# Patient Record
Sex: MALE | Race: WHITE | NOT HISPANIC OR LATINO | Employment: OTHER | URBAN - METROPOLITAN AREA
[De-identification: names, ages, dates, MRNs, and addresses within clinical notes are randomized per-mention and may not be internally consistent; named-entity substitution may affect disease eponyms.]

---

## 2017-01-06 ENCOUNTER — ALLSCRIPTS OFFICE VISIT (OUTPATIENT)
Dept: OTHER | Facility: OTHER | Age: 82
End: 2017-01-06

## 2017-02-07 ENCOUNTER — ALLSCRIPTS OFFICE VISIT (OUTPATIENT)
Dept: OTHER | Facility: OTHER | Age: 82
End: 2017-02-07

## 2017-05-03 ENCOUNTER — ALLSCRIPTS OFFICE VISIT (OUTPATIENT)
Dept: OTHER | Facility: OTHER | Age: 82
End: 2017-05-03

## 2017-06-08 ENCOUNTER — ALLSCRIPTS OFFICE VISIT (OUTPATIENT)
Dept: OTHER | Facility: OTHER | Age: 82
End: 2017-06-08

## 2017-09-25 ENCOUNTER — ALLSCRIPTS OFFICE VISIT (OUTPATIENT)
Dept: OTHER | Facility: OTHER | Age: 82
End: 2017-09-25

## 2017-10-11 ENCOUNTER — GENERIC CONVERSION - ENCOUNTER (OUTPATIENT)
Dept: OTHER | Facility: OTHER | Age: 82
End: 2017-10-11

## 2017-10-16 ENCOUNTER — ALLSCRIPTS OFFICE VISIT (OUTPATIENT)
Dept: OTHER | Facility: OTHER | Age: 82
End: 2017-10-16

## 2017-10-25 ENCOUNTER — ALLSCRIPTS OFFICE VISIT (OUTPATIENT)
Dept: RADIOLOGY | Facility: MEDICAL CENTER | Age: 82
End: 2017-10-25
Payer: MEDICARE

## 2017-11-01 NOTE — PROGRESS NOTES
Assessment  1  Sacroiliitis (720 2) (M46 1)    Plan  COPD with acute exacerbation, Degeneration of intervertebral disc of lumbar region    · Azithromycin 250 MG Oral Tablet   Rx By: Jackie Thompson; Dispense: 0 Days ; #:6 Tablet; Refill: 0;For: COPD with acute exacerbation, Degeneration of intervertebral disc of lumbar region; VALERIA = N; Sent To: [facility] 7432; Last Updated By: Jacqui Mitchell; 10/16/2017 8:47:10 AM  Sacroiliitis    · Sacroiliac Joint Injection w/Imaging; Status:Active; Requested for:16Oct2017;    Perform:[facility]; Order Comments:Right SIJ injection; Due:89Rhm7059; Ordered;Ordered By:Lissy Wilkins Delay;   · Follow-up After Procedure Evaluation and Treatment  Follow-up  Status: Hold For -Scheduling  Requested for: 65KIY4455   Ordered; Sacroiliitis; Ordered By: Neeta Alexandre Performed:  Due: [id number]    Discussion/Summary    Mr Paola Valentino returns to the office with right low back and groin pain  He was last seen in 2015 when he was treated with a right L5 TFESI  The patient tells me that his pain was good for a while, however within the last week or so his pain has increased in it is very bothersome in his right low back, hip, right groin, and right thigh  on patient report and physical exam, the patient's symptomatology does seem to be consistent with sacroiliac mediated pain from sacroiliitis  We will schedule the patient for a a right SIJ injection to decrease any inflammatory components of the patient's pain symptoms  risks and benefits including bleeding, infection, tissue reaction, allergic reaction were discussed  Verbal and written consent were obtained  can use over-the-counter extra-strength Tylenol 2 tablets up to 3 times per day for pain as needed  PDMP  will return 4 weeks after his injection for follow-up  Patient is able to Self-Care  The treatment plan was reviewed with the patient/guardian   The patient/guardian understands and agrees with the treatment plan    Jean olivares back pain persists despite time, relative rest, activity modification and therapy  Based on the patient's symptoms and examination, I suspect that his pain is being generated by the sacroiliac joint  We will schedule the patient for intraarticular sacroiliac joint steroid injection under fluoroscopic guidance to address any inflammatory component to the pain  If the patient does not receive significant relief following the injection, it is possible that there is another pain source as the sacroiliac joint is a common pain referral site  It is also possible that the pain is being manifested by an extraarticular sacroiliac pain generator which would not be addressed with an intraarticular injection  In the office today, we reviewed the nature of SUNITA's pathology in depth using diagrams and models  We discussed the approach we would use for the sacroiliac joint injection and provided literature for home review  The patient understands the risks associated with the procedure including bleeding, infection, tissue injury, allergic reaction and paralysis and provided written and verbal consent in the office today  Chief Complaint    1  Back Pain  right low back and groin pain      History of Present Illness  Mr Radha Jauregui returns to the office with right low back and groin pain  He was last seen in 2015 when he was treated with a right L5 TFESI  The patient tells me that his pain was good for a while, however within the last week or so his pain has increased in it is very bothersome in his right low back, hip, right groin, and right thigh  the patient rates his pain 5/10, this is intermittent in nature most bothersome at night  He describes his pain as dull, aching, and sharp   He localizes his pain to his right low back over his right PSIS region with radiation into his right hip, groin, and right thigh stopping at the knee   have personally reviewed and/or updated the patient's past medical history, past surgical history, family history, social history, current medications, allergies, and vital signs today  Mee Tolbert presents with complaints of gradual onset of intermittent episodes of moderate right lower back pain, described as sharp, dull and aching, radiating to the right buttock  On a scale of 1 to 10, the patient rates the pain as 5  Symptoms are unchanged  Review of Systems   Constitutional: no fever,-- no recent weight gain-- and-- no recent weight loss  Eyes: no double vision-- and-- no blurry vision  Cardiovascular: no chest pain,-- no palpitations-- and-- no lower extremity edema  Respiratory: no complaints of shortness of breath-- and-- no wheezing  Musculoskeletal: difficulty walking-- and-- pain in extremity right hip and back  , but-- no muscle weakness,-- no joint stiffness,-- no joint swelling,-- no limb swelling-- and-- no decreased range of motion  Neurological: no dizziness,-- no difficulty swallowing,-- no memory loss,-- no loss of consciousness-- and-- no seizures  Gastrointestinal: no nausea,-- no vomiting,-- no constipation-- and-- no diarrhea  Genitourinary: no difficulty initiating urine stream,-- no genital pain-- and-- no frequent urination  Integumentary: no complaints of skin rash  Psychiatric: no depression  Endocrine: no excessive thirst,-- no adrenal disease,-- no hypothyroidism-- and-- no hyperthyroidism  Hematologic/Lymphatic: no tendency for easy bruising-- and-- no tendency for easy bleeding  Active Problems  1  Benign essential hypertension (401 1) (I10)   2  COPD (chronic obstructive pulmonary disease) (496) (J44 9)   3  COPD with acute exacerbation (491 21) (J44 1)   4  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 52) (M51 36)   5  Diastasis of rectus abdominis (728 84) (M62 08)   6  Herniated lumbar intervertebral disc (722 10) (M51 26)   7  Hyperlipidemia (272 4) (E78 5)   8  Impacted cerumen of left ear (380 4) (H61 22)   9   Impacted cerumen of left ear (380 4) (H61 22)   10  Lumbar radiculitis (724 4) (M54 16)   11  Neural foraminal stenosis of lumbar spine (724 02) (M99 83)   12  Osteoarthritis of hip (715 95) (M16 9)   13  Osteoarthritis of spine with radiculopathy, lumbar region (721 3) (M47 26)   14  Right hip pain (719 45) (M25 551)   15  Subdural hematoma (432 1) (I62 00)    Past Medical History    1  History of Abnormal EKG (794 31) (R94 31)   2  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 (V10 51)   3  History of COPD with acute exacerbation (491 21) (J44 1)   4  History of Depression screening (V79 0) (Z13 89)   5  History of Diverticulosis (562 10) (K57 90)   6  History of Flu vaccine need (V04 81) (Z23)   7  History of Flu vaccine need (V04 81) (Z23)   8  History of acute bronchitis (V12 69) (Z87 09)   9  History of acute bronchitis (V12 69) (Z87 09)   10  History of acute bronchitis (V12 69) (Z87 09)   11  History of acute sinusitis (V12 69) (Z87 09)   12  History of contact dermatitis (V13 3) (Z87 2)   13  History of eczema (V13 3) (Z87 2)   14  History of esophagitis (V12 79) (Z87 19)   15  History of fatigue (V13 89) (Z87 898)   16  History of gout (V12 29) (Z87 39)   17  History of seborrheic keratosis (V13 3) (Z87 2)   18  History of Impacted cerumen of both ears (380 4) (H61 23)   19  History of Joint pain, hip (719 45) (M25 559)   20  History of Left hip pain (719 45) (M25 552)   21  History of Medicare annual wellness visit, subsequent (V70 0) (Z00 00)   22  History of Medicare annual wellness visit, subsequent (V70 0) (Z00 00)   23  History of Nail avulsion (879 8) (S61 309A)   24  History of Need for influenza vaccination (V04 81) (Z23)   25  History of Need for prophylactic vaccination and inoculation against influenza (V04 81)  (Z23)   26  History of Neoplasm of bladder (239 4) (D49 4)   27  History of Right bundle branch block (426 4) (I45 10)   28  History of Right knee pain (719 46) (M22 561)   29  History of Screening for other and unspecified genitourinary condition (V81 6) (Z13 89)   30  History of Screening for prostate cancer (V76 44) (Z12 5)   31  History of Screening, ischemic heart disease (V81 0) (Z13 6)   32  History of Special screening examination for neoplasm of prostate (V76 44) (Z12 5)   33  History of Special screening for other neurological conditions (V80 09) (Z13 89)   34  History of Tinea cruris (110 3) (B35 6)   35  History of Verrucous keratosis (702 8) (L82 1)    Surgical History  1  History of Hernia Repair   2  History of Kidney Surgery    Family History  Mother    1  Family history of Stroke  Father    2  Family history of Alzheimer disease  Sibling    3  Family history of Cancer  Sister    4  Family history of Myocardial infarction    Social History     · Former smoker (O34 85) (K58 898)    Current Meds   1  AmLODIPine Besylate 10 MG Oral Tablet; Take 1 tablet by mouth  daily; Therapy: 35PZP7887 to (Evaluate:06Aug2017)  Requested for: 91NJP2071; Last Rx:07Feb2017 Ordered   2  Aspirin 81 MG CAPS; 1 DAILY; Therapy: (Recorded:16Oct2017) to Recorded   3  Azithromycin 250 MG Oral Tablet; TAKE 2 TABLETS ON DAY 1 THEN TAKE 1 TABLET A DAY FOR 4 DAYS; Therapy: 58TSU8359 to (Last Janece Cure)  Requested for: 80Djz6548 Ordered   4  Breo Ellipta 100-25 MCG/INH Inhalation Aerosol Powder Breath Activated; USE 1 INHALATION ONCE DAILY; Therapy: 15UVF1821 to (Last Rx:07Feb2017) Ordered   5  Losartan Potassium 100 MG Oral Tablet; Take 1 tablet by mouth  daily; Therapy: 87WMD4678 to (Evaluate:05Sep2017)  Requested for: 36ABW5745; Last Rx:07Jun2017 Ordered   6  Metoprolol Succinate ER 25 MG Oral Tablet Extended Release 24 Hour; Take 1 tablet by mouth  daily; Therapy: 83XKM0295 to (Evaluate:06Aug2017)  Requested for: 29ZLR2881; Last Rx:07Feb2017 Ordered    Allergies  1   Keppra TABS    Vitals  Vital Signs    Recorded: 58ONB3191 08:16AM   Heart Rate 64   Respiration 14   Systolic 449   Diastolic 68 Height 5 ft 9 in   Weight 222 lb    BMI Calculated 32 78   BSA Calculated 2 16   Pain Scale 5       Physical Exam   Constitutional  General appearance: Well developed, well nourished, alert, in no distress, non-toxic and no overt pain behavior  -- Endomorphic body habitus  Eyes  Sclera: anicteric  HEENT  Hearing grossly intact  Pulmonary  Respiratory effort: Even and unlabored  Psychiatric  Mood and affect: Mood and affect appropriate  Neurologic  Cranial nerves: Cranial nerves II-XII grossly intact  Musculoskeletal  Gait and station: Abnormal  -- antalgic  Lumbar/Sacral Spine examination demonstrates Lumbosacral Spine:  Tenderness: None  ROM Lumbosacral Spine: Full except as noted: Right lateral flexion was restricted-- and-- was painful  ROM Hips: Full  Foot and ankle strength was normal bilaterally  Knee strength was normal bilaterally  Hip strength was normal bilaterally  Special Tests: positive Washington's Maneuver on right, but-- negative Straight Leg Raise on right,-- negative Straight Leg Raise on left-- and-- negative Washington's Maneuver on lef        Results/Data  Procedure Flowsheet [id number] 08:46AM      Test Name Result Flag Reference   Domestic Violence Screen [id number]         Future Appointments    Date/Time Provider Specialty Site   10/25/2017 01:00 PM Vincent Hopkins DO Pain Management [facility] OUTPATIENT   11/22/2017 09:45 AM MIAH Rosenbaum Pain Management [facility] 15       Signatures   Electronically signed by : MIAH Antonio; Oct 16 2017  8:36AM EST                       (Author)    Electronically signed by : Reena Gardiner DO; Oct 16 2017  4:21PM EST

## 2017-11-22 ENCOUNTER — ALLSCRIPTS OFFICE VISIT (OUTPATIENT)
Dept: OTHER | Facility: OTHER | Age: 82
End: 2017-11-22

## 2017-11-23 NOTE — PROGRESS NOTES
Assessment    1  Sacroiliitis (720 2) (M46 1)    Plan  Sacroiliitis    · Follow-up PRN Evaluation and Treatment  Follow-up  Status: Complete  Done:22Nov2017   Ordered; Sacroiliitis; Ordered By: [employer] Performed:  Due: 19IBU4417    Discussion/Summary    The patient presents today for a follow-up office visit  The patient is currently being treated for his sacroiliitis  The patient is status post a right sacroiliac joint injection on October 25, 2017 with Dr Nichole Mitchell, and reports that he is experiencing great relief of his pain symptoms as a result of the injection  discussed with the patient that since there has been significant improvement in the pain symptoms that we will hold off on any repeat injections at this point in time  However, I reviewed with the patient that if his symptoms should return, worsen, and/or experience new pain symptoms, he should call our office to schedule an office visit to discuss repeating the injection  PDMP was reviewed today and was appropriateup on an as-needed basis  Patient is able to Self-Care  The treatment plan was reviewed with the patient/guardian  The patient/guardian understands and agrees with the treatment plan      Chief Complaint    1  Pain  right low back and groin pain; improved      History of Present Illness  The patient presents today for a follow-up office visit  The patient is currently being treated for his sacroiliitis  The patient is status post a right sacroiliac joint injection on October 25, 2017 with Dr Nichole Mitchell, and reports that he is experiencing great relief of his pain symptoms as a result of the injection  the patient rates his pain 0/10  He does get occasional pain described as burning and even sometimes on the left side as well as the right mainly when he is standing and walking   Otherwise, the patient tells me he is getting around pretty well   have personally reviewed and/or updated the patient's past medical history, past surgical history, family history, social history, current medications, allergies, and vital signs today  Vitor Olivera presents with complaints of occasional episodes of left lower back and left hip pain, described as burning  The patient reports no pain  Symptoms are improving  Review of Systems   Constitutional: no fever,-- no recent weight gain-- and-- no recent weight loss  Eyes: no double vision-- and-- no blurry vision  Cardiovascular: no chest pain,-- no palpitations-- and-- no lower extremity edema  Respiratory: no complaints of shortness of breath-- and-- no wheezing  Musculoskeletal: difficulty walking, but-- no muscle weakness,-- no joint stiffness,-- no joint swelling,-- no limb swelling,-- no pain in extremity-- and-- no decreased range of motion  Neurological: no dizziness,-- no difficulty swallowing,-- no memory loss,-- no loss of consciousness-- and-- no seizures  Gastrointestinal: no nausea,-- no vomiting,-- no constipation-- and-- no diarrhea  Genitourinary: no difficulty initiating urine stream,-- no genital pain-- and-- no frequent urination  Integumentary: no complaints of skin rash  Psychiatric: no depression  Endocrine: no excessive thirst,-- no adrenal disease,-- no hypothyroidism-- and-- no hyperthyroidism  Hematologic/Lymphatic: no tendency for easy bruising-- and-- no tendency for easy bleeding  Active Problems  1  Benign essential hypertension (401 1) (I10)   2  COPD (chronic obstructive pulmonary disease) (496) (J44 9)   3  COPD with acute exacerbation (491 21) (J44 1)   4  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 52) (M51 36)   5  Diastasis of rectus abdominis (728 84) (M62 08)   6  Herniated lumbar intervertebral disc (722 10) (M51 26)   7  Hyperlipidemia (272 4) (E78 5)   8  Impacted cerumen of left ear (380 4) (H61 22)   9  Impacted cerumen of left ear (380 4) (H61 22)   10  Lumbar radiculitis (724 4) (M54 16)   11   Need for influenza vaccination (V04 81) (Z23)   12  Neural foraminal stenosis of lumbar spine (724 02) (M99 83)   13  Osteoarthritis of hip (715 95) (M16 9)   14  Osteoarthritis of spine with radiculopathy, lumbar region (721 3) (M47 26)   15  Right hip pain (719 45) (M25 551)   16  Sacroiliitis (720 2) (M46 1)   17  Subdural hematoma (432 1) (I62 00)    Past Medical History    1  History of Abnormal EKG (794 31) (R94 31)   2  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 (V10 51)   3  History of COPD with acute exacerbation (491 21) (J44 1)   4  History of Depression screening (V79 0) (Z13 89)   5  History of Diverticulosis (562 10) (K57 90)   6  History of Flu vaccine need (V04 81) (Z23)   7  History of Flu vaccine need (V04 81) (Z23)   8  History of acute bronchitis (V12 69) (Z87 09)   9  History of acute bronchitis (V12 69) (Z87 09)   10  History of acute bronchitis (V12 69) (Z87 09)   11  History of acute sinusitis (V12 69) (Z87 09)   12  History of contact dermatitis (V13 3) (Z87 2)   13  History of eczema (V13 3) (Z87 2)   14  History of esophagitis (V12 79) (Z87 19)   15  History of fatigue (V13 89) (Z87 898)   16  History of gout (V12 29) (Z87 39)   17  History of seborrheic keratosis (V13 3) (Z87 2)   18  History of Impacted cerumen of both ears (380 4) (H61 23)   19  History of Joint pain, hip (719 45) (M25 559)   20  History of Left hip pain (719 45) (M25 552)   21  History of Medicare annual wellness visit, subsequent (V70 0) (Z00 00)   22  History of Medicare annual wellness visit, subsequent (V70 0) (Z00 00)   23  History of Nail avulsion (879 8) (S61 309A)   24  History of Need for influenza vaccination (V04 81) (Z23)   25  History of Need for prophylactic vaccination and inoculation against influenza (V04 81)  (Z23)   26  History of Neoplasm of bladder (239 4) (D49 4)   27  History of Right bundle branch block (426 4) (I45 10)   28  History of Right knee pain (479 46) (M24 561)   29   History of Screening for other and unspecified genitourinary condition (V81 6) (Z13 89)   30  History of Screening for prostate cancer (V76 44) (Z12 5)   31  History of Screening, ischemic heart disease (V81 0) (Z13 6)   32  History of Special screening examination for neoplasm of prostate (V76 44) (Z12 5)   33  History of Special screening for other neurological conditions (V80 09) (Z13 89)   34  History of Tinea cruris (110 3) (B35 6)   35  History of Verrucous keratosis (702 8) (L82 1)    Surgical History  1  History of Hernia Repair   2  History of Kidney Surgery    Family History  Mother    1  Family history of Stroke  Father    2  Family history of Alzheimer disease  Sibling    3  Family history of Cancer  Sister    4  Family history of Myocardial infarction    Social History     · Former smoker (N85 44) (I72 157)    Current Meds   1  AmLODIPine Besylate 10 MG Oral Tablet; Take 1 tablet by mouth  daily; Therapy: 37NOM2336 to (Evaluate:28Jan2018)  Requested for: 96CGZ6083; Last Rx:30Oct2017 Ordered   2  Aspirin 81 MG CAPS; 1 DAILY; Therapy: (Recorded:16Oct2017) to Recorded   3  Breo Ellipta 100-25 MCG/INH Inhalation Aerosol Powder Breath Activated; USE 1 INHALATION ONCE DAILY; Therapy: 66TXD7548 to (Last Rx:40Fff4451) Ordered   4  Losartan Potassium 100 MG Oral Tablet; Take 1 tablet by mouth  daily; Therapy: 38Ook3801 to (Evaluate:28Jan2018)  Requested for: 30Oct2017; Last Rx:30Oct2017 Ordered   5  Metoprolol Succinate ER 25 MG Oral Tablet Extended Release 24 Hour; Take 1 tablet by mouth  daily; Therapy: 71SGI1704 to (Evaluate:28Jan2018)  Requested for: 41TEF0173; Last Rx:30Oct2017 Ordered    Allergies  1  Keppra TABS    Vitals  Vital Signs    Recorded: 22Nov2017 10:10AM   Temperature 20 4 F   Systolic 823   Diastolic 80   Weight 608 lb    BMI Calculated 32 78   BSA Calculated 2 16   Pain Scale 0       Physical Exam   Constitutional  General appearance: Well developed, well nourished, alert, in no distress, non-toxic and no overt pain behavior  -- Endomorphic body habitus  Eyes  Sclera: anicteric  HEENT  Hearing grossly intact  Pulmonary  Respiratory effort: Even and unlabored  Psychiatric  Mood and affect: Mood and affect appropriate  Neurologic  Cranial nerves: Cranial nerves II-XII grossly intact  Musculoskeletal  Gait and station: Normal    Lumbar/Sacral Spine examination demonstrates Lumbosacral Spine:  Tenderness: None  ROM Lumbosacral Spine: Full  Right lateral flexion was not restricted-- and-- was painless  ROM Hips: Full  Foot and ankle strength was normal bilaterally  Knee strength was normal bilaterally  Hip strength was normal bilaterally  Results/Data  * MRI Lumbar Spine Without Contrast 89Mxc5823 09:16AM Adline Challenger     Test Name Result Flag Reference   MRI LSpine W/O (Report)       No Address;  09/11/2015 0935  09/11/2015 1015  NONE   MRI LUMBAR SPINE WITHOUT CONTRAST   INDICATION- Right worse than left hip and leg pain  History of  bladder carcinoma  COMPARISON- None  TECHNIQUE- Sagittal T1, sagittal T2, sagittal inversion recovery,  axial T1 and axial T2, coronal T2     IMAGE QUALITY- Diagnostic   FINDINGS-   ALIGNMENT- Slight smooth levoscoliosis of the lumbar spine  No  compression fracture  No spondylolisthesis or spondylolysis  MARROW SIGNAL- Heterogeneous marrow signal related to small scattered  hemangiomas and scattered Modic type II endplate marrow degenerative  change  DISTAL CORD AND CONUS- Normal size and signal within the distal cord  and conus  The conus ends at the L2 level  PARASPINAL SOFT TISSUES- Exophytic simple appearing cyst arising from  the lower pole of the right kidney medially  There is a similar cyst  anteriorly and laterally which is incompletely included on this exam    SACRUM- Normal signal within the sacrum  No evidence of insufficiency  or stress fracture     LOWER THORACIC DISC SPACES- Mild lower thoracic degenerative disc  disease without disc herniation, canal stenosis or foraminal narrowing  LUMBAR DISC SPACES- Disc desiccation and loss of disc height at every  lumbar level, most pronounced at L2-3 and L3-4  Annular bulging with  endplate and facet hypertrophic degenerative change  L1-L2- Mild canal stenosis and foraminal narrowing  L2-L3- Moderate annular bulging with moderate canal stenosis and mild  foraminal narrowing  L3-L4- Endplate hypertrophic degenerative change  Partial fusion of  the endplates laterally on the right  Facet degenerative change  Mild  to moderate canal stenosis with mild foraminal narrowing  L4-L5- Moderate diffuse annular bulging with a small broad-based left  foraminal/lateral disc extrusion  Mild facet arthropathy  Mild to  moderate canal stenosis  Mild left and moderate right foraminal  narrowing  L5-S1- Diffuse annular bulging with small broad-based central disc  protrusion  Moderate facet hypertrophic degenerative change  Mild to  moderate canal stenosis  Severe bilateral foraminal narrowing  IMPRESSION-   Lumbar spondylytic degenerative change diffusely  Severe foraminal  narrowing at L5-S1 with compression of the exiting nerves  Correlate  for corresponding radiculopathy  Moderate foraminal narrowing noted on  the right at L4-5  Bilateral renal cysts       Transcribed on- [address] MARLO Bose DO  [place] Radiologist- [address] MARLO Lopez DO  Electronically [facility], RAD DO  Released Date Time- 09/11/15 1454  ------------------------------------------------------------------------------  [id number]  [id number] [facility]     Future Appointments    Date/Time Provider Specialty Site   11/28/2017 09:30 AM Sinai Brooks DO [facility]   Electronically signed by : Nagi Lu [facility]; Nov 22 2017 10:22AM EST                       (Author)    Electronically signed by : Christie Cheng DO; Nov 22 2017 12:23PM EST

## 2017-11-28 ENCOUNTER — ALLSCRIPTS OFFICE VISIT (OUTPATIENT)
Dept: OTHER | Facility: OTHER | Age: 82
End: 2017-11-28

## 2017-11-28 ENCOUNTER — GENERIC CONVERSION - ENCOUNTER (OUTPATIENT)
Dept: OTHER | Facility: OTHER | Age: 82
End: 2017-11-28

## 2017-11-28 ENCOUNTER — APPOINTMENT (OUTPATIENT)
Dept: LAB | Facility: HOSPITAL | Age: 82
End: 2017-11-28
Payer: MEDICARE

## 2017-11-28 DIAGNOSIS — L98.9 DISORDER OF SKIN OR SUBCUTANEOUS TISSUE: ICD-10-CM

## 2017-11-28 PROCEDURE — 88305 TISSUE EXAM BY PATHOLOGIST: CPT

## 2017-12-04 LAB
A/G RATIO (HISTORICAL): 1.3 (CALC) (ref 1–2.5)
ALBUMIN SERPL BCP-MCNC: 3.9 G/DL (ref 3.6–5.1)
ALP SERPL-CCNC: 60 U/L (ref 40–115)
ALT SERPL W P-5'-P-CCNC: 20 U/L (ref 9–46)
AST SERPL W P-5'-P-CCNC: 20 U/L (ref 10–35)
BILIRUB SERPL-MCNC: 0.6 MG/DL (ref 0.2–1.2)
BUN SERPL-MCNC: 15 MG/DL (ref 7–25)
BUN/CREA RATIO (HISTORICAL): ABNORMAL (CALC) (ref 6–22)
CALCIUM (ADJUSTED FOR ALBUMIN) (HISTORICAL): 9.7 MG/DL (CALC) (ref 8.6–10.2)
CALCIUM SERPL-MCNC: 9.3 MG/DL (ref 8.6–10.3)
CHLORIDE SERPL-SCNC: 104 MMOL/L (ref 98–110)
CHOLEST SERPL-MCNC: 167 MG/DL
CHOLEST/HDLC SERPL: 4.3 (CALC)
CO2 SERPL-SCNC: 29 MMOL/L (ref 20–31)
CREAT SERPL-MCNC: 0.93 MG/DL (ref 0.7–1.11)
DEPRECATED RDW RBC AUTO: 13.5 % (ref 11–15)
EGFR AFRICAN AMERICAN (HISTORICAL): 88 ML/MIN/1.73M2
EGFR-AMERICAN CALC (HISTORICAL): 76 ML/MIN/1.73M2
GAMMA GLOBULIN (HISTORICAL): 2.9 G/DL (CALC) (ref 1.9–3.7)
GLUCOSE (HISTORICAL): 104 MG/DL (ref 65–99)
HCT VFR BLD AUTO: 45.1 % (ref 38.5–50)
HDLC SERPL-MCNC: 39 MG/DL
HEPATITIS C ANTIBODY (HISTORICAL): NORMAL
HGB BLD-MCNC: 15 G/DL (ref 13.2–17.1)
LDL CHOLESTEROL (HISTORICAL): 100 MG/DL (CALC)
MCH RBC QN AUTO: 30.2 PG (ref 27–33)
MCHC RBC AUTO-ENTMCNC: 33.3 G/DL (ref 32–36)
MCV RBC AUTO: 90.7 FL (ref 80–100)
NON-HDL-CHOL (CHOL-HDL) (HISTORICAL): 128 MG/DL (CALC)
PLATELET # BLD AUTO: 239 THOUSAND/UL (ref 140–400)
PMV BLD AUTO: 10.9 FL (ref 7.5–12.5)
POTASSIUM SERPL-SCNC: 4.5 MMOL/L (ref 3.5–5.3)
PROSTATE SPECIFIC ANTIGEN TOTAL (HISTORICAL): 3.9 NG/ML
RBC # BLD AUTO: 4.97 MILLION/UL (ref 4.2–5.8)
SIGNAL TO CUT-OFF (HISTORICAL): 0.03
SODIUM SERPL-SCNC: 141 MMOL/L (ref 135–146)
TOTAL PROTEIN (HISTORICAL): 6.8 G/DL (ref 6.1–8.1)
TRIGL SERPL-MCNC: 180 MG/DL
TSH SERPL DL<=0.05 MIU/L-ACNC: 2.41 MIU/L (ref 0.4–4.5)
WBC # BLD AUTO: 7.5 THOUSAND/UL (ref 3.8–10.8)

## 2017-12-05 ENCOUNTER — GENERIC CONVERSION - ENCOUNTER (OUTPATIENT)
Dept: OTHER | Facility: OTHER | Age: 82
End: 2017-12-05

## 2018-01-09 ENCOUNTER — ALLSCRIPTS OFFICE VISIT (OUTPATIENT)
Dept: OTHER | Facility: OTHER | Age: 83
End: 2018-01-09

## 2018-01-10 NOTE — PROGRESS NOTES
Assessment    1  Benign essential hypertension (401 1) (I10)   2  Hyperlipidemia (272 4) (E78 5)   3  COPD with acute exacerbation (491 21) (J44 1)   4  Lumbar radiculitis (724 4) (M54 16)   5  Screening for prostate cancer (V76 44) (Z12 5)    Plan  Benign essential hypertension    · Metoprolol Succinate ER 25 MG Oral Tablet Extended Release 24 Hour; TAKE  ONE tablet(s) DAILY  Benign essential hypertension, COPD with acute exacerbation, Hyperlipidemia, Lumbar  radiculitis    · Debrox 6 5 % Otic Solution; READ AND FOLLOW 'S  INSTRUCTIONS ON BOX   · (Q) CBC (INCLUDES DIFF/PLT) (REFL); Status:Active; Requested OJI:[id number];    · (Q) COMPREHENSIVE METABOLIC PNL W/ADJUSTED CALCIUM; Status:Active; Requested for:28Jan2016;    · (Q) LIPID PANEL WITH REFLEX TO DIRECT LDL; Status:Active; Requested  for:28Jan2016;    · (Q) TSH, 3RD GENERATION W/REFLEX TO FT4; Status:Active; Requested  KGX:[id number];   Screening for prostate cancer    · (Q) PSA, TOTAL WITH REFLEX TO PSA, FREE; Status:Active; Requested QQX:[id number];     Discussion/Summary    Pt is a [de-identified] yo M  1  HTN - BP appears well controlled  Continue with tx of metoprolol, ARB, and CCB  Check CBC, CMP, FLP, tSH and PSA  2  Dyslipidemia - Last FLP was very stable  Continue with diet low in fat/cholesterol  3  COPD - Sx appear well stable  he has not needed to use his Symbicort recently  4  Lumbar Radiculitis - Pt f/u with Pain mgmt  Has had lumbar spinal injections  Continue with PRN use of Meloxicam  F/u in 6 months  Chief Complaint  pt her for a medication check up      History of Present Illness  The patient is here today for a follow-up visit  His hypertension is primary, but stable  the patient is adherent with his medication regimen  He denies medication side effects  Medication(s): a beta blocking agent, an angiotensin receptor blocker and a calcium channel blocker  He has no significant interval events     Symptoms: The patient denies any symptoms currently  denies chest pain, denies intermittent leg claudication, denies dyspnea, denies lower extremity edema, denies exercise intolerance, denies numbness of the feet, denies foot pain, denies a foot ulcer, denies visual impairment and denies muscle pain  Associated symptoms include no polyuria, no focal neurologic deficits, no palpitations, no headache and no polydipsia  Lifestyle and Disease Management:      Review of Systems    Constitutional: no fever, not feeling poorly, no chills and not feeling tired  Eyes: no eyesight problems and no purulent discharge from the eyes  ENT: no sore throat and no nasal discharge  Cardiovascular: no chest pain and no palpitations  Respiratory: no cough and no shortness of breath during exertion  Gastrointestinal: no nausea and no diarrhea  Genitourinary: no dysuria and no nocturia  Musculoskeletal: no arthralgias and no myalgias  Integumentary: no rashes and no skin lesions  Neurological: no headache, no numbness and no dizziness  Psychiatric: no anxiety  Active Problems    1  Acute bronchitis (466 0) (J20 9)   2  Benign essential hypertension (401 1) (I10)   3  COPD with acute exacerbation (491 21) (J44 1)   4  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 52) (M51 36)   5  Flu vaccine need (V04 81) (Z23)   6  History of Former smoker (V15 82) (K22 814)   7  Herniated lumbar intervertebral disc (722 10) (M51 26)   8  Hyperlipidemia (272 4) (E78 5)   9  Lumbar radiculitis (724 4) (M54 16)   10  Neural foraminal stenosis of lumbar spine (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 02) (M99 83)   11  Osteoarthritis of hip (715 95) (M16 9)   12  Osteoarthritis of spine with radiculopathy, lumbar region (721 3) (M47 26)   13  Right hip pain (719 45) (M25 551)   14  Seborrheic keratosis (702 19) (L82 1)   15  Verrucous keratosis (702 8) (L98 8)    Past Medical History    1  History of Abnormal EKG (794 31) (R94 31)   2   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 (V10 51)   3  History of Diverticulosis (562 10) (K57 90)   4  History of acute bronchitis (V12 69) (Z87 09)   5  History of acute bronchitis (V12 69) (Z87 09)   6  History of acute sinusitis (V12 69) (Z87 09)   7  History of contact dermatitis (V13 3) (Z87 2)   8  History of eczema (V13 3) (Z87 2)   9  History of esophagitis (V12 79) (Z87 19)   10  History of fatigue (V13 89) (Z87 898)   11  History of gout (V12 29) (Z87 39)   12  History of Impacted cerumen of both ears (380 4) (H61 23)   13  History of Joint pain, hip (719 45) (M25 559)   14  History of Left hip pain (719 45) (M25 552)   15  History of Medicare annual wellness visit, subsequent (V70 0) (Z00 00)   16  History of Nail avulsion (879 8) (S61 309A)   17  History of Need for influenza vaccination (V04 81) (Z23)   18  History of Need for prophylactic vaccination and inoculation against influenza (V04 81)    (Z23)   19  History of Neoplasm of bladder (239 4) (D49 4)   20  History of Right bundle branch block (426 4) (I45 10)   21  History of Right knee pain (719 46) (M25 561)   22  History of Screening, ischemic heart disease (V81 0) (Z13 6)   23  History of Special screening examination for neoplasm of prostate (V76 44) (Z12 5)   24  History of Tinea cruris (110 3) (B35 6)    The active problems and past medical history were reviewed and updated today  Surgical History    1  History of Hernia Repair   2  History of Kidney Surgery    The surgical history was reviewed and updated today  Family History    1  Family history of Stroke    2  Family history of Alzheimer disease    3  Family history of Cancer    4  Family history of Myocardial infarction    The family history was reviewed and updated today  Social History    · History of Former smoker (V13 03) (W58 303)   · Former smoker (Q64 34) (R98 176)  The social history was reviewed and updated today  The social history was reviewed and is unchanged  Current Meds   1  AmLODIPine Besylate 10 MG Oral Tablet; TAKE ONE tablet(s) DAILY; Therapy: 66ZDP1868 to (Zena Gomezveland)  Requested for: 34QDT8921; Last   Rx:57Wbm4421 Ordered   2  Losartan Potassium 100 MG Oral Tablet; TAKE ONE tablet(s) DAILY; Therapy: 34NDL7497 to (Li [place])  Requested for: 21IBM3734; Last   Rx:78Ggf2759 Ordered   3  Meloxicam 15 MG Oral Tablet; TAKE 1 TABLET DAILY AS NEEDED FOR PAIN WITH   FOOD; Therapy: 85RQS6625 to (Last Rx:41Yks0576)  Requested for: 46RUC4533 Ordered   4  Metoprolol Succinate ER 25 MG Oral Tablet Extended Release 24 Hour; TAKE ONE   tablet(s) DAILY; Therapy: 61ZNN9006 to (Evelio Mitchell)  Requested for: 57KTV6011; Last   Rx:70Zyr1177 Ordered   5  SB Low Dose ASA EC 81 MG Oral Tablet Delayed Release; TAKE 1 TABLET DAILY; Therapy: 07Php3730 to (Evaluate:11Gob1992); Last Rx:31Epk8350 Ordered   6  Symbicort 160-4 5 MCG/ACT Inhalation Aerosol; INHALE 2 PUFFS TWICE DAILY  RINSE   MOUTH AFTER USE; Therapy: 58PMB0833 to (Venkat Ayers)  Requested for: 25Jun2015 Ordered    The medication list was reviewed and updated today  Allergies    1  No Known Drug Allergies    Vitals  Vital Signs [Data Includes: Current Encounter]    Recorded: 01VVB3521 08:49AM   Temperature 97 5 F, Tympanic   Heart Rate 63   Respiration 17   Systolic 006   Diastolic 60   Height 5 ft 10 in   Weight 215 lb 7 04 oz   BMI Calculated 30 91   BSA Calculated 2 16   O2 Saturation 96     Physical Exam    Constitutional   General appearance: No acute distress, well appearing and well nourished  Eyes   Conjunctiva and lids: No swelling, erythema, or discharge  Pupils and irises: Equal, round and reactive to light  Ears, Nose, Mouth, and Throat   External inspection of ears and nose: Normal     Otoscopic examination: Tympanic membrance translucent with normal light reflex  Canals patent without erythema  Nasal mucosa, septum, and turbinates: Normal without edema or erythema      Oropharynx: Normal with no erythema, edema, exudate or lesions  Pulmonary   Respiratory effort: No increased work of breathing or signs of respiratory distress  Auscultation of lungs: Clear to auscultation, equal breath sounds bilaterally, no wheezes, no rales, no rhonci  Cardiovascular   Auscultation of heart: Normal rate and rhythm, normal S1 and S2, without murmurs  Examination of extremities for edema and/or varicosities: Normal     Abdomen   Abdomen: Non-tender, no masses  Liver and spleen: No hepatomegaly or splenomegaly  Musculoskeletal   Gait and station: Normal     Inspection/palpation of joints, bones, and muscles: Normal     Neurologic   Cranial nerves: Cranial nerves 2-12 intact  Sensation: No sensory loss  Psychiatric   Orientation to person, place and time: Normal     Mood and affect: Normal          Signatures   Electronically signed by :  Keira Pereira DO; Jan 28 2016  9:27AM EST                       (Author)

## 2018-01-10 NOTE — MISCELLANEOUS
Message   Recorded as Task   Date: 10/11/2017 10:01 AM, Created By: Shannon Romero   Task Name: Miscellaneous   Assigned To: Leana Restrepo   Regarding Patient: Jovanna Nogueira, Status: Active   Comment:    Leana Restrepo - 11 Oct 2017 10:01 AM     TASK CREATED  Pt called requesting an injection  Pt was last seen by you on 12/7/15  It is for the same problem -- back pain  He has not seen a pain specialist since he last saw you  He said his back pain is getting worse and he had to crawl out of bed this morning  Would you like to see pt for an office visit or can he have an injection? Pt can be reached on cell # [phone]  Rachana Thomas - 11 Oct 2017 11:59 AM     TASK REPLIED TO: Previously Assigned To Rachana Thomas                      yes needs office visit first   can be with me or NP, first available   Leana Restrepo - 11 Oct 2017 1:32 PM     TASK EDITED  Appt scheduled with Mick Churchill on 10/16/17 at 8:15am         Active Problems    1  Benign essential hypertension (401 1) (I10)   2  COPD (chronic obstructive pulmonary disease) (496) (J44 9)   3  COPD with acute exacerbation (491 21) (J44 1)   4  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 52) (M51 36)   5  Diastasis of rectus abdominis (728 84) (M62 08)   6  Herniated lumbar intervertebral disc (722 10) (M51 26)   7  Hyperlipidemia (272 4) (E78 5)   8  Impacted cerumen of left ear (380 4) (H61 22)   9  Impacted cerumen of left ear (380 4) (H61 22)   10  Lumbar radiculitis (724 4) (M54 16)   11  Neural foraminal stenosis of lumbar spine (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 02) (M99 83)   12  Osteoarthritis of hip (715 95) (M16 9)   13  Osteoarthritis of spine with radiculopathy, lumbar region (721 3) (M47 26)   14  Right hip pain (719 45) (M25 551)   15  Subdural hematoma (432 1) (I62 00)    Current Meds   1  AmLODIPine Besylate 10 MG Oral Tablet; Take 1 tablet by mouth  daily;    Therapy: 53QZW7657 to (Evaluate:98Cgf0250)  Requested for: 69VYC7460; Last   Rx:07Feb2017 Ordered   2  Azithromycin 250 MG Oral Tablet; TAKE 2 TABLETS ON DAY 1 THEN TAKE 1 TABLET A   DAY FOR 4 DAYS; Therapy: 55AES1639 to (Venkat Pisano)  Requested for: 79Afg5263 Ordered   3  Breo Ellipta 100-25 MCG/INH Inhalation Aerosol Powder Breath Activated; USE 1   INHALATION ONCE DAILY; Therapy: 41QLN9522 to (Last Rx:07Feb2017) Ordered   4  Losartan Potassium 100 MG Oral Tablet; Take 1 tablet by mouth  daily; Therapy: 01NFA5160 to (Evaluate:19Asr4605)  Requested for: 28ZXV8143; Last   Rx:07Jun2017 Ordered   5  Metoprolol Succinate ER 25 MG Oral Tablet Extended Release 24 Hour; Take 1 tablet by   mouth  daily; Therapy: 58MXE7157 to (Evaluate:28Gbn5515)  Requested for: 16GTZ5456; Last   Rx:07Feb2017 Ordered   6  Montelukast Sodium 10 MG Oral Tablet; TAKE 1 TABLET DAILY; Therapy: 53FMK5021 to (Rodolfo Conner)  Requested for: 94KBD2876; Last   Rx:08Jun2017 Ordered   7  PredniSONE 10 MG Oral Tablet; 6 x1 day 5x 1 day 4 x 2 days 3 x 2 days 2   x 2 days 1x 2 days; Therapy: 71JMA8434 to (77 873 135)  Requested for: 28NMU9572; Last   Rx:25Sep2017 Ordered    Allergies    1   Keppra TABS    Signatures   Electronically signed by : Ramiro Jackson, ; Oct 11 2017  1:33PM EST                       (Author)

## 2018-01-10 NOTE — PROGRESS NOTES
Assessment   1  Benign essential hypertension (401 1) (I10)   2  Hyperlipidemia (272 4) (E78 5)   3  COPD (chronic obstructive pulmonary disease) (496) (J22 9)    Plan   PMH: COPD with acute exacerbation    · Breo Ellipta 100-25 MCG/INH Inhalation Aerosol Powder Breath Activated; USE 1    INHALATION ONCE DAILY    Discussion/Summary      Patient is a 14-year-old male COPD -appears stable today  Continue with current use of Breo  Hypertension - patient's blood pressure appears stable today  Current treatment of losartan, metoprolol as well as amlodipine  Hyperlipidemia - reviewed patient's previous FLP with him  His cholesterol levels appear mildly elevated today  Specifically, his triglycerides have increased to 180  He was encouraged to maintain a strict low-fat/low-cholesterol diet  Chief Complaint   Pt presents for medication f/u  History of Present Illness   Patient is a 14-year-old male presents today for follow-up his chronic conditions  He has hypertension  , hyperlipidemia, and chronic COPD(stable)  He has been taking his medications regularly and tolerating them very well  Denies adverse reactions to his medications  Review of Systems        Constitutional: not feeling poorly-- and-- not feeling tired  Eyes: no eyesight problems-- and-- no purulent discharge from the eyes  ENT: no sore throat-- and-- no nasal discharge  Cardiovascular: no chest pain-- and-- no palpitations  Respiratory: no cough-- and-- no shortness of breath during exertion  Gastrointestinal: no nausea-- and-- no diarrhea  Genitourinary: no dysuria-- and-- no nocturia  Musculoskeletal: no arthralgias-- and-- no myalgias  Integumentary: no rashes-- and-- no skin lesions  Neurological: no headache,-- no numbness-- and-- no dizziness  Psychiatric: no anxiety-- and-- no depression  Endocrine: no muscle weakness-- and-- no erectile dysfunction        Hematologic/Lymphatic: no tendency for easy bleeding-- and-- no tendency for easy bruising  Active Problems   1  Benign essential hypertension (401 1) (I10)   2  COPD (chronic obstructive pulmonary disease) (496) (J44 9)   3  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 52) (M51 36)   4  Diastasis of rectus abdominis (728 84) (M62 08)   5  Herniated lumbar intervertebral disc (722 10) (M51 26)   6  Hyperlipidemia (272 4) (E78 5)   7  Impacted cerumen of left ear (380 4) (H61 22)   8  Impacted cerumen of left ear (380 4) (H61 22)   9  Inflamed seborrheic keratosis (702 11) (L82 0)   10  Lumbar radiculitis (724 4) (M54 16)   11  Need for hepatitis C screening test (V73 89) (Z11 59)   12  Need for influenza vaccination (V04 81) (Z23)   13  Need for vaccination with 13-polyvalent pneumococcal conjugate vaccine (V03 82) (Z23)   14  Neural foraminal stenosis of lumbar spine (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 02) (M99 83)   15  Osteoarthritis of hip (715 95) (M16 9)   16  Osteoarthritis of spine with radiculopathy, lumbar region (721 3) (M47 26)   17  Prostate cancer screening (V76 44) (Z12 5)   18  Right hip pain (719 45) (M25 551)   19  Sacroiliitis (720 2) (M46 1)   20  Screening for diabetes mellitus (DM) (V77 1) (Z13 1)   21  Screening for thyroid disorder (V77 0) (Z13 29)   22  Subdural hematoma (432 1) (I62 00)    Past Medical History   1  History of Abnormal EKG (794 31) (R94 31)   2  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 (V10 51)   3  History of COPD with acute exacerbation (491 21) (J44 1)   4  History of Depression screening (V79 0) (Z13 89)   5  History of Diverticulosis (562 10) (K57 90)   6  History of Flu vaccine need (V04 81) (Z23)   7  History of Flu vaccine need (V04 81) (Z23)   8  History of acute bronchitis (V12 69) (Z87 09)   9  History of acute bronchitis (V12 69) (Z87 09)   10  History of acute bronchitis (V12 69) (Z87 09)   11  History of acute sinusitis (V12 69) (Z87 09)   12   History of contact dermatitis (V13 3) (Z87 2)   13  History of eczema (V13 3) (Z87 2)   14  History of esophagitis (V12 79) (Z87 19)   15  History of fatigue (V13 89) (Z87 898)   16  History of gout (V12 29) (Z87 39)   17  History of seborrheic keratosis (V13 3) (Z87 2)   18  History of Impacted cerumen of both ears (380 4) (H61 23)   19  History of Joint pain, hip (719 45) (M25 559)   20  History of Left hip pain (719 45) (M25 552)   21  History of Medicare annual wellness visit, subsequent (V70 0) (Z00 00)   22  History of Medicare annual wellness visit, subsequent (V70 0) (Z00 00)   23  History of Nail avulsion (879 8) (S61 309A)   24  History of Need for influenza vaccination (V04 81) (Z23)   25  History of Need for prophylactic vaccination and inoculation against influenza (V04 81)      (Z23)   26  History of Neoplasm of bladder (239 4) (D49 4)   27  History of Right bundle branch block (426 4) (I45 10)   28  History of Right knee pain (719 46) (M25 561)   29  History of Screening for other and unspecified genitourinary condition (V81 6) (Z13 89)   30  History of Screening for prostate cancer (V76 44) (Z12 5)   31  History of Screening, ischemic heart disease (V81 0) (Z13 6)   32  History of Special screening examination for neoplasm of prostate (V76 44) (Z12 5)   33  History of Special screening for other neurological conditions (V80 09) (Z13 89)   34  History of Tinea cruris (110 3) (B35 6)   35  History of Verrucous keratosis (702 8) (L82 1)     The active problems and past medical history were reviewed and updated today  Surgical History   1  History of Hernia Repair   2  History of Kidney Surgery     The surgical history was reviewed and updated today  Family History   Mother    1  Family history of Stroke  Father    2  Family history of Alzheimer disease  Sibling    3  Family history of Cancer  Sister    4  Family history of Myocardial infarction     The family history was reviewed and updated today         Social History    · Former smoker (L63 94) (K10 188)  The social history was reviewed and updated today  The social history was reviewed and is unchanged  Current Meds    1  AmLODIPine Besylate 10 MG Oral Tablet; Take 1 tablet by mouth  daily; Therapy: 63ORC8367 to (Evaluate:28Mar2018)  Requested for: 28Dec2017; Last     Rx:64Qxi3808 Ordered   2  Aspirin 81 MG CAPS; 1 DAILY; Therapy: (Recorded:16Oct2017) to Recorded   3  Breo Ellipta 100-25 MCG/INH Inhalation Aerosol Powder Breath Activated; USE 1     INHALATION ONCE DAILY; Therapy: 66PLN8151 to (Last Rx:48Xea2542) Ordered   4  Losartan Potassium 100 MG Oral Tablet; Take 1 tablet by mouth  daily; Therapy: 72HNO7278 to (Evaluate:28Mar2018)  Requested for: 28Dec2017; Last     Rx:31Bag2334 Ordered   5  Metoprolol Succinate ER 25 MG Oral Tablet Extended Release 24 Hour; Take 1 tablet by     mouth  daily; Therapy: 36AOZ0543 to (Evaluate:28Mar2018)  Requested for: 28Dec2017; Last     Rx:01Wpu6228 Ordered     The medication list was reviewed and updated today  Allergies   1  Keppra TABS    Vitals   Vital Signs    Recorded: 79MRK8210 10:23AM   Temperature 97 2 F, Tympanic   Heart Rate 62   Pulse Quality Normal   Respiration 16   Systolic 956, LUE, Sitting   Diastolic 74, LUE, Sitting   Height 5 ft 9 in   Weight 225 lb 7 oz   BMI Calculated 33 29   BSA Calculated 2 17   O2 Saturation 98, RA   Pain Scale 0     Physical Exam        Constitutional      General appearance: No acute distress, well appearing and well nourished  Eyes      Conjunctiva and lids: No swelling, erythema, or discharge  Pupils and irises: Equal, round and reactive to light  Ears, Nose, Mouth, and Throat      External inspection of ears and nose: Normal        Nasal mucosa, septum, and turbinates: Normal without edema or erythema  Oropharynx: Normal with no erythema, edema, exudate or lesions         Pulmonary      Respiratory effort: No increased work of breathing or signs of respiratory distress  Auscultation of lungs: Clear to auscultation, equal breath sounds bilaterally, no wheezes, no rales, no rhonci  Cardiovascular      Auscultation of heart: Normal rate and rhythm, normal S1 and S2, without murmurs  Examination of extremities for edema and/or varicosities: Normal        Abdomen      Abdomen: Non-tender, no masses  Liver and spleen: No hepatomegaly or splenomegaly  Lymphatic      Palpation of lymph nodes in neck: No lymphadenopathy  Musculoskeletal      Gait and station: Normal        Inspection/palpation of joints, bones, and muscles: Normal        Neurologic      Cranial nerves: Cranial nerves 2-12 intact  Sensation: No sensory loss  Psychiatric      Orientation to person, place and time: Normal        Mood and affect: Normal           Signatures    Electronically signed by :  Yohan Abdullahi DO; Jan 9 2018 10:30PM EST                       (Author)

## 2018-01-12 VITALS
HEART RATE: 63 BPM | BODY MASS INDEX: 31.33 KG/M2 | WEIGHT: 211.56 LBS | HEIGHT: 69 IN | TEMPERATURE: 97.2 F | SYSTOLIC BLOOD PRESSURE: 110 MMHG | OXYGEN SATURATION: 97 % | RESPIRATION RATE: 15 BRPM | DIASTOLIC BLOOD PRESSURE: 50 MMHG

## 2018-01-12 VITALS
SYSTOLIC BLOOD PRESSURE: 120 MMHG | DIASTOLIC BLOOD PRESSURE: 80 MMHG | TEMPERATURE: 97.2 F | BODY MASS INDEX: 31.85 KG/M2 | WEIGHT: 222 LBS

## 2018-01-13 VITALS
OXYGEN SATURATION: 94 % | SYSTOLIC BLOOD PRESSURE: 120 MMHG | HEART RATE: 58 BPM | BODY MASS INDEX: 32.95 KG/M2 | TEMPERATURE: 98.1 F | RESPIRATION RATE: 17 BRPM | HEIGHT: 69 IN | WEIGHT: 222.44 LBS | DIASTOLIC BLOOD PRESSURE: 78 MMHG

## 2018-01-13 VITALS
HEART RATE: 80 BPM | DIASTOLIC BLOOD PRESSURE: 78 MMHG | OXYGEN SATURATION: 96 % | WEIGHT: 215 LBS | HEIGHT: 69 IN | TEMPERATURE: 95.8 F | BODY MASS INDEX: 31.84 KG/M2 | SYSTOLIC BLOOD PRESSURE: 122 MMHG | RESPIRATION RATE: 16 BRPM

## 2018-01-14 VITALS
TEMPERATURE: 96.5 F | HEIGHT: 69 IN | DIASTOLIC BLOOD PRESSURE: 70 MMHG | OXYGEN SATURATION: 96 % | WEIGHT: 222.06 LBS | SYSTOLIC BLOOD PRESSURE: 120 MMHG | RESPIRATION RATE: 18 BRPM | HEART RATE: 64 BPM | BODY MASS INDEX: 32.89 KG/M2

## 2018-01-14 VITALS
HEIGHT: 69 IN | BODY MASS INDEX: 32.88 KG/M2 | SYSTOLIC BLOOD PRESSURE: 102 MMHG | WEIGHT: 222 LBS | HEART RATE: 64 BPM | DIASTOLIC BLOOD PRESSURE: 68 MMHG | RESPIRATION RATE: 14 BRPM

## 2018-01-14 VITALS
TEMPERATURE: 97.5 F | OXYGEN SATURATION: 93 % | RESPIRATION RATE: 16 BRPM | HEIGHT: 69 IN | WEIGHT: 219.06 LBS | BODY MASS INDEX: 32.44 KG/M2 | HEART RATE: 62 BPM

## 2018-01-22 VITALS
TEMPERATURE: 96 F | RESPIRATION RATE: 16 BRPM | WEIGHT: 223.56 LBS | HEIGHT: 69 IN | DIASTOLIC BLOOD PRESSURE: 60 MMHG | HEART RATE: 68 BPM | SYSTOLIC BLOOD PRESSURE: 130 MMHG | BODY MASS INDEX: 33.11 KG/M2 | OXYGEN SATURATION: 98 %

## 2018-01-22 VITALS
SYSTOLIC BLOOD PRESSURE: 124 MMHG | OXYGEN SATURATION: 98 % | WEIGHT: 225.44 LBS | TEMPERATURE: 97.2 F | BODY MASS INDEX: 33.39 KG/M2 | HEIGHT: 69 IN | DIASTOLIC BLOOD PRESSURE: 74 MMHG | RESPIRATION RATE: 16 BRPM | HEART RATE: 62 BPM

## 2018-01-23 NOTE — RESULT NOTES
Verified Results  (Q) CBC (H/H, RBC, INDICES, WBC, PLT) [id number] 08:31AM [facility]     Test Name Result Flag Reference   WHITE BLOOD CELL COUNT 7 5 Thousand/uL  3 8-10 8   RED BLOOD CELL COUNT 4 97 Million/uL  4 20-5 80   HEMOGLOBIN 15 0 g/dL  13 2-17 1   HEMATOCRIT 45 1 %  38 5-50 0   MCV 90 7 fL  80 0-100 0   MCH 30 2 pg  27 0-33 0   MCHC 33 3 g/dL  32 0-36 0   RDW 13 5 %  11 0-15 0   PLATELET COUNT 681 Thousand/uL  140-400   MPV 10 9 fL  7 5-12 5     (Q) COMPREHENSIVE METABOLIC PNL W/ADJUSTED CALCIUM [id number] 08:31AM [facility]     Test Name Result Flag Reference   GLUCOSE 104 mg/dL H 65-99   Fasting reference interval     For someone without known diabetes, a glucose value  between 100 and 125 mg/dL is consistent with  prediabetes and should be confirmed with a  follow-up test    UREA NITROGEN (BUN) 15 mg/dL  7-25   CREATININE 0 93 mg/dL  0 70-1 11   For patients >52years of age, the reference limit  for Creatinine is approximately 13% higher for people  identified as -American  eGFR NON-AFR   AMERICAN 76 mL/min/1 73m2  > OR = 60   eGFR AFRICAN AMERICAN 88 mL/min/1 73m2  > OR = 60   BUN/CREATININE RATIO   6-22   NOT APPLICABLE (calc)   SODIUM 141 mmol/L  135-146   POTASSIUM 4 5 mmol/L  3 5-5 3   CHLORIDE 104 mmol/L     CARBON DIOXIDE 29 mmol/L  20-31   CALCIUM 9 3 mg/dL  8 6-10 3   CALCIUM (ADJUSTED FOR$ALBUMIN) 9 7 mg/dL (calc)  8 6-10 2   PROTEIN, TOTAL 6 8 g/dL  6 1-8 1   ALBUMIN 3 9 g/dL  3 6-5 1   GLOBULIN 2 9 g/dL (calc)  1 9-3 7   ALBUMIN/GLOBULIN RATIO 1 3 (calc)  1 0-2 5   BILIRUBIN, TOTAL 0 6 mg/dL  0 2-1 2   ALKALINE PHOSPHATASE 60 U/L     AST 20 U/L  10-35   ALT 20 U/L  9-46     (Q) LIPID PANEL WITH REFLEX TO DIRECT LDL [id number] 08:31AM [facility]     Test Name Result Flag Reference   CHOLESTEROL, TOTAL 167 mg/dL  <200   HDL CHOLESTEROL 39 mg/dL L >08   TRIGLICERIDES 491 mg/dL H <150   LDL-CHOLESTEROL 100 mg/dL (calc) H    Reference range: <100     Desirable range <100 mg/dL for patients with CHD or  diabetes and <70 mg/dL for diabetic patients with  known heart disease  LDL-C is now calculated using the Diego-Brown   calculation, which is a validated novel method providing   better accuracy than the Friedewald equation in the   estimation of LDL-C  Jer Hernandez  Chaparrita Patel  7251;541(30): 8075-2011   (http://X2IMPACT/faq/AZO726)   CHOL/HDLC RATIO 4 3 (calc)  <5 0   NON HDL CHOLESTEROL 128 mg/dL (calc)  <130   For patients with diabetes plus 1 major ASCVD risk   factor, treating to a non-HDL-C goal of <100 mg/dL   (LDL-C of <70 mg/dL) is considered a therapeutic   option  (Q) PSA, TOTAL WITH REFLEX TO PSA, FREE [id number] 08:31AM [facility]     Test Name Result Flag Reference   TOTAL PSA 3 9 ng/mL  < OR = 4 0   The Total PSA value from this assay system is   standardized against the equimolar PSA standard  The test result will be approximately 20% higher   when compared to the [facility] Total PSA   (Siemens assay)  Comparison of serial PSA results   should be interpreted with this fact in mind  PSA was performed using the Prince Hiren   Immunoassay method  Values obtained from different   assay methods cannot be used interchangeably  PSA   levels, regardless of value, should not be   interpreted as absolute evidence of the presence or   absence of disease       (Q) TSH, 3RD GENERATION W/REFLEX TO FT4 [id number] 08:31AM [facility]   REPORT COMMENT:  FASTING:YES     Test Name Result Flag Reference   TSH W/REFLEX TO FT4 2 41 mIU/L  0 40-4 50     (Q) HEPATITIS C ANTIBODY [id number] 08:31AM [facility]     Test Name Result Flag Reference   HEPATITIS C ANTIBODY NON-REACTIVE  NON-REACTIVE   SIGNAL TO CUT-OFF 0 03  <1 00     (1) TISSUE EXAM [id number] 01:02PM [facility] Order Number: [id number]     Test Name Result Flag Reference   LAB AP CASE REPORT (Report)     Surgical Pathology Report             Case: [id number]                   Authorizing Provider: Yohan Abdullahi DO     Collected:      11/28/2017 1302        Ordering Location:   [address] [place] [address]   Received:      11/30/2017 [address]  [facility]                                           Laboratory                                   Pathologist:      Bharti Leavitt MD                                Specimen:  Skin, Other, Left cheek   LAB AP FINAL DIAGNOSIS (Report)     A  Skin, Left cheek, shave biopsy:  - Seborrheic keratosis, inflamed/irritated/lichenoid and hyperkeratotic  Interpretation performed at [place] [address]  Electronically signed by Bharti Leavitt MD on 12/5/2017 at 1:57 PM   LAB AP SURGICAL ADDITIONAL INFORMATION (Report)     All controls performed with the immunohistochemical stains reported above   reacted appropriately  These tests were developed and their performance   characteristics determined by [facility] or   [facility]  They may not be cleared or approved by the [place]  Food and Drug Administration  The FDA has determined that such clearance   or approval is not necessary  These tests are used for clinical purposes  They should not be regarded as investigational or for research  This   laboratory has been approved by [facility], designated as a high-complexity   laboratory and is qualified to perform these tests  LAB AP GROSS DESCRIPTION (Report)     A  The specimen is received in formalin, labeled with the patient's name   and hospital number, and is designated left cheek  The specimen consists   of a tan skin shave measuring 1 2 x 0 8 x 0 1 cm  The epithelial surface   exhibits a tan papule measuring 1 0 x 0 7 x 0 2 cm which is located less   than 1 mm from the nearest peripheral margin and abuts both ends of   resection  The apparent margin of resection is painted with green ink     Entirely submitted in 2 cassettes with the ends of resection in cassette 1   and the trisected center in cassette 2 with the papule in both cassettes  Note: The estimated total formalin fixation time based upon information   provided by the submitting clinician and the standard processing schedule   is over 72 hours   AKemmerer   LAB AP CLINICAL INFORMATION       Order Number: [id number]  Nodular skin lesion over left cheek

## 2018-02-20 ENCOUNTER — TELEPHONE (OUTPATIENT)
Dept: FAMILY MEDICINE CLINIC | Facility: CLINIC | Age: 83
End: 2018-02-20

## 2018-02-20 NOTE — TELEPHONE ENCOUNTER
Charan Crews from Dr Kimball's office requested the results of patient's PSA faxed to [phone] Pt's blood work was faxed on 2/20/18 at 2:36pm

## 2018-02-23 ENCOUNTER — TELEPHONE (OUTPATIENT)
Dept: FAMILY MEDICINE CLINIC | Facility: CLINIC | Age: 83
End: 2018-02-23

## 2018-02-26 NOTE — TELEPHONE ENCOUNTER
Spoke with patient, he currently has been having increasing urinary symptoms  He states that he will be continued follow-up with his urologist and will eventually be started on a new medication for this problem  He was advised to continue with his regular follow-up  Due to his glaucoma, discontinue his use of Breo  Patient states that he never had much benefit from this medication  If he is noticing persistent shortness of breath, consider other alternate treatment

## 2018-02-27 DIAGNOSIS — I10 BENIGN ESSENTIAL HYPERTENSION: Primary | ICD-10-CM

## 2018-02-27 PROBLEM — S06.5X9A SUBDURAL HEMATOMA (HCC): Status: ACTIVE | Noted: 2017-01-06

## 2018-02-27 PROBLEM — J44.9 COPD (CHRONIC OBSTRUCTIVE PULMONARY DISEASE) (HCC): Status: ACTIVE | Noted: 2017-06-08

## 2018-02-27 PROBLEM — S06.5XAA SUBDURAL HEMATOMA: Status: ACTIVE | Noted: 2017-01-06

## 2018-02-27 PROBLEM — M46.1 SACROILIITIS (HCC): Status: ACTIVE | Noted: 2017-10-16

## 2018-02-27 RX ORDER — AMLODIPINE BESYLATE 10 MG/1
TABLET ORAL
Qty: 90 TABLET | Refills: 1 | Status: SHIPPED | OUTPATIENT
Start: 2018-02-27 | End: 2018-07-09

## 2018-02-27 RX ORDER — METOPROLOL SUCCINATE 25 MG/1
TABLET, EXTENDED RELEASE ORAL
Qty: 90 TABLET | Refills: 1 | Status: SHIPPED | OUTPATIENT
Start: 2018-02-27 | End: 2018-08-07 | Stop reason: SDUPTHER

## 2018-02-27 RX ORDER — LOSARTAN POTASSIUM 100 MG/1
TABLET ORAL
Qty: 90 TABLET | Refills: 1 | Status: SHIPPED | OUTPATIENT
Start: 2018-02-27 | End: 2018-08-07 | Stop reason: SDUPTHER

## 2018-03-07 NOTE — PROCEDURES
Procedure      Indication: Low back/buttock pain  Preoperative Diagnosis: 1  Sacroiliac Joint Pain  2  Sacroiliitis  Postoperative Diagnosis: 1  Sacroiliac Joint Pain  2  Sacroiliitis  Procedure:  Fluoroscopically-guided injection of the right sacroiliac joint(s)    After discussing the risks, benefits, and alternatives to the procedure, the patient expressed understanding and wished to proceed  The patient was brought to the fluoroscopy suite and placed in the prone position  Procedural pause conducted to verify: correct patient identity, procedure to be performed and as applicable, correct side and site, correct patient position, and availability of implants, special equipment and special requirements  Using fluoroscopy, the inferior portion of the sacroiliac joint was identified  The skin was sterilely prepped and draped in the usual fashion using Chloraprep skin prep  The skin and subcutaneous tissue were anesthetized with 0 5% buffered lidocaine  Using fluoroscopic guidance, a 3 5 inch 22 gauge spinal needle was advanced into joint  Proper needle positioning was confirmed using multiple fluoroscopic views  After negative aspiration, Omnipaque 300 contrast was injected, showing intraarticular spread of contrast without any evidence of intravascular uptake  A 2 5 mL solution consisting of 40 mg of Depo-Medrol in 0 25% bupivacaine was injected slowly and incrementally into the joint  Following the injection, the needle was withdrawn slightly and flushed with lidocaine as it was fully extracted  The patient tolerated the procedure well and there were no apparent complications  After appropriate observation, the patient was dismissed from the clinic in good condition under their own power                 Signatures   Electronically signed by : Mary Ellen Patton DO; Oct 25 2017  1:13PM EST                       (Author)

## 2018-07-02 ENCOUNTER — TELEPHONE (OUTPATIENT)
Dept: FAMILY MEDICINE CLINIC | Facility: CLINIC | Age: 83
End: 2018-07-02

## 2018-07-02 DIAGNOSIS — E78.2 MIXED HYPERLIPIDEMIA: ICD-10-CM

## 2018-07-02 DIAGNOSIS — I10 BENIGN ESSENTIAL HYPERTENSION: Primary | ICD-10-CM

## 2018-07-04 LAB
ALBUMIN SERPL-MCNC: 4.1 G/DL (ref 3.6–5.1)
ALBUMIN/GLOB SERPL: 1.6 (CALC) (ref 1–2.5)
ALP SERPL-CCNC: 51 U/L (ref 40–115)
ALT SERPL-CCNC: 17 U/L (ref 9–46)
AST SERPL-CCNC: 19 U/L (ref 10–35)
BILIRUB SERPL-MCNC: 0.8 MG/DL (ref 0.2–1.2)
BUN SERPL-MCNC: 13 MG/DL (ref 7–25)
BUN/CREAT SERPL: ABNORMAL (CALC) (ref 6–22)
CALCIUM SERPL-MCNC: 9.2 MG/DL (ref 8.6–10.3)
CHLORIDE SERPL-SCNC: 103 MMOL/L (ref 98–110)
CHOLEST SERPL-MCNC: 162 MG/DL
CHOLEST/HDLC SERPL: 4 (CALC)
CO2 SERPL-SCNC: 28 MMOL/L (ref 20–31)
CREAT SERPL-MCNC: 1 MG/DL (ref 0.7–1.11)
GLOBULIN SER CALC-MCNC: 2.6 G/DL (CALC) (ref 1.9–3.7)
GLUCOSE SERPL-MCNC: 106 MG/DL (ref 65–99)
HDLC SERPL-MCNC: 41 MG/DL
LDLC SERPL CALC-MCNC: 91 MG/DL (CALC)
NONHDLC SERPL-MCNC: 121 MG/DL (CALC)
POTASSIUM SERPL-SCNC: 4.4 MMOL/L (ref 3.5–5.3)
PROT SERPL-MCNC: 6.7 G/DL (ref 6.1–8.1)
SL AMB EGFR AFRICAN AMERICAN: 81 ML/MIN/1.73M2
SL AMB EGFR NON AFRICAN AMERICAN: 70 ML/MIN/1.73M2
SODIUM SERPL-SCNC: 140 MMOL/L (ref 135–146)
TRIGL SERPL-MCNC: 207 MG/DL
TSH SERPL-ACNC: 1.34 MIU/L (ref 0.4–4.5)

## 2018-07-09 ENCOUNTER — OFFICE VISIT (OUTPATIENT)
Dept: FAMILY MEDICINE CLINIC | Facility: CLINIC | Age: 83
End: 2018-07-09
Payer: MEDICARE

## 2018-07-09 VITALS
SYSTOLIC BLOOD PRESSURE: 140 MMHG | RESPIRATION RATE: 16 BRPM | TEMPERATURE: 97 F | WEIGHT: 227 LBS | HEIGHT: 69 IN | OXYGEN SATURATION: 93 % | BODY MASS INDEX: 33.62 KG/M2 | HEART RATE: 67 BPM | DIASTOLIC BLOOD PRESSURE: 80 MMHG

## 2018-07-09 DIAGNOSIS — J44.9 CHRONIC OBSTRUCTIVE PULMONARY DISEASE, UNSPECIFIED COPD TYPE (HCC): ICD-10-CM

## 2018-07-09 DIAGNOSIS — I10 BENIGN ESSENTIAL HYPERTENSION: ICD-10-CM

## 2018-07-09 DIAGNOSIS — Z00.00 MEDICARE ANNUAL WELLNESS VISIT, SUBSEQUENT: Primary | ICD-10-CM

## 2018-07-09 DIAGNOSIS — S06.5X9A SUBDURAL HEMATOMA (HCC): ICD-10-CM

## 2018-07-09 PROCEDURE — G0439 PPPS, SUBSEQ VISIT: HCPCS | Performed by: FAMILY MEDICINE

## 2018-07-09 PROCEDURE — 99214 OFFICE O/P EST MOD 30 MIN: CPT | Performed by: FAMILY MEDICINE

## 2018-07-09 RX ORDER — FELODIPINE 10 MG/1
10 TABLET, EXTENDED RELEASE ORAL DAILY
Qty: 90 TABLET | Refills: 1 | Status: SHIPPED | OUTPATIENT
Start: 2018-07-09 | End: 2019-03-04 | Stop reason: ALTCHOICE

## 2018-07-09 NOTE — PROGRESS NOTES
Assessment/Plan:   1  Benign essential hypertension  Reviewed blood pressure with patient today  His blood pressures appeared stable  He states he has been noticing mild dry mouth  He would like to consider switching to a different calcium channel blocker  Will start treatment with floated Pean 10 mg daily  Continue checking blood pressure regularly at home  If he notices elevations, will consider further treatment options  - felodipine (PLENDIL) 10 MG 24 hr tablet; Take 1 tablet (10 mg total) by mouth daily  Dispense: 90 tablet; Refill: 1    2  Chronic obstructive pulmonary disease, unspecified COPD type ([facility] [address] )  COPD very is a were well controlled today  Continue with routine monitoring  If any symptoms worsen, consider following up immediately for further evaluation of controlling inhalers  - Ambulatory referral to Pulmonology; Future    3  Subdural hematoma (HCC)  The patient appears clinically stable today  Continue with strict blood pressure control as well as use of a baby aspirin  Diagnoses and all orders for this visit:    Medicare annual wellness visit, subsequent    Other orders  -     bimatoprost (LUMIGAN) 0 01 % ophthalmic drops; Administer 1 drop to both eyes daily at bedtime          Subjective:    Chief Complaint   Patient presents with    Medicare Wellness Visit    Follow-up        Patient ID: Maldonado Mendiola is a 80 y o  male  Patient is a 58-year-old male presents today for follow-up on his chronic conditions  He has hypertension, COPD, as well as a previous subdural hematoma  He has been taking his medications regularly  Denies adverse reactions with his medications  Review of Systems   Constitutional: Negative for activity change, chills, fatigue and fever  HENT: Negative for congestion, ear pain, sinus pressure and sore throat  Eyes: Negative for redness, itching and visual disturbance  Respiratory: Negative for cough and shortness of breath  Cardiovascular: Negative for chest pain and palpitations  Gastrointestinal: Negative for abdominal pain, diarrhea and nausea  Endocrine: Negative for cold intolerance and heat intolerance  Genitourinary: Negative for dysuria, flank pain and frequency  Musculoskeletal: Negative for arthralgias, back pain, gait problem and myalgias  Skin: Negative for color change  Allergic/Immunologic: Negative for environmental allergies  Neurological: Negative for dizziness, numbness and headaches  Psychiatric/Behavioral: Negative for behavioral problems and sleep disturbance  The following portions of the patient's history were reviewed and updated as appropriate : past family history, past medical history, past social history and past surgical history  Current Outpatient Prescriptions:     amLODIPine (NORVASC) 10 mg tablet, TAKE 1 TABLET BY MOUTH  DAILY, Disp: 90 tablet, Rfl: 1    bimatoprost (LUMIGAN) 0 01 % ophthalmic drops, Administer 1 drop to both eyes daily at bedtime, Disp: , Rfl:     losartan (COZAAR) 100 MG tablet, TAKE 1 TABLET BY MOUTH  DAILY, Disp: 90 tablet, Rfl: 1    metoprolol succinate (TOPROL-XL) 25 mg 24 hr tablet, TAKE 1 TABLET BY MOUTH  DAILY, Disp: 90 tablet, Rfl: 1    Objective:    Vitals:    07/09/18 0929   BP: 140/80   BP Location: Left arm   Patient Position: Sitting   Cuff Size: Adult   Pulse: 67   Resp: 16   Temp: (!) 97 °F (36 1 °C)   TempSrc: Tympanic   SpO2: 93%   Weight: 103 kg (227 lb)   Height: 5' 9" (1 753 m)        Physical Exam   Constitutional: He is oriented to person, place, and time  He appears well-developed and well-nourished  HENT:   Head: Normocephalic and atraumatic  Nose: Nose normal    Mouth/Throat: No oropharyngeal exudate  Eyes: Pupils are equal, round, and reactive to light  Right eye exhibits no discharge  Left eye exhibits no discharge  Neck: Normal range of motion  Neck supple  No tracheal deviation present     Cardiovascular: Normal rate, regular rhythm and intact distal pulses  Exam reveals no gallop and no friction rub  No murmur heard  Pulses:       Dorsalis pedis pulses are 2+ on the right side, and 2+ on the left side  Posterior tibial pulses are 2+ on the right side, and 2+ on the left side  Pulmonary/Chest: Effort normal and breath sounds normal  No respiratory distress  He has no wheezes  He has no rales  Abdominal: Soft  Bowel sounds are normal  He exhibits no distension  There is no tenderness  There is no rebound and no guarding  Musculoskeletal: Normal range of motion  He exhibits no edema  Lymphadenopathy:        Head (right side): No submental and no submandibular adenopathy present  Head (left side): No submental and no submandibular adenopathy present  He has no cervical adenopathy  Right cervical: No superficial cervical, no deep cervical and no posterior cervical adenopathy present  Left cervical: No superficial cervical, no deep cervical and no posterior cervical adenopathy present  Neurological: He is alert and oriented to person, place, and time  No cranial nerve deficit or sensory deficit  Skin: Skin is warm, dry and intact  Psychiatric: His speech is normal and behavior is normal  Judgment normal  His mood appears not anxious  Cognition and memory are normal  He does not exhibit a depressed mood  Vitals reviewed

## 2018-07-09 NOTE — PATIENT INSTRUCTIONS
[facility]  Please follow the instructions below to securely access your online medical record  [employer] allows you to send messages to your doctor, view your test results, renew your prescriptions, schedule appointments, and more  [address] uses Single Sign on (SSO) Technology for you to log in and access our [facility]  JaciRaoul, including [employer]  No more remembering multiple user names and passwords! We are going to guide you through, step by step, to help you set up your [employer] account which will provide access to your [employer] account  How Do I Sign Up? 1  In your Internet browser, go to [email or web address]       2  Click on the [facility] patient account and then click Dont have an                 Account? Create one now      3  Enter your demographic information and chose a user name (email address) and password  Think of one that is secure and easy to remember  Enter a Referral code if you have one (this is not your P[employer]Palhart code ) Accept the Terms and Conditions and the Privacy Policy  4  Select your security questions that you will use to reset your password should you forget it  Click Submit  5  Enter your [employer] Activation Code exactly as it appears below  You will not need to use this code after you have completed the sign-up process  If you do not sign up before the expiration date, you must request a new code  [employer] Activation Code: Activation code not generated  Current [employer] Status: Patient Declined    6  Confirm your email address  An email confirmation was sent to you  Please open that email and click Confirm your Email   You should then be redirected to our [employer] Single sign on page, where you will log on with the user name and password you created! Proceed to the [employer] Icon to view your personal health information          Additional Information  If you have questions, you can e-mail patient  [email or web address] or call [phone] to talk to our customer support staff  Remember, MyChart is NOT to be used for urgent needs  For medical emergencies, dial 911

## 2018-07-09 NOTE — PROGRESS NOTES
Assessment and Plan:    Problem List Items Addressed This Visit     None      Visit Diagnoses     Medicare annual wellness visit, subsequent    -  Primary        Health Maintenance Due   Topic Date Due    DTaP,Tdap,and Td Vaccines (1 - Tdap) 11/18/1956    GLAUCOMA SCREENING 72 + YR  11/18/2002         HPI:  Bishop Thompson is a 80 y o  male here for his Subsequent Wellness Visit      Patient Active Problem List   Diagnosis    Benign essential hypertension    COPD (chronic obstructive pulmonary disease) (HCC)    Degeneration of intervertebral disc of lumbar region    Diastasis of rectus abdominis    Herniated lumbar intervertebral disc    Hyperlipidemia    Lumbar radiculitis    Neural foraminal stenosis of lumbar spine    Subdural hematoma (HCC)    Sacroiliitis ([address] )     Past Medical History:   Diagnosis Date    Abnormal EKG     last assessed: 11/25/2014    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 last assessed: 10/22/2012    COPD with acute exacerbation [facility] [address] )     last assessed: 2/7/2017    Diverticulosis     Eczema     last assessed: 4/30/2013    Esophagitis     Gout     last assessed: 5/19/2014    Nail avulsion of toe     Neoplasm of bladder     last assessed: 4/30/2013     Past Surgical History:   Procedure Laterality Date    HERNIA REPAIR      last assessed: 11/25/2014    KIDNEY SURGERY      description: removal of kidney stone last assessed: 11/25/2014     Family History   Problem Relation Age of Onset    Stroke Mother     Alzheimer's disease Father     Heart attack Sister     Cancer Family      History   Smoking Status    Former Smoker   Smokeless Tobacco    Never Used     History   Alcohol Use No      History   Drug Use No       Current Outpatient Prescriptions   Medication Sig Dispense Refill    amLODIPine (NORVASC) 10 mg tablet TAKE 1 TABLET BY MOUTH  DAILY 90 tablet 1    bimatoprost (LUMIGAN) 0 01 % ophthalmic drops Administer 1 drop to both eyes daily at bedtime      losartan (COZAAR) 100 MG tablet TAKE 1 TABLET BY MOUTH  DAILY 90 tablet 1    metoprolol succinate (TOPROL-XL) 25 mg 24 hr tablet TAKE 1 TABLET BY MOUTH  DAILY 90 tablet 1     No current facility-administered medications for this visit        Allergies   Allergen Reactions    Levetiracetam Confusion and Anxiety     Immunization History   Administered Date(s) Administered    H1N1, All Formulations 02/02/2010    Influenza 11/01/2014, 11/05/2015, 01/06/2017, 11/20/2017    Influenza Split High Dose Preservative Free IM 11/01/2014, 11/05/2015, 01/06/2017, 11/20/2017    Influenza TIV (IM) 09/21/2012, 09/21/2012, 11/04/2013    Pneumococcal Conjugate 13-Valent 11/28/2017    Pneumococcal Polysaccharide PPV23 1935, 10/03/2001    Zoster 12/01/2014       Patient Care Team:  Hermelinda Pierre, DO as PCP - General  Audrey Jain, DO Mtailda De La O, MIAH Benson, DO      Medicare Screening Tests and Risk Assessments:  AWV Clinical     ISAR:   Previous hospitalizations?:  No       Once in a Lifetime Medicare Screening:       Medicare Screening Tests and Risk Assessment:   AAA Risk Assessment    Age over 72 (males only):  Yes    Osteoporosis Risk Assessment    :  Yes    Age over 48:  Yes    HIV Risk Assessment     Male with male sex after 65:  No   Past/present IV drug use:  No Multiple unprotected sex partners:  No   Being treated for HIV:  No Exchanges sex for money/drugs or partner who does:  No   Past/present partner HIV positive:  No Recieves care in high risk/high prevalence setting:  No   Past/present partner Bisexual:  No    Past/present partner IV drugs:  No        Drug and Alcohol Use:   Tobacco use    Cigarettes:  never smoker    Tobacco use duration    Tobacco Cessation Readiness    Alcohol use    Alcohol use:  never    Alcohol Treatment Readiness   Illicit Drug Use    Drug use:  never    Drug type:  no sedative use       Diet & Exercise:   Diet   What is your diet?: Regular   How many servings a day of the following:   Fruits and Vegetables:  1-2    Whole Grains:  2    Dairy:  2    Exercise    Do you currently exercise?:  currently not exercising       Cognitive Impairment Screening:   Depression screening preformed:  Yes     PHQ-9 Depression scale score:  1   Depression screening results:  no significant symptoms   Cognitive Impairment Screening    Do you have difficulty learning or retaining new information?:  No Do you have difficulty handling new tasks?:  No   Do you have difficulty with reasoning?:  No Do you have difficulty with spatial ability and orientation?:  No   Do you have difficulty with language?:  No Do you have difficulty with behavior?:  No       Functional Ability/Level of Safety:   Hearing    Hearing difficulties:  Yes Bilateral:  slightly decreased   Left:  slightly decreased Right:  slightly decreased   Hearing aid:  No    Hearing Impairment Assessment    Hearing status:  No impairment   Current Activities    Status:  unlimited ADL's, unlimited IADL's, unlimited social activities, unlimited driving   Help needed with the folllowing:    Using the phone:  No Transportation:  No   Shopping:  No Preparing Meals:  No   Doing Housework:  No Doing Laundry:  No   Managing Medications:  No Managing Money:  No   ADL    Fall Risk   Have you fallen in the last 12 months?:  No Are you unsteady on your feet?:  No    Are you taking any medications that may cause fatigue or dizziness?:  No    Do you rush to the bathroom potentially risking a fall?:  No   Injury History   Polypharmacy:  No Antidepressant Use:  No   Sedative Use:  No Antihypertensive Use:  No   Previous Fall:  Yes Alcohol Use:  No   Deconditioning:  No Visual Impairment:  Yes   Cogitive Impairment:  No Mmobility Impairment:  Yes   Postural Hypotension:  No Urinary Incontinence:  Yes       Home Safety:   Are there hazards in your environment?:  No   If you fell, would you need help to get back up from the ground?:  No Do you have problems or concerns getting in/out of a bed, chair, tub, or toilet?:  No   Do you feel unsteady when walking?:  No Is your activity limited by pain?:  No   Do you have handrails and grab-bars in the home?:  Yes Are emergency numbers kept by the phone and regularly updated?:  Yes   Are you and/or family members aware of the dangers of smoking in bed?:  Yes Are firearms stored securely?:  Yes   Do you have working smoke alarms and fire extinguisher?:  Yes    Have you left the stove on unsupervised?:  No    Home Safety Risk Factors   Unfamilar with surroundings:  No Uneven floors:  No   Stairs or handrail saftey risk:  No Loose rugs:  No   Household clutter:  No Poor household lighting:  No   No grab bars in bathroom:  No Further evaluation needed:  No       Advanced Directives:   Advanced Directives    Living Will:  No Durable POA for healthcare:  No   Advanced directive:  Yes    Patient's End of Life Decisions        Urinary Incontinence:   Do you have urinary incontinence?:  Yes        Glaucoma:            Provider Screening     Preventative Screening/Counseling:   Cardiovascular Screening/Counseling:   (Labs Q5 years, EKG optional one-time)         Diabetes Screening/Counseling:   (2 tests/year if Pre-Diabetes or 1 test/year if no Diabetes)         Colorectal Cancer Screening/Counseling:   (FOBT Q1 yr; Flex Sig Q4 yrs or Q10 yrs after Screening Colonoscopy; Screening Colonoscpy Q2 yrs High Risk or Q10 yrs Low Risk; Barium Enema Q2 yrs High Risk or Q4 yrs Low Risk)         Prostate Cancer Screening/Counseling:   (Annual)          Breast Cancer Screening/Counseling:   (Baseline Age 28 - 43; Annual Age 36+)         Cervical Cancer Screening/Counseling:   (Annual for High Risk or Childbearing Age with Abnormal Pap in Last 3 yrs;  Every 2 all others)         Osteoporosis Screening/Counseling:   (Every 2 Yrs if at risk or more if medically necessary)         AAA Screening/Counseling:   (Once per Lifetime with risk factors)     Age over 72 (males only):  Yes          Glaucoma Screening/Counseling:   (Annual)         HIV Screening/Counseling:   (Voluntary; Once annually for high risk OR 3 times for Pregnancy at diagnosis of IUP; 3rd trimester; and at Labor         Hepatitis C Screening:             Immunizations:        Other Preventative Couseling (Non-Medicare Wellness Visit Required):       Referrals (Non-Medicare Wellness Visit Required):       Medical Equipment/Suppliers:

## 2018-08-07 DIAGNOSIS — I10 BENIGN ESSENTIAL HYPERTENSION: ICD-10-CM

## 2018-08-07 RX ORDER — LOSARTAN POTASSIUM 100 MG/1
TABLET ORAL
Qty: 90 TABLET | Refills: 1 | Status: SHIPPED | OUTPATIENT
Start: 2018-08-07 | End: 2019-03-04 | Stop reason: SDUPTHER

## 2018-08-07 RX ORDER — METOPROLOL SUCCINATE 25 MG/1
TABLET, EXTENDED RELEASE ORAL
Qty: 90 TABLET | Refills: 1 | Status: SHIPPED | OUTPATIENT
Start: 2018-08-07 | End: 2019-03-04 | Stop reason: SDUPTHER

## 2018-08-07 RX ORDER — AMLODIPINE BESYLATE 10 MG/1
TABLET ORAL
Qty: 90 TABLET | Refills: 1 | Status: SHIPPED | OUTPATIENT
Start: 2018-08-07 | End: 2019-01-26 | Stop reason: SDUPTHER

## 2018-09-05 ENCOUNTER — OFFICE VISIT (OUTPATIENT)
Dept: FAMILY MEDICINE CLINIC | Facility: CLINIC | Age: 83
End: 2018-09-05
Payer: MEDICARE

## 2018-09-05 VITALS
HEIGHT: 68 IN | WEIGHT: 222.7 LBS | OXYGEN SATURATION: 96 % | RESPIRATION RATE: 20 BRPM | DIASTOLIC BLOOD PRESSURE: 78 MMHG | BODY MASS INDEX: 33.75 KG/M2 | SYSTOLIC BLOOD PRESSURE: 128 MMHG | TEMPERATURE: 99 F | HEART RATE: 56 BPM

## 2018-09-05 DIAGNOSIS — M25.561 ACUTE PAIN OF RIGHT KNEE: Primary | ICD-10-CM

## 2018-09-05 PROCEDURE — 99213 OFFICE O/P EST LOW 20 MIN: CPT | Performed by: FAMILY MEDICINE

## 2018-09-05 NOTE — PROGRESS NOTES
Assessment/Plan:   1  Acute pain of right knee  Symptoms appear likely secondary to possible acute strain  His pain is directly over his patella  It may likely be a quadriceps tendinitis  Will check x-ray to rule out gross abnormalities  Start rice treatment  Elevate knee at nighttime  May continue with Tylenol Arthritis  If any symptoms are worsening, he was advised to follow up immediately  - XR knee 4+ vw right injury; Future     There are no diagnoses linked to this encounter  Subjective:    Chief Complaint   Patient presents with    Follow-up     right knee pain x 2 days        Patient ID: Cassi Chopra is a 80 y o  male  Knee Pain    Incident onset: yesterday  The incident occurred at home  The injury mechanism was a direct blow (while painting closet)  The pain is present in the right knee  The quality of the pain is described as aching  The pain is at a severity of 8/10  The pain is moderate  The pain has been constant since onset  Pertinent negatives include no inability to bear weight, numbness or tingling  He has tried nothing for the symptoms  Review of Systems   Constitutional: Negative for activity change, chills, fatigue and fever  HENT: Negative for congestion, ear pain, sinus pressure and sore throat  Eyes: Negative for redness, itching and visual disturbance  Respiratory: Negative for cough and shortness of breath  Cardiovascular: Negative for chest pain and palpitations  Gastrointestinal: Negative for abdominal pain, diarrhea and nausea  Endocrine: Negative for cold intolerance and heat intolerance  Genitourinary: Negative for dysuria, flank pain and frequency  Musculoskeletal: Positive for arthralgias  Negative for back pain, gait problem and myalgias  Skin: Negative for color change  Allergic/Immunologic: Negative for environmental allergies  Neurological: Negative for dizziness, tingling, numbness and headaches     Psychiatric/Behavioral: Negative for behavioral problems and sleep disturbance  The following portions of the patient's history were reviewed and updated as appropriate : past family history, past medical history, past social history and past surgical history  Current Outpatient Prescriptions:     amLODIPine (NORVASC) 10 mg tablet, TAKE 1 TABLET BY MOUTH  DAILY, Disp: 90 tablet, Rfl: 1    bimatoprost (LUMIGAN) 0 01 % ophthalmic drops, Administer 1 drop to both eyes daily at bedtime, Disp: , Rfl:     losartan (COZAAR) 100 MG tablet, TAKE 1 TABLET BY MOUTH  DAILY, Disp: 90 tablet, Rfl: 1    metoprolol succinate (TOPROL-XL) 25 mg 24 hr tablet, TAKE 1 TABLET BY MOUTH  DAILY, Disp: 90 tablet, Rfl: 1    felodipine (PLENDIL) 10 MG 24 hr tablet, Take 1 tablet (10 mg total) by mouth daily (Patient not taking: Reported on 9/5/2018 ), Disp: 90 tablet, Rfl: 1    Objective:    Vitals:    09/05/18 1101   BP: 128/78   BP Location: Left arm   Patient Position: Sitting   Cuff Size: Adult   Pulse: 56   Resp: 20   Temp: 99 °F (37 2 °C)   TempSrc: Tympanic   SpO2: 96%   Weight: 101 kg (222 lb 11 2 oz)   Height: 5' 8 11" (1 73 m)        Physical Exam   Constitutional: He is oriented to person, place, and time  Vital signs are normal  He appears well-developed and well-nourished  HENT:   Head: Normocephalic and atraumatic  Right Ear: Hearing normal    Left Ear: Hearing normal    Nose: Nose normal  No septal deviation  Mouth/Throat: Oropharynx is clear and moist and mucous membranes are normal    Eyes: EOM and lids are normal  Pupils are equal, round, and reactive to light  Neck: Trachea normal and normal range of motion  No thyroid mass and no thyromegaly present  Cardiovascular: Normal rate  Pulmonary/Chest: Effort normal  No respiratory distress  He has no decreased breath sounds  Abdominal: Normal appearance  There is no guarding  Musculoskeletal: Normal range of motion  Right knee: He exhibits swelling   He exhibits normal range of motion  Tenderness found  Legs:  Neurological: He is alert and oriented to person, place, and time  He has normal strength  No cranial nerve deficit or sensory deficit  Skin: Skin is warm and dry  Psychiatric: He has a normal mood and affect   His speech is normal and behavior is normal  Judgment and thought content normal  Cognition and memory are normal

## 2018-09-06 ENCOUNTER — APPOINTMENT (OUTPATIENT)
Dept: RADIOLOGY | Facility: MEDICAL CENTER | Age: 83
End: 2018-09-06
Payer: MEDICARE

## 2018-09-06 DIAGNOSIS — M25.561 ACUTE PAIN OF RIGHT KNEE: ICD-10-CM

## 2018-09-06 PROCEDURE — 73564 X-RAY EXAM KNEE 4 OR MORE: CPT

## 2018-10-23 ENCOUNTER — OFFICE VISIT (OUTPATIENT)
Dept: PULMONOLOGY | Facility: CLINIC | Age: 83
End: 2018-10-23
Payer: MEDICARE

## 2018-10-23 VITALS
HEART RATE: 96 BPM | WEIGHT: 221.4 LBS | RESPIRATION RATE: 16 BRPM | BODY MASS INDEX: 31 KG/M2 | SYSTOLIC BLOOD PRESSURE: 138 MMHG | HEIGHT: 71 IN | TEMPERATURE: 98.1 F | OXYGEN SATURATION: 96 % | DIASTOLIC BLOOD PRESSURE: 70 MMHG

## 2018-10-23 DIAGNOSIS — J44.9 COPD (CHRONIC OBSTRUCTIVE PULMONARY DISEASE) (HCC): Primary | ICD-10-CM

## 2018-10-23 DIAGNOSIS — G47.33 OSA (OBSTRUCTIVE SLEEP APNEA): ICD-10-CM

## 2018-10-23 DIAGNOSIS — J44.9 CHRONIC OBSTRUCTIVE PULMONARY DISEASE, UNSPECIFIED COPD TYPE (HCC): ICD-10-CM

## 2018-10-23 DIAGNOSIS — R06.00 DOE (DYSPNEA ON EXERTION): ICD-10-CM

## 2018-10-23 PROCEDURE — 99205 OFFICE O/P NEW HI 60 MIN: CPT | Performed by: INTERNAL MEDICINE

## 2018-10-23 PROCEDURE — 94618 PULMONARY STRESS TESTING: CPT | Performed by: INTERNAL MEDICINE

## 2018-10-23 PROCEDURE — G0008 ADMIN INFLUENZA VIRUS VAC: HCPCS | Performed by: INTERNAL MEDICINE

## 2018-10-23 PROCEDURE — 90662 IIV NO PRSV INCREASED AG IM: CPT | Performed by: INTERNAL MEDICINE

## 2018-10-23 PROCEDURE — 94010 BREATHING CAPACITY TEST: CPT | Performed by: INTERNAL MEDICINE

## 2018-10-23 NOTE — PROGRESS NOTES
Pulmonary Consultation   Darnell Pantoja 80 y o  male MRN: [id number]    Encounter: [id number]      Reason for consultation:   Dyspnea on exertion and COPD  Requesting physician:  Harris Lima       Impressions:   · Dyspnea on exertion  · Chronic obstructive pulmonary disease  · Obstructive sleep apnea  · Obesity  Recommendations:  · Spirometry  · 6 minutes walk test   · Polysomnogram   · Influenza vaccine  · Chest x-ray PA and lateral   · Follow-up in 2 months  Discussion:  The patient's dyspnea on exertion most likely it is due to deconditioning  His deconditioning is because of his sedentary lifestyle and because of his arthritis which is limiting his activities  His spirometry today did not show any evidence or airflow limitation  However his previous CT scan showed emphysematous changes  Without a doubt he has COPD most likely mild emphysema  However I do not think it is clinically significant at this point in time  Also he fortunately did not desaturate on the 6 min walk test   To complete the workup I have ordered a chest x-ray PA and lateral       I have reassured the patient and informed him that he will need to start taking regular walks to improve his stamina  I am also hoping that if we diagnose the obstructive sleep apnea by the polysomnogram and start to treated with CPAP then he is level of alertness and energy will improve  He received the influenza vaccine today  I will have him come back and see me in 2 months  History of Present Illness   HPI:  Darnell Pantoja is a 80 y o  male who is here for evaluation of dyspnea on exertion  The patient has noticed the dyspnea on exertion about 2 years ago and has been worsening  He denies any significant cough or sputum production  Denies any chest pain or palpitations  Occasionally he has wheezing however it resolved spontaneously  In the past he was treated with Breo    He has not been on any inhalers for more than 6 months  He is very limited in terms of activities because of his breathing  His wife is concerned about his breathing at night  She stated that he sometimes stops breathing and she will be checking that if he was still breathing  The patient feels tired during the day and he takes a lot of naps  Review of systems:  12 point review of systems was completed and was otherwise negative except as listed in HPI  Historical Information   Past Medical History:   Diagnosis Date    Abnormal EKG     last assessed: 11/25/2014    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    COPD with acute exacerbation ([facility] [address] )     last assessed: 2/7/2017    Diverticulosis     Eczema     last assessed: 4/30/2013    Esophagitis     Gout     last assessed: 5/19/2014    Nail avulsion of toe     Neoplasm of bladder     last assessed: 4/30/2013     Past Surgical History:   Procedure Laterality Date    HERNIA REPAIR      last assessed: 11/25/2014    KIDNEY SURGERY      description: removal of kidney stone last assessed: 11/25/2014     Family History   Problem Relation Age of Onset    Stroke Mother     Alzheimer's disease Father     Heart attack Sister     Cancer Family        Family History:  Noncontributory  Social History:  The patient is  and lives with his wife  He has about 50 pack year smoking history  He was drinking alcohol excessively and has quit many years ago  He worked for [employer]   No current facility-administered medications for this visit  (Not in a hospital admission)  Allergies   Allergen Reactions    Levetiracetam Confusion and Anxiety       Vitals: Blood pressure 138/70, pulse 96, temperature 98 1 °F (36 7 °C), temperature source Tympanic, resp  rate 16, height 5' 11" (1 803 m), weight 100 kg (221 lb 6 4 oz), SpO2 96 %  ,      Physical exam:        Head/eyes:    Normocephalic, without obvious abnormality, atraumatic, PERRL, extraocular muscles intact, no scleral icterus    Nose:   Nares normal, septum midline, mucosa normal, no drainage    or sinus tenderness   Throat:   Moist mucous membranes, no thrush   Neck:   Supple, trachea midline, no adenopathy; no carotid    bruit or JVD   Lungs:     Decreased breath sounds  No wheezing or rhonchi  Heart:    Regular rate and rhythm, S1 and S2 normal, no murmur, rub   or gallop   Abdomen:     Soft, non-tender, bowel sounds active all four quadrants,     no masses, no organomegaly   Extremities:   Extremities normal, atraumatic, no cyanosis or edema   Skin:   Warm, dry, turgor normal, no rashes or lesions   Neurologic:   CNII-XII intact, normal strength, non-focal           Imaging and other studies:   CT scan of the chest from 2014 showed mild emphysematous changes  No recent imaging studies available  Spirometry done today in the office showed restrictive pattern  The patient had difficulty with testing  6 min walk test was done today in the office  The patient started with a pulse rate of 64 beats per minute and O2 saturation 93%  At the end of the test his O2 saturation remained at 93% and his heart rate increased to 87 beats per minute  He walked 324 ft    Patient complained of bilateral hip pain while having the test       Abhijeet Rothman MD

## 2018-10-29 ENCOUNTER — HOSPITAL ENCOUNTER (OUTPATIENT)
Dept: RADIOLOGY | Facility: HOSPITAL | Age: 83
Discharge: HOME | End: 2018-10-29
Attending: INTERNAL MEDICINE
Payer: MEDICARE

## 2018-10-29 DIAGNOSIS — R06.00 DOE (DYSPNEA ON EXERTION): ICD-10-CM

## 2018-10-29 PROCEDURE — 71046 X-RAY EXAM CHEST 2 VIEWS: CPT

## 2018-12-02 ENCOUNTER — HOSPITAL ENCOUNTER (OUTPATIENT)
Dept: SLEEP CENTER | Facility: CLINIC | Age: 83
Discharge: HOME | End: 2018-12-02
Payer: MEDICARE

## 2018-12-02 DIAGNOSIS — G47.33 OSA (OBSTRUCTIVE SLEEP APNEA): ICD-10-CM

## 2018-12-02 PROCEDURE — 95810 POLYSOM 6/> YRS 4/> PARAM: CPT

## 2018-12-02 PROCEDURE — 95810 POLYSOM 6/> YRS 4/> PARAM: CPT | Performed by: INTERNAL MEDICINE

## 2018-12-03 NOTE — PROGRESS NOTES
Sleep Study Documentation    Pre-Sleep Study       Sleep testing procedure explained to patient:YES    Patient napped prior to study:NO    Caffeine:Dayshift worker after 12PM   Caffeine use:NO    Alcohol:Dayshift workers after 5PM: Alcohol use:NO    Typical day for patient:YES       Study Documentation  Diagnostic   Snore:Mild  Supplemental O2: no    O2 flow rate (L/min) range n/a  O2 flow rate (L/min) final n/a  Minimum SaO2 77  Baseline SaO2 87            EKG abnormalities: no     EEG abnormalities: no    Study Terminated:no    Patient classification: retired       Post-Sleep Study    Medication used at bedtime or during sleep study:NO    Patient reports time it took to fall asleep:less than 20 minutes    Patient reports waking up during study:1 to 2 times  Patient reports returning to sleep in 10 to 30 minutes  Patient reports sleeping 4 to 6 hours without dreaming  Patient reports sleep during study:typical    Patient rated sleepiness: Not sleepy or tired    PAP treatment:no

## 2018-12-08 DIAGNOSIS — G47.33 OSA (OBSTRUCTIVE SLEEP APNEA): Primary | ICD-10-CM

## 2018-12-11 ENCOUNTER — TELEPHONE (OUTPATIENT)
Dept: SLEEP CENTER | Facility: CLINIC | Age: 83
End: 2018-12-11

## 2018-12-11 NOTE — TELEPHONE ENCOUNTER
Advised patient he has SIVA  He will f/u with Dr Paul Carrizales on 1/2/2019   Cpap titration study ordered but patient chooses to wait to see Dr Paul Carrziales

## 2019-01-02 ENCOUNTER — APPOINTMENT (OUTPATIENT)
Dept: LAB | Facility: CLINIC | Age: 84
End: 2019-01-02
Payer: MEDICARE

## 2019-01-02 ENCOUNTER — OFFICE VISIT (OUTPATIENT)
Dept: PULMONOLOGY | Facility: CLINIC | Age: 84
End: 2019-01-02
Payer: MEDICARE

## 2019-01-02 VITALS
DIASTOLIC BLOOD PRESSURE: 60 MMHG | SYSTOLIC BLOOD PRESSURE: 108 MMHG | TEMPERATURE: 97.3 F | BODY MASS INDEX: 31.22 KG/M2 | HEIGHT: 71 IN | WEIGHT: 223 LBS | HEART RATE: 59 BPM | RESPIRATION RATE: 16 BRPM | OXYGEN SATURATION: 93 %

## 2019-01-02 DIAGNOSIS — G25.81 RESTLESS LEG: ICD-10-CM

## 2019-01-02 DIAGNOSIS — G47.33 OSA (OBSTRUCTIVE SLEEP APNEA): Primary | ICD-10-CM

## 2019-01-02 DIAGNOSIS — R06.00 DOE (DYSPNEA ON EXERTION): ICD-10-CM

## 2019-01-02 LAB
FERRITIN SERPL-MCNC: 40 NG/ML (ref 8–388)
FOLATE SERPL-MCNC: >20 NG/ML (ref 3.1–17.5)
IRON SATN MFR SERPL: 24 %
IRON SERPL-MCNC: 79 UG/DL (ref 65–175)
TIBC SERPL-MCNC: 328 UG/DL (ref 250–450)
VIT B12 SERPL-MCNC: 252 PG/ML (ref 100–900)

## 2019-01-02 PROCEDURE — 82607 VITAMIN B-12: CPT

## 2019-01-02 PROCEDURE — 83540 ASSAY OF IRON: CPT

## 2019-01-02 PROCEDURE — 99215 OFFICE O/P EST HI 40 MIN: CPT | Performed by: INTERNAL MEDICINE

## 2019-01-02 PROCEDURE — 82746 ASSAY OF FOLIC ACID SERUM: CPT

## 2019-01-02 PROCEDURE — 36415 COLL VENOUS BLD VENIPUNCTURE: CPT

## 2019-01-02 PROCEDURE — 82728 ASSAY OF FERRITIN: CPT

## 2019-01-02 PROCEDURE — 83550 IRON BINDING TEST: CPT

## 2019-01-02 NOTE — PROGRESS NOTES
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  · Obesity  Plan:   · Polysomnogram with BiPAP titration  · Vitamin B12, folate and iron studies  · Regular walks to improve stamina  · Follow-up in 2 months  Discussion:   The patient has significant obstructive sleep apnea  Initially was reluctant to go for the polysomnogram and BiPAP titration  I had to explain to him the nature of the disease and all the potential complications that can happen from untreated obstructive sleep apnea  Ultimately the patient agreed to go for the study  I have scheduled him for polysomnogram and BiPAP titration  He had significant restless legs during the sleep study  I have ordered vitamin B12, folate and iron studies which the deficiency of can lead to restless legs  Once the sleep study is done we will order the BiPAP machine per him  I have encouraged him to take regular walks to improve stamina  I will see him in 2 months in a follow-up visit  Subjective: The patient is here for a follow-up visit  He is still sleepy  He went to the sleep study test but did not go for the BiPAP titration  He stated that the feel it is used for everyone  He denies any significant cough, wheezing or sputum production  Denies any chest pain  Review of systems:  A 12 point system review is done and aside from what is stated above the rest of the review of systems is negative  Family history and social history are reviewed  Medications list is reviewed  Vitals: Blood pressure 108/60, pulse 59, temperature (!) 97 3 °F (36 3 °C), temperature source Tympanic, resp  rate 16, height 5' 11" (1 803 m), weight 101 kg (223 lb), SpO2 93 %  ,     Physical Exam  Gen: Awake, alert, oriented x 3, no acute distress  HEENT: Mucous membranes moist, no oral lesions, no thrush  NECK: No accessory muscle use, JVP not elevated  Cardiac: Regular, single S1, single S2, no murmurs, no rubs, no gallops  Lungs:   Decreased breath sounds  No wheezing or rhonchi  Abdomen: normoactive bowel sounds, soft nontender, nondistended, no rebound or rigidity, no guarding  Extremities: no cyanosis, no clubbing, no edema  Neuro:  Grossly nonfocal   Skin:  No rash  Polysomnogram showed significant obstructive sleep apnea with AHI 18 8  Also significant restless legs

## 2019-01-13 ENCOUNTER — HOSPITAL ENCOUNTER (OUTPATIENT)
Dept: SLEEP CENTER | Facility: CLINIC | Age: 84
Discharge: HOME | End: 2019-01-13
Payer: MEDICARE

## 2019-01-13 DIAGNOSIS — G47.33 OSA (OBSTRUCTIVE SLEEP APNEA): ICD-10-CM

## 2019-01-13 PROCEDURE — 95811 POLYSOM 6/>YRS CPAP 4/> PARM: CPT

## 2019-01-13 PROCEDURE — 95811 POLYSOM 6/>YRS CPAP 4/> PARM: CPT | Performed by: INTERNAL MEDICINE

## 2019-01-14 NOTE — PROGRESS NOTES
Sleep Study Documentation    Pre-Sleep Study       Sleep testing procedure explained to patient:YES    Patient napped prior to study:NO    Caffeine:Dayshift worker after 12PM   Caffeine use:NO    Alcohol:Dayshift workers after 5PM: Alcohol use:NO    Typical day for patient:YES       Study Documentation  Treatment   Optimal PAP pressure: 13  Leak:Small  Snore:Eliminated  REM Obtained:yes  Supplemental O2: no    Minimum SaO2 84  Baseline SaO2 92  PAP mask tried (list all)eson, nasal pillows,  Several full face mask   Airfit, simplus and comfort blue  PAP mask choice (final) Respironics  Comfort gel blue Medium  PAP mask type:full face  PAP pressure at which snoring was eliminated 13  Minimum SaO2 at final PAP pressure 90  Mode of Therapy:CPAP  ETCO2:No  CPAP changed to BiPAP:No    Mode of Therapy:CPAP    EKG abnormalities: yes:  EPOCH example and comments:  Premature ventricular contractions BBB NOTED    EEG abnormalities: no    Study Terminated:no          Post-Sleep Study    Medication used at bedtime or during sleep study:NO    Patient reports time it took to fall asleep:greater than 60 minutes    Patient reports waking up during study:3 or more times  Patient reports returning to sleep in 10 to 30 minutes  Patient reports sleeping 4 to 6 hours without dreaming  Patient reports sleep during study:better than usual    Patient rated sleepiness: Somewhat sleepy or tired    PAP treatment:yes: Post PAP treatment patient reports feeling better and  would wear PAP mask at home  PATIENT SAID WOULD TRY

## 2019-01-17 DIAGNOSIS — G47.33 OSA (OBSTRUCTIVE SLEEP APNEA): Primary | ICD-10-CM

## 2019-01-18 ENCOUNTER — TELEPHONE (OUTPATIENT)
Dept: SLEEP CENTER | Facility: CLINIC | Age: 84
End: 2019-01-18

## 2019-01-18 NOTE — TELEPHONE ENCOUNTER
I spoke with pt, advised sleep study was resulted, pt will discuss with Dr Angel Suarez at [address] 3/4/19

## 2019-01-25 ENCOUNTER — TELEPHONE (OUTPATIENT)
Dept: PULMONOLOGY | Facility: CLINIC | Age: 84
End: 2019-01-25

## 2019-01-25 NOTE — TELEPHONE ENCOUNTER
I called patient and left a message for him to call me back so we can discuss the results of his sleep study  I left my office number on his Voicemail

## 2019-01-26 DIAGNOSIS — I10 BENIGN ESSENTIAL HYPERTENSION: ICD-10-CM

## 2019-01-26 RX ORDER — AMLODIPINE BESYLATE 10 MG/1
TABLET ORAL
Qty: 90 TABLET | Refills: 0 | Status: SHIPPED | OUTPATIENT
Start: 2019-01-26 | End: 2019-03-13 | Stop reason: SDUPTHER

## 2019-03-04 ENCOUNTER — OFFICE VISIT (OUTPATIENT)
Dept: PULMONOLOGY | Facility: CLINIC | Age: 84
End: 2019-03-04
Payer: MEDICARE

## 2019-03-04 VITALS
BODY MASS INDEX: 31.22 KG/M2 | SYSTOLIC BLOOD PRESSURE: 120 MMHG | TEMPERATURE: 98.5 F | HEART RATE: 63 BPM | WEIGHT: 223 LBS | OXYGEN SATURATION: 97 % | DIASTOLIC BLOOD PRESSURE: 60 MMHG | HEIGHT: 71 IN | RESPIRATION RATE: 17 BRPM

## 2019-03-04 DIAGNOSIS — G47.33 OSA (OBSTRUCTIVE SLEEP APNEA): Primary | ICD-10-CM

## 2019-03-04 DIAGNOSIS — I10 BENIGN ESSENTIAL HYPERTENSION: ICD-10-CM

## 2019-03-04 PROCEDURE — 99215 OFFICE O/P EST HI 40 MIN: CPT | Performed by: INTERNAL MEDICINE

## 2019-03-04 RX ORDER — ASPIRIN 81 MG/1
81 TABLET ORAL DAILY
COMMUNITY
End: 2021-03-08

## 2019-03-04 NOTE — PROGRESS NOTES
Office Progress Note - Pulmonary    Diane Ervin 80 y o  male MRN: [id number]    Encounter: [id number]      Assessment:  · Obstructive sleep apnea  · Restless leg syndrome  · Dyspnea on exertion  · Obesity  Plan:   · Nocturnal auto CPAP 10-15 cm water  · Follow-up in 6 weeks  Discussion:   The patient's dyspnea on exertion is due to untreated obstructive sleep apnea, obesity and deconditioning  His daytime hypersomnolence is due to the obstructive sleep apnea  However restless legs may be contributing to that  I have ordered a nocturnal auto CPAP for 10-15 cm water  I have recommended to the patient to start using it 1-2 hours before bedtime just to get adjusted to it  I have emphasized on the importance of using it every night at least for 6 hours  If he remains tired and sleepy during the day during the next visit despite using the nocturnal CPAP then I will consider put him on medications for his restless legs  We will address diet and exercise during the next visit to improve his stamina  I will see him back in 6 weeks  Subjective: The patient is here for follow-up visit  He had the sleep study done  He still has dyspnea on exertion  He is very sleepy and tired during the day  He wakes up with very dry mouth  He gets up about 3-4 times every night to urinate  He denies any chest pain or palpitations  Denies any dizziness  Review of systems:  A 12 point system review is done and aside from what is stated above the rest of the review of systems is negative  Family history and social history are reviewed  Medications list is reviewed  Vitals: Blood pressure 120/60, pulse 63, temperature 98 5 °F (36 9 °C), temperature source Tympanic, resp  rate 17, height 5' 11" (1 803 m), weight 101 kg (223 lb), SpO2 97 %  ,     Physical Exam  Gen: Awake, alert, oriented x 3, no acute distress  HEENT: Mucous membranes moist, no oral lesions, no thrush  NECK: No accessory muscle use, JVP not elevated  Cardiac: Regular, single S1, single S2, no murmurs, no rubs, no gallops  Lungs:  Decreased breath sounds  No wheezing or rhonchi  Abdomen: normoactive bowel sounds, soft nontender, nondistended, no rebound or rigidity, no guarding  Extremities: no cyanosis, no clubbing, no edema  Neuro:  Grossly nonfocal   Skin:  No rash  Sleep study report is reviewed and the patient required auto CPAP 10-15 cm water

## 2019-03-05 RX ORDER — LOSARTAN POTASSIUM 100 MG/1
TABLET ORAL
Qty: 90 TABLET | Refills: 1 | Status: SHIPPED | OUTPATIENT
Start: 2019-03-05 | End: 2019-03-25 | Stop reason: SDUPTHER

## 2019-03-05 RX ORDER — METOPROLOL SUCCINATE 25 MG/1
TABLET, EXTENDED RELEASE ORAL
Qty: 90 TABLET | Refills: 1 | Status: SHIPPED | OUTPATIENT
Start: 2019-03-05 | End: 2019-03-25 | Stop reason: SDUPTHER

## 2019-03-13 DIAGNOSIS — I10 BENIGN ESSENTIAL HYPERTENSION: ICD-10-CM

## 2019-03-13 RX ORDER — AMLODIPINE BESYLATE 10 MG/1
10 TABLET ORAL DAILY
Qty: 90 TABLET | Refills: 1 | Status: SHIPPED | OUTPATIENT
Start: 2019-03-13 | End: 2019-03-25 | Stop reason: SDUPTHER

## 2019-03-25 ENCOUNTER — OFFICE VISIT (OUTPATIENT)
Dept: FAMILY MEDICINE CLINIC | Facility: CLINIC | Age: 84
End: 2019-03-25
Payer: MEDICARE

## 2019-03-25 VITALS
SYSTOLIC BLOOD PRESSURE: 122 MMHG | BODY MASS INDEX: 31.36 KG/M2 | WEIGHT: 224 LBS | TEMPERATURE: 97.1 F | OXYGEN SATURATION: 98 % | DIASTOLIC BLOOD PRESSURE: 74 MMHG | RESPIRATION RATE: 17 BRPM | HEART RATE: 63 BPM | HEIGHT: 71 IN

## 2019-03-25 DIAGNOSIS — Z13.29 SCREENING FOR THYROID DISORDER: ICD-10-CM

## 2019-03-25 DIAGNOSIS — E78.1 PURE HYPERGLYCERIDEMIA: ICD-10-CM

## 2019-03-25 DIAGNOSIS — R35.1 NOCTURIA: ICD-10-CM

## 2019-03-25 DIAGNOSIS — I10 BENIGN ESSENTIAL HYPERTENSION: ICD-10-CM

## 2019-03-25 DIAGNOSIS — Z12.5 SCREENING FOR PROSTATE CANCER: ICD-10-CM

## 2019-03-25 DIAGNOSIS — J44.9 CHRONIC OBSTRUCTIVE PULMONARY DISEASE, UNSPECIFIED COPD TYPE (HCC): Primary | ICD-10-CM

## 2019-03-25 DIAGNOSIS — E78.2 MIXED HYPERLIPIDEMIA: ICD-10-CM

## 2019-03-25 PROCEDURE — 99214 OFFICE O/P EST MOD 30 MIN: CPT | Performed by: FAMILY MEDICINE

## 2019-03-25 RX ORDER — AMLODIPINE BESYLATE 10 MG/1
10 TABLET ORAL DAILY
Qty: 90 TABLET | Refills: 1 | Status: SHIPPED | OUTPATIENT
Start: 2019-03-25 | End: 2019-11-13 | Stop reason: SDUPTHER

## 2019-03-25 RX ORDER — FINASTERIDE 5 MG/1
5 TABLET, FILM COATED ORAL DAILY
Qty: 30 TABLET | Refills: 0 | Status: SHIPPED | OUTPATIENT
Start: 2019-03-25 | End: 2019-05-01 | Stop reason: SDUPTHER

## 2019-03-25 RX ORDER — METOPROLOL SUCCINATE 25 MG/1
25 TABLET, EXTENDED RELEASE ORAL DAILY
Qty: 90 TABLET | Refills: 1 | Status: SHIPPED | OUTPATIENT
Start: 2019-03-25 | End: 2019-11-12 | Stop reason: SDUPTHER

## 2019-03-25 RX ORDER — LOSARTAN POTASSIUM 100 MG/1
100 TABLET ORAL DAILY
Qty: 90 TABLET | Refills: 1 | Status: SHIPPED | OUTPATIENT
Start: 2019-03-25 | End: 2019-11-12 | Stop reason: SDUPTHER

## 2019-03-25 NOTE — PROGRESS NOTES
Assessment/Plan:   1  Benign essential hypertension  Blood pressure appears very well controlled today  At this time, he was advised to continue with routine home monitoring of his blood pressure  Continue with current treatment of metoprolol, losartan as well as his amlodipine  Will check blood work including CMP, lipid panel, TSH, PSA   - amLODIPine (NORVASC) 10 mg tablet; Take 1 tablet (10 mg total) by mouth daily  Dispense: 90 tablet; Refill: 1  - losartan (COZAAR) 100 MG tablet; Take 1 tablet (100 mg total) by mouth daily  Dispense: 90 tablet; Refill: 1  - metoprolol succinate (TOPROL-XL) 25 mg 24 hr tablet; Take 1 tablet (25 mg total) by mouth daily  Dispense: 90 tablet; Refill: 1  - Comprehensive metabolic panel; Future  - Lipid Panel with Direct LDL reflex; Future  - TSH, 3rd generation with Free T4 reflex; Future  - PSA, Total Screen; Future    2  Mixed hyperlipidemia  For unclear process control  Will consult recheck blood work  Continue with a strict low-fat/low-cholesterol diet  - Comprehensive metabolic panel; Future  - Lipid Panel with Direct LDL reflex; Future  - TSH, 3rd generation with Free T4 reflex; Future  - PSA, Total Screen; Future      3  Chronic obstructive pulmonary disease, unspecified COPD type ([facility] [address] )  Patient's symptoms appear very well controlled today  Appears very well controlled today  Will continue with routine monitoring   - Comprehensive metabolic panel; Future  - Lipid Panel with Direct LDL reflex; Future  - TSH, 3rd generation with Free T4 reflex; Future  - PSA, Total Screen; Future      4  Nocturia  Reviewed patient's symptoms today  At this time, symptoms appear likely is previously appear log urologic issues  Will start treatment with finasteride  Follow up with patient in 6 months  - Comprehensive metabolic panel; Future  - Lipid Panel with Direct LDL reflex; Future  - TSH, 3rd generation with Free T4 reflex; Future  - PSA, Total Screen;  Future  - finasteride (PROSCAR) 5 mg tablet; Take 1 tablet (5 mg total) by mouth daily  Dispense: 30 tablet; Refill: 0         Diagnoses and all orders for this visit:    Benign essential hypertension  -     amLODIPine (NORVASC) 10 mg tablet; Take 1 tablet (10 mg total) by mouth daily  -     losartan (COZAAR) 100 MG tablet; Take 1 tablet (100 mg total) by mouth daily  -     metoprolol succinate (TOPROL-XL) 25 mg 24 hr tablet; Take 1 tablet (25 mg total) by mouth daily    Other orders  -     bimatoprost (LUMIGAN) 0 01 % ophthalmic drops; Administer 1 drop to both eyes daily at bedtime          Subjective:    Chief Complaint   Patient presents with    Follow-up     Pt presents for a Med check and BP f/u  Pt has a rash on his R/uppr arm x3 weeks that comes and goes  Rash itchy         Patient ID: Alphonse Gonzalez is a 80 y o  male  Patient is a 57-year-old male presents today for follow-up on his chronic conditions  He has hypertension, dyslipidemia, COPD, nocturia  He has been taking his medications regularly  Adverse reactions with medications  Recently had blood work completed in light reviewed this today  Review of Systems   Constitutional: Negative for activity change, chills, fatigue and fever  HENT: Negative for congestion, ear pain, sinus pressure and sore throat  Eyes: Negative for redness, itching and visual disturbance  Respiratory: Negative for cough and shortness of breath  Cardiovascular: Negative for chest pain and palpitations  Gastrointestinal: Negative for abdominal pain, diarrhea and nausea  Endocrine: Negative for cold intolerance and heat intolerance  Genitourinary: Negative for dysuria, flank pain and frequency  Musculoskeletal: Negative for arthralgias, back pain, gait problem and myalgias  Skin: Negative for color change  Allergic/Immunologic: Negative for environmental allergies  Neurological: Negative for dizziness, numbness and headaches     Psychiatric/Behavioral: Negative for behavioral problems and sleep disturbance  The following portions of the patient's history were reviewed and updated as appropriate : past family history, past medical history, past social history and past surgical history  Current Outpatient Medications:     amLODIPine (NORVASC) 10 mg tablet, Take 1 tablet (10 mg total) by mouth daily, Disp: 90 tablet, Rfl: 1    aspirin (ADULT ASPIRIN REGIMEN) 81 mg EC tablet, Take 81 mg by mouth daily, Disp: , Rfl:     bimatoprost (LUMIGAN) 0 01 % ophthalmic drops, Administer 1 drop to both eyes daily at bedtime, Disp: , Rfl:     losartan (COZAAR) 100 MG tablet, TAKE 1 TABLET BY MOUTH  DAILY, Disp: 90 tablet, Rfl: 1    metoprolol succinate (TOPROL-XL) 25 mg 24 hr tablet, TAKE 1 TABLET BY MOUTH  DAILY, Disp: 90 tablet, Rfl: 1    Objective:    Vitals:    03/25/19 1041   BP: 122/74   BP Location: Left arm   Patient Position: Sitting   Cuff Size: Adult   Pulse: 63   Resp: 17   Temp: (!) 97 1 °F (36 2 °C)   TempSrc: Tympanic   SpO2: 98%   Weight: 102 kg (224 lb)   Height: 5' 11" (1 803 m)        Physical Exam   Constitutional: He is oriented to person, place, and time  He appears well-developed and well-nourished  HENT:   Head: Normocephalic and atraumatic  Nose: Nose normal    Mouth/Throat: No oropharyngeal exudate  Eyes: Pupils are equal, round, and reactive to light  Right eye exhibits no discharge  Left eye exhibits no discharge  Neck: Normal range of motion  Neck supple  No tracheal deviation present  Cardiovascular: Normal rate, regular rhythm and intact distal pulses  Exam reveals no gallop and no friction rub  No murmur heard  Pulses:       Dorsalis pedis pulses are 2+ on the right side, and 2+ on the left side  Posterior tibial pulses are 2+ on the right side, and 2+ on the left side  Pulmonary/Chest: Effort normal and breath sounds normal  No respiratory distress  He has no wheezes  He has no rales  Abdominal: Soft   Bowel sounds are normal  He exhibits no distension  There is no tenderness  There is no rebound and no guarding  Musculoskeletal: Normal range of motion  He exhibits no edema  Lymphadenopathy:        Head (right side): No submental and no submandibular adenopathy present  Head (left side): No submental and no submandibular adenopathy present  He has no cervical adenopathy  Right cervical: No superficial cervical, no deep cervical and no posterior cervical adenopathy present  Left cervical: No superficial cervical, no deep cervical and no posterior cervical adenopathy present  Neurological: He is alert and oriented to person, place, and time  No cranial nerve deficit or sensory deficit  Skin: Skin is warm, dry and intact  Psychiatric: His speech is normal and behavior is normal  Judgment normal  His mood appears not anxious  Cognition and memory are normal  He does not exhibit a depressed mood  Vitals reviewed

## 2019-04-30 ENCOUNTER — TELEPHONE (OUTPATIENT)
Dept: PULMONOLOGY | Facility: CLINIC | Age: 84
End: 2019-04-30

## 2019-04-30 ENCOUNTER — OFFICE VISIT (OUTPATIENT)
Dept: PULMONOLOGY | Facility: CLINIC | Age: 84
End: 2019-04-30
Payer: MEDICARE

## 2019-04-30 VITALS
WEIGHT: 221 LBS | OXYGEN SATURATION: 91 % | HEIGHT: 71 IN | TEMPERATURE: 98.5 F | RESPIRATION RATE: 18 BRPM | BODY MASS INDEX: 30.94 KG/M2 | HEART RATE: 62 BPM | DIASTOLIC BLOOD PRESSURE: 54 MMHG | SYSTOLIC BLOOD PRESSURE: 108 MMHG

## 2019-04-30 DIAGNOSIS — G47.33 OSA (OBSTRUCTIVE SLEEP APNEA): Primary | ICD-10-CM

## 2019-04-30 PROCEDURE — 99213 OFFICE O/P EST LOW 20 MIN: CPT | Performed by: INTERNAL MEDICINE

## 2019-05-01 DIAGNOSIS — R35.1 NOCTURIA: ICD-10-CM

## 2019-05-02 RX ORDER — FINASTERIDE 5 MG/1
TABLET, FILM COATED ORAL
Qty: 30 TABLET | Refills: 0 | Status: SHIPPED | OUTPATIENT
Start: 2019-05-02 | End: 2019-09-24 | Stop reason: SDUPTHER

## 2019-05-03 DIAGNOSIS — R35.1 NOCTURIA: ICD-10-CM

## 2019-05-04 RX ORDER — FINASTERIDE 5 MG/1
TABLET, FILM COATED ORAL
Qty: 30 TABLET | Refills: 0 | Status: SHIPPED | OUTPATIENT
Start: 2019-05-04 | End: 2019-09-24

## 2019-07-12 ENCOUNTER — DOCUMENTATION (OUTPATIENT)
Dept: PULMONOLOGY | Facility: CLINIC | Age: 84
End: 2019-07-12

## 2019-09-13 LAB
ALBUMIN SERPL-MCNC: 4 G/DL (ref 3.6–5.1)
ALBUMIN/GLOB SERPL: 1.4 (CALC) (ref 1–2.5)
ALP SERPL-CCNC: 50 U/L (ref 40–115)
ALT SERPL-CCNC: 20 U/L (ref 9–46)
AST SERPL-CCNC: 20 U/L (ref 10–35)
BILIRUB SERPL-MCNC: 0.6 MG/DL (ref 0.2–1.2)
BUN SERPL-MCNC: 17 MG/DL (ref 7–25)
BUN/CREAT SERPL: ABNORMAL (CALC) (ref 6–22)
CALCIUM SERPL-MCNC: 9.1 MG/DL (ref 8.6–10.3)
CHLORIDE SERPL-SCNC: 104 MMOL/L (ref 98–110)
CHOLEST SERPL-MCNC: 149 MG/DL
CHOLEST/HDLC SERPL: 3.8 (CALC)
CO2 SERPL-SCNC: 32 MMOL/L (ref 20–32)
CREAT SERPL-MCNC: 1.03 MG/DL (ref 0.7–1.11)
GLOBULIN SER CALC-MCNC: 2.8 G/DL (CALC) (ref 1.9–3.7)
GLUCOSE SERPL-MCNC: 100 MG/DL (ref 65–99)
HDLC SERPL-MCNC: 39 MG/DL
LDLC SERPL CALC-MCNC: 83 MG/DL (CALC)
NONHDLC SERPL-MCNC: 110 MG/DL (CALC)
POTASSIUM SERPL-SCNC: 4.4 MMOL/L (ref 3.5–5.3)
PROT SERPL-MCNC: 6.8 G/DL (ref 6.1–8.1)
PSA SERPL-MCNC: 2.9 NG/ML
SL AMB EGFR AFRICAN AMERICAN: 77 ML/MIN/1.73M2
SL AMB EGFR NON AFRICAN AMERICAN: 67 ML/MIN/1.73M2
SODIUM SERPL-SCNC: 140 MMOL/L (ref 135–146)
TRIGL SERPL-MCNC: 177 MG/DL
TSH SERPL-ACNC: 1.79 MIU/L (ref 0.4–4.5)

## 2019-09-24 ENCOUNTER — OFFICE VISIT (OUTPATIENT)
Dept: FAMILY MEDICINE CLINIC | Facility: CLINIC | Age: 84
End: 2019-09-24
Payer: MEDICARE

## 2019-09-24 VITALS
OXYGEN SATURATION: 92 % | TEMPERATURE: 98.9 F | RESPIRATION RATE: 19 BRPM | WEIGHT: 222.2 LBS | SYSTOLIC BLOOD PRESSURE: 122 MMHG | HEART RATE: 57 BPM | BODY MASS INDEX: 31.11 KG/M2 | HEIGHT: 71 IN | DIASTOLIC BLOOD PRESSURE: 74 MMHG

## 2019-09-24 DIAGNOSIS — E78.2 MIXED HYPERLIPIDEMIA: ICD-10-CM

## 2019-09-24 DIAGNOSIS — R35.1 NOCTURIA: ICD-10-CM

## 2019-09-24 DIAGNOSIS — Z23 NEED FOR PROPHYLACTIC VACCINATION AND INOCULATION AGAINST INFLUENZA: Primary | ICD-10-CM

## 2019-09-24 DIAGNOSIS — I10 BENIGN ESSENTIAL HYPERTENSION: ICD-10-CM

## 2019-09-24 DIAGNOSIS — Z00.00 MEDICARE ANNUAL WELLNESS VISIT, SUBSEQUENT: ICD-10-CM

## 2019-09-24 PROCEDURE — G0439 PPPS, SUBSEQ VISIT: HCPCS | Performed by: FAMILY MEDICINE

## 2019-09-24 PROCEDURE — G0008 ADMIN INFLUENZA VIRUS VAC: HCPCS | Performed by: FAMILY MEDICINE

## 2019-09-24 PROCEDURE — 99214 OFFICE O/P EST MOD 30 MIN: CPT | Performed by: FAMILY MEDICINE

## 2019-09-24 PROCEDURE — 90662 IIV NO PRSV INCREASED AG IM: CPT | Performed by: FAMILY MEDICINE

## 2019-09-24 NOTE — PATIENT INSTRUCTIONS

## 2019-09-24 NOTE — PROGRESS NOTES
Assessment/Plan:   1  Nocturia  Patient's symptoms appear secondary to BPH  Unfortunately, he refuses all prostate exams secondary to his previous rectal surgery  He was previously seen by Urology  At this time, will hold off on the initiation of other treatment for his prostate as further treatments can cause dizziness  He may also benefit from seeing Urology again  2  Benign essential hypertension  Blood pressure appears stable today  Will continue with routine home monitoring  Continue as well with current treatment of amlodipine as well as his losartan and metoprolol  3  Mixed hyperlipidemia  Reviewed fasting lipid panel with patient today  His cholesterol levels appeared stable except for his triglycerides  Triglycerides were mildly elevated at 177  He was advised on importance of maintaining a very strict diet and exercise plan  Will hold off on the initiation of any cholesterol medication  Follow up with patient in 6 months         Diagnoses and all orders for this visit:    Need for prophylactic vaccination and inoculation against influenza  -     influenza vaccine, [id number], high-dose, PF 0 5 mL (FLUZONE HIGH-DOSE)    Medicare annual wellness visit, subsequent          Subjective:    Chief Complaint   Patient presents with    Medicare Wellness Visit     subsequent        Patient ID: Aime Hernandez is a 80 y o  male  Patient is a 70-year-old male presents today for follow-up on his chronic conditions  He has nocturia, hypertension, dyslipidemia  He has been taking his medications regularly  He denies adverse reactions with his medications  He states that he has been having persistent problems with urination  He is waking up multiple times at nighttime to use the bathroom  He denies any hesitancy or dribbling he has been on both treatments with finasteride as well as his Flomax period with medications were ineffective for him    He was previously seen by Urology however stopped with regular follow ups  Review of Systems   Constitutional: Negative for activity change, chills, fatigue and fever  HENT: Negative for congestion, ear pain, sinus pressure and sore throat  Eyes: Negative for redness, itching and visual disturbance  Respiratory: Negative for cough and shortness of breath  Cardiovascular: Negative for chest pain and palpitations  Gastrointestinal: Negative for abdominal pain, diarrhea and nausea  Endocrine: Negative for cold intolerance and heat intolerance  Genitourinary: Negative for dysuria, flank pain and frequency  Nocturia   Musculoskeletal: Negative for arthralgias, back pain, gait problem and myalgias  Skin: Negative for color change  Allergic/Immunologic: Negative for environmental allergies  Neurological: Negative for dizziness, numbness and headaches  Psychiatric/Behavioral: Negative for behavioral problems and sleep disturbance  The following portions of the patient's history were reviewed and updated as appropriate : past family history, past medical history, past social history and past surgical history        Current Outpatient Medications:     amLODIPine (NORVASC) 10 mg tablet, Take 1 tablet (10 mg total) by mouth daily, Disp: 90 tablet, Rfl: 1    aspirin (ADULT ASPIRIN REGIMEN) 81 mg EC tablet, Take 81 mg by mouth daily, Disp: , Rfl:     bimatoprost (LUMIGAN) 0 01 % ophthalmic drops, Administer 1 drop to both eyes daily at bedtime, Disp: , Rfl:     losartan (COZAAR) 100 MG tablet, Take 1 tablet (100 mg total) by mouth daily, Disp: 90 tablet, Rfl: 1    metoprolol succinate (TOPROL-XL) 25 mg 24 hr tablet, Take 1 tablet (25 mg total) by mouth daily, Disp: 90 tablet, Rfl: 1    Objective:    Vitals:    09/24/19 1313   BP: 122/74   BP Location: Left arm   Patient Position: Sitting   Cuff Size: Large   Pulse: 57   Resp: 19   Temp: 98 9 °F (37 2 °C)   TempSrc: Tympanic   SpO2: 92%   Weight: 101 kg (222 lb 3 2 oz)   Height: 5' 11" (1 803 m)        Physical Exam   Constitutional: He is oriented to person, place, and time  He appears well-developed and well-nourished  HENT:   Head: Normocephalic and atraumatic  Nose: Nose normal    Mouth/Throat: No oropharyngeal exudate  Eyes: Pupils are equal, round, and reactive to light  Right eye exhibits no discharge  Left eye exhibits no discharge  Neck: Normal range of motion  Neck supple  No tracheal deviation present  Cardiovascular: Normal rate, regular rhythm and intact distal pulses  Exam reveals no gallop and no friction rub  No murmur heard  Pulses:       Dorsalis pedis pulses are 2+ on the right side, and 2+ on the left side  Posterior tibial pulses are 2+ on the right side, and 2+ on the left side  Pulmonary/Chest: Effort normal and breath sounds normal  No respiratory distress  He has no wheezes  He has no rales  Abdominal: Soft  Bowel sounds are normal  He exhibits no distension  There is no tenderness  There is no rebound and no guarding  Musculoskeletal: Normal range of motion  He exhibits no edema  Lymphadenopathy:        Head (right side): No submental and no submandibular adenopathy present  Head (left side): No submental and no submandibular adenopathy present  He has no cervical adenopathy  Right cervical: No superficial cervical, no deep cervical and no posterior cervical adenopathy present  Left cervical: No superficial cervical, no deep cervical and no posterior cervical adenopathy present  Neurological: He is alert and oriented to person, place, and time  No cranial nerve deficit or sensory deficit  Skin: Skin is warm, dry and intact  Psychiatric: His speech is normal and behavior is normal  Judgment normal  His mood appears not anxious  Cognition and memory are normal  He does not exhibit a depressed mood  Vitals reviewed

## 2019-09-24 NOTE — PROGRESS NOTES
Assessment and Plan:     Problem List Items Addressed This Visit        Cardiovascular and Mediastinum    Benign essential hypertension       Other    Hyperlipidemia    Nocturia      Other Visit Diagnoses     Need for prophylactic vaccination and inoculation against influenza    -  Primary    Relevant Orders    influenza vaccine, [id number], high-dose, PF 0 5 mL (FLUZONE HIGH-DOSE) (Completed)    Medicare annual wellness visit, subsequent            BMI Counseling: Body mass index is 30 99 kg/m²  The BMI is above normal  Nutrition recommendations include decreasing portion sizes and encouraging healthy choices of fruits and vegetables  Exercise recommendations include moderate physical activity 150 minutes/week and vigorous physical activity 75 minutes/week  Preventive health issues were discussed with patient, and age appropriate screening tests were ordered as noted in patient's After Visit Summary  Personalized health advice and appropriate referrals for health education or preventive services given if needed, as noted in patient's After Visit Summary       History of Present Illness:     Patient presents for Medicare Annual Wellness visit    Patient Care Team:  Lauren Sandhoff, DO as PCP - DO Dano Villalta CRNP Deborah Lung, DO     Problem List:     Patient Active Problem List   Diagnosis    Benign essential hypertension    COPD (chronic obstructive pulmonary disease) ([address] )    Degeneration of intervertebral disc of lumbar region    Diastasis of rectus abdominis    Herniated lumbar intervertebral disc    Hyperlipidemia    Lumbar radiculitis    Neural foraminal stenosis of lumbar spine    Subdural hematoma (HCC)    Sacroiliitis ([address] )    SIVA (obstructive sleep apnea)    Nocturia      Past Medical and Surgical History:     Past Medical History:   Diagnosis Date    Abnormal EKG     last assessed: 11/25/2014    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    COPD with acute exacerbation ([facility] [address] )     last assessed: 2017    Diverticulosis     Eczema     last assessed: 2013    Esophagitis     Gout     last assessed: 2014    Nail avulsion of toe     Neoplasm of bladder     last assessed: 2013     Past Surgical History:   Procedure Laterality Date    HERNIA REPAIR      last assessed: 2014    KIDNEY SURGERY      description: removal of kidney stone last assessed: 2014      Family History:     Family History   Problem Relation Age of Onset    Stroke Mother     Alzheimer's disease Father     Heart attack Sister     Cancer Family       Social History:     Social History     Socioeconomic History    Marital status: /Civil Union     Spouse name: None    Number of children: None    Years of education: None    Highest education level: None   Occupational History    None   Social Needs    Financial resource strain: None    Food insecurity:     Worry: None     Inability: None    Transportation needs:     Medical: None     Non-medical: None   Tobacco Use    Smoking status: Former Smoker     Packs/day: 2 00     Years: 33 00     Pack years: 66 00     Types: Cigarettes     Start date:      Last attempt to quit:      Years since quittin 7    Smokeless tobacco: Never Used   Substance and Sexual Activity    Alcohol use: No    Drug use: No    Sexual activity: None   Lifestyle    Physical activity:     Days per week: None     Minutes per session: None    Stress: None   Relationships    Social connections:     Talks on phone: None     Gets together: None     Attends Congregational service: None     Active member of club or organization: None     Attends meetings of clubs or organizations: None     Relationship status: None    Intimate partner violence:     Fear of current or ex partner: None     Emotionally abused: None     Physically abused: None     Forced sexual activity: None   Other Topics Concern    None   Social History Narrative    None       Medications and Allergies:     Current Outpatient Medications   Medication Sig Dispense Refill    amLODIPine (NORVASC) 10 mg tablet Take 1 tablet (10 mg total) by mouth daily 90 tablet 1    aspirin (ADULT ASPIRIN REGIMEN) 81 mg EC tablet Take 81 mg by mouth daily      bimatoprost (LUMIGAN) 0 01 % ophthalmic drops Administer 1 drop to both eyes daily at bedtime      losartan (COZAAR) 100 MG tablet Take 1 tablet (100 mg total) by mouth daily 90 tablet 1    metoprolol succinate (TOPROL-XL) 25 mg 24 hr tablet Take 1 tablet (25 mg total) by mouth daily 90 tablet 1     No current facility-administered medications for this visit  Allergies   Allergen Reactions    Levetiracetam Confusion and Anxiety      Immunizations:     Immunization History   Administered Date(s) Administered    H1N1, All Formulations 02/02/2010    INFLUENZA 11/01/2014, 11/05/2015, 01/06/2017, 11/20/2017    Influenza Split High Dose Preservative Free IM 11/01/2014, 11/05/2015, 01/06/2017, 11/20/2017    Influenza TIV (IM) 09/21/2012, 09/21/2012, 11/04/2013    Influenza, high dose seasonal 0 5 mL 10/23/2018, 09/24/2019    Pneumococcal Conjugate 13-Valent 11/28/2017    Pneumococcal Polysaccharide PPV23 1935, 10/03/2001    Zoster 12/01/2014      Health Maintenance:         Topic Date Due    Hepatitis C Screening  Completed         Topic Date Due    DTaP,Tdap,and Td Vaccines (1 - Tdap) 11/18/1956    INFLUENZA VACCINE  07/01/2019      Medicare Health Risk Assessment:     /74 (BP Location: Left arm, Patient Position: Sitting, Cuff Size: Large)   Pulse 57   Temp 98 9 °F (37 2 °C) (Tympanic)   Resp 19   Ht 5' 11" (1 803 m)   Wt 101 kg (222 lb 3 2 oz)   SpO2 92%   BMI 30 99 kg/m²      Edgar Trevizo is here for his Subsequent Wellness visit  Last Medicare Wellness visit information reviewed, patient interviewed, no change since last AWV       Health Risk Assessment: Patient rates overall health as fair  Patient feels that their physical health rating is slightly worse  Eyesight was rated as much worse  Hearing was rated as much worse  Patient feels that their emotional and mental health rating is same  Pain experienced in the last 7 days has been some  Patient's pain rating has been 6/10  Patient states that he has experienced no weight loss or gain in last 6 months  Depression Screening:   PHQ-2 Score: 0      Fall Risk Screening: In the past year, patient has experienced: no history of falling in past year      Home Safety:  Patient does not have trouble with stairs inside or outside of their home  Patient has working smoke alarms and has working carbon monoxide detector  Home safety hazards include: none  Nutrition:   Current diet is Regular  Medications:   Patient is currently taking over-the-counter supplements  OTC medications include: see medication list  Patient is able to manage medications  Activities of Daily Living (ADLs)/Instrumental Activities of Daily Living (IADLs):   Walk and transfer into and out of bed and chair?: Yes  Dress and groom yourself?: Yes    Bathe or shower yourself?: Yes    Feed yourself?  Yes  Do your laundry/housekeeping?: No  Manage your money, pay your bills and track your expenses?: Yes  Make your own meals?: Yes    Do your own shopping?: Yes    Previous Hospitalizations:   Any hospitalizations or ED visits within the last 12 months?: No      Advance Care Planning:   Living will: No    Durable POA for healthcare: No    Advanced directive: No    Advanced directive counseling given: Yes    End of Life Decisions reviewed with patient: Yes    Provider agrees with end of life decisions: Yes      Cognitive Screening:   Provider or family/friend/caregiver concerned regarding cognition?: No    PREVENTIVE SCREENINGS      Cardiovascular Screening:    General: Screening Not Indicated and History Lipid Disorder      Diabetes Screening: General: Screening Current      Colorectal Cancer Screening:     General: Screening Not Indicated      Prostate Cancer Screening:    General: Screening Not Indicated      Osteoporosis Screening:    General: Screening Not Indicated      Abdominal Aortic Aneurysm (AAA) Screening:    Risk factors include: tobacco use        General: Screening Not Indicated      Lung Cancer Screening:     General: Screening Not Indicated      Hepatitis C Screening:    General: Screening Current      Michelle Bush DO

## 2019-11-12 DIAGNOSIS — I10 BENIGN ESSENTIAL HYPERTENSION: ICD-10-CM

## 2019-11-13 RX ORDER — AMLODIPINE BESYLATE 10 MG/1
TABLET ORAL
Qty: 90 TABLET | Refills: 1 | Status: SHIPPED | OUTPATIENT
Start: 2019-11-13 | End: 2020-05-19

## 2019-11-13 RX ORDER — METOPROLOL SUCCINATE 25 MG/1
TABLET, EXTENDED RELEASE ORAL
Qty: 90 TABLET | Refills: 1 | Status: SHIPPED | OUTPATIENT
Start: 2019-11-13 | End: 2020-05-19

## 2019-11-13 RX ORDER — LOSARTAN POTASSIUM 100 MG/1
TABLET ORAL
Qty: 90 TABLET | Refills: 1 | Status: SHIPPED | OUTPATIENT
Start: 2019-11-13 | End: 2020-05-19

## 2020-01-13 ENCOUNTER — OFFICE VISIT (OUTPATIENT)
Dept: FAMILY MEDICINE CLINIC | Facility: CLINIC | Age: 85
End: 2020-01-13
Payer: MEDICARE

## 2020-01-13 VITALS
OXYGEN SATURATION: 94 % | TEMPERATURE: 98.6 F | HEART RATE: 56 BPM | DIASTOLIC BLOOD PRESSURE: 78 MMHG | WEIGHT: 217.2 LBS | RESPIRATION RATE: 18 BRPM | SYSTOLIC BLOOD PRESSURE: 118 MMHG | HEIGHT: 70 IN | BODY MASS INDEX: 31.09 KG/M2

## 2020-01-13 DIAGNOSIS — H26.9 CATARACT OF BOTH EYES, UNSPECIFIED CATARACT TYPE: ICD-10-CM

## 2020-01-13 DIAGNOSIS — Z01.810 PREOP CARDIOVASCULAR EXAM: Primary | ICD-10-CM

## 2020-01-13 PROCEDURE — 99213 OFFICE O/P EST LOW 20 MIN: CPT | Performed by: FAMILY MEDICINE

## 2020-01-13 RX ORDER — NEPAFENAC 0.3 %
SUSPENSION, DROPS(FINAL DOSAGE FORM)(ML) OPHTHALMIC (EYE)
COMMUNITY
Start: 2020-01-10

## 2020-01-13 NOTE — PROGRESS NOTES
Assessment/Plan:   1  Preop cardiovascular exam/Cataract of both eyes, unspecified cataract type  Patient appears well today  He has a good functional capacity and no active cardiac problems  This type of procedure is considered low risk  Patient is cleared low perioperative cardiovascular risk  No further testing needed today  There are no diagnoses linked to this encounter  Subjective:       Chief Complaint   Patient presents with    Pre-op Exam     1/21 Cataract surgery Dr Hendrix [facility] for paperwork to be faxed to us  Patient ID: Padmini Hand is a 80 y o  male  Dixie Salazar Estela  80 y o   male    SURGEON:Dr Thaddeus Quintanilla    SURGERY/PROCEDURE: B/L cataracts    DATE OF SURGERY:  January 21st as well as February 4th    PRIOR ANESTHESIA:yes    COMPLICATION: no    BLEEDING PROBLEM: no    PERTINENT PMH: no    EXERCISE CAPACITY:   CAN WALK 4 BLOCKS AND OR CLIMB 2 FLIGHTS: Yes    HOME LIVING SITUATION SAFE AND SECURE: Yes      TOBACCO: no     ETOH: no     ILLEGAL DRUGS: no        Review of Systems   Constitutional: Negative for activity change, chills, fatigue and fever  HENT: Negative for congestion, ear pain, sinus pressure and sore throat  Eyes: Negative for redness, itching and visual disturbance  Respiratory: Negative for cough and shortness of breath  Cardiovascular: Negative for chest pain and palpitations  Gastrointestinal: Negative for abdominal pain, diarrhea and nausea  Endocrine: Negative for cold intolerance and heat intolerance  Genitourinary: Negative for dysuria, flank pain and frequency  Musculoskeletal: Negative for arthralgias, back pain, gait problem and myalgias  Skin: Negative for color change  Allergic/Immunologic: Negative for environmental allergies  Neurological: Negative for dizziness, numbness and headaches  Psychiatric/Behavioral: Negative for behavioral problems and sleep disturbance         The following portions of the patient's history were reviewed and updated as appropriate : past family history, past medical history, past social history and past surgical history  Current Outpatient Medications:     amLODIPine (NORVASC) 10 mg tablet, TAKE 1 TABLET BY MOUTH  DAILY, Disp: 90 tablet, Rfl: 1    aspirin (ADULT ASPIRIN REGIMEN) 81 mg EC tablet, Take 81 mg by mouth daily, Disp: , Rfl:     bimatoprost (LUMIGAN) 0 01 % ophthalmic drops, Administer 1 drop to both eyes daily at bedtime, Disp: , Rfl:     ILEVRO 0 3 % SUSP, , Disp: , Rfl:     losartan (COZAAR) 100 MG tablet, TAKE 1 TABLET BY MOUTH  DAILY, Disp: 90 tablet, Rfl: 1    metoprolol succinate (TOPROL-XL) 25 mg 24 hr tablet, TAKE 1 TABLET BY MOUTH  DAILY, Disp: 90 tablet, Rfl: 1         Objective:         Vitals:    01/13/20 1002   BP: 118/78   BP Location: Left arm   Patient Position: Sitting   Cuff Size: Adult   Pulse: 56   Resp: 18   Temp: 98 6 °F (37 °C)   TempSrc: Tympanic   SpO2: 94%   Weight: 98 5 kg (217 lb 3 2 oz)   Height: 5' 9 5" (1 765 m)     Physical Exam   Constitutional: He is oriented to person, place, and time  He appears well-developed and well-nourished  HENT:   Head: Normocephalic and atraumatic  Nose: Nose normal    Mouth/Throat: No oropharyngeal exudate  Eyes: Pupils are equal, round, and reactive to light  Right eye exhibits no discharge  Left eye exhibits no discharge  Neck: Normal range of motion  Neck supple  No tracheal deviation present  Cardiovascular: Normal rate, regular rhythm and intact distal pulses  Exam reveals no gallop and no friction rub  No murmur heard  Pulses:       Dorsalis pedis pulses are 2+ on the right side, and 2+ on the left side  Posterior tibial pulses are 2+ on the right side, and 2+ on the left side  Pulmonary/Chest: Effort normal and breath sounds normal  No respiratory distress  He has no wheezes  He has no rales  Abdominal: Soft  Bowel sounds are normal  He exhibits no distension  There is no tenderness   There is no rebound and no guarding  Musculoskeletal: Normal range of motion  He exhibits no edema  Lymphadenopathy:        Head (right side): No submental and no submandibular adenopathy present  Head (left side): No submental and no submandibular adenopathy present  He has no cervical adenopathy  Right cervical: No superficial cervical, no deep cervical and no posterior cervical adenopathy present  Left cervical: No superficial cervical, no deep cervical and no posterior cervical adenopathy present  Neurological: He is alert and oriented to person, place, and time  No cranial nerve deficit or sensory deficit  Skin: Skin is warm, dry and intact  Psychiatric: His speech is normal and behavior is normal  Judgment normal  His mood appears not anxious  Cognition and memory are normal  He does not exhibit a depressed mood  Vitals reviewed

## 2020-01-21 ENCOUNTER — HOSPITAL ENCOUNTER (EMERGENCY)
Facility: HOSPITAL | Age: 85
Discharge: HOME | End: 2020-01-21
Attending: EMERGENCY MEDICINE | Admitting: EMERGENCY MEDICINE
Payer: MEDICARE

## 2020-01-21 ENCOUNTER — APPOINTMENT (EMERGENCY)
Dept: CT IMAGING | Facility: HOSPITAL | Age: 85
End: 2020-01-21
Payer: MEDICARE

## 2020-01-21 VITALS
HEART RATE: 54 BPM | WEIGHT: 214.95 LBS | SYSTOLIC BLOOD PRESSURE: 129 MMHG | OXYGEN SATURATION: 95 % | BODY MASS INDEX: 31.29 KG/M2 | RESPIRATION RATE: 15 BRPM | TEMPERATURE: 98.3 F | DIASTOLIC BLOOD PRESSURE: 60 MMHG

## 2020-01-21 DIAGNOSIS — K57.92 ACUTE DIVERTICULITIS: Primary | ICD-10-CM

## 2020-01-21 LAB
ALBUMIN SERPL BCP-MCNC: 3.5 G/DL (ref 3.5–5)
ALP SERPL-CCNC: 71 U/L (ref 46–116)
ALT SERPL W P-5'-P-CCNC: 25 U/L (ref 12–78)
ANION GAP SERPL CALCULATED.3IONS-SCNC: 7 MMOL/L (ref 4–13)
AST SERPL W P-5'-P-CCNC: 19 U/L (ref 5–45)
BACTERIA UR QL AUTO: ABNORMAL /HPF
BASOPHILS # BLD AUTO: 0.06 THOUSANDS/ΜL (ref 0–0.1)
BASOPHILS NFR BLD AUTO: 1 % (ref 0–1)
BILIRUB SERPL-MCNC: 0.71 MG/DL (ref 0.2–1)
BILIRUB UR QL STRIP: NEGATIVE
BUN SERPL-MCNC: 15 MG/DL (ref 5–25)
CALCIUM SERPL-MCNC: 8.9 MG/DL (ref 8.3–10.1)
CHLORIDE SERPL-SCNC: 104 MMOL/L (ref 100–108)
CLARITY UR: CLEAR
CO2 SERPL-SCNC: 30 MMOL/L (ref 21–32)
COLOR UR: YELLOW
CREAT SERPL-MCNC: 0.95 MG/DL (ref 0.6–1.3)
EOSINOPHIL # BLD AUTO: 0.32 THOUSAND/ΜL (ref 0–0.61)
EOSINOPHIL NFR BLD AUTO: 4 % (ref 0–6)
ERYTHROCYTE [DISTWIDTH] IN BLOOD BY AUTOMATED COUNT: 12.8 % (ref 11.6–15.1)
GFR SERPL CREATININE-BSD FRML MDRD: 73 ML/MIN/1.73SQ M
GLUCOSE SERPL-MCNC: 108 MG/DL (ref 65–140)
GLUCOSE UR STRIP-MCNC: NEGATIVE MG/DL
HCT VFR BLD AUTO: 45.2 % (ref 36.5–49.3)
HGB BLD-MCNC: 14.7 G/DL (ref 12–17)
HGB UR QL STRIP.AUTO: ABNORMAL
IMM GRANULOCYTES # BLD AUTO: 0.02 THOUSAND/UL (ref 0–0.2)
IMM GRANULOCYTES NFR BLD AUTO: 0 % (ref 0–2)
KETONES UR STRIP-MCNC: NEGATIVE MG/DL
LACTATE SERPL-SCNC: 1 MMOL/L (ref 0.5–2)
LEUKOCYTE ESTERASE UR QL STRIP: NEGATIVE
LIPASE SERPL-CCNC: 173 U/L (ref 73–393)
LYMPHOCYTES # BLD AUTO: 2.15 THOUSANDS/ΜL (ref 0.6–4.47)
LYMPHOCYTES NFR BLD AUTO: 29 % (ref 14–44)
MCH RBC QN AUTO: 29.6 PG (ref 26.8–34.3)
MCHC RBC AUTO-ENTMCNC: 32.5 G/DL (ref 31.4–37.4)
MCV RBC AUTO: 91 FL (ref 82–98)
MONOCYTES # BLD AUTO: 0.72 THOUSAND/ΜL (ref 0.17–1.22)
MONOCYTES NFR BLD AUTO: 10 % (ref 4–12)
MUCOUS THREADS UR QL AUTO: ABNORMAL
NEUTROPHILS # BLD AUTO: 4.26 THOUSANDS/ΜL (ref 1.85–7.62)
NEUTS SEG NFR BLD AUTO: 56 % (ref 43–75)
NITRITE UR QL STRIP: NEGATIVE
NON-SQ EPI CELLS URNS QL MICRO: ABNORMAL /HPF
NRBC BLD AUTO-RTO: 0 /100 WBCS
PH UR STRIP.AUTO: 6 [PH] (ref 4.5–8)
PLATELET # BLD AUTO: 207 THOUSANDS/UL (ref 149–390)
PMV BLD AUTO: 9.8 FL (ref 8.9–12.7)
POTASSIUM SERPL-SCNC: 3.9 MMOL/L (ref 3.5–5.3)
PROT SERPL-MCNC: 7.6 G/DL (ref 6.4–8.2)
PROT UR STRIP-MCNC: ABNORMAL MG/DL
RBC # BLD AUTO: 4.97 MILLION/UL (ref 3.88–5.62)
RBC #/AREA URNS AUTO: ABNORMAL /HPF
SODIUM SERPL-SCNC: 141 MMOL/L (ref 136–145)
SP GR UR STRIP.AUTO: 1.02 (ref 1–1.03)
UROBILINOGEN UR QL STRIP.AUTO: 0.2 E.U./DL
WBC # BLD AUTO: 7.53 THOUSAND/UL (ref 4.31–10.16)
WBC #/AREA URNS AUTO: ABNORMAL /HPF

## 2020-01-21 PROCEDURE — 83605 ASSAY OF LACTIC ACID: CPT | Performed by: EMERGENCY MEDICINE

## 2020-01-21 PROCEDURE — 99284 EMERGENCY DEPT VISIT MOD MDM: CPT

## 2020-01-21 PROCEDURE — 83690 ASSAY OF LIPASE: CPT | Performed by: EMERGENCY MEDICINE

## 2020-01-21 PROCEDURE — 85025 COMPLETE CBC W/AUTO DIFF WBC: CPT | Performed by: EMERGENCY MEDICINE

## 2020-01-21 PROCEDURE — 99284 EMERGENCY DEPT VISIT MOD MDM: CPT | Performed by: EMERGENCY MEDICINE

## 2020-01-21 PROCEDURE — 81001 URINALYSIS AUTO W/SCOPE: CPT

## 2020-01-21 PROCEDURE — 36415 COLL VENOUS BLD VENIPUNCTURE: CPT | Performed by: EMERGENCY MEDICINE

## 2020-01-21 PROCEDURE — 80053 COMPREHEN METABOLIC PANEL: CPT | Performed by: EMERGENCY MEDICINE

## 2020-01-21 PROCEDURE — 74177 CT ABD & PELVIS W/CONTRAST: CPT

## 2020-01-21 RX ORDER — AMOXICILLIN AND CLAVULANATE POTASSIUM 875; 125 MG/1; MG/1
1 TABLET, FILM COATED ORAL ONCE
Status: COMPLETED | OUTPATIENT
Start: 2020-01-21 | End: 2020-01-21

## 2020-01-21 RX ORDER — AMOXICILLIN AND CLAVULANATE POTASSIUM 875; 125 MG/1; MG/1
1 TABLET, FILM COATED ORAL EVERY 12 HOURS
Qty: 20 TABLET | Refills: 0 | Status: SHIPPED | OUTPATIENT
Start: 2020-01-21 | End: 2020-01-31

## 2020-01-21 RX ADMIN — IOHEXOL 100 ML: 350 INJECTION, SOLUTION INTRAVENOUS at 09:07

## 2020-01-21 RX ADMIN — AMOXICILLIN AND CLAVULANATE POTASSIUM 1 TABLET: 875; 125 TABLET, FILM COATED ORAL at 10:12

## 2020-01-21 NOTE — ED PROVIDER NOTES
History  Chief Complaint   Patient presents with    Abdominal Pain     Left sided abdominal pain since yesterday, also reports increased gas x 2 weeks  Hx of bladder ca  Hx of kidney stones 8 years ago  History provided by:  Patient  Abdominal Pain   Pain location:  RLQ, LLQ and suprapubic  Pain quality: aching    Pain radiates to:  Does not radiate  Pain severity:  Mild  Onset quality:  Gradual  Duration:  1 week  Timing:  Constant  Progression:  Waxing and waning  Relieved by:  Nothing  Worsened by:  Palpation  Associated symptoms: no anorexia, no chest pain, no chills, no constipation, no diarrhea, no dysuria, no fatigue, no fever, no flatus, no hematochezia, no hematuria, no melena, no nausea, no shortness of breath, no sore throat and no vomiting        Prior to Admission Medications   Prescriptions Last Dose Informant Patient Reported? Taking?    ILEVRO 0 3 % SUSP   Yes No   amLODIPine (NORVASC) 10 mg tablet   No No   Sig: TAKE 1 TABLET BY MOUTH  DAILY   aspirin (ADULT ASPIRIN REGIMEN) 81 mg EC tablet  Self Yes No   Sig: Take 81 mg by mouth daily   bimatoprost (LUMIGAN) 0 01 % ophthalmic drops  Self Yes No   Sig: Administer 1 drop to both eyes daily at bedtime   losartan (COZAAR) 100 MG tablet   No No   Sig: TAKE 1 TABLET BY MOUTH  DAILY   metoprolol succinate (TOPROL-XL) 25 mg 24 hr tablet   No No   Sig: TAKE 1 TABLET BY MOUTH  DAILY      Facility-Administered Medications: None       Past Medical History:   Diagnosis Date    Abnormal EKG     last assessed: 11/25/2014    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    COPD with acute exacerbation ([facility] [address] )     last assessed: 2/7/2017    Diverticulosis     Eczema     last assessed: 4/30/2013    Esophagitis     Gout     last assessed: 5/19/2014    Nail avulsion of toe     Neoplasm of bladder     last assessed: 4/30/2013       Past Surgical History:   Procedure Laterality Date    HERNIA REPAIR      last assessed: 2014    KIDNEY SURGERY      description: removal of kidney stone last assessed: 2014       Family History   Problem Relation Age of Onset    Stroke Mother     Alzheimer's disease Father     Heart attack Sister     Cancer Family      I have reviewed and agree with the history as documented  Social History     Tobacco Use    Smoking status: Former Smoker     Packs/day: 2 00     Years: 33 00     Pack years: 66 00     Types: Cigarettes     Start date:      Last attempt to quit: 1982     Years since quittin 0    Smokeless tobacco: Never Used   Substance Use Topics    Alcohol use: No    Drug use: No        Review of Systems   Constitutional: Negative for activity change, appetite change, chills, fatigue and fever  HENT: Negative for congestion, dental problem, ear pain, rhinorrhea and sore throat  Eyes: Negative for pain and redness  Respiratory: Negative for chest tightness, shortness of breath and wheezing  Cardiovascular: Negative for chest pain and palpitations  Gastrointestinal: Positive for abdominal pain  Negative for anorexia, blood in stool, constipation, diarrhea, flatus, hematochezia, melena, nausea and vomiting  Endocrine: Negative for cold intolerance and heat intolerance  Genitourinary: Negative for difficulty urinating, dysuria, frequency, hematuria, scrotal swelling and testicular pain  Musculoskeletal: Negative for arthralgias and myalgias  Skin: Negative for color change, pallor and rash  Neurological: Negative for weakness and numbness  Hematological: Does not bruise/bleed easily  Psychiatric/Behavioral: Negative for agitation, hallucinations and suicidal ideas  Physical Exam  Physical Exam   Constitutional: He is oriented to person, place, and time  He appears well-developed and well-nourished  HENT:   Mouth/Throat: No oropharyngeal exudate     TMs normal bilaterally no pharyngeal erythema no rhinorrhea nontender palpation of sinuses, normal looking turbinates   Eyes: Conjunctivae and EOM are normal    Neck: Normal range of motion  Neck supple  No meningeal signs   Cardiovascular: Normal rate, regular rhythm, normal heart sounds and intact distal pulses  Pulmonary/Chest: Effort normal and breath sounds normal  No respiratory distress  He has no wheezes  He has no rales  He exhibits no tenderness  Abdominal: Soft  Bowel sounds are normal  He exhibits no distension and no mass  There is tenderness (lower abdominal pain)  No hernia  No cvat   Musculoskeletal: Normal range of motion  He exhibits no edema  Lymphadenopathy:     He has no cervical adenopathy  Neurological: He is alert and oriented to person, place, and time  No cranial nerve deficit  Skin: No rash noted  No erythema  No edema   Psychiatric: He has a normal mood and affect  His behavior is normal    Nursing note and vitals reviewed        Vital Signs  ED Triage Vitals   Temperature Pulse Respirations Blood Pressure SpO2   01/21/20 0739 01/21/20 0730 01/21/20 0730 01/21/20 0730 01/21/20 0730   98 3 °F (36 8 °C) 62 18 (!) 182/79 94 %      Temp Source Heart Rate Source Patient Position - Orthostatic VS BP Location FiO2 (%)   01/21/20 0739 01/21/20 0930 01/21/20 0930 01/21/20 0930 --   Oral Monitor Lying Right arm       Pain Score       --                  Vitals:    01/21/20 0730 01/21/20 0930   BP: (!) 182/79 129/60   Pulse: 62 (!) 54   Patient Position - Orthostatic VS:  Lying         Visual Acuity      ED Medications  Medications   amoxicillin-clavulanate (AUGMENTIN) 875-125 mg per tablet 1 tablet (has no administration in time range)   iohexol (OMNIPAQUE) 350 MG/ML injection (MULTI-DOSE) 100 mL (100 mL Intravenous Given 1/21/20 0907)       Diagnostic Studies  Results Reviewed     Procedure Component Value Units Date/Time    Urine Microscopic [[id number]]  (Abnormal) Collected:  01/21/20 0749    Lab Status:  Final result Specimen:  Urine, Clean Catch Updated:  01/21/20 7621 RBC, UA 1-2 /hpf      WBC, UA 1-2 /hpf      Epithelial Cells Occasional /hpf      Bacteria, UA Occasional /hpf      MUCUS THREADS Moderate    Lactic acid, plasma x2 [[id number]]  (Normal) Collected:  01/21/20 0808    Lab Status:  Final result Specimen:  Blood from Arm, Right Updated:  01/21/20 9360     LACTIC ACID 1 0 mmol/L     Narrative:       Result may be elevated if tourniquet was used during collection      Comprehensive metabolic panel [[id number]] Collected:  01/21/20 0808    Lab Status:  Final result Specimen:  Blood from Arm, Right Updated:  01/21/20 0835     Sodium 141 mmol/L      Potassium 3 9 mmol/L      Chloride 104 mmol/L      CO2 30 mmol/L      ANION GAP 7 mmol/L      BUN 15 mg/dL      Creatinine 0 95 mg/dL      Glucose 108 mg/dL      Calcium 8 9 mg/dL      AST 19 U/L      ALT 25 U/L      Alkaline Phosphatase 71 U/L      Total Protein 7 6 g/dL      Albumin 3 5 g/dL      Total Bilirubin 0 71 mg/dL      eGFR 73 ml/min/1 73sq m     Narrative:       [place] guidelines for Chronic Kidney Disease (CKD):     Stage 1 with normal or high GFR (GFR > 90 mL/min/1 73 square meters)    Stage 2 Mild CKD (GFR = 60-89 mL/min/1 73 square meters)    Stage 3A Moderate CKD (GFR = 45-59 mL/min/1 73 square meters)    Stage 3B Moderate CKD (GFR = 30-44 mL/min/1 73 square meters)    Stage 4 Severe CKD (GFR = 15-29 mL/min/1 73 square meters)    Stage 5 End Stage CKD (GFR <15 mL/min/1 73 square meters)  Note: GFR calculation is accurate only with a steady state creatinine    Lipase [[id number]]  (Normal) Collected:  01/21/20 0808    Lab Status:  Final result Specimen:  Blood from Arm, Right Updated:  01/21/20 0835     Lipase 173 u/L     CBC and differential [[id number]] Collected:  01/21/20 0808    Lab Status:  Final result Specimen:  Blood from Arm, Right Updated:  01/21/20 0815     WBC 7 53 Thousand/uL      RBC 4 97 Million/uL      Hemoglobin 14 7 g/dL      Hematocrit 45 2 %      MCV 91 fL MCH 29 6 pg      MCHC 32 5 g/dL      RDW 12 8 %      MPV 9 8 fL      Platelets 453 Thousands/uL      nRBC 0 /100 WBCs      Neutrophils Relative 56 %      Immat GRANS % 0 %      Lymphocytes Relative 29 %      Monocytes Relative 10 %      Eosinophils Relative 4 %      Basophils Relative 1 %      Neutrophils Absolute 4 26 Thousands/µL      Immature Grans Absolute 0 02 Thousand/uL      Lymphocytes Absolute 2 15 Thousands/µL      Monocytes Absolute 0 72 Thousand/µL      Eosinophils Absolute 0 32 Thousand/µL      Basophils Absolute 0 06 Thousands/µL     POCT urinalysis dipstick [[id number]]  (Abnormal) Resulted:  01/21/20 0751    Lab Status:  Final result Specimen:  Urine Updated:  01/21/20 0751    Urine Macroscopic, POC [[id number]]  (Abnormal) Collected:  01/21/20 0749    Lab Status:  Final result Specimen:  Urine Updated:  01/21/20 0751     Color, UA Yellow     Clarity, UA Clear     pH, UA 6 0     Leukocytes, UA Negative     Nitrite, UA Negative     Protein,  (2+) mg/dl      Glucose, UA Negative mg/dl      Ketones, UA Negative mg/dl      Urobilinogen, UA 0 2 E U /dl      Bilirubin, UA Negative     Blood, UA Trace     Specific Hooper, UA 1 020    Narrative:       CLIN[facility]K RESULT                 CT abdomen pelvis with contrast   Final Result by Kaylee Wagner MD (01/21 4095)      Findings suggesting mild acute diverticulitis involving the distal descending colon  No evidence of perforation or abscess  Workstation performed: [id number]                    Procedures  Procedures         ED Course                               MDM  Number of Diagnoses or Management Options  Diagnosis management comments: Acute lower abdominal pain with diffuse tenderness palpation-will do abdominal labs, lactic, urine dip, CT abdomen and pelvis read acute pathology          Disposition  Final diagnoses:   Acute diverticulitis     Time reflects when diagnosis was documented in both MDM as applicable and the Disposition within this note     Time User Action Codes Description Comment    1/21/2020  9:59 AM Bryan Gunderson Add [K57 [id number]] Acute diverticulitis       ED Disposition     ED Disposition Condition Date/Time Comment    Discharge Stable Tue Jan 21, 2020  9:59 AM Esdras Palmer discharge to home/self care  Follow-up Information     Follow up With Specialties Details Why Contact Info    Jamila Smith DO Family Medicine Schedule an appointment as soon as possible for a visit in 2 days  [phone] [facility]  [phone]            Patient's Medications   Discharge Prescriptions    AMOXICILLIN-CLAVULANATE (AUGMENTIN) 875-125 MG PER TABLET    Take 1 tablet by mouth every 12 (twelve) hours for 10 days       Start Date: 1/21/2020 End Date: 1/31/2020       Order Dose: 1 tablet       Quantity: 20 tablet    Refills: 0     No discharge procedures on file      ED Provider  Electronically Signed by           Carey Cao MD  01/21/20 2256

## 2020-01-22 ENCOUNTER — OFFICE VISIT (OUTPATIENT)
Dept: FAMILY MEDICINE CLINIC | Facility: CLINIC | Age: 85
End: 2020-01-22
Payer: MEDICARE

## 2020-01-22 VITALS
HEART RATE: 66 BPM | HEIGHT: 70 IN | DIASTOLIC BLOOD PRESSURE: 78 MMHG | BODY MASS INDEX: 31.01 KG/M2 | SYSTOLIC BLOOD PRESSURE: 148 MMHG | RESPIRATION RATE: 17 BRPM | TEMPERATURE: 97.5 F | WEIGHT: 216.6 LBS | OXYGEN SATURATION: 96 %

## 2020-01-22 DIAGNOSIS — K57.92 ACUTE DIVERTICULITIS: Primary | ICD-10-CM

## 2020-01-22 PROCEDURE — 99213 OFFICE O/P EST LOW 20 MIN: CPT | Performed by: PHYSICIAN ASSISTANT

## 2020-01-24 PROBLEM — M47.26 OSTEOARTHRITIS OF SPINE WITH RADICULOPATHY, LUMBAR REGION: Status: ACTIVE | Noted: 2020-01-24

## 2020-01-25 NOTE — PROGRESS NOTES
Assessment/Plan:    1  Acute diverticulitis  Finish course of Augmentin  Recommended probiotic  Continue clear liquid diet until symptoms improve  Then advance diet as tolerated  Discussed ultimately need to add more fiber to his diet  If needed may supplement with Metamucil, Benefiber, or equivalent  ER precaution  Follow-up as needed  Patient Active Problem List   Diagnosis    Benign essential hypertension    COPD (chronic obstructive pulmonary disease) (HCC)    Degeneration of intervertebral disc of lumbar region    Diastasis of rectus abdominis    Herniated lumbar intervertebral disc    Hyperlipidemia    Lumbar radiculitis    Neural foraminal stenosis of lumbar spine    Subdural hematoma (HCC)    Sacroiliitis (HCC)    SIVA (obstructive sleep apnea)    Nocturia    Osteoarthritis of spine with radiculopathy, lumbar region       Subjective:      Patient ID: Hernandez House is a 80 y o  male  Patient is here for ER follow-up  He was seen on 01/21 for abdominal pain  He was treated for acute diverticulitis with Augmentin  He has taken 2 doses  States his symptoms have mildly improved  He has been on a clear liquid diet  No longer has diarrhea  Abdominal pain in left lower quadrant  Nonradiating  States it improved since yesterday  Currently 2/10 pain  States it's anywhere from sharp to cramping  Denies nausea or vomiting  Denies fever and chills  Denies hematochezia or melena  The following portions of the patient's history were reviewed and updated as appropriate: allergies, current medications, past family history, past medical history, past social history, past surgical history and problem list     Review of Systems   Constitutional: Negative for chills, fatigue and fever  HENT: Negative for congestion, ear pain, postnasal drip, rhinorrhea and sore throat  Respiratory: Negative for cough, chest tightness, shortness of breath and wheezing      Cardiovascular: Negative for chest pain, palpitations and leg swelling  Gastrointestinal: Positive for abdominal pain and diarrhea  Negative for blood in stool, constipation, nausea and vomiting  Genitourinary: Negative for decreased urine volume, dysuria, flank pain, frequency, hematuria and urgency  Skin: Negative for rash  Neurological: Negative for dizziness, light-headedness and headaches  Hematological: Negative for adenopathy  Objective:      /78 (BP Location: Left arm, Patient Position: Sitting, Cuff Size: Large)   Pulse 66   Temp 97 5 °F (36 4 °C) (Tympanic)   Resp 17   Ht 5' 9 5" (1 765 m)   Wt 98 2 kg (216 lb 9 6 oz)   SpO2 96%   BMI 31 53 kg/m²          Physical Exam   Constitutional: He is oriented to person, place, and time  He appears well-developed and well-nourished  HENT:   Head: Normocephalic and atraumatic  Eyes: Pupils are equal, round, and reactive to light  Conjunctivae are normal    Neck: Normal range of motion  Neck supple  Cardiovascular: Normal rate, regular rhythm, S1 normal, S2 normal, normal heart sounds and intact distal pulses  Pulmonary/Chest: Effort normal and breath sounds normal    Abdominal: Soft  Normal appearance and bowel sounds are normal  He exhibits no mass  There is no hepatosplenomegaly  There is tenderness (mild) in the left upper quadrant  There is no rigidity, no rebound, no guarding, no CVA tenderness, no tenderness at McBurney's point and negative Little's sign  Musculoskeletal: Normal range of motion  Lymphadenopathy:     He has no cervical adenopathy  Neurological: He is alert and oriented to person, place, and time  Skin: Skin is warm, dry and intact  No rash noted  Psychiatric: He has a normal mood and affect  His behavior is normal  Judgment and thought content normal    Nursing note and vitals reviewed        Current Outpatient Medications on File Prior to Visit   Medication Sig Dispense Refill    amLODIPine (NORVASC) 10 mg tablet TAKE 1 TABLET BY MOUTH  DAILY 90 tablet 1    amoxicillin-clavulanate (AUGMENTIN) 875-125 mg per tablet Take 1 tablet by mouth every 12 (twelve) hours for 10 days 20 tablet 0    aspirin (ADULT ASPIRIN REGIMEN) 81 mg EC tablet Take 81 mg by mouth daily      bimatoprost (LUMIGAN) 0 01 % ophthalmic drops Administer 1 drop to both eyes daily at bedtime      ILEVRO 0 3 % SUSP       losartan (COZAAR) 100 MG tablet TAKE 1 TABLET BY MOUTH  DAILY 90 tablet 1    metoprolol succinate (TOPROL-XL) 25 mg 24 hr tablet TAKE 1 TABLET BY MOUTH  DAILY 90 tablet 1     No current facility-administered medications on file prior to visit

## 2020-05-17 DIAGNOSIS — I10 BENIGN ESSENTIAL HYPERTENSION: ICD-10-CM

## 2020-05-19 RX ORDER — LOSARTAN POTASSIUM 100 MG/1
TABLET ORAL
Qty: 30 TABLET | Refills: 0 | Status: SHIPPED | OUTPATIENT
Start: 2020-05-19 | End: 2020-05-26 | Stop reason: SDUPTHER

## 2020-05-19 RX ORDER — METOPROLOL SUCCINATE 25 MG/1
TABLET, EXTENDED RELEASE ORAL
Qty: 30 TABLET | Refills: 0 | Status: SHIPPED | OUTPATIENT
Start: 2020-05-19 | End: 2020-05-26 | Stop reason: SDUPTHER

## 2020-05-19 RX ORDER — AMLODIPINE BESYLATE 10 MG/1
TABLET ORAL
Qty: 90 TABLET | Refills: 1 | Status: SHIPPED | OUTPATIENT
Start: 2020-05-19 | End: 2020-12-07

## 2020-05-26 ENCOUNTER — TELEMEDICINE (OUTPATIENT)
Dept: FAMILY MEDICINE CLINIC | Facility: CLINIC | Age: 85
End: 2020-05-26
Payer: MEDICARE

## 2020-05-26 DIAGNOSIS — R35.1 NOCTURIA: ICD-10-CM

## 2020-05-26 DIAGNOSIS — J44.9 CHRONIC OBSTRUCTIVE PULMONARY DISEASE, UNSPECIFIED COPD TYPE (HCC): ICD-10-CM

## 2020-05-26 DIAGNOSIS — E78.2 MIXED HYPERLIPIDEMIA: ICD-10-CM

## 2020-05-26 DIAGNOSIS — I10 BENIGN ESSENTIAL HYPERTENSION: Primary | ICD-10-CM

## 2020-05-26 DIAGNOSIS — R73.03 PREDIABETES: ICD-10-CM

## 2020-05-26 PROCEDURE — 99441 PR PHYS/QHP TELEPHONE EVALUATION 5-10 MIN: CPT | Performed by: FAMILY MEDICINE

## 2020-05-26 RX ORDER — METOPROLOL SUCCINATE 25 MG/1
25 TABLET, EXTENDED RELEASE ORAL DAILY
Qty: 90 TABLET | Refills: 1 | Status: SHIPPED | OUTPATIENT
Start: 2020-05-26 | End: 2020-12-07

## 2020-05-26 RX ORDER — LOSARTAN POTASSIUM 100 MG/1
100 TABLET ORAL DAILY
Qty: 90 TABLET | Refills: 1 | Status: SHIPPED | OUTPATIENT
Start: 2020-05-26 | End: 2020-12-07

## 2020-10-06 ENCOUNTER — TRANSCRIBE ORDERS (OUTPATIENT)
Dept: ADMINISTRATIVE | Facility: HOSPITAL | Age: 85
End: 2020-10-06

## 2020-10-06 DIAGNOSIS — R31.1 BENIGN ESSENTIAL MICROSCOPIC HEMATURIA: Primary | ICD-10-CM

## 2020-10-16 ENCOUNTER — HOSPITAL ENCOUNTER (OUTPATIENT)
Dept: ULTRASOUND IMAGING | Facility: MEDICAL CENTER | Age: 85
Discharge: HOME | End: 2020-10-16
Payer: MEDICARE

## 2020-10-16 DIAGNOSIS — R31.1 BENIGN ESSENTIAL MICROSCOPIC HEMATURIA: ICD-10-CM

## 2020-10-16 PROCEDURE — 76770 US EXAM ABDO BACK WALL COMP: CPT

## 2020-12-06 DIAGNOSIS — I10 BENIGN ESSENTIAL HYPERTENSION: ICD-10-CM

## 2020-12-07 RX ORDER — METOPROLOL SUCCINATE 25 MG/1
TABLET, EXTENDED RELEASE ORAL
Qty: 90 TABLET | Refills: 0 | Status: SHIPPED | OUTPATIENT
Start: 2020-12-07 | End: 2020-12-14 | Stop reason: SDUPTHER

## 2020-12-07 RX ORDER — AMLODIPINE BESYLATE 10 MG/1
TABLET ORAL
Qty: 90 TABLET | Refills: 0 | Status: SHIPPED | OUTPATIENT
Start: 2020-12-07 | End: 2020-12-14 | Stop reason: SDUPTHER

## 2020-12-07 RX ORDER — LOSARTAN POTASSIUM 100 MG/1
TABLET ORAL
Qty: 90 TABLET | Refills: 0 | Status: SHIPPED | OUTPATIENT
Start: 2020-12-07 | End: 2020-12-14 | Stop reason: SDUPTHER

## 2020-12-14 ENCOUNTER — OFFICE VISIT (OUTPATIENT)
Dept: FAMILY MEDICINE CLINIC | Facility: CLINIC | Age: 85
End: 2020-12-14
Payer: MEDICARE

## 2020-12-14 VITALS
HEART RATE: 68 BPM | WEIGHT: 218 LBS | BODY MASS INDEX: 32.29 KG/M2 | TEMPERATURE: 97.6 F | OXYGEN SATURATION: 95 % | SYSTOLIC BLOOD PRESSURE: 128 MMHG | HEIGHT: 69 IN | RESPIRATION RATE: 19 BRPM | DIASTOLIC BLOOD PRESSURE: 80 MMHG

## 2020-12-14 DIAGNOSIS — I10 BENIGN ESSENTIAL HYPERTENSION: ICD-10-CM

## 2020-12-14 DIAGNOSIS — B35.6 TINEA CRURIS: Primary | ICD-10-CM

## 2020-12-14 DIAGNOSIS — R35.1 NOCTURIA: ICD-10-CM

## 2020-12-14 DIAGNOSIS — Z00.00 MEDICARE ANNUAL WELLNESS VISIT, SUBSEQUENT: ICD-10-CM

## 2020-12-14 DIAGNOSIS — E78.2 MIXED HYPERLIPIDEMIA: ICD-10-CM

## 2020-12-14 PROCEDURE — 99214 OFFICE O/P EST MOD 30 MIN: CPT | Performed by: FAMILY MEDICINE

## 2020-12-14 PROCEDURE — 1123F ACP DISCUSS/DSCN MKR DOCD: CPT | Performed by: FAMILY MEDICINE

## 2020-12-14 PROCEDURE — G0438 PPPS, INITIAL VISIT: HCPCS | Performed by: FAMILY MEDICINE

## 2020-12-14 RX ORDER — FINASTERIDE 5 MG/1
5 TABLET, FILM COATED ORAL DAILY
COMMUNITY
Start: 2020-11-07

## 2020-12-14 RX ORDER — AMLODIPINE BESYLATE 10 MG/1
10 TABLET ORAL DAILY
Qty: 90 TABLET | Refills: 1 | Status: SHIPPED | OUTPATIENT
Start: 2020-12-14 | End: 2021-03-16

## 2020-12-14 RX ORDER — METOPROLOL SUCCINATE 25 MG/1
25 TABLET, EXTENDED RELEASE ORAL DAILY
Qty: 90 TABLET | Refills: 1 | Status: SHIPPED | OUTPATIENT
Start: 2020-12-14 | End: 2021-03-24

## 2020-12-14 RX ORDER — LOSARTAN POTASSIUM 100 MG/1
100 TABLET ORAL DAILY
Qty: 90 TABLET | Refills: 1 | Status: SHIPPED | OUTPATIENT
Start: 2020-12-14 | End: 2021-04-06 | Stop reason: DRUGHIGH

## 2020-12-14 RX ORDER — CLOTRIMAZOLE 1 %
CREAM (GRAM) TOPICAL 2 TIMES DAILY
Qty: 30 G | Refills: 2 | Status: SHIPPED | OUTPATIENT
Start: 2020-12-14

## 2021-01-21 ENCOUNTER — IMMUNIZATIONS (OUTPATIENT)
Dept: FAMILY MEDICINE CLINIC | Facility: HOSPITAL | Age: 86
End: 2021-01-21

## 2021-01-21 DIAGNOSIS — Z23 ENCOUNTER FOR IMMUNIZATION: Primary | ICD-10-CM

## 2021-01-21 PROCEDURE — 91300 SARS-COV-2 / COVID-19 MRNA VACCINE (PFIZER-BIONTECH) 30 MCG: CPT

## 2021-01-21 PROCEDURE — 0001A SARS-COV-2 / COVID-19 MRNA VACCINE (PFIZER-BIONTECH) 30 MCG: CPT

## 2021-02-10 ENCOUNTER — IMMUNIZATIONS (OUTPATIENT)
Dept: FAMILY MEDICINE CLINIC | Facility: HOSPITAL | Age: 86
End: 2021-02-10

## 2021-02-10 DIAGNOSIS — Z23 ENCOUNTER FOR IMMUNIZATION: Primary | ICD-10-CM

## 2021-02-10 PROCEDURE — 91300 SARS-COV-2 / COVID-19 MRNA VACCINE (PFIZER-BIONTECH) 30 MCG: CPT

## 2021-02-10 PROCEDURE — 0002A SARS-COV-2 / COVID-19 MRNA VACCINE (PFIZER-BIONTECH) 30 MCG: CPT

## 2021-02-25 ENCOUNTER — TELEMEDICINE (OUTPATIENT)
Dept: FAMILY MEDICINE CLINIC | Facility: CLINIC | Age: 86
End: 2021-02-25
Payer: MEDICARE

## 2021-02-25 VITALS — BODY MASS INDEX: 32.58 KG/M2 | HEIGHT: 69 IN | WEIGHT: 220 LBS

## 2021-02-25 DIAGNOSIS — J02.0 STREP PHARYNGITIS: Primary | ICD-10-CM

## 2021-02-25 PROCEDURE — 99212 OFFICE O/P EST SF 10 MIN: CPT | Performed by: FAMILY MEDICINE

## 2021-02-25 RX ORDER — TAMSULOSIN HYDROCHLORIDE 0.4 MG/1
CAPSULE ORAL
COMMUNITY
Start: 2021-02-08

## 2021-02-25 RX ORDER — AMOXICILLIN AND CLAVULANATE POTASSIUM 875; 125 MG/1; MG/1
1 TABLET, FILM COATED ORAL EVERY 12 HOURS SCHEDULED
Qty: 14 TABLET | Refills: 0 | Status: SHIPPED | OUTPATIENT
Start: 2021-02-25 | End: 2021-03-01 | Stop reason: SDDI

## 2021-02-25 RX ORDER — SILDENAFIL 100 MG/1
TABLET, FILM COATED ORAL
COMMUNITY
Start: 2021-02-09

## 2021-02-25 NOTE — ASSESSMENT & PLAN NOTE
ymptoms are consistent with strep pharyngitis  Increase fluid intake and get plenty of rest       Please start Augmentin, which was sent to your pharmacy  If you have sore / scratch / irritated throat, you may try the following:          Warm salt water gargles every 1-2 hours while awake, throat lozenges, Tylenol and/or ibuprofen  You may also use Ibuprofen or Acetaminophen containing product for symptom relief  Follow up in 1 week if your symptoms persist or worsen  Please call the office if you have any questions  The patient verbalized understanding of treatment plan

## 2021-02-25 NOTE — PROGRESS NOTES
Virtual Brief Visit    Assessment/Plan:    Problem List Items Addressed This Visit        Digestive    Strep pharyngitis - Primary     ymptoms are consistent with strep pharyngitis  Increase fluid intake and get plenty of rest       Please start Augmentin, which was sent to your pharmacy  If you have sore / scratch / irritated throat, you may try the following:          Warm salt water gargles every 1-2 hours while awake, throat lozenges, Tylenol and/or ibuprofen  You may also use Ibuprofen or Acetaminophen containing product for symptom relief  Follow up in 1 week if your symptoms persist or worsen  Please call the office if you have any questions  The patient verbalized understanding of treatment plan  Relevant Medications    amoxicillin-clavulanate (AUGMENTIN) 875-125 mg per tablet                Reason for visit is   Chief Complaint   Patient presents with    Virtual Regular Visit    Sore Throat     3 days    Virtual Brief Visit        Encounter provider Lyla Nguyen DO    Provider located at [place] [address]  [address] [place] [address] [address] [place] [address]  [phone]    Recent Visits  No visits were found meeting these conditions  Showing recent visits within past 7 days and meeting all other requirements     Today's Visits  Date Type Provider Dept   02/25/21 Telemedicine [place]yla [place] [id number] [address] today's visits and meeting all other requirements     Future Appointments  No visits were found meeting these conditions  Showing future appointments within next 150 days and meeting all other requirements        After connecting through [facility] and patient was informed that this is a secure, HIPAA-compliant platform  He agrees to proceed  , the patient was identified by name and date of birth   Yeison Kin was informed that this is a telemedicine visit and that the visit is being conducted through [facility] and patient was informed that this is a secure, HIPAA-compliant platform  He agrees to proceed     My office door was closed  No one else was in the room  He acknowledged consent and understanding of privacy and security of the platform  The patient has agreed to participate and understands he can discontinue the visit at any time  Patient is aware this is a billable service  Subjective    Hazel Wan is a 80 y o  male with complaint of sore throat x 3 days  He has pain when he swallows  He lives with his wife who feels well with no complaints  Patient and his wife both got 2 doses of the COVID-19 vaccine already  It has been 2 weeks since their second dose of the vaccine  Patient reports some body aches  No fever, congestion, or coughing  All other ROS negative       HPI     Past Medical History:   Diagnosis Date    Abnormal EKG     last assessed: 11/25/2014    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    COPD with acute exacerbation ([facility] [address] )     last assessed: 2/7/2017    Diverticulosis     Eczema     last assessed: 4/30/2013    Esophagitis     Gout     last assessed: 5/19/2014    Nail avulsion of toe     Neoplasm of bladder     last assessed: 4/30/2013       Past Surgical History:   Procedure Laterality Date    HERNIA REPAIR      last assessed: 11/25/2014    KIDNEY SURGERY      description: removal of kidney stone last assessed: 11/25/2014       Current Outpatient Medications   Medication Sig Dispense Refill    amLODIPine (NORVASC) 10 mg tablet Take 1 tablet (10 mg total) by mouth daily 90 tablet 1    finasteride (PROSCAR) 5 mg tablet Take 5 mg by mouth daily      losartan (COZAAR) 100 MG tablet Take 1 tablet (100 mg total) by mouth daily 90 tablet 1    metoprolol succinate (TOPROL-XL) 25 mg 24 hr tablet Take 1 tablet (25 mg total) by mouth daily 90 tablet 1    tamsulosin (FLOMAX) 0 4 mg       amoxicillin-clavulanate (AUGMENTIN) 875-125 mg per tablet Take 1 tablet by mouth every 12 (twelve) hours for 7 days 14 tablet 0    aspirin (ADULT ASPIRIN REGIMEN) 81 mg EC tablet Take 81 mg by mouth daily      bimatoprost (LUMIGAN) 0 01 % ophthalmic drops Administer 1 drop to both eyes daily at bedtime      clotrimazole (LOTRIMIN) 1 % cream Apply topically 2 (two) times a day (Patient not taking: Reported on 2/25/2021) 30 g 2    D-Mannose 350 MG CAPS Take 1 capsule by mouth daily (Patient not taking: Reported on 2/25/2021) 30 capsule 0    ILEVRO 0 3 % SUSP       sildenafil (VIAGRA) 100 mg tablet TAKE ONE TABLET BY MOUTH AS NEEDED 1 HOUR PRIOR TO SEXUAL ACTIVITY       No current facility-administered medications for this visit  Allergies   Allergen Reactions    Levetiracetam Confusion and Anxiety       Review of Systems   Constitutional: Negative for chills and fever  HENT: Positive for sore throat  Negative for ear pain  Eyes: Negative for pain and visual disturbance  Respiratory: Negative for cough and shortness of breath  Cardiovascular: Negative for chest pain and palpitations  Gastrointestinal: Negative for abdominal pain and vomiting  Genitourinary: Negative for dysuria and hematuria  Musculoskeletal: Negative for arthralgias and back pain  Skin: Negative for color change and rash  Neurological: Negative for seizures and syncope  All other systems reviewed and are negative  Procedures      Vitals:    02/25/21 1010   Weight: 99 8 kg (220 lb)   Height: 5' 9" (1 753 m)         I spent 10 minutes directly with the patient during this visit    VIRTUAL VISIT DISCLAIMER    Padmini Hand acknowledges that he has consented to an online visit or consultation  He understands that the online visit is based solely on information provided by him, and that, in the absence of a face-to-face physical evaluation by the physician, the diagnosis he receives is both limited and provisional in terms of accuracy and completeness   This is not intended to replace a full medical face-to-face evaluation by the physician  Yeison Jauregui understands and accepts these terms

## 2021-03-01 ENCOUNTER — OFFICE VISIT (OUTPATIENT)
Dept: FAMILY MEDICINE CLINIC | Facility: CLINIC | Age: 86
End: 2021-03-01
Payer: MEDICARE

## 2021-03-01 VITALS
TEMPERATURE: 97.2 F | HEART RATE: 71 BPM | RESPIRATION RATE: 18 BRPM | OXYGEN SATURATION: 95 % | DIASTOLIC BLOOD PRESSURE: 86 MMHG | WEIGHT: 217 LBS | SYSTOLIC BLOOD PRESSURE: 136 MMHG | BODY MASS INDEX: 32.14 KG/M2 | HEIGHT: 69 IN

## 2021-03-01 DIAGNOSIS — H66.90 ACUTE OTITIS MEDIA, UNSPECIFIED OTITIS MEDIA TYPE: Primary | ICD-10-CM

## 2021-03-01 PROCEDURE — 99214 OFFICE O/P EST MOD 30 MIN: CPT | Performed by: FAMILY MEDICINE

## 2021-03-01 RX ORDER — DOXYCYCLINE 100 MG/1
100 TABLET ORAL 2 TIMES DAILY
Qty: 14 TABLET | Refills: 0 | Status: SHIPPED | OUTPATIENT
Start: 2021-03-01 | End: 2021-03-08

## 2021-03-01 NOTE — PROGRESS NOTES
Assessment/Plan:   1  Acute otitis media, unspecified otitis media type  Reviewed patient's symptoms today  At this time, symptoms appear likely secondary to upper airway infection  Reviewed his ER record  He has chest x-ray appeared to show linear atelectasis  It appears that he may likely have been volume depleted was instructed on importance of maintaining proper hydration  Take Tylenol for symptom relief  Will start treatment empirically with doxycycline 100 mg b i d  for 7 days  Follow up if any symptoms worsen  - doxycycline (ADOXA) 100 MG tablet; Take 1 tablet (100 mg total) by mouth 2 (two) times a day for 7 days  Dispense: 14 tablet; Refill: 0           There are no diagnoses linked to this encounter  Subjective:       Chief Complaint   Patient presents with    Follow-up     ST  Pt stopped Augmentin Saturday due to anxiety and nausea      Patient ID: Lisa Lynn is a 80 y o  male presents today for an ER follow-up  He was seen in ED on Saturday secondary to increasing anxiety as well as nausea  He proceeded by ambulance  He was previously given Augmentin for symptoms of a pharyngitis  He was taking this medication which was causing nausea  On Saturday, he did notice a significant worsening in his symptoms  He did present to the ED  He did have extensive evaluation which was negative  He was subsequently discharged  He was later informed that his COVID testing was negative  HPI    Review of Systems   Constitutional: Negative for activity change, chills, fatigue and fever  HENT: Positive for ear pain, postnasal drip, rhinorrhea and sore throat  Negative for congestion and sinus pressure  Eyes: Negative for redness, itching and visual disturbance  Respiratory: Negative for cough and shortness of breath  Cardiovascular: Negative for chest pain and palpitations  Gastrointestinal: Negative for abdominal pain, diarrhea and nausea     Endocrine: Negative for cold intolerance and heat intolerance  Genitourinary: Negative for dysuria, flank pain and frequency  Musculoskeletal: Negative for arthralgias, back pain, gait problem and myalgias  Skin: Negative for color change  Allergic/Immunologic: Negative for environmental allergies  Neurological: Negative for dizziness, numbness and headaches  Psychiatric/Behavioral: Negative for behavioral problems and sleep disturbance  The following portions of the patient's history were reviewed and updated as appropriate : past family history, past medical history, past social history and past surgical history      Current Outpatient Medications:     amLODIPine (NORVASC) 10 mg tablet, Take 1 tablet (10 mg total) by mouth daily, Disp: 90 tablet, Rfl: 1    aspirin (ADULT ASPIRIN REGIMEN) 81 mg EC tablet, Take 81 mg by mouth daily, Disp: , Rfl:     clotrimazole (LOTRIMIN) 1 % cream, Apply topically 2 (two) times a day, Disp: 30 g, Rfl: 2    finasteride (PROSCAR) 5 mg tablet, Take 5 mg by mouth daily, Disp: , Rfl:     losartan (COZAAR) 100 MG tablet, Take 1 tablet (100 mg total) by mouth daily, Disp: 90 tablet, Rfl: 1    metoprolol succinate (TOPROL-XL) 25 mg 24 hr tablet, Take 1 tablet (25 mg total) by mouth daily, Disp: 90 tablet, Rfl: 1    sildenafil (VIAGRA) 100 mg tablet, TAKE ONE TABLET BY MOUTH AS NEEDED 1 HOUR PRIOR TO SEXUAL ACTIVITY, Disp: , Rfl:     tamsulosin (FLOMAX) 0 4 mg, , Disp: , Rfl:     bimatoprost (LUMIGAN) 0 01 % ophthalmic drops, Administer 1 drop to both eyes daily at bedtime, Disp: , Rfl:     D-Mannose 350 MG CAPS, Take 1 capsule by mouth daily (Patient not taking: Reported on 3/1/2021), Disp: 30 capsule, Rfl: 0    ILEVRO 0 3 % SUSP, , Disp: , Rfl:          Objective:         Vitals:    03/01/21 1553   BP: 136/86   BP Location: Left arm   Patient Position: Sitting   Cuff Size: Large   Pulse: 71   Resp: 18   Temp: (!) 97 2 °F (36 2 °C)   TempSrc: Tympanic   SpO2: 95%   Weight: 98 4 kg (217 lb) Height: 5' 9" (1 753 m)     Physical Exam  Vitals signs reviewed  Constitutional:       Appearance: He is well-developed  HENT:      Head: Normocephalic and atraumatic  Nose: Nose normal       Mouth/Throat:      Pharynx: No oropharyngeal exudate  Eyes:      General: No scleral icterus  Right eye: No discharge  Left eye: No discharge  Pupils: Pupils are equal, round, and reactive to light  Neck:      Musculoskeletal: Normal range of motion and neck supple  Trachea: No tracheal deviation  Cardiovascular:      Rate and Rhythm: Normal rate and regular rhythm  Pulses:           Dorsalis pedis pulses are 2+ on the right side and 2+ on the left side  Posterior tibial pulses are 2+ on the right side and 2+ on the left side  Heart sounds: Normal heart sounds  No murmur  No friction rub  No gallop  Pulmonary:      Effort: Pulmonary effort is normal  No respiratory distress  Breath sounds: Normal breath sounds  No wheezing or rales  Abdominal:      General: Bowel sounds are normal  There is no distension  Palpations: Abdomen is soft  Tenderness: There is no abdominal tenderness  There is no guarding or rebound  Musculoskeletal: Normal range of motion  Lymphadenopathy:      Head:      Right side of head: No submental or submandibular adenopathy  Left side of head: No submental or submandibular adenopathy  Cervical: No cervical adenopathy  Right cervical: No superficial, deep or posterior cervical adenopathy  Left cervical: No superficial, deep or posterior cervical adenopathy  Skin:     General: Skin is warm and dry  Findings: No erythema  Neurological:      Mental Status: He is alert and oriented to person, place, and time  Cranial Nerves: No cranial nerve deficit  Sensory: No sensory deficit  Psychiatric:         Mood and Affect: Mood is not anxious or depressed           Speech: Speech normal  Behavior: Behavior normal          Thought Content:  Thought content normal          Judgment: Judgment normal

## 2021-03-05 ENCOUNTER — TELEPHONE (OUTPATIENT)
Dept: FAMILY MEDICINE CLINIC | Facility: CLINIC | Age: 86
End: 2021-03-05

## 2021-03-05 NOTE — TELEPHONE ENCOUNTER
Patient was contacted this evening however he did not respond  Please call patient to check to see what symptoms he is currently having   Thank you

## 2021-03-05 NOTE — TELEPHONE ENCOUNTER
Pt spouse called in regards to pt recent visit for ER f/u  Stated pt is not any better  She is requesting a call from you today  Notified pt spouse you are currently seeing pt's  Unsure of when you can call  Please advise

## 2021-03-06 NOTE — TELEPHONE ENCOUNTER
----- Message from Sujey Jerry sent at 11/13/2019  1:26 PM CST -----  Contact: carmelita / Dr. Interiano  Calling concerning a clearence for patient to see dentist. Please call Carmelita @ [phone]. Thanks, juan carlos   Spoke with patients wife  He was fully vaccinated as of three weeks ago  Saw IA at the beginning of the weekend feels no better at all  He does not have a fever, but does have a sore throat, earache , breathing difficulty at night , chest feels funny    Just not improving      [phone]

## 2021-03-08 ENCOUNTER — OFFICE VISIT (OUTPATIENT)
Dept: FAMILY MEDICINE CLINIC | Facility: CLINIC | Age: 86
End: 2021-03-08
Payer: MEDICARE

## 2021-03-08 VITALS
BODY MASS INDEX: 31.61 KG/M2 | SYSTOLIC BLOOD PRESSURE: 126 MMHG | WEIGHT: 213.4 LBS | HEIGHT: 69 IN | OXYGEN SATURATION: 97 % | DIASTOLIC BLOOD PRESSURE: 78 MMHG | TEMPERATURE: 97.6 F | HEART RATE: 67 BPM

## 2021-03-08 DIAGNOSIS — R93.89 ABNORMAL CHEST X-RAY: ICD-10-CM

## 2021-03-08 DIAGNOSIS — R11.0 NAUSEA: ICD-10-CM

## 2021-03-08 DIAGNOSIS — R06.00 DYSPNEA ON EXERTION: Primary | ICD-10-CM

## 2021-03-08 PROBLEM — R06.09 DYSPNEA ON EXERTION: Status: ACTIVE | Noted: 2021-03-08

## 2021-03-08 PROCEDURE — 99214 OFFICE O/P EST MOD 30 MIN: CPT | Performed by: FAMILY MEDICINE

## 2021-03-08 NOTE — ASSESSMENT & PLAN NOTE
Nausea secondary to antibiotic use  Patient informed this is a common side effect of doxycycline  He took his last dose of the medication this morning  Nausea has now resolved

## 2021-03-08 NOTE — PROGRESS NOTES
Assessment/Plan:    1  Dyspnea on exertion  Assessment & Plan:  Patient with worsening dyspnea on exertion for the past 2 weeks  Chest Xray was abnormal in ED  He is hemodynamically stable today  Due to symptoms and history of tobacco use, emphysema and reported mesothelioma history we will obtain chest CT scan  Follow up if symptoms worsen  Orders:  -     CT chest wo contrast; Future; Expected date: 03/08/2021    2  Abnormal chest x-ray  -     CT chest wo contrast; Future; Expected date: 03/08/2021    3  Nausea  Assessment & Plan:  Nausea secondary to antibiotic use  Patient informed this is a common side effect of doxycycline  He took his last dose of the medication this morning  Nausea has now resolved  Subjective:      Patient ID: Marti Reyes is a 80 y o  male  HPI    Patient with multiple concerns  Two weeks ago the patient developed sick symptoms  He was started on Augmentin for treatment of pharyngitis  He went to ED on 2/28 for nausea  His PCP switched his antibiotic to doxycycline  Patient states that the nausea has not improved  It occurs after he takes the antibiotic  He is taking it with food  He states that he feels very anxious about his health and just feels unwell  He finished the antibiotic today  This morning he felt nausea, dizziness and anxiety  No changes in vision  He had headache this morning but it has now resolved  He also has noticed fatigue and shortness of breath on exertion that has been worsening  He had Xray in the ED which he states was abnormal   He also claims of history of possible mesothelioma  He smoked cigarettes for over 40 years  Last CT scan showed emphysema  His sore throat has resolved and he denies fever    Denies chest pain or shortness of breath at rest      The following portions of the patient's history were reviewed and updated as appropriate: allergies, current medications, past family history, past medical history, past social history, past surgical history, and problem list       Current Outpatient Medications:     amLODIPine (NORVASC) 10 mg tablet, Take 1 tablet (10 mg total) by mouth daily, Disp: 90 tablet, Rfl: 1    clotrimazole (LOTRIMIN) 1 % cream, Apply topically 2 (two) times a day, Disp: 30 g, Rfl: 2    losartan (COZAAR) 100 MG tablet, Take 1 tablet (100 mg total) by mouth daily, Disp: 90 tablet, Rfl: 1    metoprolol succinate (TOPROL-XL) 25 mg 24 hr tablet, Take 1 tablet (25 mg total) by mouth daily, Disp: 90 tablet, Rfl: 1    bimatoprost (LUMIGAN) 0 01 % ophthalmic drops, Administer 1 drop to both eyes daily at bedtime, Disp: , Rfl:     D-Mannose 350 MG CAPS, Take 1 capsule by mouth daily (Patient not taking: Reported on 3/1/2021), Disp: 30 capsule, Rfl: 0    finasteride (PROSCAR) 5 mg tablet, Take 5 mg by mouth daily, Disp: , Rfl:     ILEVRO 0 3 % SUSP, , Disp: , Rfl:     sildenafil (VIAGRA) 100 mg tablet, TAKE ONE TABLET BY MOUTH AS NEEDED 1 HOUR PRIOR TO SEXUAL ACTIVITY, Disp: , Rfl:     tamsulosin (FLOMAX) 0 4 mg, , Disp: , Rfl:       Review of Systems   Constitutional: Positive for fatigue  Negative for chills and fever  HENT: Negative for ear pain and sore throat  Eyes: Negative for pain and visual disturbance  Respiratory: Positive for shortness of breath  Negative for apnea, cough, chest tightness, wheezing and stridor  Cardiovascular: Negative for chest pain, palpitations and leg swelling  Gastrointestinal: Negative for abdominal pain and vomiting  Genitourinary: Negative for dysuria and hematuria  Musculoskeletal: Negative for arthralgias and back pain  Skin: Negative for color change and rash  Neurological: Negative for seizures and syncope  Psychiatric/Behavioral: The patient is nervous/anxious  All other systems reviewed and are negative          Objective:      /78 (BP Location: Left arm, Patient Position: Sitting, Cuff Size: Standard)   Pulse 67   Temp 97 6 °F (36 4 °C) (Tympanic)   Ht 5' 9" (1 753 m)   Wt 96 8 kg (213 lb 6 4 oz)   SpO2 97%   BMI 31 51 kg/m²          Physical Exam  Vitals signs and nursing note reviewed  Constitutional:       General: He is not in acute distress  Appearance: Normal appearance  He is not ill-appearing  HENT:      Head: Normocephalic and atraumatic  Right Ear: Tympanic membrane, ear canal and external ear normal       Left Ear: Tympanic membrane, ear canal and external ear normal       Nose: Nose normal       Mouth/Throat:      Mouth: Mucous membranes are moist       Pharynx: Oropharynx is clear  No oropharyngeal exudate or posterior oropharyngeal erythema  Eyes:      General: No scleral icterus  Extraocular Movements: Extraocular movements intact  Conjunctiva/sclera: Conjunctivae normal       Pupils: Pupils are equal, round, and reactive to light  Neck:      Musculoskeletal: Normal range of motion and neck supple  No neck rigidity  Vascular: No carotid bruit  Cardiovascular:      Rate and Rhythm: Normal rate and regular rhythm  Heart sounds: Normal heart sounds  No murmur  Pulmonary:      Effort: Pulmonary effort is normal  No respiratory distress  Breath sounds: Normal breath sounds  No wheezing  Musculoskeletal: Normal range of motion  Skin:     General: Skin is warm  Neurological:      General: No focal deficit present  Mental Status: He is alert and oriented to person, place, and time  Mental status is at baseline  Cranial Nerves: No cranial nerve deficit  Motor: No weakness  Gait: Gait normal    Psychiatric:         Mood and Affect: Mood normal          Behavior: Behavior normal          Thought Content:  Thought content normal          Judgment: Judgment normal

## 2021-03-08 NOTE — ASSESSMENT & PLAN NOTE
Patient with worsening dyspnea on exertion for the past 2 weeks  Chest Xray was abnormal in ED  He is hemodynamically stable today  Due to symptoms and history of tobacco use, emphysema and reported mesothelioma history we will obtain chest CT scan  Follow up if symptoms worsen

## 2021-03-12 ENCOUNTER — APPOINTMENT (OUTPATIENT)
Dept: LAB | Facility: CLINIC | Age: 86
End: 2021-03-12
Payer: MEDICARE

## 2021-03-12 ENCOUNTER — OFFICE VISIT (OUTPATIENT)
Dept: FAMILY MEDICINE CLINIC | Facility: CLINIC | Age: 86
End: 2021-03-12
Payer: MEDICARE

## 2021-03-12 VITALS
TEMPERATURE: 97.6 F | BODY MASS INDEX: 31.7 KG/M2 | WEIGHT: 214 LBS | RESPIRATION RATE: 18 BRPM | SYSTOLIC BLOOD PRESSURE: 128 MMHG | OXYGEN SATURATION: 96 % | DIASTOLIC BLOOD PRESSURE: 84 MMHG | HEIGHT: 69 IN | HEART RATE: 71 BPM

## 2021-03-12 DIAGNOSIS — R73.01 ELEVATED FASTING GLUCOSE: ICD-10-CM

## 2021-03-12 DIAGNOSIS — N28.1 RENAL CYST: ICD-10-CM

## 2021-03-12 DIAGNOSIS — E04.2 MULTIPLE THYROID NODULES: ICD-10-CM

## 2021-03-12 DIAGNOSIS — R30.0 DYSURIA: ICD-10-CM

## 2021-03-12 DIAGNOSIS — R06.02 SOB (SHORTNESS OF BREATH) ON EXERTION: Primary | ICD-10-CM

## 2021-03-12 DIAGNOSIS — R93.89 ABNORMAL CT OF THE CHEST: ICD-10-CM

## 2021-03-12 DIAGNOSIS — R53.83 FATIGUE, UNSPECIFIED TYPE: ICD-10-CM

## 2021-03-12 DIAGNOSIS — E87.1 HYPONATREMIA: ICD-10-CM

## 2021-03-12 DIAGNOSIS — R07.89 FEELING OF CHEST TIGHTNESS: ICD-10-CM

## 2021-03-12 LAB
ALBUMIN SERPL BCP-MCNC: 3.9 G/DL (ref 3.5–5)
ALP SERPL-CCNC: 60 U/L (ref 46–116)
ALT SERPL W P-5'-P-CCNC: 27 U/L (ref 12–78)
ANION GAP SERPL CALCULATED.3IONS-SCNC: 4 MMOL/L (ref 4–13)
AST SERPL W P-5'-P-CCNC: 17 U/L (ref 5–45)
BASOPHILS # BLD AUTO: 0.07 THOUSANDS/ΜL (ref 0–0.1)
BASOPHILS NFR BLD AUTO: 1 % (ref 0–1)
BILIRUB SERPL-MCNC: 0.6 MG/DL (ref 0.2–1)
BUN SERPL-MCNC: 20 MG/DL (ref 5–25)
CALCIUM SERPL-MCNC: 9.7 MG/DL (ref 8.3–10.1)
CHLORIDE SERPL-SCNC: 103 MMOL/L (ref 100–108)
CO2 SERPL-SCNC: 32 MMOL/L (ref 21–32)
CREAT SERPL-MCNC: 1.22 MG/DL (ref 0.6–1.3)
EOSINOPHIL # BLD AUTO: 0.44 THOUSAND/ΜL (ref 0–0.61)
EOSINOPHIL NFR BLD AUTO: 5 % (ref 0–6)
ERYTHROCYTE [DISTWIDTH] IN BLOOD BY AUTOMATED COUNT: 12.6 % (ref 11.6–15.1)
EST. AVERAGE GLUCOSE BLD GHB EST-MCNC: 123 MG/DL
GFR SERPL CREATININE-BSD FRML MDRD: 54 ML/MIN/1.73SQ M
GLUCOSE P FAST SERPL-MCNC: 99 MG/DL (ref 65–99)
HBA1C MFR BLD: 5.9 %
HCT VFR BLD AUTO: 44.2 % (ref 36.5–49.3)
HGB BLD-MCNC: 14.6 G/DL (ref 12–17)
IMM GRANULOCYTES # BLD AUTO: 0.02 THOUSAND/UL (ref 0–0.2)
IMM GRANULOCYTES NFR BLD AUTO: 0 % (ref 0–2)
LYMPHOCYTES # BLD AUTO: 2.67 THOUSANDS/ΜL (ref 0.6–4.47)
LYMPHOCYTES NFR BLD AUTO: 30 % (ref 14–44)
MCH RBC QN AUTO: 29.9 PG (ref 26.8–34.3)
MCHC RBC AUTO-ENTMCNC: 33 G/DL (ref 31.4–37.4)
MCV RBC AUTO: 90 FL (ref 82–98)
MONOCYTES # BLD AUTO: 0.87 THOUSAND/ΜL (ref 0.17–1.22)
MONOCYTES NFR BLD AUTO: 10 % (ref 4–12)
NEUTROPHILS # BLD AUTO: 4.98 THOUSANDS/ΜL (ref 1.85–7.62)
NEUTS SEG NFR BLD AUTO: 54 % (ref 43–75)
NRBC BLD AUTO-RTO: 0 /100 WBCS
PLATELET # BLD AUTO: 271 THOUSANDS/UL (ref 149–390)
PMV BLD AUTO: 10.1 FL (ref 8.9–12.7)
POTASSIUM SERPL-SCNC: 4.4 MMOL/L (ref 3.5–5.3)
PROT SERPL-MCNC: 7.4 G/DL (ref 6.4–8.2)
RBC # BLD AUTO: 4.89 MILLION/UL (ref 3.88–5.62)
SL AMB  POCT GLUCOSE, UA: ABNORMAL
SL AMB LEUKOCYTE ESTERASE,UA: ABNORMAL
SL AMB POCT BILIRUBIN,UA: ABNORMAL
SL AMB POCT BLOOD,UA: ABNORMAL
SL AMB POCT CLARITY,UA: CLEAR
SL AMB POCT COLOR,UA: YELLOW
SL AMB POCT KETONES,UA: ABNORMAL
SL AMB POCT NITRITE,UA: ABNORMAL
SL AMB POCT PH,UA: 6
SL AMB POCT SPECIFIC GRAVITY,UA: 1.02
SL AMB POCT URINE PROTEIN: ABNORMAL
SL AMB POCT UROBILINOGEN: 0.2
SODIUM SERPL-SCNC: 139 MMOL/L (ref 136–145)
TSH SERPL DL<=0.05 MIU/L-ACNC: 1.29 UIU/ML (ref 0.36–3.74)
WBC # BLD AUTO: 9.05 THOUSAND/UL (ref 4.31–10.16)

## 2021-03-12 PROCEDURE — 36415 COLL VENOUS BLD VENIPUNCTURE: CPT

## 2021-03-12 PROCEDURE — 85025 COMPLETE CBC W/AUTO DIFF WBC: CPT

## 2021-03-12 PROCEDURE — 84443 ASSAY THYROID STIM HORMONE: CPT

## 2021-03-12 PROCEDURE — 99214 OFFICE O/P EST MOD 30 MIN: CPT | Performed by: NURSE PRACTITIONER

## 2021-03-12 PROCEDURE — 83036 HEMOGLOBIN GLYCOSYLATED A1C: CPT

## 2021-03-12 PROCEDURE — 81003 URINALYSIS AUTO W/O SCOPE: CPT | Performed by: NURSE PRACTITIONER

## 2021-03-12 PROCEDURE — 80053 COMPREHEN METABOLIC PANEL: CPT

## 2021-03-12 NOTE — PROGRESS NOTES
[facility]    ASSESSMENT AND PLAN     1  SOB (shortness of breath) on exertion  Reviewed recent office visits and completed testing with patient and wife (below)  Physical assessment essentially unremarkable today  Assessed in office urine for any urinary etiology:  Negative with exception of protein  Will continue workup with lab work as below  Recommend evaluation by Cardiology for cardiac cause of symptoms  Will have staff assist in scheduling 1st available  Echo vs stress  Evaluate thyroid further with ultrasound and TSH  Patient has treated with pulmonologist in the past   Recommend follow-up  He has been advised to utilize in HS CPAP, which he does not use secondary to discomfort with mask  Recommend he look into the nose pillows  He will follow back up with pulmonology/Sleep Medicine  Note incidental renal cysts on CT  Will evaluate further with ultrasound  ER precautions discussed with patient should his symptoms change or worsen prior to workup completion  Will contact as test results are received    EK/28  NORMAL SINUS RHYTHM  RIGHT BUNDLE BRANCH BLOCK  CANNOT RULE OUT INFERIOR INFARCT (CITED ON OR BEFORE 21-DEC-2016)  ABNORMAL ECG  WHEN COMPARED WITH ECG OF 24-DEC-2016 13:41,  NO SIGNIFICANT CHANGE WAS FOUND    CXR:   1  Linear densities in the right lower lobe likely represents subsegmental areas  of atelectasis than infiltrate  Chest CT: 3/11  1   Clear lungs  2   Enlarged right and left pulmonary arteries which raises the possibility of  pulmonary arterial hypertension  Findings can be correlated with  echocardiography as warranted  3   Nonspecific mildly enlarged right paratracheal lymph node, which may be  reactive  No other enlarged, calcified or necrotic thoracic lymph nodes  4   Multinodular goiter  5   Bilateral renal cysts which can be further evaluated and characterized with  renal ultrasound  - Ambulatory referral to Cardiology; Future    2   Feeling of chest tightness    - Ambulatory referral to Cardiology; Future    3  Fatigue, unspecified type    - TSH, 3rd generation with Free T4 reflex; Future  - CBC and differential; Future  - US thyroid; Future    4  Hyponatremia  Sodium 132 in ER on 02/28    - Comprehensive metabolic panel; Future    5  Elevated fasting glucose  Glucose 107 in ER on 02/28    - HEMOGLOBIN A1C W/ EAG ESTIMATION; Future    6  Abnormal CT of the chest    - Ambulatory referral to Cardiology; Future    7  Multiple thyroid nodules    - US thyroid; Future    8  Dysuria    - POCT urine dip auto non-scope    9  Renal cyst    - US retroperitoneal complete; Future            SUBJECTIVE       Patient ID: Funmi Gonsales is a 80 y o  male  Chief Complaint   Patient presents with    Nausea       HISTORY OF PRESENT ILLNESS    Patient presents today with ongoing complaints for the last 2-3 weeks  Has been evaluated in the ER, tele visit in our office and to other in office visits  He continues with extreme fatigue, and shortness of breath on exertion and at HS  Denies any fever  States he just does not feel right"  The following portions of the patient's history were reviewed and updated as appropriate: allergies, current medications, past family history, past medical history, past social history, past surgical history and problem list     REVIEW OF SYSTEMS  Review of Systems   Constitutional: Positive for activity change and fatigue  Negative for fever  HENT: Negative  Respiratory: Positive for chest tightness and shortness of breath  Negative for cough and wheezing  Cardiovascular: Negative  Gastrointestinal: Negative  Genitourinary: Negative  Musculoskeletal: Positive for arthralgias and myalgias  Neurological: Positive for dizziness  Negative for syncope, weakness and headaches  Psychiatric/Behavioral: Negative          OBJECTIVE      VITAL SIGNS  /84 (BP Location: Left arm, Patient Position: Sitting, Cuff Size: Large)   Pulse 71   Temp 97 6 °F (36 4 °C) (Tympanic)   Resp 18   Ht 5' 8 9" (1 75 m)   Wt 97 1 kg (214 lb)   SpO2 96%   BMI 31 70 kg/m²   BP recheck:  132/82  Compared with home cuff:  133/77    CURRENT MEDICATIONS    Current Outpatient Medications:     amLODIPine (NORVASC) 10 mg tablet, Take 1 tablet (10 mg total) by mouth daily, Disp: 90 tablet, Rfl: 1    bimatoprost (LUMIGAN) 0 01 % ophthalmic drops, Administer 1 drop to both eyes daily at bedtime, Disp: , Rfl:     clotrimazole (LOTRIMIN) 1 % cream, Apply topically 2 (two) times a day, Disp: 30 g, Rfl: 2    finasteride (PROSCAR) 5 mg tablet, Take 5 mg by mouth daily, Disp: , Rfl:     losartan (COZAAR) 100 MG tablet, Take 1 tablet (100 mg total) by mouth daily, Disp: 90 tablet, Rfl: 1    metoprolol succinate (TOPROL-XL) 25 mg 24 hr tablet, Take 1 tablet (25 mg total) by mouth daily, Disp: 90 tablet, Rfl: 1    D-Mannose 350 MG CAPS, Take 1 capsule by mouth daily (Patient not taking: Reported on 3/1/2021), Disp: 30 capsule, Rfl: 0    ILEVRO 0 3 % SUSP, , Disp: , Rfl:     sildenafil (VIAGRA) 100 mg tablet, TAKE ONE TABLET BY MOUTH AS NEEDED 1 HOUR PRIOR TO SEXUAL ACTIVITY, Disp: , Rfl:     tamsulosin (FLOMAX) 0 4 mg, , Disp: , Rfl:       PHYSICAL EXAMINATION   Physical Exam  Vitals signs and nursing note reviewed  Constitutional:       General: He is not in acute distress  Appearance: Normal appearance  He is well-developed  He is not ill-appearing  HENT:      Head: Normocephalic and atraumatic  Right Ear: Tympanic membrane, ear canal and external ear normal       Left Ear: Tympanic membrane, ear canal and external ear normal       Nose: Nose normal       Mouth/Throat:      Mouth: Mucous membranes are moist       Pharynx: Oropharynx is clear  Neck:      Thyroid: Thyromegaly present  Vascular: No carotid bruit  Cardiovascular:      Rate and Rhythm: Normal rate and regular rhythm     Pulmonary:      Effort: Pulmonary effort is normal  No respiratory distress  Breath sounds: Normal breath sounds and air entry  Musculoskeletal:      Right lower leg: No edema  Left lower leg: No edema  Lymphadenopathy:      Cervical: No cervical adenopathy  Skin:     General: Skin is warm and dry  Neurological:      Mental Status: He is alert and oriented to person, place, and time  Psychiatric:         Attention and Perception: Attention normal          Mood and Affect: Mood normal          Speech: Speech normal          Behavior: Behavior normal          Thought Content:  Thought content normal

## 2021-03-15 ENCOUNTER — TELEPHONE (OUTPATIENT)
Dept: FAMILY MEDICINE CLINIC | Facility: CLINIC | Age: 86
End: 2021-03-15

## 2021-03-15 NOTE — TELEPHONE ENCOUNTER
Phone call from wife concerned because he is still having heaviness on chest   Appointment with Cardio not until 3/26/21  I advised her he should go to the ER if he has concerns before the appointment  She would like to know what the bloodwork showed  Please advise

## 2021-03-16 DIAGNOSIS — I10 BENIGN ESSENTIAL HYPERTENSION: ICD-10-CM

## 2021-03-16 RX ORDER — AMLODIPINE BESYLATE 10 MG/1
TABLET ORAL
Qty: 90 TABLET | Refills: 1 | Status: SHIPPED | OUTPATIENT
Start: 2021-03-16 | End: 2021-03-24 | Stop reason: DRUGHIGH

## 2021-03-16 NOTE — TELEPHONE ENCOUNTER
Spoke with pt's spouse  Pt spouse stated pt is still very "sick"  Chest is still tight and very weak  Pt wanted an appt with [facility]  No openings  Offered appt with FK  Declined at this time  Spoke with Edward  She advised pt to go to ER if not getting better on 3/15  Advised pt spouse if he is that bad he should go to ER again  Pt spouse stated she will speak with pt about going to ER

## 2021-03-16 NOTE — TELEPHONE ENCOUNTER
Agree with ER if symptoms worsen  Lab work essentially unremarkable    Note left to contact patient on results

## 2021-03-18 ENCOUNTER — TELEPHONE (OUTPATIENT)
Dept: FAMILY MEDICINE CLINIC | Facility: CLINIC | Age: 86
End: 2021-03-18

## 2021-03-18 NOTE — TELEPHONE ENCOUNTER
Prieto Hernandez called 911 and pt is in the hospital, she returned the call about his lab results  Results were given

## 2021-03-19 ENCOUNTER — TELEPHONE (OUTPATIENT)
Dept: FAMILY MEDICINE CLINIC | Facility: CLINIC | Age: 86
End: 2021-03-19

## 2021-03-20 ENCOUNTER — TRANSITIONAL CARE MANAGEMENT (OUTPATIENT)
Dept: FAMILY MEDICINE CLINIC | Facility: CLINIC | Age: 86
End: 2021-03-20

## 2021-03-22 ENCOUNTER — TELEPHONE (OUTPATIENT)
Dept: FAMILY MEDICINE CLINIC | Facility: CLINIC | Age: 86
End: 2021-03-22

## 2021-03-22 ENCOUNTER — OFFICE VISIT (OUTPATIENT)
Dept: FAMILY MEDICINE CLINIC | Facility: CLINIC | Age: 86
End: 2021-03-22
Payer: MEDICARE

## 2021-03-22 ENCOUNTER — HOSPITAL ENCOUNTER (OUTPATIENT)
Dept: ULTRASOUND IMAGING | Facility: MEDICAL CENTER | Age: 86
Discharge: HOME | End: 2021-03-22
Payer: MEDICARE

## 2021-03-22 VITALS
BODY MASS INDEX: 31.34 KG/M2 | TEMPERATURE: 97.3 F | WEIGHT: 211.6 LBS | HEART RATE: 75 BPM | HEIGHT: 69 IN | DIASTOLIC BLOOD PRESSURE: 78 MMHG | SYSTOLIC BLOOD PRESSURE: 112 MMHG | OXYGEN SATURATION: 98 %

## 2021-03-22 DIAGNOSIS — R11.0 NAUSEA: ICD-10-CM

## 2021-03-22 DIAGNOSIS — R53.83 FATIGUE, UNSPECIFIED TYPE: ICD-10-CM

## 2021-03-22 DIAGNOSIS — R13.10 DYSPHAGIA, UNSPECIFIED TYPE: ICD-10-CM

## 2021-03-22 DIAGNOSIS — R55 SYNCOPE, UNSPECIFIED SYNCOPE TYPE: Primary | ICD-10-CM

## 2021-03-22 DIAGNOSIS — E04.2 MULTIPLE THYROID NODULES: ICD-10-CM

## 2021-03-22 DIAGNOSIS — Z87.19 HISTORY OF ESOPHAGEAL STRICTURE: ICD-10-CM

## 2021-03-22 PROCEDURE — 76536 US EXAM OF HEAD AND NECK: CPT

## 2021-03-22 PROCEDURE — 99214 OFFICE O/P EST MOD 30 MIN: CPT | Performed by: NURSE PRACTITIONER

## 2021-03-22 NOTE — TELEPHONE ENCOUNTER
pts wife said you requested a new scan of the kidney - I did not see that in the system  Is this something you wanted ordered?

## 2021-03-22 NOTE — TELEPHONE ENCOUNTER
Pt has appt 3/22
pts wife Sriram Morales called seems very confused  Wants to speak with you regarding pts blwk for thyroid and cardiology appt cancelled
Detail Level: Detailed

## 2021-03-24 ENCOUNTER — CONSULT (OUTPATIENT)
Dept: CARDIOLOGY CLINIC | Facility: CLINIC | Age: 86
End: 2021-03-24
Payer: MEDICARE

## 2021-03-24 VITALS
SYSTOLIC BLOOD PRESSURE: 160 MMHG | BODY MASS INDEX: 31.09 KG/M2 | OXYGEN SATURATION: 98 % | WEIGHT: 209.9 LBS | HEIGHT: 69 IN | DIASTOLIC BLOOD PRESSURE: 86 MMHG | HEART RATE: 66 BPM

## 2021-03-24 DIAGNOSIS — R55 SYNCOPE, NEAR: ICD-10-CM

## 2021-03-24 DIAGNOSIS — I45.10 RIGHT BUNDLE-BRANCH BLOCK: ICD-10-CM

## 2021-03-24 DIAGNOSIS — R06.02 SOB (SHORTNESS OF BREATH): ICD-10-CM

## 2021-03-24 DIAGNOSIS — R07.89 FEELING OF CHEST TIGHTNESS: ICD-10-CM

## 2021-03-24 DIAGNOSIS — R93.89 ABNORMAL CT OF THE CHEST: ICD-10-CM

## 2021-03-24 DIAGNOSIS — R06.02 SOB (SHORTNESS OF BREATH) ON EXERTION: ICD-10-CM

## 2021-03-24 DIAGNOSIS — I10 HYPERTENSION, UNSPECIFIED TYPE: Primary | ICD-10-CM

## 2021-03-24 PROCEDURE — 93000 ELECTROCARDIOGRAM COMPLETE: CPT | Performed by: INTERNAL MEDICINE

## 2021-03-24 PROCEDURE — 99215 OFFICE O/P EST HI 40 MIN: CPT | Performed by: INTERNAL MEDICINE

## 2021-03-24 PROCEDURE — 93246 EXT ECG>7D<15D RECORDING: CPT | Performed by: INTERNAL MEDICINE

## 2021-03-24 RX ORDER — AMLODIPINE BESYLATE 10 MG/1
TABLET ORAL
COMMUNITY
End: 2021-04-06

## 2021-03-24 NOTE — PROGRESS NOTES
Consult - Cardiology   Judithann Merlin Hartranft 80 y o  male MRN: [id number]        Problems    Problem List Items Addressed This Visit     None      Visit Diagnoses     Hypertension, unspecified type    -  Primary    Relevant Medications    amLODIPine (NORVASC) 10 mg tablet    SOB (shortness of breath) on exertion        Relevant Orders    POCT ECG    Feeling of chest tightness        Relevant Orders    POCT ECG    Abnormal CT of the chest        Syncope, near        Right bundle-branch block                PlanRenin aldosterone level  Careful monitor the blood pressure at home  Start losartan 50 mg daily   Two week Zio patch   pulmonary function studies          Reason for Consult / Principal Problem: near syncope and hypertension  At times primarily when sitting he will feel somewhat nauseated feel like he is going to pass out  He has not had syncope  He went to [facility] had a rather extensive workup which included the following  The studies were done in the last 10 days  Echocardiogram is normal   CT scan of the head was normal     Chest x-ray was normal     EKG showed a right bundle branch block pattern which is similar to previous old EKGs  CBC and diff and metabolic panel were all normal     He had a CT scan of his chest with suggested some enlargement of the pulmonary artery  Echocardiogram however showed no evidence of pulmonary hypertension  He did not have a CTA  His blood pressure medication has been variable because of variable blood pressure  However he has been on significant doses of medication  I e  10 mg of amlodipine, 100 mg losartan, and a beta-blocker  He is now off all his blood pressure medication  Blood pressure today in the office is 606 systolic  We are going to start with an ACE inhibitor at low dose      Wife is going to take multiple blood pressures and in particular she is going to get an are blood pressure if and when he has a feels nauseated  Will also put on a 2 week Zio patch  Before he starts his medication  aldosterone renin blood level  HPI: Mago Hernandez is a 80y o  year old male        Review of Systems   Constitutional: Negative  Respiratory: Negative  Cardiovascular: Negative  Gastrointestinal: Negative  Musculoskeletal: Negative  Hematological: Negative  Psychiatric/Behavioral: Negative  Past Medical History:   Diagnosis Date    Abnormal EKG     last assessed: 2014    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    COPD with acute exacerbation ([facility] [address] )     last assessed: 2017    Diverticulosis     Eczema     last assessed: 2013    Esophagitis     Gout     last assessed: 2014    Nail avulsion of toe     Neoplasm of bladder     last assessed: 2013     Past Surgical History:   Procedure Laterality Date    HERNIA REPAIR      last assessed: 2014    KIDNEY SURGERY      description: removal of kidney stone last assessed: 2014     Social History     Substance and Sexual Activity   Alcohol Use No     Social History     Substance and Sexual Activity   Drug Use No     Social History     Tobacco Use   Smoking Status Former Smoker    Packs/day: 2 00    Years: 33 00    Pack years: 66 00    Types: Cigarettes    Start date:     Quit date: 18    Years since quittin 2   Smokeless Tobacco Never Used     Family History   Problem Relation Age of Onset    Stroke Mother     Alzheimer's disease Father     Heart attack Sister     Cancer Family        Allergies:   Allergies   Allergen Reactions    Levetiracetam Confusion and Anxiety       Medications:     Current Outpatient Medications:     amLODIPine (NORVASC) 10 mg tablet, Take 1/2 tablet daily, Disp: , Rfl:     clotrimazole (LOTRIMIN) 1 % cream, Apply topically 2 (two) times a day, Disp: 30 g, Rfl: 2    finasteride (PROSCAR) 5 mg tablet, Take 5 mg by mouth daily, Disp: , Rfl:     losartan (COZAAR) 100 MG tablet, Take 1 tablet (100 mg total) by mouth daily, Disp: 90 tablet, Rfl: 1    bimatoprost (LUMIGAN) 0 01 % ophthalmic drops, Administer 1 drop to both eyes daily at bedtime, Disp: , Rfl:     D-Mannose 350 MG CAPS, Take 1 capsule by mouth daily (Patient not taking: Reported on 3/1/2021), Disp: 30 capsule, Rfl: 0    ILEVRO 0 3 % SUSP, , Disp: , Rfl:     sildenafil (VIAGRA) 100 mg tablet, TAKE ONE TABLET BY MOUTH AS NEEDED 1 HOUR PRIOR TO SEXUAL ACTIVITY, Disp: , Rfl:     tamsulosin (FLOMAX) 0 4 mg, , Disp: , Rfl:       Physical Exam  Constitutional:       Appearance: He is normal weight  Neck:      Musculoskeletal: Normal range of motion  Cardiovascular:      Rate and Rhythm: Normal rate and regular rhythm  Pulses: Normal pulses  Heart sounds: Normal heart sounds  No murmur  No friction rub  No gallop  Pulmonary:      Effort: Pulmonary effort is normal  No respiratory distress  Breath sounds: No stridor  No wheezing, rhonchi or rales  Chest:      Chest wall: No tenderness  Abdominal:      General: Abdomen is flat  Palpations: Abdomen is soft  Musculoskeletal:         General: No swelling, tenderness, deformity or signs of injury  Right lower leg: No edema  Left lower leg: No edema  Skin:     General: Skin is warm and dry  Coloration: Skin is not pale  Neurological:      Mental Status: He is alert and oriented to person, place, and time     Psychiatric:         Behavior: Behavior normal            Laboratory Studies:  CMP:  Lab Results   Component Value Date    CREATININE 1 22 03/12/2021    CREATININE 0 93 12/01/2017    BUN 20 03/12/2021    BUN 17 09/12/2019     12/01/2017    K 4 4 03/12/2021    K 4 4 09/12/2019     03/12/2021     09/12/2019    CO2 32 03/12/2021    CO2 32 09/12/2019    PROT 6 8 12/01/2017    ALKPHOS 60 03/12/2021    ALKPHOS 50 09/12/2019    ALT 27 03/12/2021    ALT 20 09/12/2019    AST 17 03/12/2021    AST 20 09/12/2019     NT-proBNP: No results found for: NTBNP   CBC:  Lab Results   Component Value Date    WBC 9 05 03/12/2021    WBC 7 5 12/01/2017    RBC 4 89 03/12/2021    RBC 4 97 12/01/2017    HGB 14 6 03/12/2021    HGB 15 0 12/01/2017    HCT 44 2 03/12/2021    HCT 45 1 12/01/2017    MCV 90 03/12/2021    MCV 90 7 12/01/2017    MCH 29 9 03/12/2021    MCH 30 2 12/01/2017    RDW 12 6 03/12/2021    RDW 13 5 12/01/2017     03/12/2021     12/01/2017     Coags:    Lipid Profile:   Lab Results   Component Value Date    CHOL 167 12/01/2017     Lab Results   Component Value Date    HDL 39 (L) 09/12/2019     Lab Results   Component Value Date    LDLCALC 83 09/12/2019     Lab Results   Component Value Date    TRIG 177 (H) 09/12/2019       Cardiac testing:     EKG reviewed personally:  Sinus rhythm with a right bundle branch block pattern small Q-waves in the inferior leads possible old healed inferior infarction    No results found for this or any previous visit  No results found for this or any previous visit  No results found for this or any previous visit  No results found for this or any previous visit  Herbie Goodman MD    Portions of the record may have been created with voice recognition software   Occasional wrong word or "sound a like" substitutions may have occurred due to the inherent limitations of voice recognition software   Read the chart carefully and recognize, using context, where substitutions have occurred

## 2021-03-28 DIAGNOSIS — E04.1 THYROID NODULE: Primary | ICD-10-CM

## 2021-04-01 NOTE — PROGRESS NOTES
[facility]    ASSESSMENT AND PLAN     1  Syncope, unspecified syncope type    Hospital records reviewed  Extensive testing completed:  Echo, head CT, chest x-ray, lab work  Reviewed at length today with patient and wife  /78 in office today  Asymptomatic  Remainder of cardiopulmonary assessment unremarkable  Continue monitoring home pressures:  Random and if symptomatic until evaluated by cardiology  Return with further problems/conerns  ER precautions    2  Nausea    Continues with intermittent periods of nausea  Notes difficulty swallowing at times  Specifically pills and larger bolus  All testing has been negative  Patient states does have history of esophageal stricture requiring dilatation  Will refer to GI for further evaluation of same  - Ambulatory referral to Gastroenterology; Future    3  Dysphagia, unspecified type    - Ambulatory referral to Gastroenterology; Future    4  History of esophageal stricture    - Ambulatory referral to Gastroenterology; Future      NOTE: thyroid US pending  Appt today      SUBJECTIVE       Patient ID: Dennis Brothers is a 80 y o  male  Chief Complaint   Patient presents with    Transition of Care Management     [facility] 3/17/21- 3/19/21  Dizziness, syncope, trouble breathing       HISTORY OF PRESENT ILLNESS     Patient presents today for TCM  Recent hospitalization [facility]  03/17-19  Admitted via EMS for dizziness and syncopal episode: ( LOC 5 minutes per wife)  Associated symptoms:  Nausea, dizziness, shortness of breath and transient chest tightness  Cardio and neuro workup completed  Discharged with diagnosis bradycardia and medication induced hypotension  Blood pressure medications adjusted, and discharged with referral to follow-up with Cardiology  Evaluated for similar symptoms [facility] on the 11th  Extensive workup done at that  time as well, with no conclusive explanation           The following portions of the patient's history were reviewed and updated as appropriate: allergies, current medications, past family history, past medical history, past social history, past surgical history and problem list     REVIEW OF SYSTEMS  Review of Systems   Constitutional: Positive for activity change, appetite change and fatigue  Respiratory: Positive for chest tightness and shortness of breath  Cardiovascular: Negative for palpitations  Gastrointestinal: Negative  Genitourinary: Negative  Musculoskeletal: Positive for myalgias  Neurological: Positive for dizziness  Psychiatric/Behavioral: Positive for sleep disturbance  TCM Call (since 2/28/2021)     Date and time call was made  3/20/2021 12:45 PM    Hospital care reviewed  Records reviewed    Patient was hospitial[facility] at  [facility]        Date of Admission  03/17/21    Date of discharge  03/19/21    Diagnosis  dizziness, syncope    Disposition  Home    Were the patients medications reviewed and updated  Yes    Current Symptoms  Shortness of breath; Fatigue; Weakness (Comment)  trouble swallowing, feels he can't breathe at night    Shortness of breath severity  Moderate    Weakness severity  Moderate    Fatigue severity  Moderate      TCM Call (since 2/28/2021)     Post hospital issues  Reduced activity    Should patient be enrolled in anticoag monitoring? No    Scheduled for follow up?   Yes    Patients specialists  Cardiologist    Cardiologist name  Norma Bradshaw MD    Did you obtain your prescribed medications  Yes    Do you need help managing your prescriptions or medications  No    Is transportation to your appointment needed  No    I have advised the patient to call PCP with any new or worsening symptoms  [address] or [employer] other    Support System  Family    The type of support provided  Emotional; Physical    Do you have social support  Yes, as much as I need    Are you recieving any outpatient services  No Are you recieving home care services  No    Are you using any community resources  No    Current waiver services  No    Have you fallen in the last 12 months  No    Interperter language line needed  No          OBJECTIVE      VITAL SIGNS  /78 (BP Location: Right arm, Patient Position: Sitting, Cuff Size: Adult)   Pulse 75   Temp (!) 97 3 °F (36 3 °C)   Ht 5' 8 5" (1 74 m)   Wt 96 kg (211 lb 9 6 oz)   SpO2 98%   BMI 31 70 kg/m²     CURRENT MEDICATIONS    Current Outpatient Medications:     bimatoprost (LUMIGAN) 0 01 % ophthalmic drops, Administer 1 drop to both eyes daily at bedtime, Disp: , Rfl:     clotrimazole (LOTRIMIN) 1 % cream, Apply topically 2 (two) times a day, Disp: 30 g, Rfl: 2    finasteride (PROSCAR) 5 mg tablet, Take 5 mg by mouth daily, Disp: , Rfl:     ILEVRO 0 3 % SUSP, , Disp: , Rfl:     losartan (COZAAR) 100 MG tablet, Take 1 tablet (100 mg total) by mouth daily, Disp: 90 tablet, Rfl: 1    sildenafil (VIAGRA) 100 mg tablet, TAKE ONE TABLET BY MOUTH AS NEEDED 1 HOUR PRIOR TO SEXUAL ACTIVITY, Disp: , Rfl:     tamsulosin (FLOMAX) 0 4 mg, , Disp: , Rfl:     amLODIPine (NORVASC) 10 mg tablet, Take 1/2 tablet daily, Disp: , Rfl:     D-Mannose 350 MG CAPS, Take 1 capsule by mouth daily (Patient not taking: Reported on 3/1/2021), Disp: 30 capsule, Rfl: 0      PHYSICAL EXAMINATION   Physical Exam  Vitals signs and nursing note reviewed  Constitutional:       General: He is not in acute distress  Appearance: Normal appearance  He is well-developed  He is not ill-appearing  Neck:      Thyroid: Thyromegaly present  Vascular: No carotid bruit  Cardiovascular:      Rate and Rhythm: Normal rate and regular rhythm  Pulmonary:      Effort: Pulmonary effort is normal  No respiratory distress  Breath sounds: Normal breath sounds and air entry  Musculoskeletal:      Right lower leg: No edema  Left lower leg: No edema     Lymphadenopathy:      Cervical: No cervical adenopathy  Skin:     General: Skin is warm and dry  Neurological:      Mental Status: He is alert and oriented to person, place, and time     Psychiatric:         Attention and Perception: Attention normal          Mood and Affect: Mood normal          Speech: Speech normal          Behavior: Behavior normal

## 2021-04-06 ENCOUNTER — CLINICAL SUPPORT (OUTPATIENT)
Dept: CARDIOLOGY CLINIC | Facility: CLINIC | Age: 86
End: 2021-04-06
Payer: MEDICARE

## 2021-04-06 VITALS
DIASTOLIC BLOOD PRESSURE: 80 MMHG | HEART RATE: 72 BPM | SYSTOLIC BLOOD PRESSURE: 140 MMHG | WEIGHT: 214.6 LBS | BODY MASS INDEX: 30.72 KG/M2 | HEIGHT: 70 IN

## 2021-04-06 DIAGNOSIS — I10 BENIGN ESSENTIAL HYPERTENSION: Primary | ICD-10-CM

## 2021-04-06 PROCEDURE — 99211 OFF/OP EST MAY X REQ PHY/QHP: CPT | Performed by: INTERNAL MEDICINE

## 2021-04-06 RX ORDER — HYDROCHLOROTHIAZIDE 12.5 MG/1
12.5 CAPSULE, GELATIN COATED ORAL EVERY OTHER DAY
Qty: 60 CAPSULE | Refills: 3 | Status: SHIPPED | OUTPATIENT
Start: 2021-04-06 | End: 2022-05-23

## 2021-04-06 RX ORDER — ASPIRIN 81 MG/1
81 TABLET ORAL ONCE
COMMUNITY
End: 2021-11-15

## 2021-04-06 RX ORDER — LOSARTAN POTASSIUM 50 MG/1
50 TABLET ORAL DAILY
COMMUNITY

## 2021-04-06 RX ORDER — HYDROCHLOROTHIAZIDE 12.5 MG/1
12.5 CAPSULE, GELATIN COATED ORAL EVERY OTHER DAY
COMMUNITY
End: 2021-04-06 | Stop reason: SDUPTHER

## 2021-04-06 NOTE — PROGRESS NOTES
Pt here under the direction of Dr Jan Vargas for BP check and with pt's monitor  Reviewed meds with pt and Mrs  Pt only on Losartan 50mg qd (he cuts 100mg tab in half)  BP-R 136/76         L-140/80  P-72  Pt's monitor R-140/86                       L-173/90  Pt states he took his Losartan 2 hrs ago  BP readings to be reviewed by Dr Jan Vargas

## 2021-04-09 ENCOUNTER — HOSPITAL ENCOUNTER (OUTPATIENT)
Dept: PULMONOLOGY | Facility: HOSPITAL | Age: 86
Discharge: HOME | End: 2021-04-09
Payer: MEDICARE

## 2021-04-09 DIAGNOSIS — R06.02 SOB (SHORTNESS OF BREATH) ON EXERTION: ICD-10-CM

## 2021-04-09 PROCEDURE — 94010 BREATHING CAPACITY TEST: CPT

## 2021-04-09 PROCEDURE — 94010 BREATHING CAPACITY TEST: CPT | Performed by: INTERNAL MEDICINE

## 2021-04-09 PROCEDURE — 94729 DIFFUSING CAPACITY: CPT | Performed by: INTERNAL MEDICINE

## 2021-04-09 PROCEDURE — 94760 N-INVAS EAR/PLS OXIMETRY 1: CPT

## 2021-04-09 PROCEDURE — 94729 DIFFUSING CAPACITY: CPT

## 2021-04-09 PROCEDURE — 94726 PLETHYSMOGRAPHY LUNG VOLUMES: CPT

## 2021-04-09 PROCEDURE — 94726 PLETHYSMOGRAPHY LUNG VOLUMES: CPT | Performed by: INTERNAL MEDICINE

## 2021-04-11 DIAGNOSIS — E04.1 THYROID NODULE: Primary | ICD-10-CM

## 2021-04-11 DIAGNOSIS — I10 BENIGN ESSENTIAL HYPERTENSION: ICD-10-CM

## 2021-04-11 RX ORDER — LOSARTAN POTASSIUM 100 MG/1
TABLET ORAL
Qty: 90 TABLET | Refills: 1 | Status: SHIPPED | OUTPATIENT
Start: 2021-04-11 | End: 2021-04-21 | Stop reason: DRUGHIGH

## 2021-04-11 RX ORDER — METOPROLOL SUCCINATE 25 MG/1
TABLET, EXTENDED RELEASE ORAL
Qty: 90 TABLET | Refills: 1 | Status: SHIPPED | OUTPATIENT
Start: 2021-04-11 | End: 2021-04-21

## 2021-04-14 ENCOUNTER — OFFICE VISIT (OUTPATIENT)
Dept: CARDIOLOGY CLINIC | Facility: CLINIC | Age: 86
End: 2021-04-14
Payer: MEDICARE

## 2021-04-14 VITALS
WEIGHT: 212.9 LBS | OXYGEN SATURATION: 95 % | HEART RATE: 88 BPM | DIASTOLIC BLOOD PRESSURE: 80 MMHG | BODY MASS INDEX: 30.48 KG/M2 | HEIGHT: 70 IN | SYSTOLIC BLOOD PRESSURE: 146 MMHG

## 2021-04-14 DIAGNOSIS — I45.10 RIGHT BUNDLE-BRANCH BLOCK: Primary | ICD-10-CM

## 2021-04-14 DIAGNOSIS — I10 BENIGN ESSENTIAL HYPERTENSION: ICD-10-CM

## 2021-04-14 PROCEDURE — 99214 OFFICE O/P EST MOD 30 MIN: CPT | Performed by: INTERNAL MEDICINE

## 2021-04-14 NOTE — PROGRESS NOTES
Follow-up - Cardiology   Ezio Miller 80 y o  male MRN: [id number]        Problems    Problem List Items Addressed This Visit        Cardiovascular and Mediastinum    Benign essential hypertension - Primary      Other Visit Diagnoses     Right bundle-branch block                Plan and discussion  Patient seen April 14, 2021  The issues have been shortness of breath as well as hypertension  He is presently on losartan 50 mg a day and recently I added 12 5 mg hydrochlorothiazide every other day  Next week she will call with 5-10 blood pressures  We may have to add Norvasc  He did get a Zio patch  We are waiting the results  He may need a thyroid nodule biopsy  He is seeing an endocrinologist     I will order an aldosterone renal level  That has not been done yet  Pulmonary function studies are just minor abnormalities    The issue is to get his blood pressure on under better control          HPI: Ezio Miller is a 80y o  year old male    Reason for Consult / Principal Problem: near syncope and hypertension  At times primarily when sitting he will feel somewhat nauseated feel like he is going to pass out  He has not had syncope  He went to [facility] had a rather extensive workup which included the following  The studies were done in the last 10 days  Echocardiogram is normal   CT scan of the head was normal     Chest x-ray was normal     EKG showed a right bundle branch block pattern which is similar to previous old EKGs  CBC and diff and metabolic panel were all normal     He had a CT scan of his chest with suggested some enlargement of the pulmonary artery  Echocardiogram however showed no evidence of pulmonary hypertension  He did not have a CTA  His blood pressure medication has been variable because of variable blood pressure  However he has been on significant doses of medication    I e  10 mg of amlodipine, 100 mg losartan, and a beta-blocker  He is now off all his blood pressure medication  Blood pressure today in the office is 167 systolic  We are going to start with an ACE inhibitor at low dose  Wife is going to take multiple blood pressures and in particular she is going to get an are blood pressure if and when he has a feels nauseated  Will also put on a 2 week Zio patch  Before he starts his medication  aldosterone renin blood level  Review of Systems   Cardiovascular: Negative  Past Medical History:   Diagnosis Date    Abnormal EKG     last assessed: 2014    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    COPD with acute exacerbation ([facility] [address] )     last assessed: 2017    Diverticulosis     Eczema     last assessed: 2013    Esophagitis     Gout     last assessed: 2014    Nail avulsion of toe     Neoplasm of bladder     last assessed: 2013     Social History     Substance and Sexual Activity   Alcohol Use No     Social History     Substance and Sexual Activity   Drug Use No     Social History     Tobacco Use   Smoking Status Former Smoker    Packs/day: 2 00    Years: 33 00    Pack years: 66 00    Types: Cigarettes    Start date:     Quit date: 18    Years since quittin 3   Smokeless Tobacco Never Used       Allergies:   Allergies   Allergen Reactions    Levetiracetam Confusion and Anxiety       Medications:     Current Outpatient Medications:     aspirin (ECOTRIN LOW STRENGTH) 81 mg EC tablet, Take 81 mg by mouth once weekly, Disp: , Rfl:     bimatoprost (LUMIGAN) 0 01 % ophthalmic drops, Administer 1 drop to both eyes daily at bedtime, Disp: , Rfl:     clotrimazole (LOTRIMIN) 1 % cream, Apply topically 2 (two) times a day, Disp: 30 g, Rfl: 2    hydrochlorothiazide (MICROZIDE) 12 5 mg capsule, Take 1 capsule (12 5 mg total) by mouth every other day, Disp: 60 capsule, Rfl: 3    losartan (COZAAR) 100 MG tablet, TAKE 1 TABLET BY MOUTH  DAILY, Disp: 90 tablet, Rfl: 1    losartan (COZAAR) 50 mg tablet, Take 50 mg by mouth daily, Disp: , Rfl:     D-Mannose 350 MG CAPS, Take 1 capsule by mouth daily (Patient not taking: Reported on 3/1/2021), Disp: 30 capsule, Rfl: 0    finasteride (PROSCAR) 5 mg tablet, Take 5 mg by mouth daily, Disp: , Rfl:     ILEVRO 0 3 % SUSP, , Disp: , Rfl:     metoprolol succinate (TOPROL-XL) 25 mg 24 hr tablet, TAKE 1 TABLET BY MOUTH  DAILY (Patient not taking: Reported on 2021), Disp: 90 tablet, Rfl: 1    sildenafil (VIAGRA) 100 mg tablet, TAKE ONE TABLET BY MOUTH AS NEEDED 1 HOUR PRIOR TO SEXUAL ACTIVITY, Disp: , Rfl:     tamsulosin (FLOMAX) 0 4 mg, , Disp: , Rfl:       Physical Exam  Constitutional:       Appearance: He is normal weight  Cardiovascular:      Rate and Rhythm: Normal rate and regular rhythm  Heart sounds: No murmur  No friction rub  No gallop  Pulmonary:      Effort: Pulmonary effort is normal       Breath sounds: No stridor  No rales  Chest:      Chest wall: No tenderness  Musculoskeletal:      Right lower leg: No edema  Left lower leg: No edema  Skin:     General: Skin is dry  Coloration: Skin is not pale  Neurological:      Mental Status: He is alert and oriented to person, place, and time  Laboratory Studies:  CMP:      Invalid input(s): ALBUMIN  NT-proBNP: No results found for: NTBNP   Coags:    Lipid Profile:   Lab Results   Component Value Date    CHOL 167 2017     Lab Results   Component Value Date    HDL 39 (L) 2019     Lab Results   Component Value Date    LDLCALC 83 2019     Lab Results   Component Value Date    TRIG 177 (H) 2019       Cardiac testing:     EKG reviewed personally:     No results found for this or any previous visit  No results found for this or any previous visit  No results found for this or any previous visit  No results found for this or any previous visit      Allison Escudero, MD    Portions of the record may have been created with voice recognition software   Occasional wrong word or "sound a like" substitutions may have occurred due to the inherent limitations of voice recognition software   Read the chart carefully and recognize, using context, where substitutions have occurred

## 2021-04-16 ENCOUNTER — CLINICAL SUPPORT (OUTPATIENT)
Dept: CARDIOLOGY CLINIC | Facility: CLINIC | Age: 86
End: 2021-04-16
Payer: MEDICARE

## 2021-04-16 DIAGNOSIS — R06.02 SOB (SHORTNESS OF BREATH) ON EXERTION: ICD-10-CM

## 2021-04-16 DIAGNOSIS — I10 HYPERTENSION, UNSPECIFIED TYPE: ICD-10-CM

## 2021-04-16 DIAGNOSIS — R07.89 FEELING OF CHEST TIGHTNESS: ICD-10-CM

## 2021-04-16 DIAGNOSIS — R93.89 ABNORMAL CT OF THE CHEST: ICD-10-CM

## 2021-04-16 DIAGNOSIS — R55 SYNCOPE, NEAR: ICD-10-CM

## 2021-04-16 DIAGNOSIS — I45.10 RIGHT BUNDLE-BRANCH BLOCK: ICD-10-CM

## 2021-04-16 PROCEDURE — 93248 EXT ECG>7D<15D REV&INTERPJ: CPT | Performed by: INTERNAL MEDICINE

## 2021-04-21 DIAGNOSIS — I10 BENIGN ESSENTIAL HYPERTENSION: Primary | ICD-10-CM

## 2021-04-21 LAB
ALBUMIN SERPL-MCNC: 4.1 G/DL (ref 3.6–5.1)
ALBUMIN/GLOB SERPL: 1.7 (CALC) (ref 1–2.5)
ALDOST SERPL-MCNC: 9 NG/DL
ALDOST/RENIN PLAS-RTO: 5.4 RATIO (ref 0.9–28.9)
ALP SERPL-CCNC: 45 U/L (ref 35–144)
ALT SERPL-CCNC: 16 U/L (ref 9–46)
AST SERPL-CCNC: 17 U/L (ref 10–35)
BILIRUB SERPL-MCNC: 0.7 MG/DL (ref 0.2–1.2)
BUN SERPL-MCNC: 15 MG/DL (ref 7–25)
BUN/CREAT SERPL: ABNORMAL (CALC) (ref 6–22)
CALCIUM SERPL-MCNC: 9.3 MG/DL (ref 8.6–10.3)
CHLORIDE SERPL-SCNC: 102 MMOL/L (ref 98–110)
CO2 SERPL-SCNC: 30 MMOL/L (ref 20–32)
CREAT SERPL-MCNC: 0.84 MG/DL (ref 0.7–1.11)
GLOBULIN SER CALC-MCNC: 2.4 G/DL (CALC) (ref 1.9–3.7)
GLUCOSE SERPL-MCNC: 106 MG/DL (ref 65–99)
POTASSIUM SERPL-SCNC: 4.2 MMOL/L (ref 3.5–5.3)
PROT SERPL-MCNC: 6.5 G/DL (ref 6.1–8.1)
RENIN PLAS-CCNC: 1.68 NG/ML/H (ref 0.25–5.82)
SL AMB EGFR AFRICAN AMERICAN: 93 ML/MIN/1.73M2
SL AMB EGFR NON AFRICAN AMERICAN: 80 ML/MIN/1.73M2
SODIUM SERPL-SCNC: 140 MMOL/L (ref 135–146)

## 2021-04-21 RX ORDER — AMLODIPINE BESYLATE 5 MG/1
5 TABLET ORAL DAILY
COMMUNITY
End: 2021-04-21 | Stop reason: SDUPTHER

## 2021-04-21 RX ORDER — AMLODIPINE BESYLATE 5 MG/1
5 TABLET ORAL DAILY
Qty: 90 TABLET | Refills: 3 | Status: SHIPPED | OUTPATIENT
Start: 2021-04-21 | End: 2022-01-03

## 2021-05-07 NOTE — NURSING NOTE
Consult completed for upcoming thyroid biopsy  Instruction, detailed description given  No blood thinners  All questions answered  Pt verbalizes understanding

## 2021-05-11 ENCOUNTER — HOSPITAL ENCOUNTER (OUTPATIENT)
Dept: ULTRASOUND IMAGING | Facility: HOSPITAL | Age: 86
Discharge: HOME | End: 2021-05-11
Admitting: RADIOLOGY
Payer: MEDICARE

## 2021-05-11 DIAGNOSIS — E04.1 THYROID NODULE: ICD-10-CM

## 2021-05-11 PROCEDURE — 88172 CYTP DX EVAL FNA 1ST EA SITE: CPT | Performed by: PATHOLOGY

## 2021-05-11 PROCEDURE — 88173 CYTOPATH EVAL FNA REPORT: CPT | Performed by: PATHOLOGY

## 2021-05-11 PROCEDURE — 10005 FNA BX W/US GDN 1ST LES: CPT

## 2021-05-11 PROCEDURE — 10006 FNA BX W/US GDN EA ADDL: CPT

## 2021-05-11 RX ORDER — LIDOCAINE HYDROCHLORIDE 10 MG/ML
5 INJECTION, SOLUTION EPIDURAL; INFILTRATION; INTRACAUDAL; PERINEURAL ONCE
Status: COMPLETED | OUTPATIENT
Start: 2021-05-11 | End: 2021-05-11

## 2021-05-11 RX ADMIN — LIDOCAINE HYDROCHLORIDE 2.5 ML: 10 INJECTION, SOLUTION EPIDURAL; INFILTRATION; INTRACAUDAL; PERINEURAL at 10:39

## 2021-05-11 RX ADMIN — LIDOCAINE HYDROCHLORIDE 5 ML: 10 INJECTION, SOLUTION EPIDURAL; INFILTRATION; INTRACAUDAL; PERINEURAL at 10:00

## 2021-05-12 ENCOUNTER — HOSPITAL ENCOUNTER (OUTPATIENT)
Dept: NON INVASIVE DIAGNOSTICS | Facility: CLINIC | Age: 86
Discharge: HOME | End: 2021-05-12
Payer: MEDICARE

## 2021-05-12 DIAGNOSIS — I10 BENIGN ESSENTIAL HYPERTENSION: ICD-10-CM

## 2021-05-12 PROCEDURE — 93975 VASCULAR STUDY: CPT | Performed by: SURGERY

## 2021-05-12 PROCEDURE — 93975 VASCULAR STUDY: CPT

## 2021-05-15 DIAGNOSIS — E04.1 THYROID NODULE: Primary | ICD-10-CM

## 2021-05-18 ENCOUNTER — TELEPHONE (OUTPATIENT)
Dept: SURGICAL ONCOLOGY | Facility: CLINIC | Age: 86
End: 2021-05-18

## 2021-05-18 NOTE — TELEPHONE ENCOUNTER
New Patient Encounter    New Patient Intake Form   Patient Details:  Coreen Cox  1935  [id number]    Background Information:  [address] starts by opening a telephone encounter and gathering the following information   Who is calling to schedule? If not self, relationship to patient? 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 Yes   When was the diagnosis? 5/2021   Is there a confirmed diagnosis from a biopsy/tissue reviewed by pathology? Yes   Were outside slides requested? No   Is patient aware of diagnosis? Yes   Is there a personal history and what kind? No   Is there a family history and what kind? No   Reason for visit? New Diagnosis   Have you had any imaging or labs done? If so: when, where? yes  [facility]   Are records in "[employer]? yes   If patient has a prior history of breast cancer were old records obtained? NA   Was the patient told to bring a disk? No   Does the patient smoke or Vape? No   If yes, how many packs or cartridges per day? Scheduling Information:   Preferred [place]:  [place]     Are there any dates/time the patient cannot be seen? Miscellaneous:    After completing the above information, please route to Financial Counselor and the appropriate Nurse Navigator for review

## 2021-05-24 ENCOUNTER — OFFICE VISIT (OUTPATIENT)
Dept: CARDIOLOGY CLINIC | Facility: CLINIC | Age: 86
End: 2021-05-24
Payer: MEDICARE

## 2021-05-24 VITALS
WEIGHT: 208.7 LBS | HEART RATE: 65 BPM | HEIGHT: 70 IN | DIASTOLIC BLOOD PRESSURE: 68 MMHG | OXYGEN SATURATION: 96 % | SYSTOLIC BLOOD PRESSURE: 120 MMHG | BODY MASS INDEX: 29.88 KG/M2

## 2021-05-24 DIAGNOSIS — I45.10 RIGHT BUNDLE-BRANCH BLOCK: ICD-10-CM

## 2021-05-24 DIAGNOSIS — R06.02 SOB (SHORTNESS OF BREATH): ICD-10-CM

## 2021-05-24 DIAGNOSIS — R06.02 SOB (SHORTNESS OF BREATH) ON EXERTION: ICD-10-CM

## 2021-05-24 DIAGNOSIS — I47.2 NSVT (NONSUSTAINED VENTRICULAR TACHYCARDIA) (HCC): ICD-10-CM

## 2021-05-24 DIAGNOSIS — I10 BENIGN ESSENTIAL HYPERTENSION: Primary | ICD-10-CM

## 2021-05-24 DIAGNOSIS — R93.89 ABNORMAL CT OF THE CHEST: ICD-10-CM

## 2021-05-24 PROBLEM — I47.29 NSVT (NONSUSTAINED VENTRICULAR TACHYCARDIA): Status: ACTIVE | Noted: 2021-05-24

## 2021-05-24 PROCEDURE — 99214 OFFICE O/P EST MOD 30 MIN: CPT | Performed by: INTERNAL MEDICINE

## 2021-05-24 NOTE — PROGRESS NOTES
Follow-up - Cardiology   Shawna Washington 80 y o  male MRN: [id number]        Problems    Problem List Items Addressed This Visit        Cardiovascular and Mediastinum    Benign essential hypertension - Primary      Other Visit Diagnoses     SOB (shortness of breath)        Right bundle-branch block        SOB (shortness of breath) on exertion        Abnormal CT of the chest                Plan and discussion  This gentleman with for some has significant elevation of blood pressure  His aldosterone renin ratios normal     Renal duplex shows no renal artery stenosis  Blood pressure is now normal with present therapy  Renal function is normal     He did have a CT scan of the chest x-ray at [facility]   No tumors noted  He  He did have pulmonary function studies   Did not have nuclear stress nuclear stress test   The wife feels he is limited in his ability to do things and stops before she thinks he should  I 1 order both of the studies and if they decide to have him they will move forward  At this time he does not want to proceed with the studies  They may change her mind or at least he may changes my  He does have sleep apnea but he will not wear the mask  He recently had thyroid biopsy the results are not back  His LDL is 83            HPI: Shawna Washington is a 80y o  year old male HPI: Shawna Washington is a 80y o  year old male    Reason for Consult / Principal Problem: near syncope and hypertension  At times primarily when sitting he will feel somewhat nauseated feel like he is going to pass out  [facility] has not had syncope     He went to [facility] had a rather extensive workup which included the following     The studies were done in the last 10 days     Echocardiogram is normal   CT scan of the head was normal     Chest x-ray was normal     EKG showed a right bundle branch block pattern which is similar to previous old EKGs     CBC and diff and metabolic panel were all normal     He had a CT scan of his chest with suggested some enlargement of the pulmonary artery     Echocardiogram however showed no evidence of pulmonary hypertension     He did not have a CTA     His blood pressure medication has been variable because of variable blood pressure     However he has been on significant doses of medication   I e  10 mg of amlodipine, 100 mg losartan, and a beta-blocker     He is now off all his blood pressure medication     Blood pressure today in the office is 791 systolic     We are going to start with an ACE inhibitor at low dose     Wife is going to take multiple blood pressures and in particular she is going to get an are blood pressure if and when he has a feels nauseated     Will also put on a 2 week Zio patch     Before he starts his medication  aldosterone renin blood level              Review of Systems   Constitutional: Positive for fatigue  Respiratory: Positive for shortness of breath  Cardiovascular: Negative  Hematological: Negative  Psychiatric/Behavioral: Negative  Past Medical History:   Diagnosis Date    Abnormal EKG     last assessed: 2014    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     last assessed: 2017    Diverticulosis     Eczema     last assessed: 2013    Esophagitis     Gout     last assessed: 2014    Nail avulsion of toe     Neoplasm of bladder     last assessed: 2013     Social History     Substance and Sexual Activity   Alcohol Use No     Social History     Substance and Sexual Activity   Drug Use No     Social History     Tobacco Use   Smoking Status Former Smoker    Packs/day: 2 00    Years: 33 00    Pack years: 66 00    Types: Cigarettes    Start date:     Quit date: 18    Years since quittin 4   Smokeless Tobacco Never Used       Allergies:   Allergies   Allergen Reactions    Levetiracetam Confusion and Anxiety       Medications:     Current Outpatient Medications:     amLODIPine (NORVASC) 5 mg tablet, Take 1 tablet (5 mg total) by mouth daily, Disp: 90 tablet, Rfl: 3    aspirin (ECOTRIN LOW STRENGTH) 81 mg EC tablet, Take 81 mg by mouth once weekly, Disp: , Rfl:     bimatoprost (LUMIGAN) 0 01 % ophthalmic drops, Administer 1 drop to both eyes daily at bedtime, Disp: , Rfl:     clotrimazole (LOTRIMIN) 1 % cream, Apply topically 2 (two) times a day, Disp: 30 g, Rfl: 2    hydrochlorothiazide (MICROZIDE) 12 5 mg capsule, Take 1 capsule (12 5 mg total) by mouth every other day, Disp: 60 capsule, Rfl: 3    losartan (COZAAR) 50 mg tablet, Take 50 mg by mouth daily, Disp: , Rfl:     finasteride (PROSCAR) 5 mg tablet, Take 5 mg by mouth daily, Disp: , Rfl:     ILEVRO 0 3 % SUSP, , Disp: , Rfl:     sildenafil (VIAGRA) 100 mg tablet, TAKE ONE TABLET BY MOUTH AS NEEDED 1 HOUR PRIOR TO SEXUAL ACTIVITY, Disp: , Rfl:     tamsulosin (FLOMAX) 0 4 mg, , Disp: , Rfl:       Physical Exam  Constitutional:       Appearance: He is normal weight  Cardiovascular:      Rate and Rhythm: Normal rate and regular rhythm  Pulses: Normal pulses  Heart sounds: No murmur  No friction rub  No gallop  Pulmonary:      Effort: No respiratory distress  Breath sounds: Normal breath sounds  No wheezing or rhonchi  Chest:      Chest wall: No tenderness  Skin:     General: Skin is warm and dry  Coloration: Skin is not jaundiced or pale  Findings: No bruising, erythema, lesion or rash  Neurological:      Mental Status: He is alert and oriented to person, place, and time     Psychiatric:         Behavior: Behavior normal            Laboratory Studies:  CMP:      Invalid input(s): ALBUMIN  NT-proBNP: No results found for: NTBNP   Coags:    Lipid Profile:   Lab Results   Component Value Date    CHOL 167 12/01/2017     Lab Results   Component Value Date    HDL 39 (L) 09/12/2019     Lab Results Component Value Date    LDLCALC 83 09/12/2019     Lab Results   Component Value Date    TRIG 177 (H) 09/12/2019       Cardiac testing:     EKG reviewed personally:     No results found for this or any previous visit  No results found for this or any previous visit  No results found for this or any previous visit  No results found for this or any previous visit  Noemy Coffey MD    Portions of the record may have been created with voice recognition software   Occasional wrong word or "sound a like" substitutions may have occurred due to the inherent limitations of voice recognition software   Read the chart carefully and recognize, using context, where substitutions have occurred

## 2021-06-07 ENCOUNTER — HOSPITAL ENCOUNTER (OUTPATIENT)
Dept: NON INVASIVE DIAGNOSTICS | Facility: CLINIC | Age: 86
Discharge: HOME | End: 2021-06-07
Payer: MEDICARE

## 2021-06-07 DIAGNOSIS — R93.89 ABNORMAL CT OF THE CHEST: ICD-10-CM

## 2021-06-07 DIAGNOSIS — R06.02 SOB (SHORTNESS OF BREATH): ICD-10-CM

## 2021-06-07 DIAGNOSIS — I10 BENIGN ESSENTIAL HYPERTENSION: ICD-10-CM

## 2021-06-07 DIAGNOSIS — R06.02 SOB (SHORTNESS OF BREATH) ON EXERTION: ICD-10-CM

## 2021-06-07 DIAGNOSIS — I45.10 RIGHT BUNDLE-BRANCH BLOCK: ICD-10-CM

## 2021-06-07 LAB
CHEST PAIN STATEMENT: NORMAL
MAX DIASTOLIC BP: 90 MMHG
MAX HEART RATE: 69 BPM
MAX PREDICTED HEART RATE: 135 BPM
MAX. SYSTOLIC BP: 142 MMHG
PROTOCOL NAME: NORMAL
REASON FOR TERMINATION: NORMAL
TARGET HR FORMULA: NORMAL
TEST INDICATION: NORMAL
TIME IN EXERCISE PHASE: NORMAL

## 2021-06-07 PROCEDURE — A9502 TC99M TETROFOSMIN: HCPCS

## 2021-06-07 PROCEDURE — 93018 CV STRESS TEST I&R ONLY: CPT | Performed by: INTERNAL MEDICINE

## 2021-06-07 PROCEDURE — 78452 HT MUSCLE IMAGE SPECT MULT: CPT | Performed by: INTERNAL MEDICINE

## 2021-06-07 PROCEDURE — 93017 CV STRESS TEST TRACING ONLY: CPT

## 2021-06-07 PROCEDURE — G1004 CDSM NDSC: HCPCS

## 2021-06-07 PROCEDURE — 78452 HT MUSCLE IMAGE SPECT MULT: CPT

## 2021-06-07 PROCEDURE — 93016 CV STRESS TEST SUPVJ ONLY: CPT | Performed by: INTERNAL MEDICINE

## 2021-06-07 RX ADMIN — REGADENOSON 0.4 MG: 0.08 INJECTION, SOLUTION INTRAVENOUS at 12:10

## 2021-06-14 PROBLEM — E04.1 THYROID NODULE: Status: ACTIVE | Noted: 2021-06-14

## 2021-06-16 ENCOUNTER — CONSULT (OUTPATIENT)
Dept: SURGICAL ONCOLOGY | Facility: CLINIC | Age: 86
End: 2021-06-16
Payer: MEDICARE

## 2021-06-16 VITALS
RESPIRATION RATE: 16 BRPM | WEIGHT: 211.4 LBS | SYSTOLIC BLOOD PRESSURE: 146 MMHG | OXYGEN SATURATION: 95 % | DIASTOLIC BLOOD PRESSURE: 84 MMHG | BODY MASS INDEX: 30.26 KG/M2 | HEART RATE: 67 BPM | TEMPERATURE: 97.8 F | HEIGHT: 70 IN

## 2021-06-16 DIAGNOSIS — E04.1 THYROID NODULE: Primary | ICD-10-CM

## 2021-06-16 PROCEDURE — 99203 OFFICE O/P NEW LOW 30 MIN: CPT | Performed by: SURGERY

## 2021-06-16 NOTE — PROGRESS NOTES
Surgical Oncology Follow Up       [facility] SURGICAL ONCOLOGY [place]  [facility] [address] [place] [address]    Bhargav Living  1935  [id number]  [facility] SURGICAL ONCOLOGY [facility] [address]  [address] [place] [address]    Chief Complaint   Patient presents with    Consult       Assessment/Plan:    No problem-specific Assessment & Plan notes found for this encounter  Diagnoses and all orders for this visit:    Thyroid nodule        Advance Care Planning/Advance Directives:  Discussed disease status, cancer treatment plans and/or cancer treatment goals with the patient  Oncology History    No history exists  History of Present Illness:   Patient is an 59-year-old man who was being worked up for pulmonary issues  He was found to have thyroid nodules bilaterally  Based on size and appearance, biopsy was recommended and performed  He is now here to discuss results  He denies a history of radiation exposure to the head or neck area  He denies a personal or family history of endocrine malignancies including thyroid cancer  No difficulty with swallowing, though he did have esophageal dilatation 30 years ago for what sounds like an esophageal stricture  Review of Systems   Constitutional: Negative  HENT: Negative  Eyes: Negative  Respiratory: Negative  Cardiovascular: Negative  Gastrointestinal: Negative  Endocrine: Negative  Genitourinary: Negative  Musculoskeletal: Negative  Skin: Negative  Allergic/Immunologic: Negative  Neurological: Negative  Hematological: Negative  Psychiatric/Behavioral: Negative  All other systems reviewed and are negative          Patient Active Problem List   Diagnosis    Benign essential hypertension    COPD (chronic obstructive pulmonary disease) (HCC)    Degeneration of intervertebral disc of lumbar region    Diastasis of rectus abdominis    Herniated lumbar intervertebral disc    Hyperlipidemia    Lumbar radiculitis    Neural foraminal stenosis of lumbar spine    Subdural hematoma (HCC)    Sacroiliitis (HCC)    SIVA (obstructive sleep apnea)    Nocturia    Osteoarthritis of spine with radiculopathy, lumbar region    Strep pharyngitis    Nausea    Dyspnea on exertion    Abnormal chest x-ray    NSVT (nonsustained ventricular tachycardia) (HCC)    Thyroid nodule     Past Medical History:   Diagnosis Date    Abnormal EKG     last assessed: 2014    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    COPD with acute exacerbation ([facility] [address] )     last assessed: 2017    Diverticulosis     Eczema     last assessed: 2013    Esophagitis     Gout     last assessed: 2014    Nail avulsion of toe     Neoplasm of bladder     last assessed: 2013     Past Surgical History:   Procedure Laterality Date    HERNIA REPAIR      last assessed: 2014    KIDNEY SURGERY      description: removal of kidney stone last assessed: 2014    US GUIDED THYROID BIOPSY  2021     Family History   Problem Relation Age of Onset    Stroke Mother     Alzheimer's disease Father     Heart attack Sister     Cancer Family      Social History     Socioeconomic History    Marital status: /Civil Union     Spouse name: Not on file    Number of children: Not on file    Years of education: Not on file    Highest education level: Not on file   Occupational History    Not on file   Tobacco Use    Smoking status: Former Smoker     Packs/day: 2 00     Years: 33 00     Pack years: 66 00     Types: Cigarettes     Start date:      Quit date:      Years since quittin 4    Smokeless tobacco: Never Used   Vaping Use    Vaping Use: Never used   Substance and Sexual Activity    Alcohol use: No    Drug use: No    Sexual activity: Not on file   Other Topics Concern    Not on file Social History Narrative    Not on file     Social Determinants of Health     Financial Resource Strain:     Difficulty of Paying Living Expenses:    Food Insecurity:     Worried About Running Out of Food in the Last Year:     [address] in the Last Year:    Transportation Needs:     Lack of Transportation (Medical):      Lack of Transportation (Non-Medical):    Physical Activity:     Days of Exercise per Week:     Minutes of Exercise per Session:    Stress:     Feeling of Stress :    Social Connections:     Frequency of Communication with Friends and Family:     Frequency of Social Gatherings with Friends and Family:     Attends Church Services:     Active Member of Clubs or Organizations:     Attends Club or Organization Meetings:     Marital Status:    Intimate Partner Violence:     Fear of Current or Ex-Partner:     Emotionally Abused:     Physically Abused:     Sexually Abused:        Current Outpatient Medications:     amLODIPine (NORVASC) 5 mg tablet, Take 1 tablet (5 mg total) by mouth daily, Disp: 90 tablet, Rfl: 3    aspirin (ECOTRIN LOW STRENGTH) 81 mg EC tablet, Take 81 mg by mouth once weekly, Disp: , Rfl:     bimatoprost (LUMIGAN) 0 01 % ophthalmic drops, Administer 1 drop to both eyes daily at bedtime, Disp: , Rfl:     clotrimazole (LOTRIMIN) 1 % cream, Apply topically 2 (two) times a day, Disp: 30 g, Rfl: 2    finasteride (PROSCAR) 5 mg tablet, Take 5 mg by mouth daily, Disp: , Rfl:     hydrochlorothiazide (MICROZIDE) 12 5 mg capsule, Take 1 capsule (12 5 mg total) by mouth every other day, Disp: 60 capsule, Rfl: 3    ILEVRO 0 3 % SUSP, , Disp: , Rfl:     losartan (COZAAR) 50 mg tablet, Take 50 mg by mouth daily, Disp: , Rfl:     sildenafil (VIAGRA) 100 mg tablet, TAKE ONE TABLET BY MOUTH AS NEEDED 1 HOUR PRIOR TO SEXUAL ACTIVITY, Disp: , Rfl:     tamsulosin (FLOMAX) 0 4 mg, , Disp: , Rfl:   Allergies   Allergen Reactions    Levetiracetam Confusion and Anxiety Vitals:    06/16/21 1338   BP: 146/84   Pulse: 67   Resp: 16   Temp: 97 8 °F (36 6 °C)   SpO2: 95%       Physical Exam  Vitals reviewed  Constitutional:       Appearance: Normal appearance  HENT:      Head: Normocephalic and atraumatic  Right Ear: External ear normal       Left Ear: External ear normal       Nose: Nose normal    Eyes:      General: No scleral icterus  Extraocular Movements: Extraocular movements intact  Pupils: Pupils are equal, round, and reactive to light  Neck:      Comments: Palpable fullness bilateral thyroid nodules  Cardiovascular:      Rate and Rhythm: Normal rate and regular rhythm  Pulses: Normal pulses  Heart sounds: Normal heart sounds  Pulmonary:      Effort: Pulmonary effort is normal       Breath sounds: Normal breath sounds  Abdominal:      General: Abdomen is flat  Palpations: Abdomen is soft  Tenderness: There is no left CVA tenderness  Musculoskeletal:         General: Normal range of motion  Cervical back: Normal range of motion and neck supple  No rigidity  Lymphadenopathy:      Cervical: No cervical adenopathy  Skin:     General: Skin is warm and dry  Neurological:      General: No focal deficit present  Mental Status: He is alert and oriented to person, place, and time     Psychiatric:         Mood and Affect: Mood normal          Behavior: Behavior normal          Pathology:    Right-sided Afirma suspicious, left-sided Afirma benign  Case Report   Non-gynecologic Cytology                          Case: [id number]                                   Authorizing Provider: MIAH Trejo     Collected:           05/11/2021 1015               Ordering Location:     [facility]     Received:            05/11/2021 [facility]                                      [facility]                                                             Pathologist:           Manjula Garg DO                                                      Specimens:   A) - Thyroid, Right, Mid                                                                             B) - Thyroid, Right, Mid                                                                             C) - Thyroid, Left, Mid                                                                              D) - Thyroid, Left, Mid                                                                    Final Diagnosis   A  and B  Thyroid, Right Mid, FNAB (ThinPrep and Smears):  - Atypia of undetermined significance ([place] Category III)  See Note  - Few clusters of atypical follicular cells present, which display nuclear enlargement, grooves, crowding, overlapping, and rare microfollicular formations  - Background colloid and mixed inflammatory cells present      C  and D  Thyroid, Left Mid, FNAB (ThinPrep and Smears):  - Atypia of undetermined significance ([place] Category III)  See Note  - Few atypical follicular cells present, which display nuclear enlargement, grooves, crowding, overlapping, inclusions, and rare microfollicular formations  - Background colloid, macrophages, and mixed inflammatory cells present  Electronically signed by Ayde Hutchinson DO on 5/13/2021 at  2:30 PM   Note    As reported in the [address] [place] [address] for Reporting Thyroid Cytopathology*, this diagnostic category has demonstrated anywhere from 10-30% risk of malignancy being found in subsequent resections and/or FNA  This risk of malignancy is expected to change due to the usage of the surgical pathology diagnosis of 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)    The anticipated risk of malignancy secondary to NIFTP is 6-18%  The manual reports that the usual management following this diagnosis is repeat FNA, molecular testing, or lobectomy   Ultimately, clinical/imaging correlation for this patient is needed in arriving at the actual management plan      *The [place] System for Reporting Thyroid Cytopathology, Lanie Bedoya Oh ), 2018 (2nd ed )      The treating physician has requested a sample from the above thyroid nodules be sent for analysis by [facility] Gene Expression  (irma GEC), performed by [facility] [address] [place] [address])  A separate report with results of Afirma GEC will follow directly from [facility], typically taking 14 days  Intraoperative Consultation       B  Inadequate on initial 2 passes (slides) --> requested additional 2 passes  Dr Donavan Fierro, Interpretation performed at [facility], [address] [place] [address]           D  Inadequate on initial 2 passes (slides) --> requested additional 2 passes    Dr Donavan Fierro, Interpretation performed at [facility], [address] [place] [address]      Gross Description       A   20ml, light pink, clear, received in CytoLyt           B   8 slides received (4 alcohol, 4 diff quik)           C   20ml, colorless, clear, received in CytoLyt           D   8 slides received (4 alcohol, 4 diff quik)      Clinical Information    Right Mid Thyroid:   Size: 2 6x2x2 1cm Margins: smooth Echogenicity: solid Microcalcs: absent Flow: peripheral Size change: na Suspicion level: intermediate Hx of Hashimoto's Thyroiditis: na     Left Mid Thyroid:   Size: 1 9x1 0x1 2cm Margins: irregular Echogenicity: solid Microcalcs: present Flow: na Size change: na Suspicion level: intermediate Hx of Hashimoto's Thyroiditis: na         Labs:  THYROID ULTRASOUND     INDICATION:    R53 83: Other fatigue  E04 2: Nontoxic multinodular goiter      COMPARISON:  None     TECHNIQUE:   Ultrasound of the thyroid was performed with a high frequency linear transducer in transverse and sagittal planes including volumetric imaging sweeps as well as traditional still imaging technique      FINDINGS:  Thyroid parenchyma is diffusely heterogeneous in echotexture      Right lobe:  4 x 2 3 x 2 7 cm  Left lobe:  4 6 x 2 2 x 1 8 cm  Isthmus:  0 3 cm      Nodule #1  Image 8  RIGHT midgland nodule measuring 2 6 x 2 x 2 1 cm  No priors for comparison  COMPOSITION:  2 points, solid or almost completely solid   ECHOGENICITY:  1 point, hyperechoic or isoechoic  SHAPE:  0 points, wider-than-tall  MARGIN: 0 points, smooth  ECHOGENIC FOCI:  0 points, none or large comet-tail artifacts  TI-RADS Classification: TR 3 (3 points), FNA if >2 5 cm  Follow if >1 5 cm      Nodule #2  Image 37  LEFT midgland nodule measuring 1 9 x 1 x 1 2 cm  No priors for comparison  COMPOSITION:  2 points, solid or almost completely solid   ECHOGENICITY:  1 point, hyperechoic or isoechoic  SHAPE:  0 points, wider-than-tall  MARGIN: 0 points, ill-defined  ECHOGENIC FOCI:  1 point, macrocalcifications  TI-RADS Classification: TR 4 (4-6 points), FNA if > 1 5 cm  Follow if > 1cm           IMPRESSION:     The following meet current ACR criteria for recommending ultrasound guided biopsy: Nodule #1 in the right mid thyroid and nodule #2 in the left mid thyroid      The study was marked in EPIC for significant notification      Reference: ACR Thyroid Imaging, Reporting and Data System (TI-RADS): White Paper of the [employer]   J AM Cl Radiol 8405;[id number]  (additional recommendations based on American Thyroid Association 2015 guidelines )        Workstation performed: [id number]       Imaging    Case Report   Non-gynecologic Cytology                          Case: [id number]                                   Authorizing Provider: MIAH Catherine     Collected:           05/11/2021 1015               Ordering Location:     [facility]     Received:            05/11/2021 1426                                      [facility]                                                             Pathologist:           Roxana Grimes DO                                                      Specimens:   A) - Thyroid, Right, Mid                                                                             B) - Thyroid, Right, Mid                                                                             C) - Thyroid, Left, Mid                                                                              D) - Thyroid, Left, Mid                                                                    Final Diagnosis   A  and B  Thyroid, Right Mid, FNAB (ThinPrep and Smears):  - Atypia of undetermined significance ([place] Category III)  See Note  - Few clusters of atypical follicular cells present, which display nuclear enlargement, grooves, crowding, overlapping, and rare microfollicular formations  - Background colloid and mixed inflammatory cells present      C  and D  Thyroid, Left Mid, FNAB (ThinPrep and Smears):  - Atypia of undetermined significance ([place] Category III)  See Note  - Few atypical follicular cells present, which display nuclear enlargement, grooves, crowding, overlapping, inclusions, and rare microfollicular formations  - Background colloid, macrophages, and mixed inflammatory cells present  Electronically signed by Wilson Henry DO on 5/13/2021 at  2:30 PM   Note    As reported in the [address] [place] [address] for Reporting Thyroid Cytopathology*, this diagnostic category has demonstrated anywhere from 10-30% risk of malignancy being found in subsequent resections and/or FNA  This risk of malignancy is expected to change due to the usage of the surgical pathology diagnosis of 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)    The anticipated risk of malignancy secondary to NIFTP is 6-18%  The manual reports that the usual management following this diagnosis is repeat FNA, molecular testing, or lobectomy   Ultimately, clinical/imaging correlation for this patient is needed in arriving at the actual management plan      *The [place] System for Reporting Thyroid Cytopathology, Marilin Frederick  Ania Alma Rosa Gomez ), 2018 (2nd ed )      The treating physician has requested a sample from the above thyroid nodules be sent for analysis by [facility] Gene Expression  (irma GEC), performed by [facility] [address] [place] [address])  A separate report with results of Afirma GEC will follow directly from [facility], typically taking 14 days  Intraoperative Consultation       B  Inadequate on initial 2 passes (slides) --> requested additional 2 passes  Dr Leatha aCrlton, Interpretation performed at [facility], [address] [place] [address], [address] [place] [address]           D  Inadequate on initial 2 passes (slides) --> requested additional 2 passes  Dr Leatha Carlton, Interpretation performed at [facility], [address] [place] [address], [address] [place] [address]      Gross Description       A   20ml, light pink, clear, received in CytoLyt           B   8 slides received (4 alcohol, 4 diff quik)           C   20ml, colorless, clear, received in CytoLyt           D   8 slides received (4 alcohol, 4 diff quik)       Left-sided nodule benign on Afirma  Right-sided nodule suspicious on Afirma      Stress strip    Result Date: 2021  Narrative: Confirmed by Terrie Barrera ([id number]),  Dedrick Moeller ([id number]) on 2021 12:20:38 PM    NM myocardial perfusion spect (rx stress and/or rest)    Result Date: 2021  Narrative: [facility] [address] [place] [address] [phone] Rest/Stress Gated SPECT Myocardial Perfusion Imaging After Regadenoson Patient: Blekys Leon MR number: [id number] Account number: [de-identified] : 1935 Age: 80 years Gender: Male Status: Outpatient Location: [address] [place] [address] [facility] Height: 70 in Weight: 208 lb BP: 142/ 90 mmHg Allergies: LEVETIRACETAM Diagnosis: I45 10 - Unspecified right bundle-branch block, R06 00 - Dyspnea, unspecified Interpreting Physician:  Ronald Cheng DO Referring Physician:  Allison Escudero MD Primary Physician:  Isabel Baxter DO Technician:  THOMAS Swanson RN:  Yeni Kelley RN Group:  [facility] Cardiology Fellow:  Allison Escudero MD Report Prepared by[de-identified]  Yeni Kelley RN INDICATIONS: Detection of coronary artery disease  HISTORY: The patient is a 80year old  male  Chest pain status: no chest pain  Other symptoms: dyspnea  Coronary artery disease risk factors: dyslipidemia, hypertension, former smoker, and family history of premature coronary artery disease  Cardiovascular history: none significant  Co-morbidity: history of lung disease/ COPD, SIVA  Medications: a calcium channel blocker, an ARB, a diuretic, and aspirin  Previous test results: abnormal ECG/ RBBB  PHYSICAL EXAM: Baseline physical exam screening: no wheezes audible, no loud murmur  REST ECG: Normal sinus rhythm  The ECG showed right bundle branch block  PROCEDURE: The study was performed in the the [address] [facility]  A sitting regadenoson infusion pharmacologic stress test was performed  Gated SPECT myocardial perfusion imaging was performed after stress  Systolic blood pressure was 142 mmHg, at the start of the study  Diastolic blood pressure was 90 mmHg, at the start of the study  The heart rate was 64 bpm, at the start of the study  IV double checked  Regadenoson protocol: HR bpm SBP mmHg DBP mmHg Symptoms Baseline 64 142 90 none 1 min 68 116 60 moderate dyspnea, dizziness, nausea 2 min 62 120 80 subsiding 4 min 62 130 82 none No medications or fluids given  STRESS SUMMARY: Duration of pharmacologic stress was 3 min and 0 sec  Maximal heart rate during stress was 68 bpm  The rate-pressure product for the peak heart rate and blood pressure was 9656  There was no chest pain during stress  The stress test was terminated due to protocol completion  Pre oxygen saturation: 95 %   Peak oxygen saturation: 99 %  Arrhythmia during stress: isolated premature ventricular beats  The stress ECG was non-diagnostic  ISOTOPE ADMINISTRATION: Resting isotope administration Stress isotope administration Agent Tetrofosmin Tetrofosmin Dose 10 3 mCi 31 9 mCi Date 06/07/2021 06/07/2021 Injection time 10:50 12:10 Imaging time 11:40 12:50 Injection-image interval 50 min 40 min The radiopharmaceutical was injected at the peak effect of pharmacologic stress  MYOCARDIAL PERFUSION IMAGING: The image quality was good  Rotating projection images reveal mild diaphragmatic attenuation  Left ventricular size was normal  The TID ratio was 1 07  PERFUSION DEFECTS: -  There was a small, mild, fixed myocardial perfusion defect of the apex due to apical thinning  -  There was a small, mild, fixed myocardial perfusion defect of the basal inferior wall due to diaphragm attenuation  GATED SPECT: The calculated left ventricular ejection fraction was 54 %  There was no left ventricular regional abnormality  SUMMARY: -  Stress results: There was no chest pain during stress  -  ECG conclusions: The stress ECG was non-diagnostic  -  Perfusion imaging: There was a small, mild, fixed myocardial perfusion defect of the apex due to apical thinning  There was a small, mild, fixed myocardial perfusion defect of the basal inferior wall due to diaphragm attenuation  -  Gated SPECT: The calculated left ventricular ejection fraction was 54 %  There was no left ventricular regional abnormality  IMPRESSIONS: Normal study after pharmacologic vasodilation without reproduction of symptoms  There was image artifact, without diagnostic evidence for perfusion abnormality  Left ventricular systolic function was normal  Prepared and signed by Pepito Benavidez DO Signed 06/07/2021 15:45:31     I reviewed the above laboratory and imaging data  Discussion/Summary:  A 69-year-old man, bilateral thyroid nodules, left side benign on Afirma testing, right-sided suspicious  Results were discussed extensively the patient  Risks malignancy versus benign disease were discussed  At this point, he is not interested in surgery and would like to pursue close surveillance we will therefore plan ultrasound 6 months to assess for nodule stability  All questions answered

## 2021-07-15 ENCOUNTER — TELEPHONE (OUTPATIENT)
Dept: CARDIOLOGY CLINIC | Facility: CLINIC | Age: 86
End: 2021-07-15

## 2021-11-02 ENCOUNTER — IMMUNIZATIONS (OUTPATIENT)
Dept: FAMILY MEDICINE CLINIC | Facility: HOSPITAL | Age: 86
End: 2021-11-02

## 2021-11-02 DIAGNOSIS — Z23 ENCOUNTER FOR IMMUNIZATION: Primary | ICD-10-CM

## 2021-11-02 PROCEDURE — 91300 COVID-19 PFIZER VACC 0.3 ML: CPT

## 2021-11-02 PROCEDURE — 0001A COVID-19 PFIZER VACC 0.3 ML: CPT

## 2021-11-15 ENCOUNTER — OFFICE VISIT (OUTPATIENT)
Dept: CARDIOLOGY CLINIC | Facility: CLINIC | Age: 86
End: 2021-11-15
Payer: MEDICARE

## 2021-11-15 VITALS
SYSTOLIC BLOOD PRESSURE: 120 MMHG | HEIGHT: 70 IN | WEIGHT: 207.9 LBS | DIASTOLIC BLOOD PRESSURE: 74 MMHG | HEART RATE: 80 BPM | BODY MASS INDEX: 29.76 KG/M2 | OXYGEN SATURATION: 97 %

## 2021-11-15 DIAGNOSIS — I10 BENIGN ESSENTIAL HYPERTENSION: Primary | ICD-10-CM

## 2021-11-15 DIAGNOSIS — I45.10 RIGHT BUNDLE-BRANCH BLOCK: ICD-10-CM

## 2021-11-15 PROCEDURE — 99213 OFFICE O/P EST LOW 20 MIN: CPT | Performed by: INTERNAL MEDICINE

## 2021-11-15 RX ORDER — IBUPROFEN 800 MG
TABLET ORAL
COMMUNITY

## 2021-11-15 RX ORDER — UREA 10 %
100 LOTION (ML) TOPICAL DAILY
COMMUNITY

## 2021-12-01 ENCOUNTER — TELEMEDICINE (OUTPATIENT)
Dept: FAMILY MEDICINE CLINIC | Facility: CLINIC | Age: 86
End: 2021-12-01
Payer: MEDICARE

## 2021-12-01 DIAGNOSIS — J01.00 ACUTE NON-RECURRENT MAXILLARY SINUSITIS: Primary | ICD-10-CM

## 2021-12-01 DIAGNOSIS — B34.9 VIRAL SYNDROME: ICD-10-CM

## 2021-12-01 PROCEDURE — 99213 OFFICE O/P EST LOW 20 MIN: CPT | Performed by: FAMILY MEDICINE

## 2021-12-01 RX ORDER — CEFUROXIME AXETIL 500 MG/1
500 TABLET ORAL EVERY 12 HOURS SCHEDULED
Qty: 20 TABLET | Refills: 0 | Status: SHIPPED | OUTPATIENT
Start: 2021-12-01 | End: 2021-12-11

## 2021-12-13 PROCEDURE — U0005 INFEC AGEN DETEC AMPLI PROBE: HCPCS | Performed by: FAMILY MEDICINE

## 2021-12-13 PROCEDURE — U0003 INFECTIOUS AGENT DETECTION BY NUCLEIC ACID (DNA OR RNA); SEVERE ACUTE RESPIRATORY SYNDROME CORONAVIRUS 2 (SARS-COV-2) (CORONAVIRUS DISEASE [COVID-19]), AMPLIFIED PROBE TECHNIQUE, MAKING USE OF HIGH THROUGHPUT TECHNOLOGIES AS DESCRIBED BY CMS-2020-01-R: HCPCS | Performed by: FAMILY MEDICINE

## 2021-12-14 ENCOUNTER — TELEPHONE (OUTPATIENT)
Dept: FAMILY MEDICINE CLINIC | Facility: CLINIC | Age: 86
End: 2021-12-14

## 2021-12-16 ENCOUNTER — TELEPHONE (OUTPATIENT)
Dept: SURGICAL ONCOLOGY | Facility: CLINIC | Age: 86
End: 2021-12-16

## 2021-12-23 ENCOUNTER — TELEPHONE (OUTPATIENT)
Dept: SURGICAL ONCOLOGY | Facility: CLINIC | Age: 86
End: 2021-12-23

## 2021-12-31 DIAGNOSIS — I10 BENIGN ESSENTIAL HYPERTENSION: ICD-10-CM

## 2022-01-03 RX ORDER — AMLODIPINE BESYLATE 5 MG/1
TABLET ORAL
Qty: 90 TABLET | Refills: 3 | Status: SHIPPED | OUTPATIENT
Start: 2022-01-03

## 2022-03-14 ENCOUNTER — TELEPHONE (OUTPATIENT)
Dept: SURGICAL ONCOLOGY | Facility: CLINIC | Age: 87
End: 2022-03-14

## 2022-03-21 ENCOUNTER — OFFICE VISIT (OUTPATIENT)
Dept: FAMILY MEDICINE CLINIC | Facility: CLINIC | Age: 87
End: 2022-03-21
Payer: COMMERCIAL

## 2022-03-21 ENCOUNTER — APPOINTMENT (OUTPATIENT)
Dept: RADIOLOGY | Facility: CLINIC | Age: 87
End: 2022-03-21
Payer: COMMERCIAL

## 2022-03-21 VITALS
DIASTOLIC BLOOD PRESSURE: 88 MMHG | OXYGEN SATURATION: 98 % | HEIGHT: 70 IN | HEART RATE: 66 BPM | SYSTOLIC BLOOD PRESSURE: 126 MMHG | WEIGHT: 213 LBS | TEMPERATURE: 98 F | BODY MASS INDEX: 30.49 KG/M2 | RESPIRATION RATE: 18 BRPM

## 2022-03-21 DIAGNOSIS — M79.671 RIGHT FOOT PAIN: ICD-10-CM

## 2022-03-21 DIAGNOSIS — M79.671 RIGHT FOOT PAIN: Primary | ICD-10-CM

## 2022-03-21 PROCEDURE — 73630 X-RAY EXAM OF FOOT: CPT

## 2022-03-21 PROCEDURE — 99214 OFFICE O/P EST MOD 30 MIN: CPT | Performed by: FAMILY MEDICINE

## 2022-03-21 RX ORDER — LOSARTAN POTASSIUM 100 MG/1
TABLET ORAL
COMMUNITY
Start: 2022-01-31 | End: 2022-03-21

## 2022-03-21 RX ORDER — PREDNISONE 10 MG/1
TABLET ORAL
Qty: 21 TABLET | Refills: 0 | Status: SHIPPED | OUTPATIENT
Start: 2022-03-21

## 2022-03-21 NOTE — PATIENT INSTRUCTIONS
Low Purine Diet   WHAT YOU NEED TO KNOW:   A low-purine diet is a meal plan based on foods that are low in purine content  Purine is a substance that is found in foods and is produced naturally by the body  Purines are broken down by the body and changed to uric acid  The kidneys normally filter the uric acid, and it leaves the body through the urine  However, people with gout sometimes have a buildup of uric acid in the blood  This buildup of uric acid can cause swelling and pain (a gout attack)  A low-purine diet may help to treat and prevent gout attacks  DISCHARGE INSTRUCTIONS:   Foods to include: The following foods are low in purine  · Eggs, nuts, and peanut butter    · Low-fat and fat-free cheese and ice cream    · Skim or 1% milk    · Soup made without meat extract or broth    · Vegetables that are not on the medium-purine list below    · All fruit and fruit juice    · Bread, pasta, rice, cakes, cornbread, and popcorn    · Water, soda, tea, coffee, and cocoa    · Sugar, sweets, and gelatin    · Fat and oil    Foods to limit:   · Medium-purine foods:     ? Meats:  Limit the following to 4 to 6 ounces each day  § Meat and [employer], lobster, oysters, and shrimp    ? Vegetables:  Limit the following vegetables to ½ cup each day  § Asparagus    § Cauliflower    § Spinach    § Mushrooms    § Green peas    ? Beans, peas, and lentils (limit to 1 cup each day)    ? Oats and oatmeal (limit to ? cup uncooked each day)    ? Wheat germ and bran (limit to ¼ cup each day)    · High-purine foods:  Limit or avoid foods high in purine  ? Anchovies, sardines, scallops, and mussels    ? Tuna, codfish, herring, and anderson    ? [employer], like goose and duck    ? Organ meats, such as brains, heart, kidney, liver, and sweetbreads    ? Gravies and sauces made with meat    ? Yeast extracts taken in the form of a supplement    Other guidelines to follow:   · Increase liquid intake    Drink 8 to 16 (eight-ounce) cups of liquid each day  At least half of the liquid you drink should be water  Liquid can help your body get rid of extra uric acid  · Limit or avoid alcohol  Alcohol (especially beer) increases your risk of a gout attack  Beer contains a high amount of purine  · Maintain a healthy weight  If you are overweight, you should lose weight slowly  Weight loss can help decrease the amount of stress on your joints  Regular exercise can help you lose weight if you are overweight, or maintain your weight if you are at a normal weight  Talk to your healthcare provider before you begin an exercise program     © Copyright [employer] 2022 Information is for End User's use only and may not be sold, redistributed or otherwise used for commercial purposes  All illustrations and images included in CareNotes® are the copyrighted property of [employer]  or Amina Cowan  The above information is an  only  It is not intended as medical advice for individual conditions or treatments  Talk to your doctor, nurse or pharmacist before following any medical regimen to see if it is safe and effective for you

## 2022-03-21 NOTE — PROGRESS NOTES
Assessment/Plan:   1  Right foot pain    Reviewed patient's symptoms today  At this time, is unclear as to exact cause of his right foot pain  He does have pain over his midfoot  Will check x-rays to rule out gross abnormalities  Patient is is concerned for gout  He states that he did have episodes of gout in the past   Will avoid NSAIDs at this time, will start treatment with a prednisone taper  Follow up if any symptoms persist   - XR foot 3+ vw right; Future  - predniSONE 10 mg tablet; Take 6 tablets By mouth  In the morning Qd  Decrease by  By 1 tablet daily until completed  Dispense: 21 tablet; Refill: 0           There are no diagnoses linked to this encounter  Subjective:       Chief Complaint   Patient presents with    Foot Pain     R foot pain x2 days  At night when at rest its worse       Patient ID: Peggy Ward is a 80 y o  male  Leg Pain   The incident occurred 2 days ago  The incident occurred at home  There was no injury mechanism  The pain is present in the right foot and right toes  The quality of the pain is described as stabbing and aching  The pain is at a severity of 6/10  The pain is mild  The pain has been intermittent (worse at night) since onset  Associated symptoms include a loss of sensation, numbness and tingling  Pertinent negatives include no inability to bear weight or loss of motion  The symptoms are aggravated by movement  He has tried NSAIDs for the symptoms  Review of Systems   Constitutional: Negative for activity change, chills, fatigue and fever  HENT: Negative for congestion, ear pain, sinus pressure and sore throat  Eyes: Negative for redness, itching and visual disturbance  Respiratory: Negative for cough and shortness of breath  Cardiovascular: Negative for chest pain and palpitations  Gastrointestinal: Negative for abdominal pain, diarrhea and nausea  Endocrine: Negative for cold intolerance and heat intolerance     Genitourinary: Negative for dysuria, flank pain and frequency  Musculoskeletal: Positive for arthralgias  Negative for back pain, gait problem and myalgias  Skin: Negative for color change  Allergic/Immunologic: Negative for environmental allergies  Neurological: Positive for tingling and numbness  Negative for dizziness and headaches  Psychiatric/Behavioral: Negative for behavioral problems and sleep disturbance  The following portions of the patient's history were reviewed and updated as appropriate : past family history, past medical history, past social history and past surgical history      Current Outpatient Medications:     amLODIPine (NORVASC) 5 mg tablet, TAKE 1 TABLET BY MOUTH  DAILY, Disp: 90 tablet, Rfl: 3    Cholecalciferol (Vitamin D3) 10 MCG (400 UNIT) CAPS, Take by mouth, Disp: , Rfl:     clotrimazole (LOTRIMIN) 1 % cream, Apply topically 2 (two) times a day, Disp: 30 g, Rfl: 2    finasteride (PROSCAR) 5 mg tablet, Take 5 mg by mouth daily, Disp: , Rfl:     hydrochlorothiazide (MICROZIDE) 12 5 mg capsule, Take 1 capsule (12 5 mg total) by mouth every other day, Disp: 60 capsule, Rfl: 3    ILEVRO 0 3 % SUSP, , Disp: , Rfl:     losartan (COZAAR) 100 MG tablet, , Disp: , Rfl:     losartan (COZAAR) 50 mg tablet, Take 50 mg by mouth daily, Disp: , Rfl:     sildenafil (VIAGRA) 100 mg tablet, TAKE ONE TABLET BY MOUTH AS NEEDED 1 HOUR PRIOR TO SEXUAL ACTIVITY, Disp: , Rfl:     tamsulosin (FLOMAX) 0 4 mg, , Disp: , Rfl:     vitamin B-12 (CYANOCOBALAMIN) 100 MCG tablet, Take 100 mcg by mouth daily, Disp: , Rfl:     bimatoprost (LUMIGAN) 0 01 % ophthalmic drops, Administer 1 drop to both eyes daily at bedtime (Patient not taking: Reported on 11/15/2021 ), Disp: , Rfl:          Objective:         Vitals:    03/21/22 1435   BP: 126/88   BP Location: Left arm   Patient Position: Sitting   Cuff Size: Large   Pulse: 66   Resp: 18   Temp: 98 °F (36 7 °C)   TempSrc: Tympanic   SpO2: 98%   Weight: 96 6 kg (213 lb)   Height: 5' 10" (1 778 m)     Physical Exam  Vitals reviewed  Constitutional:       Appearance: He is well-developed  HENT:      Head: Normocephalic and atraumatic  Nose: Nose normal       Mouth/Throat:      Pharynx: No oropharyngeal exudate  Eyes:      General: No scleral icterus  Right eye: No discharge  Left eye: No discharge  Pupils: Pupils are equal, round, and reactive to light  Neck:      Trachea: No tracheal deviation  Cardiovascular:      Rate and Rhythm: Normal rate and regular rhythm  Pulses:           Dorsalis pedis pulses are 2+ on the right side and 2+ on the left side  Posterior tibial pulses are 2+ on the right side and 2+ on the left side  Heart sounds: Normal heart sounds  No murmur heard  No friction rub  No gallop  Pulmonary:      Effort: Pulmonary effort is normal  No respiratory distress  Breath sounds: Normal breath sounds  No wheezing or rales  Abdominal:      General: Bowel sounds are normal  There is no distension  Palpations: Abdomen is soft  Tenderness: There is no abdominal tenderness  There is no guarding or rebound  Musculoskeletal:         General: Normal range of motion  Cervical back: Normal range of motion and neck supple  Lymphadenopathy:      Head:      Right side of head: No submental or submandibular adenopathy  Left side of head: No submental or submandibular adenopathy  Cervical: No cervical adenopathy  Right cervical: No superficial, deep or posterior cervical adenopathy  Left cervical: No superficial, deep or posterior cervical adenopathy  Skin:     General: Skin is warm and dry  Findings: No erythema  Neurological:      Mental Status: He is alert and oriented to person, place, and time  Cranial Nerves: No cranial nerve deficit  Sensory: No sensory deficit  Psychiatric:         Mood and Affect: Mood is not anxious or depressed           Speech: Speech normal          Behavior: Behavior normal          Thought Content:  Thought content normal          Judgment: Judgment normal

## 2022-03-25 ENCOUNTER — TELEPHONE (OUTPATIENT)
Dept: FAMILY MEDICINE CLINIC | Facility: CLINIC | Age: 87
End: 2022-03-25

## 2022-03-29 DIAGNOSIS — R93.6 ABNORMAL X-RAY OF LOWER EXTREMITY: ICD-10-CM

## 2022-03-29 DIAGNOSIS — M87.00 AVASCULAR NECROSIS (HCC): Primary | ICD-10-CM

## 2022-03-30 ENCOUNTER — OFFICE VISIT (OUTPATIENT)
Dept: PODIATRY | Facility: CLINIC | Age: 87
End: 2022-03-30
Payer: COMMERCIAL

## 2022-03-30 VITALS
BODY MASS INDEX: 30.21 KG/M2 | HEIGHT: 70 IN | SYSTOLIC BLOOD PRESSURE: 137 MMHG | WEIGHT: 211 LBS | DIASTOLIC BLOOD PRESSURE: 76 MMHG | HEART RATE: 66 BPM

## 2022-03-30 DIAGNOSIS — M19.071 OSTEOARTHRITIS OF FIRST METATARSOPHALANGEAL (MTP) JOINT OF RIGHT FOOT: ICD-10-CM

## 2022-03-30 DIAGNOSIS — M10.9 ACUTE GOUT OF RIGHT FOOT, UNSPECIFIED CAUSE: Primary | ICD-10-CM

## 2022-03-30 PROCEDURE — 99203 OFFICE O/P NEW LOW 30 MIN: CPT | Performed by: PODIATRIST

## 2022-03-30 NOTE — PROGRESS NOTES
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      XRay reviewed, no concern for 2nd met head flattening, this is asymptomatic and the toe is anatomic  Clinically this is an incidental finding  Low suspicion for avascular necrosis of the 2nd metatarsal head  He has no pain or discomfort in this area  He does some degenerative changes to the 1st metatarsophalangeal joint  He also has history of gout  His recent gouty attack improved with prednisone  No acute intervention necessary at this time  We did discuss gouty foods  It has been quite sometime since his last gout attack  I would not suggest allopurinol unless this becomes a more regular occurrence  He does have diminished sensation to pinprick and vibration in his forefoot  He also has extensive lumbar stenosis and degenerative disc disease and his L4-L5 spine  Most likely this is a side effect of his degenerative changes  No acute podiatric care available for this condition  Subjective:      Patient ID: Bruno Bar is a 80 y o  male  Patient was referred here for his feet  He had an acute gout and was prescribed medication  The pain is pretty much gone  He did get XRays which "showed something " He feels like his toes are numb all the time  PMH: COPD, arthritis, back pain, NSVT      The following portions of the patient's history were reviewed and updated as appropriate:   He  has a past medical history of Abnormal EKG, Adenoid squamous cell carcinoma, COPD with acute exacerbation ([address] ), Diverticulosis, Eczema, Esophagitis, Gout, Nail avulsion of toe, and Neoplasm of bladder    He   Patient Active Problem List    Diagnosis Date Noted    Thyroid nodule 06/14/2021    NSVT (nonsustained ventricular tachycardia) (HCC) 05/24/2021    Nausea 03/08/2021    Dyspnea on exertion 03/08/2021    Abnormal chest x-ray 03/08/2021    Strep pharyngitis 02/25/2021    Osteoarthritis of spine with radiculopathy, lumbar region 01/24/2020    Nocturia 09/24/2019    SIVA (obstructive sleep apnea)     Sacroiliitis ([facility] [address] ) 10/16/2017    COPD (chronic obstructive pulmonary disease) ([facility]) 06/08/2017    Subdural hematoma ([facility] [address] ) 01/06/2017    Diastasis of rectus abdominis 07/29/2016    Neural foraminal stenosis of lumbar spine 09/24/2015    Degeneration of intervertebral disc of lumbar region 09/15/2015    Herniated lumbar intervertebral disc 09/15/2015    Lumbar radiculitis 09/01/2015    Benign essential hypertension 06/14/2013    Hyperlipidemia 04/30/2013     He  has a past surgical history that includes Hernia repair; Kidney surgery; and US guided thyroid biopsy (5/11/2021)  His family history includes Alzheimer's disease in his father; Cancer in his family; Heart attack in his sister; Lung cancer in his brother; Stroke in his mother  He  reports that he quit smoking about 40 years ago  His smoking use included cigarettes  He started smoking about 73 years ago  He has a 66 00 pack-year smoking history  He has never used smokeless tobacco  He reports that he does not drink alcohol and does not use drugs  Current Outpatient Medications   Medication Sig Dispense Refill    amLODIPine (NORVASC) 5 mg tablet TAKE 1 TABLET BY MOUTH  DAILY 90 tablet 3    Cholecalciferol (Vitamin D3) 10 MCG (400 UNIT) CAPS Take by mouth      clotrimazole (LOTRIMIN) 1 % cream Apply topically 2 (two) times a day 30 g 2    finasteride (PROSCAR) 5 mg tablet Take 5 mg by mouth daily      hydrochlorothiazide (MICROZIDE) 12 5 mg capsule Take 1 capsule (12 5 mg total) by mouth every other day 60 capsule 3    ILEVRO 0 3 % SUSP       losartan (COZAAR) 50 mg tablet Take 50 mg by mouth daily      predniSONE 10 mg tablet Take 6 tablets By mouth  In the morning Qd  Decrease by  By 1 tablet daily until completed   21 tablet 0    sildenafil (VIAGRA) 100 mg tablet TAKE ONE TABLET BY MOUTH AS NEEDED 1 HOUR PRIOR TO SEXUAL ACTIVITY      tamsulosin (FLOMAX) 0 4 mg       vitamin B-12 (CYANOCOBALAMIN) 100 MCG tablet Take 100 mcg by mouth daily      bimatoprost (LUMIGAN) 0 01 % ophthalmic drops Administer 1 drop to both eyes daily at bedtime (Patient not taking: Reported on 11/15/2021 )       No current facility-administered medications for this visit  Current Outpatient Medications on File Prior to Visit   Medication Sig    amLODIPine (NORVASC) 5 mg tablet TAKE 1 TABLET BY MOUTH  DAILY    Cholecalciferol (Vitamin D3) 10 MCG (400 UNIT) CAPS Take by mouth    clotrimazole (LOTRIMIN) 1 % cream Apply topically 2 (two) times a day    finasteride (PROSCAR) 5 mg tablet Take 5 mg by mouth daily    hydrochlorothiazide (MICROZIDE) 12 5 mg capsule Take 1 capsule (12 5 mg total) by mouth every other day    ILEVRO 0 3 % SUSP     losartan (COZAAR) 50 mg tablet Take 50 mg by mouth daily    predniSONE 10 mg tablet Take 6 tablets By mouth  In the morning Qd  Decrease by  By 1 tablet daily until completed   sildenafil (VIAGRA) 100 mg tablet TAKE ONE TABLET BY MOUTH AS NEEDED 1 HOUR PRIOR TO SEXUAL ACTIVITY    tamsulosin (FLOMAX) 0 4 mg     vitamin B-12 (CYANOCOBALAMIN) 100 MCG tablet Take 100 mcg by mouth daily    bimatoprost (LUMIGAN) 0 01 % ophthalmic drops Administer 1 drop to both eyes daily at bedtime (Patient not taking: Reported on 11/15/2021 )     No current facility-administered medications on file prior to visit  He is allergic to levetiracetam     Review of Systems   Constitutional: Negative  HENT: Negative  Respiratory: Negative  Cardiovascular: Negative  Gastrointestinal: Negative  Musculoskeletal: Positive for arthralgias and joint swelling  Skin: Negative for color change and wound  Neurological: Positive for numbness  Negative for weakness           Objective:      /76   Pulse 66   Ht 5' 10" (1 778 m) Comment: verbal  Wt 95 7 kg (211 lb)   BMI 30 28 kg/m²          Physical Exam  Vitals reviewed  Constitutional:       Appearance: He is obese  He is not ill-appearing or diaphoretic  Cardiovascular:      Rate and Rhythm: Normal rate  Pulses: Normal pulses  Dorsalis pedis pulses are 2+ on the right side and 2+ on the left side  Posterior tibial pulses are 2+ on the right side and 2+ on the left side  Pulmonary:      Effort: Pulmonary effort is normal  No respiratory distress  Musculoskeletal:         General: No tenderness  Right foot: Normal range of motion  Deformity (hammertoe) present  Left foot: Normal range of motion  Deformity (hammertoe) present  Feet:      Right foot:      Protective Sensation: 10 sites tested  6 sites sensed  Skin integrity: Skin integrity normal       Toenail Condition: Right toenails are abnormally thick  Left foot:      Protective Sensation: 10 sites tested  6 sites sensed  Skin integrity: Skin integrity normal       Toenail Condition: Left toenails are abnormally thick  Skin:     Capillary Refill: Capillary refill takes less than 2 seconds  Findings: No erythema, lesion or rash  Neurological:      Mental Status: He is alert and oriented to person, place, and time  Sensory: Sensory deficit present  Motor: No weakness  Gait: Gait normal    Psychiatric:         Mood and Affect: Mood normal        XRay 3 views of the right foot personally read by Dr Olga Monroy in office today and discussed with patient:  1  Mild 2nd met flattening, asymptomatic  2  Mild 1st MTPJ arthritis  3   No acute findings

## 2022-04-03 DIAGNOSIS — I10 BENIGN ESSENTIAL HYPERTENSION: ICD-10-CM

## 2022-04-04 RX ORDER — LOSARTAN POTASSIUM 100 MG/1
TABLET ORAL
Qty: 90 TABLET | Refills: 3 | Status: SHIPPED | OUTPATIENT
Start: 2022-04-04

## 2022-05-10 ENCOUNTER — TELEMEDICINE (OUTPATIENT)
Dept: FAMILY MEDICINE CLINIC | Facility: CLINIC | Age: 87
End: 2022-05-10
Payer: COMMERCIAL

## 2022-05-10 DIAGNOSIS — U07.1 COVID-19: Primary | ICD-10-CM

## 2022-05-10 PROCEDURE — 99441 PR PHYS/QHP TELEPHONE EVALUATION 5-10 MIN: CPT | Performed by: FAMILY MEDICINE

## 2022-05-10 NOTE — PROGRESS NOTES
COVID-19 Outpatient Progress Note    Assessment/Plan:    Problem List Items Addressed This Visit     None         Disposition:     Patient had a positive home test for COVID  I prescribed Paxlovid  Patient meets criteria for PAXLOVID and they have been counseled appropriately according to EUA documentation released by the FDA  After discussion, patient agrees to treatment  [address] is an investigational medicine used to treat mild-to-moderate COVID-19 in adults and children (15years of age and older weighing at least 80 pounds (40 kg)) with positive results of direct SARS-CoV-2 viral testing, and who are at high risk for progression to severe COVID-19, including hospitalization or death  PAXLOVID is investigational because it is still being studied  There is limited information about the safety and effectiveness of using PAXLOVID to treat people with mild-to-moderate COVID-19  The FDA has authorized the emergency use of PAXLOVID for the treatment of mild-tomoderate COVID-19 in adults and children (15years of age and older weighing at least 80 pounds (40 kg)) with a positive test for the virus that causes COVID-19, and who are at high risk for progression to severe COVID-19, including hospitalization or death, under an EUA  What should I tell my healthcare provider before I take PAXLOVID? Tell your healthcare provider if you:  - Have any allergies  - Have liver or kidney disease  - Are pregnant or plan to become pregnant  - Are breastfeeding a child  - Have any serious illnesses    Tell your healthcare provider about all the medicines you take, including prescription and over-the-counter medicines, vitamins, and herbal supplements  Some medicines may interact with PAXLOVID and may cause serious side effects  Keep a list of your medicines to show your healthcare provider and pharmacist when you get a new medicine      You can ask your healthcare provider or pharmacist for a list of medicines that interact with PAXLOVID  Do not start taking a new medicine without telling your healthcare provider  Your healthcare provider can tell you if it is safe to take PAXLOVID with other medicines  Tell your healthcare provider if you are taking combined hormonal contraceptive  PAXLOVID may affect how your birth control pills work  Females who are able to become pregnant should use another effective alternative form of contraception or an additional barrier method of contraception  Talk to your healthcare provider if you have any questions about contraceptive methods that might be right for you  How do I take PAXLOVID? PAXLOVID consists of 2 medicines: nirmatrelvir and ritonavir  - Take 2 pink tablets of nirmatrelvir with 1 white tablet of ritonavir by mouth 2 times each day (in the morning and in the evening) for 5 days  For each dose, take all 3 tablets at the same time  - If you have kidney disease, talk to your healthcare provider  You may need a different dose  - Swallow the tablets whole  Do not chew, break, or crush the tablets  - Take PAXLOVID with or without food  - Do not stop taking PAXLOVID without talking to your healthcare provider, even if you feel better  - If you miss a dose of PAXLOVID within 8 hours of the time it is usually taken, take it as soon as you remember  If you miss a dose by more than 8 hours, skip the missed dose and take the next dose at your regular time  Do not take 2 doses of PAXLOVID at the same time  - If you take too much PAXLOVID, call your healthcare provider or go to the nearest hospital emergency room right away  - If you are taking a ritonavir- or cobicistat-containing medicine to treat hepatitis C or Human Immunodeficiency Virus (HIV), you should continue to take your medicine as prescribed by your healthcare provider   - Talk to your healthcare provider if you do not feel better or if you feel worse after 5 days      Who should generally not take PAXLOVID? Do not take PAXLOVID if:  You are allergic to nirmatrelvir, ritonavir, or any of the ingredients in PAXLOVID  You are taking any of the following medicines:  - Alfuzosin  - Pethidine, piroxicam, propoxyphene  - Ranolazine  - Amiodarone, dronedarone, flecainide, propafenone, quinidine  - Colchicine  - Lurasidone, pimozide, clozapine  - Dihydroergotamine, ergotamine, methylergonovine  - Lovastatin, simvastatin  - Sildenafil (Revatio®) for pulmonary arterial hypertension (PAH)  - Triazolam, oral midazolam  - Apalutamide  - Carbamazepine, phenobarbital, phenytoin  - Rifampin  - St  Renos Wort (hypericum perforatum)    What are the important possible side effects of PAXLOVID? Possible side effects of PAXLOVID are:  - Liver Problems  Tell your healthcare provider right away if you have any of these signs and symptoms of liver problems: loss of appetite, yellowing of your skin and the whites of eyes (jaundice), dark-colored urine, pale colored stools and itchy skin, stomach area (abdominal) pain  - Resistance to HIV Medicines  If you have untreated HIV infection, PAXLOVID may lead to some HIV medicines not working as well in the future  - Other possible side effects include: altered sense of taste, diarrhea, high blood pressure, or muscle aches    These are not all the possible side effects of PAXLOVID  Not many people have taken PAXLOVID  Serious and unexpected side effects may happen  Yuvalaydee Coats is still being studied, so it is possible that all of the risks are not known at this time  What other treatment choices are there? Like Edmond Thomas may allow for the emergency use of other medicines to treat people with COVID-19  Go to https://[employer]/ for information on the emergency use of other medicines that are authorized by FDA to treat people with COVID-19   Your healthcare provider may talk with you about clinical trials for which you may be eligible  It is your choice to be treated or not to be treated with PAXLOVID  Should you decide not to receive it or for your child not to receive it, it will not change your standard medical care  What if I am pregnant or breastfeeding? There is no experience treating pregnant women or breastfeeding mothers with PAXLOVID  For a mother and unborn baby, the benefit of taking PAXLOVID may be greater than the risk from the treatment  If you are pregnant, discuss your options and specific situation with your healthcare provider  It is recommended that you use effective barrier contraception or do not have sexual activity while taking PAXLOVID  If you are breastfeeding, discuss your options and specific situation with your healthcare provider  How do I report side effects with PAXLOVID? Contact your healthcare provider if you have any side effects that bother you or do not go away  Report side effects to FDA MedWatch at www [employer] gov/medwatch or call 9-777-IAZ3325 or you can report side effects to [facility]  at the contact information provided below  Website Fax number Telephone number   [place] [phone]     How should I store Carletha Maxcy? Store PAXLOVID tablets at room temperature between 68°F to 77°F (20°C to 25°C)  Full fact sheet for patients, parents, and caregivers can be found at: AF[employer] fernández    I have spent 10 minutes directly with the patient  Along with discussing Paxlovid, patient's wife had questions about medication prescribed yesterday by one of the other providers in the office  She also tested positive for COVID       Encounter provider Elissa Arshad DO    Provider located at [place] [address]  [address] [facility] [address] [facility] [address]  [phone]    Recent Visits  No visits were found meeting these conditions  Showing recent visits within past 7 days and meeting all other requirements  Future Appointments  No visits were found meeting these conditions  Showing future appointments within next 150 days and meeting all other requirements     This virtual check-in was done via telephone and he agrees to proceed  Patient agrees to participate in a virtual check in via telephone or video visit instead of presenting to the office to address urgent/immediate medical needs  Patient is aware this is a billable service  After connecting through Telephone, the patient was identified by name and date of birth  Yeison Jauregui was informed that this was a telemedicine visit and that the exam was being conducted confidentially over secure lines  My office door was closed  No one else was in the room  Yeison Jauregui acknowledged consent and understanding of privacy and security of the telemedicine visit  I informed the patient that I have reviewed his record in Epic and presented the opportunity for him to ask any questions regarding the visit today  The patient agreed to participate  Verification of patient location:  Patient is located in the following state in which I hold an active license: [place]    Subjective:   Yeison Jauregui is a 80 y o  male who is concerned about COVID-19  Patient's symptoms include nasal congestion, sore throat and cough  Patient denies fever, chills, fatigue, malaise, rhinorrhea, anosmia, loss of taste, shortness of breath, chest tightness, abdominal pain, nausea, vomiting, diarrhea (loose BM's yesterday), myalgias and headaches       - Date of symptom onset: 5/10/2022      COVID-19 vaccination status: Fully vaccinated with booster    Exposure:   Contact with a person who is under investigation (PUI) for or who is positive for COVID-19 within the last 14 days?: Yes    Hospitalized recently for fever and/or lower respiratory symptoms?: No      Currently a healthcare worker that is involved in direct patient care?: No      Works in a special setting where the risk of COVID-19 transmission may be high? (this may include long-term care, correctional and [facility] facilities; homeless shelters; assisted-living facilities and group homes ): No      Resident in a special setting where the risk of COVID-19 transmission may be high? 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    COPD with acute exacerbation ([facility] [address] )     last assessed: 2/7/2017    Diverticulosis     Eczema     last assessed: 4/30/2013    Esophagitis     Gout     last assessed: 5/19/2014    Nail avulsion of toe     Neoplasm of bladder     last assessed: 4/30/2013     Past Surgical History:   Procedure Laterality Date    HERNIA REPAIR      last assessed: 11/25/2014    KIDNEY SURGERY      description: removal of kidney stone last assessed: 11/25/2014    US GUIDED THYROID BIOPSY  5/11/2021     Current Outpatient Medications   Medication Sig Dispense Refill    amLODIPine (NORVASC) 5 mg tablet TAKE 1 TABLET BY MOUTH  DAILY 90 tablet 3    bimatoprost (LUMIGAN) 0 01 % ophthalmic drops Administer 1 drop to both eyes daily at bedtime (Patient not taking: Reported on 11/15/2021 )      Cholecalciferol (Vitamin D3) 10 MCG (400 UNIT) CAPS Take by mouth      clotrimazole (LOTRIMIN) 1 % cream Apply topically 2 (two) times a day 30 g 2    finasteride (PROSCAR) 5 mg tablet Take 5 mg by mouth daily      hydrochlorothiazide (MICROZIDE) 12 5 mg capsule Take 1 capsule (12 5 mg total) by mouth every other day 60 capsule 3    ILEVRO 0 3 % SUSP       losartan (COZAAR) 100 MG tablet TAKE 1 TABLET BY MOUTH  DAILY 90 tablet 3    losartan (COZAAR) 50 mg tablet Take 50 mg by mouth daily      predniSONE 10 mg tablet Take 6 tablets By mouth  In the morning Qd  Decrease by  By 1 tablet daily until completed  21 tablet 0    sildenafil (VIAGRA) 100 mg tablet TAKE ONE TABLET BY MOUTH AS NEEDED 1 HOUR PRIOR TO SEXUAL ACTIVITY      tamsulosin (FLOMAX) 0 4 mg       vitamin B-12 (CYANOCOBALAMIN) 100 MCG tablet Take 100 mcg by mouth daily       No current facility-administered medications for this visit  Allergies   Allergen Reactions    Levetiracetam Confusion and Anxiety       Review of Systems   Constitutional: Negative for chills, fatigue and fever  HENT: Positive for congestion and sore throat  Negative for rhinorrhea  Respiratory: Positive for cough  Negative for chest tightness and shortness of breath  Gastrointestinal: Negative for abdominal pain, diarrhea (loose BM's yesterday), nausea and vomiting  Musculoskeletal: Negative for myalgias  Neurological: Negative for headaches  Objective: There were no vitals filed for this visit  Physical Exam  Neurological:      Mental Status: He is alert and oriented to person, place, and time  VIRTUAL VISIT DISCLAIMER    Jodi Palmer verbally agrees to participate in [employer]  Pt is aware that [employer] could be limited without vital signs or the ability to perform a full hands-on physical exam  Ramsey Palmer understands he or the provider may request at any time to terminate the video visit and request the patient to seek care or treatment in person

## 2022-05-11 ENCOUNTER — APPOINTMENT (OUTPATIENT)
Dept: LAB | Facility: CLINIC | Age: 87
End: 2022-05-11
Payer: COMMERCIAL

## 2022-05-11 DIAGNOSIS — U07.1 COVID-19: ICD-10-CM

## 2022-05-11 LAB
ANION GAP SERPL CALCULATED.3IONS-SCNC: 2 MMOL/L (ref 4–13)
BUN SERPL-MCNC: 11 MG/DL (ref 5–25)
CALCIUM SERPL-MCNC: 9.5 MG/DL (ref 8.3–10.1)
CHLORIDE SERPL-SCNC: 105 MMOL/L (ref 100–108)
CO2 SERPL-SCNC: 33 MMOL/L (ref 21–32)
CREAT SERPL-MCNC: 0.94 MG/DL (ref 0.6–1.3)
GFR SERPL CREATININE-BSD FRML MDRD: 73 ML/MIN/1.73SQ M
GLUCOSE P FAST SERPL-MCNC: 102 MG/DL (ref 65–99)
POTASSIUM SERPL-SCNC: 4.5 MMOL/L (ref 3.5–5.3)
SARS-COV-2 AG UPPER RESP QL IA: ABNORMAL
SODIUM SERPL-SCNC: 140 MMOL/L (ref 136–145)

## 2022-05-11 PROCEDURE — 36415 COLL VENOUS BLD VENIPUNCTURE: CPT

## 2022-05-11 PROCEDURE — 80048 BASIC METABOLIC PNL TOTAL CA: CPT

## 2022-05-12 ENCOUNTER — NURSE TRIAGE (OUTPATIENT)
Dept: OTHER | Facility: OTHER | Age: 87
End: 2022-05-12

## 2022-05-12 NOTE — TELEPHONE ENCOUNTER
Reason for Disposition   [1] Caller has NON-URGENT medicine question about med that PCP prescribed AND [2] triager unable to answer question    Answer Assessment - Initial Assessment Questions  1  NAME of MEDICATION: "What medicine are you calling about?"      Paxlovid  2  QUESTION: "What is your question?" (e g , medication refill, side effect)      Can he start taking? Was waiting on blood test result  3  PRESCRIBING HCP: "Who prescribed it?" Reason: if prescribed by specialist, call should be referred to that group  Dr Mohit Farias  4   SYMPTOMS: "Do you have any symptoms?"      Congestions    Protocols used: MEDICATION QUESTION CALL-ADULT-

## 2022-05-12 NOTE — TELEPHONE ENCOUNTER
Regarding: medication question  ----- Message from Hailey Faustin sent at 5/12/2022  6:11 AM EDT -----  " I have a question about taking the nirmatrelvir & ritonavir (Paxlovid) tablet therapy pack "

## 2022-05-22 DIAGNOSIS — I10 BENIGN ESSENTIAL HYPERTENSION: ICD-10-CM

## 2022-05-23 RX ORDER — HYDROCHLOROTHIAZIDE 12.5 MG/1
CAPSULE, GELATIN COATED ORAL
Qty: 45 CAPSULE | Refills: 3 | Status: SHIPPED | OUTPATIENT
Start: 2022-05-23

## 2022-05-26 ENCOUNTER — TELEPHONE (OUTPATIENT)
Dept: FAMILY MEDICINE CLINIC | Facility: CLINIC | Age: 87
End: 2022-05-26

## 2022-05-26 NOTE — TELEPHONE ENCOUNTER
Pt called and LMOM stating about 1 5 months ago IA told him to d/c finasteride  Pt asking if he should start this back up? I couldn't find documentation that pt was to d/c  Please advise

## 2022-05-26 NOTE — TELEPHONE ENCOUNTER
Please check with patient to see if he has been noticing any worsening urinary symptoms    Thank you

## 2022-06-02 NOTE — TELEPHONE ENCOUNTER
Pt called again in regards to finasteride  States no worsening urinary symptoms, but no improvements either  Pt also unclear as to why he was taken off the medication in the first place  Couldn't recall if it was because it was suspected he had gout  Pt said his feet cleared up  Would like a call from anyone in clinical to review why he was asked to d/c medication, and if he should go back on it  Phone number [phone]   Thank you

## 2022-06-02 NOTE — TELEPHONE ENCOUNTER
He was asked to stop this likely because it was not clear if this medication was adding any benefit in the 1st place  If he has not noticed any change, this medication likely is not needed as it is not providing any benefit for him

## 2022-06-15 ENCOUNTER — TELEPHONE (OUTPATIENT)
Dept: FAMILY MEDICINE CLINIC | Facility: CLINIC | Age: 87
End: 2022-06-15

## 2022-07-21 ENCOUNTER — OFFICE VISIT (OUTPATIENT)
Dept: FAMILY MEDICINE CLINIC | Facility: CLINIC | Age: 87
End: 2022-07-21
Payer: COMMERCIAL

## 2022-07-21 VITALS
RESPIRATION RATE: 18 BRPM | TEMPERATURE: 97.6 F | WEIGHT: 216.4 LBS | HEIGHT: 70 IN | BODY MASS INDEX: 30.98 KG/M2 | OXYGEN SATURATION: 94 % | DIASTOLIC BLOOD PRESSURE: 80 MMHG | SYSTOLIC BLOOD PRESSURE: 126 MMHG | HEART RATE: 65 BPM

## 2022-07-21 DIAGNOSIS — Z12.5 SCREENING FOR PROSTATE CANCER: ICD-10-CM

## 2022-07-21 DIAGNOSIS — R35.1 NOCTURIA: ICD-10-CM

## 2022-07-21 DIAGNOSIS — I47.29 NSVT (NONSUSTAINED VENTRICULAR TACHYCARDIA): ICD-10-CM

## 2022-07-21 DIAGNOSIS — E04.1 THYROID NODULE: ICD-10-CM

## 2022-07-21 DIAGNOSIS — J44.9 CHRONIC OBSTRUCTIVE PULMONARY DISEASE, UNSPECIFIED COPD TYPE (HCC): ICD-10-CM

## 2022-07-21 DIAGNOSIS — Z00.00 MEDICARE ANNUAL WELLNESS VISIT, SUBSEQUENT: ICD-10-CM

## 2022-07-21 DIAGNOSIS — E78.2 MIXED HYPERLIPIDEMIA: ICD-10-CM

## 2022-07-21 DIAGNOSIS — I10 BENIGN ESSENTIAL HYPERTENSION: Primary | ICD-10-CM

## 2022-07-21 DIAGNOSIS — R73.01 ELEVATED FASTING GLUCOSE: ICD-10-CM

## 2022-07-21 PROBLEM — J02.0 STREP PHARYNGITIS: Status: RESOLVED | Noted: 2021-02-25 | Resolved: 2022-07-21

## 2022-07-21 PROCEDURE — 99214 OFFICE O/P EST MOD 30 MIN: CPT | Performed by: FAMILY MEDICINE

## 2022-07-21 PROCEDURE — G0439 PPPS, SUBSEQ VISIT: HCPCS | Performed by: FAMILY MEDICINE

## 2022-07-21 NOTE — PATIENT INSTRUCTIONS

## 2022-07-21 NOTE — PROGRESS NOTES
Assessment/Plan:   1  Benign essential hypertension  Stable  At this time, continue with routine home monitoring as well as his current treatment with amlodipine as well as hydrochlorothiazide  - Comprehensive metabolic panel; Future    2  Chronic obstructive pulmonary disease, unspecified COPD type ([facility] [address] )  Reviewed patient's previous testing  At this time, he denies any recent shortness of breath  Previous testing appeared to show mild obstruction  He may benefit from use of albuterol p r n  He would like to think this over  3  NSVT (nonsustained ventricular tachycardia) [facility])  Patient following with Cardiology regularly  He denies any chest pain or palpitations  Will continue with his current treatment of losartan as well as his amlodipine    4  Mixed hyperlipidemia  Recheck lipid panel  Continue with a strict low-fat/low-cholesterol diet  - Lipid Panel with Direct LDL reflex; Future    5  Nocturia  Patient following with Urology  At this time, he has been having persistent symptoms of urinary frequency  He is not sure if his finasteride has been effective for him  Will discontinue this treatment and continue with his tamsulosin alone to see if he has noticed any improvement  Follow-up patient 6 months or p r n  [facility] [place] [facility] - PSA, Total Screen; Future           Diagnoses and all orders for this visit:    Benign essential hypertension  -     Comprehensive metabolic panel; Future    Chronic obstructive pulmonary disease, unspecified COPD type (HCC)    NSVT (nonsustained ventricular tachycardia) (HCC)    Mixed hyperlipidemia  -     Lipid Panel with Direct LDL reflex; Future    Nocturia    Medicare annual wellness visit, subsequent    Thyroid nodule  -     TSH, 3rd generation with Free T4 reflex; Future    Elevated fasting glucose  -     Hemoglobin A1C; Future    Screening for prostate cancer  -     PSA, Total Screen;  Future          Subjective:       Chief Complaint   Patient presents with   [facility] Wellness Visit     subsequent      Patient ID: Eloy Dobbs is a 80 y o  male presents today for follow-up on his chronic conditions  He hypertension, COPD, nonsustained V-tach, dyslipidemia, nocturia  He has been taking his medications regularly  He denies adverse reactions with medications  HPI    Review of Systems   Constitutional: Negative for activity change, chills, fatigue and fever  HENT: Negative for congestion, ear pain, sinus pressure and sore throat  Eyes: Negative for redness, itching and visual disturbance  Respiratory: Negative for cough and shortness of breath  Cardiovascular: Negative for chest pain and palpitations  Gastrointestinal: Negative for abdominal pain, diarrhea and nausea  Endocrine: Negative for cold intolerance and heat intolerance  Genitourinary: Negative for dysuria, flank pain and frequency  Musculoskeletal: Negative for arthralgias, back pain, gait problem and myalgias  Skin: Negative for color change  Allergic/Immunologic: Negative for environmental allergies  Neurological: Negative for dizziness, numbness and headaches  Psychiatric/Behavioral: Negative for behavioral problems and sleep disturbance  The following portions of the patient's history were reviewed and updated as appropriate : past family history, past medical history, past social history and past surgical history      Current Outpatient Medications:     amLODIPine (NORVASC) 5 mg tablet, TAKE 1 TABLET BY MOUTH  DAILY, Disp: 90 tablet, Rfl: 3    Cholecalciferol (Vitamin D3) 10 MCG (400 UNIT) CAPS, Take by mouth, Disp: , Rfl:     clotrimazole (LOTRIMIN) 1 % cream, Apply topically 2 (two) times a day, Disp: 30 g, Rfl: 2    finasteride (PROSCAR) 5 mg tablet, Take 5 mg by mouth daily, Disp: , Rfl:     hydrochlorothiazide (MICROZIDE) 12 5 mg capsule, TAKE 1 CAPSULE BY MOUTH  EVERY OTHER DAY, Disp: 45 capsule, Rfl: 3    ILEVRO 0 3 % SUSP, , Disp: , Rfl:     losartan (COZAAR) 100 MG tablet, TAKE 1 TABLET BY MOUTH  DAILY, Disp: 90 tablet, Rfl: 3    losartan (COZAAR) 50 mg tablet, Take 50 mg by mouth daily, Disp: , Rfl:     predniSONE 10 mg tablet, Take 6 tablets By mouth  In the morning Qd  Decrease by  By 1 tablet daily until completed  , Disp: 21 tablet, Rfl: 0    sildenafil (VIAGRA) 100 mg tablet, TAKE ONE TABLET BY MOUTH AS NEEDED 1 HOUR PRIOR TO SEXUAL ACTIVITY, Disp: , Rfl:     tamsulosin (FLOMAX) 0 4 mg, , Disp: , Rfl:     vitamin B-12 (CYANOCOBALAMIN) 100 MCG tablet, Take 100 mcg by mouth daily, Disp: , Rfl:     bimatoprost (LUMIGAN) 0 01 % ophthalmic drops, Administer 1 drop to both eyes daily at bedtime (Patient not taking: No sig reported), Disp: , Rfl:          Objective:         Vitals:    07/21/22 1452   BP: 126/80   Pulse: 65   Resp: 18   Temp: 97 6 °F (36 4 °C)   TempSrc: Tympanic   SpO2: 94%   Weight: 98 2 kg (216 lb 6 4 oz)   Height: 5' 10" (1 778 m)     Physical Exam  Vitals reviewed  Constitutional:       Appearance: He is well-developed  HENT:      Head: Normocephalic and atraumatic  Nose: Nose normal       Mouth/Throat:      Pharynx: No oropharyngeal exudate  Eyes:      General: No scleral icterus  Right eye: No discharge  Left eye: No discharge  Pupils: Pupils are equal, round, and reactive to light  Neck:      Trachea: No tracheal deviation  Cardiovascular:      Rate and Rhythm: Normal rate and regular rhythm  Pulses:           Dorsalis pedis pulses are 2+ on the right side and 2+ on the left side  Posterior tibial pulses are 2+ on the right side and 2+ on the left side  Heart sounds: Normal heart sounds  No murmur heard  No friction rub  No gallop  Pulmonary:      Effort: Pulmonary effort is normal  No respiratory distress  Breath sounds: Normal breath sounds  No wheezing or rales  Abdominal:      General: Bowel sounds are normal  There is no distension  Palpations: Abdomen is soft  Tenderness: There is no abdominal tenderness  There is no guarding or rebound  Musculoskeletal:         General: Normal range of motion  Cervical back: Normal range of motion and neck supple  Lymphadenopathy:      Head:      Right side of head: No submental or submandibular adenopathy  Left side of head: No submental or submandibular adenopathy  Cervical: No cervical adenopathy  Right cervical: No superficial, deep or posterior cervical adenopathy  Left cervical: No superficial, deep or posterior cervical adenopathy  Skin:     General: Skin is warm and dry  Findings: No erythema  Neurological:      Mental Status: He is alert and oriented to person, place, and time  Cranial Nerves: No cranial nerve deficit  Sensory: No sensory deficit  Psychiatric:         Mood and Affect: Mood is not anxious or depressed  Speech: Speech normal          Behavior: Behavior normal          Thought Content:  Thought content normal          Judgment: Judgment normal

## 2022-07-21 NOTE — PROGRESS NOTES
Assessment and Plan:     Problem List Items Addressed This Visit        Endocrine    Thyroid nodule       Respiratory    COPD (chronic obstructive pulmonary disease) (HCC)       Cardiovascular and Mediastinum    Benign essential hypertension - Primary    NSVT (nonsustained ventricular tachycardia) (HCC)       Other    Hyperlipidemia    Nocturia      Other Visit Diagnoses     Medicare annual wellness visit, subsequent        Elevated fasting glucose        Screening for prostate cancer               Preventive health issues were discussed with patient, and age appropriate screening tests were ordered as noted in patient's After Visit Summary  Personalized health advice and appropriate referrals for health education or preventive services given if needed, as noted in patient's After Visit Summary       History of Present Illness:     Patient presents for a Medicare Wellness Visit    HPI   Patient Care Team:  Aliya Shelley DO as PCP - General  Aliya Shelley DO as PCP - [address] [place] [address],[address] (RTE)  DO Oneyda Roe CRNP Arcadio Pu, DO Reagan Curt, MD as Surgeon (Surgical Oncology)     Review of Systems:     Review of Systems     Problem List:     Patient Active Problem List   Diagnosis    Benign essential hypertension    COPD (chronic obstructive pulmonary disease) ([address] )    Degeneration of intervertebral disc of lumbar region    Diastasis of rectus abdominis    Herniated lumbar intervertebral disc    Hyperlipidemia    Lumbar radiculitis    Neural foraminal stenosis of lumbar spine    Subdural hematoma (HCC)    Sacroiliitis ([address] )    SIVA (obstructive sleep apnea)    Nocturia    Osteoarthritis of spine with radiculopathy, lumbar region    Nausea    Dyspnea on exertion    Abnormal chest x-ray    NSVT (nonsustained ventricular tachycardia) ([address] )    Thyroid nodule      Past Medical and Surgical History:     Past Medical History:   Diagnosis Date    Abnormal EKG last assessed: 2014    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    COPD with acute exacerbation [facility] )     last assessed: 2017    Diverticulosis     Eczema     last assessed: 2013    Esophagitis     Gout     last assessed: 2014    Nail avulsion of toe     Neoplasm of bladder     last assessed: 2013     Past Surgical History:   Procedure Laterality Date    HERNIA REPAIR      last assessed: 2014    KIDNEY SURGERY      description: removal of kidney stone last assessed: 2014    US GUIDED THYROID BIOPSY  2021      Family History:     Family History   Problem Relation Age of Onset    Stroke Mother     Alzheimer's disease Father     Heart attack Sister     Cancer Family     Lung cancer Brother       Social History:     Social History     Socioeconomic History    Marital status: /Civil Union     Spouse name: None    Number of children: None    Years of education: None    Highest education level: None   Occupational History    None   Tobacco Use    Smoking status: Former Smoker     Packs/day: 2 00     Years: 33 00     Pack years: 66 00     Types: Cigarettes     Start date:      Quit date:      Years since quittin 5    Smokeless tobacco: Never Used   Vaping Use    Vaping Use: Never used   Substance and Sexual Activity    Alcohol use: No    Drug use: No    Sexual activity: None   Other Topics Concern    None   Social History Narrative    None     Social Determinants of Health     Financial Resource Strain: Not on file   Food Insecurity: Not on file   Transportation Needs: Not on file   Physical Activity: Not on file   Stress: Not on file   Social Connections: Not on file   Intimate Partner Violence: Not on file   Housing Stability: Not on file      Medications and Allergies:     Current Outpatient Medications   Medication Sig Dispense Refill    amLODIPine (NORVASC) 5 mg tablet TAKE 1 TABLET BY MOUTH DAILY 90 tablet 3    Cholecalciferol (Vitamin D3) 10 MCG (400 UNIT) CAPS Take by mouth      clotrimazole (LOTRIMIN) 1 % cream Apply topically 2 (two) times a day 30 g 2    finasteride (PROSCAR) 5 mg tablet Take 5 mg by mouth daily      hydrochlorothiazide (MICROZIDE) 12 5 mg capsule TAKE 1 CAPSULE BY MOUTH  EVERY OTHER DAY 45 capsule 3    ILEVRO 0 3 % SUSP       losartan (COZAAR) 100 MG tablet TAKE 1 TABLET BY MOUTH  DAILY 90 tablet 3    losartan (COZAAR) 50 mg tablet Take 50 mg by mouth daily      predniSONE 10 mg tablet Take 6 tablets By mouth  In the morning Qd  Decrease by  By 1 tablet daily until completed  21 tablet 0    sildenafil (VIAGRA) 100 mg tablet TAKE ONE TABLET BY MOUTH AS NEEDED 1 HOUR PRIOR TO SEXUAL ACTIVITY      tamsulosin (FLOMAX) 0 4 mg       vitamin B-12 (CYANOCOBALAMIN) 100 MCG tablet Take 100 mcg by mouth daily      bimatoprost (LUMIGAN) 0 01 % ophthalmic drops Administer 1 drop to both eyes daily at bedtime (Patient not taking: No sig reported)       No current facility-administered medications for this visit       Allergies   Allergen Reactions    Levetiracetam Confusion and Anxiety      Immunizations:     Immunization History   Administered Date(s) Administered    COVID-19 PFIZER VACCINE 0 3 ML IM 01/21/2021, 02/10/2021, 11/02/2021    H1N1, All Formulations 02/02/2010    INFLUENZA 11/01/2014, 11/05/2015, 01/06/2017, 11/20/2017, 09/23/2020    Influenza Split High Dose Preservative Free IM 11/01/2014, 11/05/2015, 01/06/2017, 11/20/2017    Influenza, high dose seasonal 0 7 mL 10/23/2018, 09/24/2019    Influenza, seasonal, injectable 09/21/2012, 09/21/2012, 11/04/2013    Pneumococcal Conjugate 13-Valent 11/28/2017    Pneumococcal Polysaccharide PPV23 1935, 10/03/2001    Zoster 12/01/2014      Health Maintenance:         Topic Date Due    Hepatitis C Screening  Completed         Topic Date Due    COVID-19 Vaccine (4 - Booster for Hernandez Peter series) 03/02/2022    Influenza Vaccine (1) 09/01/2022      Medicare Screening Tests and Risk Assessments:     Nova Gaona is here for his Subsequent Wellness visit  Last Medicare Wellness visit information reviewed, patient interviewed, no change since last AWV  Health Risk Assessment:   Patient rates overall health as good  Patient feels that their physical health rating is same  Patient is very satisfied with their life  Eyesight was rated as slightly worse  Hearing was rated as slightly worse  Patient feels that their emotional and mental health rating is same  Patients states they are never, rarely angry  Patient states they are never, rarely unusually tired/fatigued  Pain experienced in the last 7 days has been none  Patient states that he has experienced no weight loss or gain in last 6 months  Depression Screening:   PHQ-2 Score: 0      Fall Risk Screening: In the past year, patient has experienced: no history of falling in past year      Home Safety:  Patient does not have trouble with stairs inside or outside of their home  Patient has working smoke alarms and has working carbon monoxide detector  Home safety hazards include: none  Nutrition:   Current diet is Regular  Medications:   Patient is currently taking over-the-counter supplements  OTC medications include: see medication list  Patient is able to manage medications  Activities of Daily Living (ADLs)/Instrumental Activities of Daily Living (IADLs):   Walk and transfer into and out of bed and chair?: Yes  Dress and groom yourself?: Yes    Bathe or shower yourself?: Yes    Feed yourself?  Yes  Do your laundry/housekeeping?: Yes  Manage your money, pay your bills and track your expenses?: Yes  Make your own meals?: Yes    Do your own shopping?: Yes    Previous Hospitalizations:   Any hospitalizations or ED visits within the last 12 months?: No      Advance Care Planning:   Living will: No    Durable POA for healthcare: No    Advanced directive: No Advanced directive counseling given: Yes    Five wishes given: Yes    Patient declined ACP directive: No    End of Life Decisions reviewed with patient: Yes    Provider agrees with end of life decisions: Yes      Cognitive Screening:   Provider or family/friend/caregiver concerned regarding cognition?: No    PREVENTIVE SCREENINGS      Cardiovascular Screening:    General: Screening Not Indicated, History Lipid Disorder, Risks and Benefits Discussed and Screening Current    Due for: Lipid Panel      Diabetes Screening:     General: Screening Current and Risks and Benefits Discussed    Due for: Blood Glucose      Colorectal Cancer Screening:     General: Screening Not Indicated and Risks and Benefits Discussed      Prostate Cancer Screening:    General: Screening Not Indicated, Risks and Benefits Discussed and Screening Current      Osteoporosis Screening:    General: Risks and Benefits Discussed and Screening Not Indicated      Abdominal Aortic Aneurysm (AAA) Screening:    Risk factors include: tobacco use        General: Risks and Benefits Discussed and Screening Not Indicated      Lung Cancer Screening:     General: Screening Not Indicated, Risks and Benefits Discussed and Screening Current      Hepatitis C Screening:    General: Screening Current and Risks and Benefits Discussed    Screening, Brief Intervention, and Referral to Treatment (SBIRT)    Screening  Typical number of drinks in a day: 0  Typical number of drinks in a week: 0  Interpretation: Low risk drinking behavior      AUDIT-C Screenin) How often did you have a drink containing alcohol in the past year? never  2) How many drinks did you have on a typical day when you were drinking in the past year? 0  3) How often did you have 6 or more drinks on one occasion in the past year? never    AUDIT-C Score: 0  Interpretation: Score 0-3 (male): Negative screen for alcohol misuse    Single Item Drug Screening:  How often have you used an illegal drug (including marijuana) or a prescription medication for non-medical reasons in the past year? never    Single Item Drug Screen Score: 0  Interpretation: Negative screen for possible drug use disorder    No exam data present     Physical Exam:     /80   Pulse 65   Temp 97 6 °F (36 4 °C) (Tympanic)   Resp 18   Ht 5' 10" (1 778 m)   Wt 98 2 kg (216 lb 6 4 oz)   SpO2 94%   BMI 31 05 kg/m²     Physical Exam     Ihab Abdelaal, DO

## 2022-07-26 ENCOUNTER — APPOINTMENT (OUTPATIENT)
Dept: LAB | Facility: CLINIC | Age: 87
End: 2022-07-26
Payer: COMMERCIAL

## 2022-07-26 DIAGNOSIS — E78.2 MIXED HYPERLIPIDEMIA: ICD-10-CM

## 2022-07-26 DIAGNOSIS — Z12.5 SCREENING FOR PROSTATE CANCER: ICD-10-CM

## 2022-07-26 DIAGNOSIS — E04.1 THYROID NODULE: ICD-10-CM

## 2022-07-26 DIAGNOSIS — I10 BENIGN ESSENTIAL HYPERTENSION: ICD-10-CM

## 2022-07-26 DIAGNOSIS — R73.01 ELEVATED FASTING GLUCOSE: ICD-10-CM

## 2022-07-26 LAB
ALBUMIN SERPL BCP-MCNC: 3.5 G/DL (ref 3.5–5)
ALP SERPL-CCNC: 45 U/L (ref 46–116)
ALT SERPL W P-5'-P-CCNC: 19 U/L (ref 12–78)
ANION GAP SERPL CALCULATED.3IONS-SCNC: 6 MMOL/L (ref 4–13)
AST SERPL W P-5'-P-CCNC: 18 U/L (ref 5–45)
BILIRUB SERPL-MCNC: 0.52 MG/DL (ref 0.2–1)
BUN SERPL-MCNC: 15 MG/DL (ref 5–25)
CALCIUM SERPL-MCNC: 9.2 MG/DL (ref 8.3–10.1)
CHLORIDE SERPL-SCNC: 105 MMOL/L (ref 96–108)
CHOLEST SERPL-MCNC: 141 MG/DL
CO2 SERPL-SCNC: 27 MMOL/L (ref 21–32)
CREAT SERPL-MCNC: 0.89 MG/DL (ref 0.6–1.3)
GFR SERPL CREATININE-BSD FRML MDRD: 77 ML/MIN/1.73SQ M
GLUCOSE P FAST SERPL-MCNC: 101 MG/DL (ref 65–99)
HDLC SERPL-MCNC: 43 MG/DL
LDLC SERPL CALC-MCNC: 60 MG/DL (ref 0–100)
POTASSIUM SERPL-SCNC: 3.8 MMOL/L (ref 3.5–5.3)
PROT SERPL-MCNC: 7.2 G/DL (ref 6.4–8.4)
PSA SERPL-MCNC: 2 NG/ML (ref 0–4)
SODIUM SERPL-SCNC: 138 MMOL/L (ref 135–147)
TRIGL SERPL-MCNC: 192 MG/DL
TSH SERPL DL<=0.05 MIU/L-ACNC: 2.38 UIU/ML (ref 0.45–4.5)

## 2022-07-26 PROCEDURE — G0103 PSA SCREENING: HCPCS

## 2022-07-26 PROCEDURE — 83036 HEMOGLOBIN GLYCOSYLATED A1C: CPT

## 2022-07-26 PROCEDURE — 84443 ASSAY THYROID STIM HORMONE: CPT

## 2022-07-26 PROCEDURE — 80053 COMPREHEN METABOLIC PANEL: CPT

## 2022-07-26 PROCEDURE — 80061 LIPID PANEL: CPT

## 2022-07-26 PROCEDURE — 36415 COLL VENOUS BLD VENIPUNCTURE: CPT

## 2022-07-27 LAB
EST. AVERAGE GLUCOSE BLD GHB EST-MCNC: 126 MG/DL
HBA1C MFR BLD: 6 %

## 2022-08-02 ENCOUNTER — TELEPHONE (OUTPATIENT)
Dept: FAMILY MEDICINE CLINIC | Facility: CLINIC | Age: 87
End: 2022-08-02

## 2022-08-02 NOTE — TELEPHONE ENCOUNTER
Outgoing fax sent from this nurse to Dr Naeem Christine (Urology) office with PSA results from 7/26/2022, per pts request      Confirmation of fax received @ 9:13am

## 2022-10-03 DIAGNOSIS — L98.9 FACIAL SKIN LESION: Primary | ICD-10-CM

## 2022-10-31 DIAGNOSIS — I10 BENIGN ESSENTIAL HYPERTENSION: ICD-10-CM

## 2022-11-01 RX ORDER — AMLODIPINE BESYLATE 5 MG/1
TABLET ORAL
Qty: 90 TABLET | Refills: 1 | Status: SHIPPED | OUTPATIENT
Start: 2022-11-01

## 2022-12-14 ENCOUNTER — IMMUNIZATIONS (OUTPATIENT)
Dept: FAMILY MEDICINE CLINIC | Facility: CLINIC | Age: 87
End: 2022-12-14

## 2022-12-14 DIAGNOSIS — Z23 NEED FOR INFLUENZA VACCINATION: Primary | ICD-10-CM

## 2022-12-28 ENCOUNTER — OFFICE VISIT (OUTPATIENT)
Dept: PODIATRY | Facility: CLINIC | Age: 87
End: 2022-12-28

## 2022-12-28 ENCOUNTER — TELEPHONE (OUTPATIENT)
Dept: FAMILY MEDICINE CLINIC | Facility: CLINIC | Age: 87
End: 2022-12-28

## 2022-12-28 VITALS
WEIGHT: 216 LBS | HEIGHT: 70 IN | BODY MASS INDEX: 30.92 KG/M2 | DIASTOLIC BLOOD PRESSURE: 82 MMHG | SYSTOLIC BLOOD PRESSURE: 127 MMHG | HEART RATE: 69 BPM

## 2022-12-28 DIAGNOSIS — M10.9 ACUTE GOUT OF RIGHT FOOT, UNSPECIFIED CAUSE: Primary | ICD-10-CM

## 2022-12-28 RX ORDER — METHYLPREDNISOLONE 4 MG/1
TABLET ORAL
Qty: 1 EACH | Refills: 0 | Status: SHIPPED | OUTPATIENT
Start: 2022-12-28

## 2022-12-28 RX ORDER — METHYLPREDNISOLONE 4 MG/1
TABLET ORAL
Qty: 1 EACH | Refills: 0 | Status: SHIPPED | OUTPATIENT
Start: 2022-12-28 | End: 2022-12-28 | Stop reason: CLARIF

## 2022-12-28 NOTE — TELEPHONE ENCOUNTER
Pt's wife called made an appt for Friday but in the meantime is wondering if he can take ibuprofen for pain      Please advise [phone]

## 2022-12-28 NOTE — PROGRESS NOTES
Assessment/Plan:       Diagnoses and all orders for this visit:    Acute gout of right foot, unspecified cause  -     methylPREDNISolone 4 MG tablet therapy pack; Use as directed on package      Diagnosis and options discussed with patient  Patient agreeable to the plan as stated below    Acute gout, offered injection vs or prednisone taper, he opted for oral  Dosing discussed  Side effects discussed    Speak to PCP about trending uric acid, consider allopurinol  Defer to PCP    Provided information on low purine diet  PRN    Subjective:      Patient ID: Rolando Juarez is a 80 y o  male  Patient presents with acute right foot pain and swelling  HE has had gout in the past  The foot is red and swollen  She dneeis trauma      The following portions of the patient's history were reviewed and updated as appropriate: allergies, current medications, past family history, past medical history, past social history, past surgical history and problem list     Review of Systems    Constitutional: Negative  HENT: Negative for sinus pressure and sinus pain  Respiratory: Negative for cough and shortness of breath  Cardiovascular: Negative for chest pain and leg swelling  Gastrointestinal: Negative for diarrhea, nausea and vomiting  Musculoskeletal:right foot pain  Skin: Negative for rash or wound  Neurological: Negative for weakness, numbness and headaches  Psychiatric/Behavioral: The patient is not nervous/anxious  Objective:      /82   Pulse 69   Ht 5' 10" (1 778 m)   Wt 98 kg (216 lb)   BMI 30 99 kg/m²          Physical Exam  Vitals reviewed  Constitutional:       General: He is not in acute distress  Appearance: He is not toxic-appearing  Cardiovascular:      Rate and Rhythm: Normal rate  Pulses: Normal pulses  Pulmonary:      Effort: Pulmonary effort is normal  No respiratory distress     Musculoskeletal:         General: Tenderness (4/5 TMTJ pain, edema, erythema) and deformity present  Skin:     Capillary Refill: Capillary refill takes less than 2 seconds  Findings: Erythema present  No rash  Neurological:      Mental Status: He is alert and oriented to person, place, and time

## 2022-12-28 NOTE — PATIENT INSTRUCTIONS
Low Purine Diet   WHAT YOU NEED TO KNOW:   What is a low-purine diet? A low-purine diet is a meal plan based on foods that are low in purine content  Purine is a substance that is found in foods and is produced naturally by the body  Purines are broken down by the body and changed to uric acid  The kidneys normally filter the uric acid, and it leaves the body through the urine  However, people with gout sometimes have a buildup of uric acid in the blood  This buildup of uric acid can cause swelling and pain (a gout attack)  A low-purine diet may help to treat and prevent gout attacks  What foods can I include? The following foods are low in purine  Eggs, nuts, and peanut butter    Low-fat and fat free cheese and ice cream    Skim or 1% milk    Soup made without meat extract or broth    Vegetables that are not on the medium-purine list below    All fruit and fruit juices    Bread, pasta, rice, cake, cornbread, and popcorn    Water, soda, tea, coffee, and cocoa    Sugar, sweets, and gelatin    Fat and oil    What foods should I limit? Medium-purine foods:      Meats:  Limit the following to 4 to 6 ounces each day  Meat and poultry     Crab, lobster, oysters, and shrimp    Vegetables:  Limit the following vegetables to ½ cup each day  Asparagus    Cauliflower    Spinach    Mushrooms    Green peas    Beans, peas, and lentils (limit to 1 cup each day)    Oats and oatmeal (limit to ? cup uncooked each day)    Wheat germ and bran (limit to ¼ cup each day)    High-purine foods:  Limit or avoid foods high in purine  Anchovies, sardines, scallops, and mussels    [place], codfish, herring, and [employer], like goose and duck    Organ meats, such as brains, heart, kidney, liver, and sweetbreads    Gravies and sauces made with meat    Yeast extracts taken in the form of a supplement    What other guidelines should I follow? Increase liquid intake    Drink 8 to 16 (eight-ounce) cups of liquid each day  At least half of the liquid you drink should be water  Liquid can help your body get rid of extra uric acid  Limit or avoid alcohol  Alcohol (especially beer) increases your risk of a gout attack  Beer contains a high amount of purine  Maintain a healthy weight  If you are overweight, you should lose weight slowly  Weight loss can help decrease the amount of stress on your joints  Regular exercise can help you lose weight if you are overweight, or maintain your weight if you are at a normal weight  Talk to your healthcare provider before you begin an exercise program     CARE AGREEMENT:   You have the right to help plan your care  Discuss treatment options with your healthcare provider to decide what care you want to receive  You always have the right to refuse treatment  The above information is an  only  It is not intended as medical advice for individual conditions or treatments  Talk to your doctor, nurse or pharmacist before following any medical regimen to see if it is safe and effective for you  © Copyright [employer] 2022 Information is for End User's use only and may not be sold, redistributed or otherwise used for commercial purposes   All illustrations and images included in CareNotes® are the copyrighted property of A D A M , Inc  or [address] [place] [address]

## 2023-01-04 ENCOUNTER — TELEPHONE (OUTPATIENT)
Dept: PODIATRY | Facility: CLINIC | Age: 88
End: 2023-01-04

## 2023-01-04 NOTE — TELEPHONE ENCOUNTER
I called back and LM as to what you said and to call back and schedule an appointment if still having issues in a few weeks   Thanks

## 2023-01-04 NOTE — TELEPHONE ENCOUNTER
Caller: Pedro Newberry    Doctor/Office: Dr Suly Finley    #: [phone]    Escalation: Care/finished RX for gout/was better but now came back/another RX? Also never got the diet for gout when checked out of office/not on AVS/please call back/advise   Thanks

## 2023-01-08 ENCOUNTER — TELEPHONE (OUTPATIENT)
Dept: OTHER | Facility: OTHER | Age: 88
End: 2023-01-08

## 2023-01-08 ENCOUNTER — TELEPHONE (OUTPATIENT)
Dept: FAMILY MEDICINE CLINIC | Facility: CLINIC | Age: 88
End: 2023-01-08

## 2023-01-08 ENCOUNTER — OFFICE VISIT (OUTPATIENT)
Dept: URGENT CARE | Facility: MEDICAL CENTER | Age: 88
End: 2023-01-08

## 2023-01-08 ENCOUNTER — NURSE TRIAGE (OUTPATIENT)
Dept: OTHER | Facility: OTHER | Age: 88
End: 2023-01-08

## 2023-01-08 VITALS
BODY MASS INDEX: 29.4 KG/M2 | SYSTOLIC BLOOD PRESSURE: 100 MMHG | DIASTOLIC BLOOD PRESSURE: 60 MMHG | HEART RATE: 68 BPM | OXYGEN SATURATION: 95 % | TEMPERATURE: 98.4 F | WEIGHT: 210 LBS | HEIGHT: 71 IN

## 2023-01-08 DIAGNOSIS — U07.1 COVID: Primary | ICD-10-CM

## 2023-01-08 RX ORDER — BENZONATATE 200 MG/1
200 CAPSULE ORAL 3 TIMES DAILY PRN
Qty: 20 CAPSULE | Refills: 0 | Status: SHIPPED | OUTPATIENT
Start: 2023-01-08

## 2023-01-08 NOTE — TELEPHONE ENCOUNTER
Reason for Disposition  • HIGH RISK for severe COVID complications (e g , weak immune system, age > 59 years, obesity with BMI > 22, pregnant, chronic lung disease or other chronic medical condition)  (Exception: Already seen by PCP and no new or worsening symptoms )    Protocols used: CORONAVIRUS (COVID-19) DIAGNOSED OR SUSPECTED-ADULT-

## 2023-01-08 NOTE — TELEPHONE ENCOUNTER
Were you within 6 feet or less, for up to 15 minutes or more with a person that has a confirmed COVID-19 test? Unsure    2  What was the date of your exposure? Unsure     3  Are you experiencing any symptoms attributed to the virus?  (Assess for SOB, cough, fever, difficulty breathing)     Cough and congestion only  But getting worse  Pt stated he tested positive for COVID today and would like a Rx for Paxlovid  However Jimenez Snyder stated his sx's started a week ago and was tested in the ED on 1 3 23 with a Rapid Negative COVID test but is now testing positive with a at home test  His wife is requesting Paxlovid  Wife made aware Jimenez Snyder is too late to be Rx'ed Paxlovid but wife is insisting I get a hold of the on call provider  TT out to On call to make aware  Per on call provider Jimenez Snyder is out of the window for medications  Pt made aware

## 2023-01-08 NOTE — PROGRESS NOTES
[facility]        NAME: Everardo Islas is a 80 y o  male  : 1935    MRN: [id number]  DATE: 2023  TIME: 3:57 PM    Assessment and Plan   COVID [U07 1]  1  COVID  benzonatate (TESSALON) 200 MG capsule            Patient Instructions       Follow up with PCP as needed  We discussed CDC guidelines and need to remain masked  Wife is also positive  Proceed to  ER if symptoms worsen  We discussed Paxlovid but he is not interested in taking this  Chief Complaint     Chief Complaint   Patient presents with   • Cough     Patient tested + for covid today  He was taken to the ED on 1/3 for SOB and had a negative test then  He states he has been coughing so hard it feels like he tore something in his abd  History of Present Illness       Patient was seen earlier in the week with URI symptoms in the emergency room  He was negative at that time for COVID  He has not had the most recent booster  Today he continued with cough but no shortness of breath  He tested at home and was positive for COVID-19  He wanted to be checked and to have something for his cough  Cough  This is a new problem  The current episode started in the past 7 days  The problem has been gradually worsening  The problem occurs every few minutes  The cough is non-productive  Associated symptoms include nasal congestion, postnasal drip and rhinorrhea  Pertinent negatives include no chest pain, chills, ear congestion, ear pain, fever, headaches, heartburn, hemoptysis, myalgias, rash, sore throat, shortness of breath, sweats, weight loss or wheezing  Nothing aggravates the symptoms  Review of Systems   Review of Systems   Constitutional: Negative for chills, fever and weight loss  HENT: Positive for postnasal drip and rhinorrhea  Negative for ear pain and sore throat  Respiratory: Positive for cough  Negative for hemoptysis, shortness of breath and wheezing      Cardiovascular: Negative for chest pain    Gastrointestinal: Negative for heartburn  Musculoskeletal: Negative for myalgias  Skin: Negative for rash  Neurological: Negative for headaches  All other systems reviewed and are negative  Current Medications       Current Outpatient Medications:   •  benzonatate (TESSALON) 200 MG capsule, Take 1 capsule (200 mg total) by mouth 3 (three) times a day as needed for cough, Disp: 20 capsule, Rfl: 0  •  amLODIPine (NORVASC) 5 mg tablet, TAKE 1 TABLET BY MOUTH  DAILY, Disp: 90 tablet, Rfl: 1  •  bimatoprost (LUMIGAN) 0 01 % ophthalmic drops, Administer 1 drop to both eyes daily at bedtime (Patient not taking: Reported on 11/15/2021), Disp: , Rfl:   •  Cholecalciferol (Vitamin D3) 10 MCG (400 UNIT) CAPS, Take by mouth, Disp: , Rfl:   •  clotrimazole (LOTRIMIN) 1 % cream, Apply topically 2 (two) times a day, Disp: 30 g, Rfl: 2  •  finasteride (PROSCAR) 5 mg tablet, Take 5 mg by mouth daily, Disp: , Rfl:   •  hydrochlorothiazide (MICROZIDE) 12 5 mg capsule, TAKE 1 CAPSULE BY MOUTH  EVERY OTHER DAY, Disp: 45 capsule, Rfl: 3  •  ILEVRO 0 3 % SUSP, , Disp: , Rfl:   •  losartan (COZAAR) 100 MG tablet, TAKE 1 TABLET BY MOUTH  DAILY, Disp: 90 tablet, Rfl: 3  •  losartan (COZAAR) 50 mg tablet, Take 50 mg by mouth daily, Disp: , Rfl:   •  methylPREDNISolone 4 MG tablet therapy pack, Use as directed on package, Disp: 1 each, Rfl: 0  •  predniSONE 10 mg tablet, Take 6 tablets By mouth  In the morning Qd  Decrease by  By 1 tablet daily until completed  , Disp: 21 tablet, Rfl: 0  •  sildenafil (VIAGRA) 100 mg tablet, TAKE ONE TABLET BY MOUTH AS NEEDED 1 HOUR PRIOR TO SEXUAL ACTIVITY, Disp: , Rfl:   •  tamsulosin (FLOMAX) 0 4 mg, , Disp: , Rfl:   •  vitamin B-12 (CYANOCOBALAMIN) 100 MCG tablet, Take 100 mcg by mouth daily, Disp: , Rfl:     Current Allergies     Allergies as of 01/08/2023 - Reviewed 12/28/2022   Allergen Reaction Noted   • Levetiracetam Confusion and Anxiety 12/23/2016            The following portions of the patient's history were reviewed and updated as appropriate: allergies, current medications, past family history, past medical history, past social history, past surgical history and problem list      Past Medical History:   Diagnosis Date   • Abnormal EKG     last assessed: 11/25/2014   • 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   • COPD with acute exacerbation ([address] )     last assessed: 2/7/2017   • Diverticulosis    • Eczema     last assessed: 4/30/2013   • Esophagitis    • Gout     last assessed: 5/19/2014   • Nail avulsion of toe    • Neoplasm of bladder     last assessed: 4/30/2013       Past Surgical History:   Procedure Laterality Date   • HERNIA REPAIR      last assessed: 11/25/2014   • KIDNEY SURGERY      description: removal of kidney stone last assessed: 11/25/2014   • US GUIDED THYROID BIOPSY  5/11/2021       Family History   Problem Relation Age of Onset   • Stroke Mother    • Alzheimer's disease Father    • Heart attack Sister    • Cancer Family    • Lung cancer Brother          Medications have been verified  Objective   /60   Pulse 68   Temp 98 4 °F (36 9 °C)   Ht 5' 11" (1 803 m)   Wt 95 3 kg (210 lb)   SpO2 95%   BMI 29 29 kg/m²   No LMP for male patient  Physical Exam     Physical Exam  Vitals and nursing note reviewed  Constitutional:       Appearance: Normal appearance  He is normal weight  HENT:      Nose: Congestion and rhinorrhea present  Mouth/Throat:      Mouth: Mucous membranes are moist    Eyes:      Conjunctiva/sclera: Conjunctivae normal    Cardiovascular:      Rate and Rhythm: Normal rate and regular rhythm  Pulses: Normal pulses  Heart sounds: Normal heart sounds  Pulmonary:      Effort: Pulmonary effort is normal       Breath sounds: Normal breath sounds  Lymphadenopathy:      Cervical: No cervical adenopathy  Neurological:      Mental Status: He is alert     Psychiatric:         Mood and Affect: Mood normal          Behavior: Behavior normal

## 2023-01-09 NOTE — TELEPHONE ENCOUNTER
Spoke with wife earlier today as on call provider  Pt tested positive for COVID19  Sx started about 1 week ago  Interested in [facility] but advised outside the window  Pt's wife concerned his symptoms were worsening and moving to his chest  Advised significant symptoms such as SOB, weakness, or chest tightness to go to ER  Pt's wife states they will not return to ER at this time  Advised he go to urgent care to be assessed if symptoms are worsening as he may need other treatments pending on his exam  Wife agreed to plan  Phone number for [employer] given to wife  Pt's wife requested our office call to set up follow up appointment for patient  Please schedule  Pt's wife upset as she feels she has a hard time getting an appointment when they are sick

## 2023-01-17 ENCOUNTER — OFFICE VISIT (OUTPATIENT)
Dept: FAMILY MEDICINE CLINIC | Facility: CLINIC | Age: 88
End: 2023-01-17

## 2023-01-17 VITALS
OXYGEN SATURATION: 98 % | BODY MASS INDEX: 30.72 KG/M2 | SYSTOLIC BLOOD PRESSURE: 132 MMHG | WEIGHT: 219.4 LBS | HEIGHT: 71 IN | DIASTOLIC BLOOD PRESSURE: 74 MMHG | HEART RATE: 60 BPM | TEMPERATURE: 96.5 F

## 2023-01-17 DIAGNOSIS — U07.1 COVID-19: Primary | ICD-10-CM

## 2023-01-17 RX ORDER — ALBUTEROL SULFATE 90 UG/1
2 AEROSOL, METERED RESPIRATORY (INHALATION) EVERY 6 HOURS PRN
Qty: 18 G | Refills: 0 | Status: SHIPPED | OUTPATIENT
Start: 2023-01-17

## 2023-01-17 RX ORDER — PREDNISONE 10 MG/1
TABLET ORAL
Qty: 21 TABLET | Refills: 0 | Status: SHIPPED | OUTPATIENT
Start: 2023-01-17

## 2023-01-17 NOTE — PROGRESS NOTES
Assessment/Plan:   1  COVID-19  Patient symptoms today  This time, symptoms appear persisting  At this time, we will prescribe him a Ventolin inhaler  He may take prednisone as this may be effective for him as well  If any of his symptoms should worsen, he is advised to call or follow-up  - predniSONE 10 mg tablet; Take 6 tablets By mouth  In the morning Qd  Decrease by  By 1 tablet daily until completed  Dispense: 21 tablet; Refill: 0  - albuterol (Ventolin HFA) 90 mcg/act inhaler; Inhale 2 puffs every 6 (six) hours as needed for wheezing  Dispense: 18 g; Refill: 0           There are no diagnoses linked to this encounter  Subjective:       Chief Complaint   Patient presents with   • Follow-up     Covid pos last week and has been having trouble breathing  Patient ID: Renee Darling is a 80 y o  male presents today for a follow-up on his COVID infection  He was diagnosed with COVID last week on Thursday  He states that he was seen by urgent care  This was on January 8  He still has been having persistent symptoms  He has not been taking anything at home that has been effective for him  HPI    Review of Systems   Constitutional: Negative for activity change, chills, fatigue and fever  HENT: Negative for congestion, ear pain, sinus pressure and sore throat  Eyes: Negative for redness, itching and visual disturbance  Respiratory: Negative for cough and shortness of breath  Cardiovascular: Negative for chest pain and palpitations  Gastrointestinal: Negative for abdominal pain, diarrhea and nausea  Endocrine: Negative for cold intolerance and heat intolerance  Genitourinary: Negative for dysuria, flank pain and frequency  Musculoskeletal: Negative for arthralgias, back pain, gait problem and myalgias  Skin: Negative for color change  Allergic/Immunologic: Negative for environmental allergies  Neurological: Negative for dizziness, numbness and headaches  Psychiatric/Behavioral: Negative for behavioral problems and sleep disturbance  The following portions of the patient's history were reviewed and updated as appropriate : past family history, past medical history, past social history and past surgical history  Current Outpatient Medications:   •  amLODIPine (NORVASC) 5 mg tablet, TAKE 1 TABLET BY MOUTH  DAILY, Disp: 90 tablet, Rfl: 1  •  benzonatate (TESSALON) 200 MG capsule, Take 1 capsule (200 mg total) by mouth 3 (three) times a day as needed for cough, Disp: 20 capsule, Rfl: 0  •  Cholecalciferol (Vitamin D3) 10 MCG (400 UNIT) CAPS, Take by mouth, Disp: , Rfl:   •  clotrimazole (LOTRIMIN) 1 % cream, Apply topically 2 (two) times a day, Disp: 30 g, Rfl: 2  •  finasteride (PROSCAR) 5 mg tablet, Take 5 mg by mouth daily, Disp: , Rfl:   •  hydrochlorothiazide (MICROZIDE) 12 5 mg capsule, TAKE 1 CAPSULE BY MOUTH  EVERY OTHER DAY, Disp: 45 capsule, Rfl: 3  •  ILEVRO 0 3 % SUSP, , Disp: , Rfl:   •  losartan (COZAAR) 100 MG tablet, TAKE 1 TABLET BY MOUTH  DAILY, Disp: 90 tablet, Rfl: 3  •  losartan (COZAAR) 50 mg tablet, Take 50 mg by mouth daily, Disp: , Rfl:   •  methylPREDNISolone 4 MG tablet therapy pack, Use as directed on package, Disp: 1 each, Rfl: 0  •  predniSONE 10 mg tablet, Take 6 tablets By mouth  In the morning Qd  Decrease by  By 1 tablet daily until completed  , Disp: 21 tablet, Rfl: 0  •  sildenafil (VIAGRA) 100 mg tablet, TAKE ONE TABLET BY MOUTH AS NEEDED 1 HOUR PRIOR TO SEXUAL ACTIVITY, Disp: , Rfl:   •  tamsulosin (FLOMAX) 0 4 mg, , Disp: , Rfl:   •  vitamin B-12 (CYANOCOBALAMIN) 100 MCG tablet, Take 100 mcg by mouth daily, Disp: , Rfl:   •  bimatoprost (LUMIGAN) 0 01 % ophthalmic drops, Administer 1 drop to both eyes daily at bedtime (Patient not taking: Reported on 11/15/2021), Disp: , Rfl:          Objective:         Vitals:    01/17/23 1251   BP: 132/74   Pulse: 60   Temp: (!) 96 5 °F (35 8 °C)   TempSrc: Tympanic   SpO2: 98% Weight: 99 5 kg (219 lb 6 4 oz)   Height: 5' 11" (1 803 m)     Physical Exam  Vitals reviewed  Constitutional:       Appearance: He is well-developed  HENT:      Head: Normocephalic and atraumatic  Nose: Nose normal       Mouth/Throat:      Pharynx: No oropharyngeal exudate  Eyes:      General: No scleral icterus  Right eye: No discharge  Left eye: No discharge  Pupils: Pupils are equal, round, and reactive to light  Neck:      Trachea: No tracheal deviation  Cardiovascular:      Rate and Rhythm: Normal rate and regular rhythm  Pulses:           Dorsalis pedis pulses are 2+ on the right side and 2+ on the left side  Posterior tibial pulses are 2+ on the right side and 2+ on the left side  Heart sounds: Normal heart sounds  No murmur heard  No friction rub  No gallop  Pulmonary:      Effort: Pulmonary effort is normal  No respiratory distress  Breath sounds: Normal breath sounds  No wheezing or rales  Abdominal:      General: Bowel sounds are normal  There is no distension  Palpations: Abdomen is soft  Tenderness: There is no abdominal tenderness  There is no guarding or rebound  Musculoskeletal:         General: Normal range of motion  Cervical back: Normal range of motion and neck supple  Lymphadenopathy:      Head:      Right side of head: No submental or submandibular adenopathy  Left side of head: No submental or submandibular adenopathy  Cervical: No cervical adenopathy  Right cervical: No superficial, deep or posterior cervical adenopathy  Left cervical: No superficial, deep or posterior cervical adenopathy  Skin:     General: Skin is warm and dry  Findings: No erythema  Neurological:      Mental Status: He is alert and oriented to person, place, and time  Cranial Nerves: No cranial nerve deficit  Sensory: No sensory deficit     Psychiatric:         Mood and Affect: Mood is not anxious or depressed  Speech: Speech normal          Behavior: Behavior normal          Thought Content:  Thought content normal          Judgment: Judgment normal

## 2023-01-25 DIAGNOSIS — I10 BENIGN ESSENTIAL HYPERTENSION: ICD-10-CM

## 2023-01-25 RX ORDER — LOSARTAN POTASSIUM 100 MG/1
TABLET ORAL
Qty: 90 TABLET | Refills: 3 | Status: SHIPPED | OUTPATIENT
Start: 2023-01-25

## 2023-03-09 ENCOUNTER — OFFICE VISIT (OUTPATIENT)
Dept: FAMILY MEDICINE CLINIC | Facility: CLINIC | Age: 88
End: 2023-03-09

## 2023-03-09 VITALS
DIASTOLIC BLOOD PRESSURE: 80 MMHG | BODY MASS INDEX: 30.8 KG/M2 | OXYGEN SATURATION: 96 % | WEIGHT: 220 LBS | SYSTOLIC BLOOD PRESSURE: 128 MMHG | RESPIRATION RATE: 16 BRPM | HEIGHT: 71 IN | HEART RATE: 76 BPM | TEMPERATURE: 97.7 F

## 2023-03-09 DIAGNOSIS — M46.1 SACROILIITIS (HCC): ICD-10-CM

## 2023-03-09 DIAGNOSIS — U07.1 COVID-19: ICD-10-CM

## 2023-03-09 DIAGNOSIS — I77.4 CELIAC ARTERY COMPRESSION SYNDROME (HCC): ICD-10-CM

## 2023-03-09 DIAGNOSIS — J44.1 COPD EXACERBATION (HCC): Primary | ICD-10-CM

## 2023-03-09 RX ORDER — ALBUTEROL SULFATE 90 UG/1
2 AEROSOL, METERED RESPIRATORY (INHALATION) EVERY 6 HOURS PRN
Qty: 18 G | Refills: 0 | Status: SHIPPED | OUTPATIENT
Start: 2023-03-09

## 2023-03-09 RX ORDER — IPRATROPIUM BROMIDE AND ALBUTEROL SULFATE 2.5; .5 MG/3ML; MG/3ML
3 SOLUTION RESPIRATORY (INHALATION) ONCE
Status: COMPLETED | OUTPATIENT
Start: 2023-03-09 | End: 2023-03-09

## 2023-03-09 RX ORDER — DOXYCYCLINE 100 MG/1
100 TABLET ORAL 2 TIMES DAILY
Qty: 14 TABLET | Refills: 0 | Status: SHIPPED | OUTPATIENT
Start: 2023-03-09 | End: 2023-03-16

## 2023-03-09 RX ORDER — PREDNISONE 10 MG/1
TABLET ORAL
Qty: 21 TABLET | Refills: 0 | Status: SHIPPED | OUTPATIENT
Start: 2023-03-09

## 2023-03-09 RX ADMIN — IPRATROPIUM BROMIDE AND ALBUTEROL SULFATE 3 ML: 2.5; .5 SOLUTION RESPIRATORY (INHALATION) at 15:26

## 2023-03-09 NOTE — PROGRESS NOTES
Assessment/Plan:   1  COPD exacerbation ([facility] [address] )  Reviewed patient's symptoms today  At this time, symptoms appear likely secondary to a COPD exacerbation  He tolerated nebulizer treatment very well today  There was improvement in his lung function  At this time, we will start treatment with a prednisone taper as well as doxycycline  He was given a refill on his albuterol  We will consider steroid inhaler in the near future if needed  - doxycycline (ADOXA) 100 MG tablet; Take 1 tablet (100 mg total) by mouth 2 (two) times a day for 7 days  Dispense: 14 tablet; Refill: 0  - predniSONE 10 mg tablet; Take 6 tablets By mouth  In the morning Qd  Decrease by  By 1 tablet daily until completed  Dispense: 21 tablet; Refill: 0  - ipratropium-albuterol (DUO-NEB) 0 5-2 5 mg/3 mL inhalation solution 3 mL  - Mini neb  - albuterol (Ventolin HFA) 90 mcg/act inhaler; Inhale 2 puffs every 6 (six) hours as needed for wheezing  Dispense: 18 g; Refill: 0           There are no diagnoses linked to this encounter  Subjective:       Chief Complaint   Patient presents with   • Wheezing      Patient ID: Jordin Franklin is a 80 y o  male  Wheezing  The current episode started in the past 7 days  The problem occurs constantly  The problem is unchanged  Associated symptoms include wheezing  Pertinent negatives include no chest pain, coughing, dizziness, fatigue, palpitations, rhinorrhea or sore throat  Nothing aggravates the symptoms  There was no intake of a foreign body  Past treatments include nothing  Review of Systems   Constitutional: Negative for activity change, chills, fatigue and fever  HENT: Negative for congestion, ear pain, rhinorrhea, sinus pressure and sore throat  Eyes: Negative for redness, itching and visual disturbance  Respiratory: Positive for wheezing  Negative for cough and shortness of breath  Cardiovascular: Negative for chest pain and palpitations     Gastrointestinal: Negative for abdominal pain, diarrhea and nausea  Endocrine: Negative for cold intolerance and heat intolerance  Genitourinary: Negative for dysuria, flank pain and frequency  Musculoskeletal: Negative for arthralgias, back pain, gait problem and myalgias  Skin: Negative for color change  Allergic/Immunologic: Negative for environmental allergies  Neurological: Negative for dizziness, numbness and headaches  Psychiatric/Behavioral: Negative for behavioral problems and sleep disturbance  The following portions of the patient's history were reviewed and updated as appropriate : past family history, past medical history, past social history and past surgical history  Current Outpatient Medications:   •  albuterol (Ventolin HFA) 90 mcg/act inhaler, Inhale 2 puffs every 6 (six) hours as needed for wheezing, Disp: 18 g, Rfl: 0  •  amLODIPine (NORVASC) 5 mg tablet, TAKE 1 TABLET BY MOUTH  DAILY, Disp: 90 tablet, Rfl: 1  •  Cholecalciferol (Vitamin D3) 10 MCG (400 UNIT) CAPS, Take by mouth, Disp: , Rfl:   •  clotrimazole (LOTRIMIN) 1 % cream, Apply topically 2 (two) times a day, Disp: 30 g, Rfl: 2  •  finasteride (PROSCAR) 5 mg tablet, Take 5 mg by mouth daily, Disp: , Rfl:   •  hydrochlorothiazide (MICROZIDE) 12 5 mg capsule, TAKE 1 CAPSULE BY MOUTH  EVERY OTHER DAY, Disp: 45 capsule, Rfl: 3  •  ILEVRO 0 3 % SUSP, , Disp: , Rfl:   •  losartan (COZAAR) 100 MG tablet, TAKE 1 TABLET BY MOUTH  DAILY, Disp: 90 tablet, Rfl: 3  •  losartan (COZAAR) 50 mg tablet, Take 50 mg by mouth daily, Disp: , Rfl:   •  methylPREDNISolone 4 MG tablet therapy pack, Use as directed on package, Disp: 1 each, Rfl: 0  •  predniSONE 10 mg tablet, Take 6 tablets By mouth  In the morning Qd  Decrease by  By 1 tablet daily until completed  , Disp: 21 tablet, Rfl: 0  •  sildenafil (VIAGRA) 100 mg tablet, TAKE ONE TABLET BY MOUTH AS NEEDED 1 HOUR PRIOR TO SEXUAL ACTIVITY, Disp: , Rfl:   •  tamsulosin (FLOMAX) 0 4 mg, , Disp: , Rfl:   • vitamin B-12 (CYANOCOBALAMIN) 100 MCG tablet, Take 100 mcg by mouth daily, Disp: , Rfl:   •  benzonatate (TESSALON) 200 MG capsule, Take 1 capsule (200 mg total) by mouth 3 (three) times a day as needed for cough (Patient not taking: Reported on 3/9/2023), Disp: 20 capsule, Rfl: 0  •  bimatoprost (LUMIGAN) 0 01 % ophthalmic drops, Administer 1 drop to both eyes daily at bedtime (Patient not taking: Reported on 11/15/2021), Disp: , Rfl:          Objective:         Vitals:    03/09/23 1434   BP: 128/80   BP Location: Left arm   Patient Position: Sitting   Cuff Size: Adult   Pulse: 76   Resp: 16   Temp: 97 7 °F (36 5 °C)   TempSrc: Temporal   SpO2: 96%   Weight: 99 8 kg (220 lb)   Height: 5' 10 87" (1 8 m)     Physical Exam  Vitals reviewed  Constitutional:       Appearance: He is well-developed  HENT:      Head: Normocephalic and atraumatic  Nose: Nose normal       Mouth/Throat:      Pharynx: No oropharyngeal exudate  Eyes:      General: No scleral icterus  Right eye: No discharge  Left eye: No discharge  Pupils: Pupils are equal, round, and reactive to light  Neck:      Trachea: No tracheal deviation  Cardiovascular:      Rate and Rhythm: Normal rate and regular rhythm  Pulses:           Dorsalis pedis pulses are 2+ on the right side and 2+ on the left side  Posterior tibial pulses are 2+ on the right side and 2+ on the left side  Heart sounds: Normal heart sounds  No murmur heard  No friction rub  No gallop  Pulmonary:      Effort: Pulmonary effort is normal  No respiratory distress  Breath sounds: Normal breath sounds  No wheezing or rales  Abdominal:      General: Bowel sounds are normal  There is no distension  Palpations: Abdomen is soft  Tenderness: There is no abdominal tenderness  There is no guarding or rebound  Musculoskeletal:         General: Normal range of motion  Cervical back: Normal range of motion and neck supple  Lymphadenopathy:      Head:      Right side of head: No submental or submandibular adenopathy  Left side of head: No submental or submandibular adenopathy  Cervical: No cervical adenopathy  Right cervical: No superficial, deep or posterior cervical adenopathy  Left cervical: No superficial, deep or posterior cervical adenopathy  Skin:     General: Skin is warm and dry  Findings: No erythema  Neurological:      Mental Status: He is alert and oriented to person, place, and time  Cranial Nerves: No cranial nerve deficit  Sensory: No sensory deficit  Psychiatric:         Mood and Affect: Mood is not anxious or depressed  Speech: Speech normal          Behavior: Behavior normal          Thought Content: Thought content normal          Judgment: Judgment normal      Mini neb  Performed by: Lew Morales DO  Authorized by: Lew Morales DO   Universal Protocol:  Consent: Verbal consent obtained  Written consent not obtained    Risks and benefits: risks, benefits and alternatives were discussed  Consent given by: patient  Patient identity confirmed: verbally with patient      Number of treatments:  1  Treatment 1:   Pre-Procedure     Symptoms:  Wheezing    Lung Sounds:  Diffuse expiratory wheezing    Medication Administered:  Duoneb - Albuterol 2 5 mg/Atrovent 0 5 mg  Post-Procedure     Symptoms:  Wheezing    Lung sounds:  Diffuse expiratory wheezing decreased

## 2023-03-30 ENCOUNTER — OFFICE VISIT (OUTPATIENT)
Dept: FAMILY MEDICINE CLINIC | Facility: CLINIC | Age: 88
End: 2023-03-30

## 2023-03-30 VITALS
DIASTOLIC BLOOD PRESSURE: 68 MMHG | TEMPERATURE: 98.2 F | BODY MASS INDEX: 30.91 KG/M2 | HEART RATE: 79 BPM | WEIGHT: 220.8 LBS | SYSTOLIC BLOOD PRESSURE: 120 MMHG | OXYGEN SATURATION: 96 %

## 2023-03-30 DIAGNOSIS — J44.1 COPD EXACERBATION (HCC): Primary | ICD-10-CM

## 2023-03-30 RX ORDER — FLUTICASONE FUROATE, UMECLIDINIUM BROMIDE AND VILANTEROL TRIFENATATE 100; 62.5; 25 UG/1; UG/1; UG/1
1 POWDER RESPIRATORY (INHALATION) DAILY
Qty: 60 BLISTER | Refills: 5 | Status: SHIPPED | OUTPATIENT
Start: 2023-03-30 | End: 2023-04-29

## 2023-03-30 NOTE — PROGRESS NOTES
Assessment/Plan:   1  COPD exacerbation ([email or web address] )  Reviewed patient's symptoms today  At this time, symptoms appear secondary to his COPD  We will hold off on checking chest x-ray as he had this completed in January  At this time, his oxygen saturations are stable  He clinically appears stable today  We will start treatment with Trelegy  He was advised to continue with use of his albuterol as needed for symptom relief  If any of his symptoms are persisting, consider further evaluation with pulmonology  - fluticasone-umeclidinium-vilanterol (Trelegy Ellipta) 100-62 5-25 mcg/actuation inhaler; Inhale 1 puff daily Rinse mouth after use  Dispense: 60 blister; Refill: 5           Diagnoses and all orders for this visit:    COPD exacerbation ([facility] [[email or web address] 75 )  -     fluticasone-umeclidinium-vilanterol (Trelegy Ellipta) 100-62 5-25 mcg/actuation inhaler; Inhale 1 puff daily Rinse mouth after use  Subjective:       Chief Complaint   Patient presents with   • Wheezing     Patient states he completed steroid and antibiotic but still wheezing      Patient ID: Carleen Hendricks is a 80 y o  male presents today for a follow-up  He states that he has been having persistent problems with wheezing  Since his last visit, he states that he does not believe that his antibiotics or prednisone was helping at all  He still has wheezing  He denies any fevers chills or coughing  He also denies any shortness of breath  HPI    Review of Systems   Constitutional: Negative for activity change, chills, fatigue and fever  HENT: Negative for congestion, ear pain, sinus pressure and sore throat  Eyes: Negative for redness, itching and visual disturbance  Respiratory: Negative for cough and shortness of breath  Cardiovascular: Negative for chest pain and palpitations  Gastrointestinal: Negative for abdominal pain, diarrhea and nausea  Endocrine: Negative for cold intolerance and heat intolerance     Genitourinary: Negative for dysuria, flank pain and frequency  Musculoskeletal: Negative for arthralgias, back pain, gait problem and myalgias  Skin: Negative for color change  Allergic/Immunologic: Negative for environmental allergies  Neurological: Negative for dizziness, numbness and headaches  Psychiatric/Behavioral: Negative for behavioral problems and sleep disturbance  The following portions of the patient's history were reviewed and updated as appropriate : past family history, past medical history, past social history and past surgical history      Current Outpatient Medications:   •  fluticasone-umeclidinium-vilanterol (Trelegy Ellipta) 100-62 5-25 mcg/actuation inhaler, Inhale 1 puff daily Rinse mouth after use , Disp: 60 blister, Rfl: 5  •  albuterol (Ventolin HFA) 90 mcg/act inhaler, Inhale 2 puffs every 6 (six) hours as needed for wheezing, Disp: 18 g, Rfl: 0  •  amLODIPine (NORVASC) 5 mg tablet, TAKE 1 TABLET BY MOUTH  DAILY, Disp: 90 tablet, Rfl: 1  •  benzonatate (TESSALON) 200 MG capsule, Take 1 capsule (200 mg total) by mouth 3 (three) times a day as needed for cough (Patient not taking: Reported on 3/9/2023), Disp: 20 capsule, Rfl: 0  •  bimatoprost (LUMIGAN) 0 01 % ophthalmic drops, Administer 1 drop to both eyes daily at bedtime (Patient not taking: Reported on 11/15/2021), Disp: , Rfl:   •  Cholecalciferol (Vitamin D3) 10 MCG (400 UNIT) CAPS, Take by mouth, Disp: , Rfl:   •  clotrimazole (LOTRIMIN) 1 % cream, Apply topically 2 (two) times a day, Disp: 30 g, Rfl: 2  •  finasteride (PROSCAR) 5 mg tablet, Take 5 mg by mouth daily, Disp: , Rfl:   •  hydrochlorothiazide (MICROZIDE) 12 5 mg capsule, TAKE 1 CAPSULE BY MOUTH  EVERY OTHER DAY, Disp: 45 capsule, Rfl: 3  •  ILEVRO 0 3 % SUSP, , Disp: , Rfl:   •  losartan (COZAAR) 100 MG tablet, TAKE 1 TABLET BY MOUTH  DAILY, Disp: 90 tablet, Rfl: 3  •  losartan (COZAAR) 50 mg tablet, Take 50 mg by mouth daily, Disp: , Rfl:   •  methylPREDNISolone 4 MG tablet therapy pack, Use as directed on package (Patient not taking: Reported on 3/30/2023), Disp: 1 each, Rfl: 0  •  predniSONE 10 mg tablet, Take 6 tablets By mouth  In the morning Qd  Decrease by  By 1 tablet daily until completed  (Patient not taking: Reported on 3/30/2023), Disp: 21 tablet, Rfl: 0  •  sildenafil (VIAGRA) 100 mg tablet, TAKE ONE TABLET BY MOUTH AS NEEDED 1 HOUR PRIOR TO SEXUAL ACTIVITY, Disp: , Rfl:   •  tamsulosin (FLOMAX) 0 4 mg, , Disp: , Rfl:   •  vitamin B-12 (CYANOCOBALAMIN) 100 MCG tablet, Take 100 mcg by mouth daily, Disp: , Rfl:          Objective:         Vitals:    03/30/23 1403   BP: 120/68   BP Location: Right arm   Patient Position: Sitting   Cuff Size: Large   Pulse: 79   Temp: 98 2 °F (36 8 °C)   SpO2: 96%   Weight: 100 kg (220 lb 12 8 oz)     Physical Exam  Vitals reviewed  Constitutional:       Appearance: He is well-developed  HENT:      Head: Normocephalic and atraumatic  Nose: Nose normal       Mouth/Throat:      Pharynx: No oropharyngeal exudate  Eyes:      General:         Right eye: No discharge  Left eye: No discharge  Pupils: Pupils are equal, round, and reactive to light  Neck:      Trachea: No tracheal deviation  Cardiovascular:      Rate and Rhythm: Normal rate and regular rhythm  Pulses:           Dorsalis pedis pulses are 2+ on the right side and 2+ on the left side  Posterior tibial pulses are 2+ on the right side and 2+ on the left side  Heart sounds: No murmur heard  No friction rub  No gallop  Pulmonary:      Effort: Pulmonary effort is normal  No respiratory distress  Breath sounds: Normal breath sounds  No wheezing or rales  Abdominal:      General: Bowel sounds are normal  There is no distension  Palpations: Abdomen is soft  Tenderness: There is no abdominal tenderness  There is no guarding or rebound  Musculoskeletal:         General: Normal range of motion        Cervical back: Normal range of motion and neck supple  Lymphadenopathy:      Head:      Right side of head: No submental or submandibular adenopathy  Left side of head: No submental or submandibular adenopathy  Cervical: No cervical adenopathy  Right cervical: No superficial, deep or posterior cervical adenopathy  Left cervical: No superficial, deep or posterior cervical adenopathy  Skin:     General: Skin is warm and dry  Neurological:      Mental Status: He is alert and oriented to person, place, and time  Cranial Nerves: No cranial nerve deficit  Sensory: No sensory deficit  Psychiatric:         Mood and Affect: Mood is not anxious or depressed           Speech: Speech normal          Behavior: Behavior normal          Judgment: Judgment normal

## 2023-06-12 ENCOUNTER — TELEPHONE (OUTPATIENT)
Dept: PODIATRY | Facility: CLINIC | Age: 88
End: 2023-06-12

## 2023-06-12 ENCOUNTER — TELEPHONE (OUTPATIENT)
Dept: FAMILY MEDICINE CLINIC | Facility: CLINIC | Age: 88
End: 2023-06-12

## 2023-06-12 NOTE — TELEPHONE ENCOUNTER
Caller: Felipe New    Doctor/Office: Dr Sayra Villanueva    CB#: [phone]    Escalation: Medication/asking what RX  gave at last appt for gout/will call pcp to get RX/if denied they will call back to ask  to prescribe

## 2023-06-13 ENCOUNTER — OFFICE VISIT (OUTPATIENT)
Dept: FAMILY MEDICINE CLINIC | Facility: CLINIC | Age: 88
End: 2023-06-13
Payer: COMMERCIAL

## 2023-06-13 VITALS
BODY MASS INDEX: 30.83 KG/M2 | SYSTOLIC BLOOD PRESSURE: 138 MMHG | DIASTOLIC BLOOD PRESSURE: 86 MMHG | OXYGEN SATURATION: 97 % | WEIGHT: 220.2 LBS | TEMPERATURE: 97.9 F | HEIGHT: 71 IN | HEART RATE: 73 BPM

## 2023-06-13 DIAGNOSIS — M79.671 RIGHT FOOT PAIN: ICD-10-CM

## 2023-06-13 PROCEDURE — 99214 OFFICE O/P EST MOD 30 MIN: CPT | Performed by: FAMILY MEDICINE

## 2023-06-13 RX ORDER — METHYLPREDNISOLONE 4 MG/1
TABLET ORAL
Qty: 21 EACH | Refills: 0 | Status: SHIPPED | OUTPATIENT
Start: 2023-06-13

## 2023-06-13 NOTE — PROGRESS NOTES
Assessment/Plan:   1  Right foot pain  Symptoms appear concerning for possible gout  Reviewed his previous podiatry evaluation  At this time, elevate leg at nighttime  He may apply ice to this area  We will start treatment with Medrol Dosepak  If his symptoms not proving, will consider x-ray imaging as well as further evaluation by podiatry  - methylPREDNISolone 4 MG tablet therapy pack; Use as directed on package  Dispense: 21 each; Refill: 0           Diagnoses and all orders for this visit:    Right foot pain  -     methylPREDNISolone 4 MG tablet therapy pack; Use as directed on package          Subjective:       Chief Complaint   Patient presents with   • Gout     Gout in right foot  Patient ID: Marjorie Alberto is a 80 y o  male presents today with a CC of right foot pain for the past few days  Symptoms started gradually  He states that he has been having swelling and pain over the lateral aspect of his right midfoot  He states that he was found to have gout in the past   He has tried ibuprofen with minimal relief  HPI    Review of Systems   Constitutional: Negative for activity change, chills, fatigue and fever  HENT: Negative for congestion, ear pain, sinus pressure and sore throat  Eyes: Negative for redness, itching and visual disturbance  Respiratory: Negative for cough and shortness of breath  Cardiovascular: Negative for chest pain and palpitations  Gastrointestinal: Negative for abdominal pain, diarrhea and nausea  Endocrine: Negative for cold intolerance and heat intolerance  Genitourinary: Negative for dysuria, flank pain and frequency  Musculoskeletal: Negative for arthralgias, back pain, gait problem and myalgias  Skin: Negative for color change  Allergic/Immunologic: Negative for environmental allergies  Neurological: Negative for dizziness, numbness and headaches  Psychiatric/Behavioral: Negative for behavioral problems and sleep disturbance         The following portions of the patient's history were reviewed and updated as appropriate : past family history, past medical history, past social history and past surgical history  Current Outpatient Medications:   •  albuterol (Ventolin HFA) 90 mcg/act inhaler, Inhale 2 puffs every 6 (six) hours as needed for wheezing, Disp: 18 g, Rfl: 0  •  amLODIPine (NORVASC) 5 mg tablet, TAKE 1 TABLET BY MOUTH DAILY, Disp: 90 tablet, Rfl: 3  •  Cholecalciferol (Vitamin D3) 10 MCG (400 UNIT) CAPS, Take by mouth, Disp: , Rfl:   •  hydrochlorothiazide (MICROZIDE) 12 5 mg capsule, TAKE 1 CAPSULE BY MOUTH  EVERY OTHER DAY, Disp: 45 capsule, Rfl: 3  •  losartan (COZAAR) 50 mg tablet, Take 50 mg by mouth daily, Disp: , Rfl:   •  methylPREDNISolone 4 MG tablet therapy pack, Use as directed on package, Disp: 21 each, Rfl: 0  •  tamsulosin (FLOMAX) 0 4 mg, , Disp: , Rfl:   •  vitamin B-12 (CYANOCOBALAMIN) 100 MCG tablet, Take 100 mcg by mouth daily, Disp: , Rfl:   •  benzonatate (TESSALON) 200 MG capsule, Take 1 capsule (200 mg total) by mouth 3 (three) times a day as needed for cough (Patient not taking: Reported on 3/9/2023), Disp: 20 capsule, Rfl: 0  •  bimatoprost (LUMIGAN) 0 01 % ophthalmic drops, Administer 1 drop to both eyes daily at bedtime (Patient not taking: Reported on 11/15/2021), Disp: , Rfl:   •  clotrimazole (LOTRIMIN) 1 % cream, Apply topically 2 (two) times a day (Patient not taking: Reported on 6/13/2023), Disp: 30 g, Rfl: 2  •  finasteride (PROSCAR) 5 mg tablet, Take 5 mg by mouth daily (Patient not taking: Reported on 6/13/2023), Disp: , Rfl:   •  fluticasone-umeclidinium-vilanterol (Trelegy Ellipta) 100-62 5-25 mcg/actuation inhaler, Inhale 1 puff daily Rinse mouth after use   (Patient not taking: Reported on 6/13/2023), Disp: 60 blister, Rfl: 5  •  ILEVRO 0 3 % SUSP, , Disp: , Rfl:   •  losartan (COZAAR) 100 MG tablet, TAKE 1 TABLET BY MOUTH  DAILY (Patient not taking: Reported on 6/13/2023), Disp: 90 tablet, Rfl: "3  •  methylPREDNISolone 4 MG tablet therapy pack, Use as directed on package (Patient not taking: Reported on 3/30/2023), Disp: 1 each, Rfl: 0  •  predniSONE 10 mg tablet, Take 6 tablets By mouth  In the morning Qd  Decrease by  By 1 tablet daily until completed  (Patient not taking: Reported on 3/30/2023), Disp: 21 tablet, Rfl: 0  •  sildenafil (VIAGRA) 100 mg tablet, TAKE ONE TABLET BY MOUTH AS NEEDED 1 HOUR PRIOR TO SEXUAL ACTIVITY (Patient not taking: Reported on 6/13/2023), Disp: , Rfl:          Objective:         Vitals:    06/13/23 1231   BP: 138/86   BP Location: Left arm   Patient Position: Sitting   Cuff Size: Adult   Pulse: 73   Temp: 97 9 °F (36 6 °C)   SpO2: 97%   Weight: 99 9 kg (220 lb 3 2 oz)   Height: 5' 10 87\" (1 8 m)     Physical Exam  Vitals reviewed  Constitutional:       Appearance: Normal appearance  He is well-developed  HENT:      Head: Normocephalic and atraumatic  Right Ear: Hearing normal       Left Ear: Hearing normal       Nose: Nose normal  No septal deviation  Eyes:      General: Lids are normal       Pupils: Pupils are equal, round, and reactive to light  Neck:      Thyroid: No thyroid mass or thyromegaly  Trachea: Trachea normal    Cardiovascular:      Rate and Rhythm: Normal rate  Pulmonary:      Effort: Pulmonary effort is normal  No respiratory distress  Breath sounds: No decreased breath sounds  Abdominal:      Tenderness: There is no guarding  Musculoskeletal:         General: Normal range of motion  Cervical back: Normal range of motion  Feet:    Skin:     General: Skin is warm and dry  Neurological:      Mental Status: He is alert and oriented to person, place, and time  Cranial Nerves: No cranial nerve deficit  Sensory: No sensory deficit  Psychiatric:         Speech: Speech normal          Behavior: Behavior normal          Thought Content:  Thought content normal          Judgment: Judgment normal            "

## 2023-06-19 DIAGNOSIS — I10 BENIGN ESSENTIAL HYPERTENSION: ICD-10-CM

## 2023-06-19 RX ORDER — HYDROCHLOROTHIAZIDE 12.5 MG/1
CAPSULE, GELATIN COATED ORAL
Qty: 45 CAPSULE | Refills: 3 | Status: SHIPPED | OUTPATIENT
Start: 2023-06-19

## 2023-08-21 DIAGNOSIS — M79.671 RIGHT FOOT PAIN: ICD-10-CM

## 2023-08-21 RX ORDER — METHYLPREDNISOLONE 4 MG/1
TABLET ORAL
Qty: 21 TABLET | Refills: 0 | OUTPATIENT
Start: 2023-08-21

## 2023-08-21 RX ORDER — METHYLPREDNISOLONE 4 MG/1
TABLET ORAL
Qty: 21 EACH | Refills: 0 | Status: SHIPPED | OUTPATIENT
Start: 2023-08-21

## 2023-08-21 NOTE — TELEPHONE ENCOUNTER
Please call patient, this refill was voided. This is a medication that we do not normally refill. If he is having problems, he needs to schedule an appointment.   Thank you

## 2023-08-21 NOTE — TELEPHONE ENCOUNTER
Pt's wife called stating that pt has gout again and is asking for this to be sent to [facility] today because they are going on vacation tomorrow.     Please advise [phone]

## 2023-12-16 DIAGNOSIS — I10 BENIGN ESSENTIAL HYPERTENSION: ICD-10-CM

## 2023-12-18 RX ORDER — AMLODIPINE BESYLATE 5 MG/1
TABLET ORAL
Qty: 100 TABLET | Refills: 2 | Status: SHIPPED | OUTPATIENT
Start: 2023-12-18

## 2024-02-08 ENCOUNTER — TELEPHONE (OUTPATIENT)
Age: 89
End: 2024-02-08

## 2024-02-08 DIAGNOSIS — Z23 NEED FOR INFLUENZA VACCINATION: ICD-10-CM

## 2024-02-08 DIAGNOSIS — I77.4 CELIAC ARTERY COMPRESSION SYNDROME (HCC): Primary | ICD-10-CM

## 2024-02-08 DIAGNOSIS — I10 BENIGN ESSENTIAL HYPERTENSION: ICD-10-CM

## 2024-02-08 DIAGNOSIS — R73.01 ELEVATED FASTING GLUCOSE: ICD-10-CM

## 2024-02-08 DIAGNOSIS — E78.2 MIXED HYPERLIPIDEMIA: ICD-10-CM

## 2024-02-08 DIAGNOSIS — J44.9 CHRONIC OBSTRUCTIVE PULMONARY DISEASE, UNSPECIFIED COPD TYPE (HCC): ICD-10-CM

## 2024-02-08 DIAGNOSIS — E04.1 THYROID NODULE: ICD-10-CM

## 2024-02-08 NOTE — TELEPHONE ENCOUNTER
"Pt wife called in stating that patient  is scheduled to see the provider on 2/14 and would like blood work orders placed so he can complete them before the appt. PT wife is also requesting that a lab order be placed to test for \"rheumatism\"? Pt wife savana like a call so she can  the lab scripts. Please advise.   "

## 2024-02-09 ENCOUNTER — APPOINTMENT (OUTPATIENT)
Dept: LAB | Facility: CLINIC | Age: 89
End: 2024-02-09
Payer: COMMERCIAL

## 2024-02-09 DIAGNOSIS — I77.4 CELIAC ARTERY COMPRESSION SYNDROME (HCC): ICD-10-CM

## 2024-02-09 DIAGNOSIS — E78.2 MIXED HYPERLIPIDEMIA: ICD-10-CM

## 2024-02-09 DIAGNOSIS — Z23 NEED FOR INFLUENZA VACCINATION: ICD-10-CM

## 2024-02-09 DIAGNOSIS — E04.1 THYROID NODULE: ICD-10-CM

## 2024-02-09 DIAGNOSIS — J44.9 CHRONIC OBSTRUCTIVE PULMONARY DISEASE, UNSPECIFIED COPD TYPE (HCC): ICD-10-CM

## 2024-02-09 DIAGNOSIS — R73.01 ELEVATED FASTING GLUCOSE: ICD-10-CM

## 2024-02-09 DIAGNOSIS — I10 BENIGN ESSENTIAL HYPERTENSION: ICD-10-CM

## 2024-02-09 LAB
ALBUMIN SERPL BCP-MCNC: 3.9 G/DL (ref 3.5–5)
ALP SERPL-CCNC: 41 U/L (ref 34–104)
ALT SERPL W P-5'-P-CCNC: 12 U/L (ref 7–52)
ANION GAP SERPL CALCULATED.3IONS-SCNC: 8 MMOL/L
AST SERPL W P-5'-P-CCNC: 17 U/L (ref 13–39)
BILIRUB SERPL-MCNC: 0.54 MG/DL (ref 0.2–1)
BUN SERPL-MCNC: 14 MG/DL (ref 5–25)
CALCIUM SERPL-MCNC: 9.2 MG/DL (ref 8.4–10.2)
CHLORIDE SERPL-SCNC: 103 MMOL/L (ref 96–108)
CHOLEST SERPL-MCNC: 135 MG/DL
CO2 SERPL-SCNC: 29 MMOL/L (ref 21–32)
CREAT SERPL-MCNC: 0.7 MG/DL (ref 0.6–1.3)
ERYTHROCYTE [DISTWIDTH] IN BLOOD BY AUTOMATED COUNT: 12.9 % (ref 11.6–15.1)
EST. AVERAGE GLUCOSE BLD GHB EST-MCNC: 128 MG/DL
GFR SERPL CREATININE-BSD FRML MDRD: 84 ML/MIN/1.73SQ M
GLUCOSE P FAST SERPL-MCNC: 90 MG/DL (ref 65–99)
HBA1C MFR BLD: 6.1 %
HCT VFR BLD AUTO: 42.2 % (ref 36.5–49.3)
HDLC SERPL-MCNC: 42 MG/DL
HGB BLD-MCNC: 13.6 G/DL (ref 12–17)
LDLC SERPL CALC-MCNC: 62 MG/DL (ref 0–100)
MCH RBC QN AUTO: 30 PG (ref 26.8–34.3)
MCHC RBC AUTO-ENTMCNC: 32.2 G/DL (ref 31.4–37.4)
MCV RBC AUTO: 93 FL (ref 82–98)
PLATELET # BLD AUTO: 189 THOUSANDS/UL (ref 149–390)
PMV BLD AUTO: 10.8 FL (ref 8.9–12.7)
POTASSIUM SERPL-SCNC: 4.1 MMOL/L (ref 3.5–5.3)
PROT SERPL-MCNC: 6.7 G/DL (ref 6.4–8.4)
RBC # BLD AUTO: 4.53 MILLION/UL (ref 3.88–5.62)
SODIUM SERPL-SCNC: 140 MMOL/L (ref 135–147)
TRIGL SERPL-MCNC: 155 MG/DL
TSH SERPL DL<=0.05 MIU/L-ACNC: 2.91 UIU/ML (ref 0.45–4.5)
WBC # BLD AUTO: 5.78 THOUSAND/UL (ref 4.31–10.16)

## 2024-02-09 PROCEDURE — 36415 COLL VENOUS BLD VENIPUNCTURE: CPT

## 2024-02-09 PROCEDURE — 85027 COMPLETE CBC AUTOMATED: CPT

## 2024-02-09 PROCEDURE — 80053 COMPREHEN METABOLIC PANEL: CPT

## 2024-02-09 PROCEDURE — 84443 ASSAY THYROID STIM HORMONE: CPT

## 2024-02-09 PROCEDURE — 83036 HEMOGLOBIN GLYCOSYLATED A1C: CPT

## 2024-02-09 PROCEDURE — 80061 LIPID PANEL: CPT

## 2024-02-14 ENCOUNTER — OFFICE VISIT (OUTPATIENT)
Dept: FAMILY MEDICINE CLINIC | Facility: CLINIC | Age: 89
End: 2024-02-14
Payer: COMMERCIAL

## 2024-02-14 ENCOUNTER — APPOINTMENT (OUTPATIENT)
Dept: RADIOLOGY | Facility: CLINIC | Age: 89
End: 2024-02-14
Payer: COMMERCIAL

## 2024-02-14 VITALS
BODY MASS INDEX: 30.32 KG/M2 | OXYGEN SATURATION: 94 % | SYSTOLIC BLOOD PRESSURE: 120 MMHG | HEIGHT: 71 IN | TEMPERATURE: 97.7 F | WEIGHT: 216.6 LBS | DIASTOLIC BLOOD PRESSURE: 70 MMHG | HEART RATE: 72 BPM

## 2024-02-14 DIAGNOSIS — S06.5XAA SUBDURAL HEMATOMA (HCC): ICD-10-CM

## 2024-02-14 DIAGNOSIS — M54.50 LUMBAR BACK PAIN: ICD-10-CM

## 2024-02-14 DIAGNOSIS — R07.89 CHEST DISCOMFORT: Primary | ICD-10-CM

## 2024-02-14 DIAGNOSIS — M46.1 SACROILIITIS (HCC): ICD-10-CM

## 2024-02-14 DIAGNOSIS — I47.29 NSVT (NONSUSTAINED VENTRICULAR TACHYCARDIA) (HCC): ICD-10-CM

## 2024-02-14 DIAGNOSIS — R35.0 URINARY FREQUENCY: ICD-10-CM

## 2024-02-14 DIAGNOSIS — I77.4 CELIAC ARTERY COMPRESSION SYNDROME (HCC): ICD-10-CM

## 2024-02-14 DIAGNOSIS — J43.9 PULMONARY EMPHYSEMA, UNSPECIFIED EMPHYSEMA TYPE (HCC): ICD-10-CM

## 2024-02-14 LAB
SL AMB  POCT GLUCOSE, UA: NORMAL
SL AMB LEUKOCYTE ESTERASE,UA: NORMAL
SL AMB POCT BILIRUBIN,UA: NORMAL
SL AMB POCT BLOOD,UA: NORMAL
SL AMB POCT CLARITY,UA: CLEAR
SL AMB POCT COLOR,UA: YELLOW
SL AMB POCT KETONES,UA: NORMAL
SL AMB POCT NITRITE,UA: NORMAL
SL AMB POCT PH,UA: 6
SL AMB POCT SPECIFIC GRAVITY,UA: 1.02
SL AMB POCT URINE PROTEIN: NORMAL
SL AMB POCT UROBILINOGEN: 0.2

## 2024-02-14 PROCEDURE — 72110 X-RAY EXAM L-2 SPINE 4/>VWS: CPT

## 2024-02-14 PROCEDURE — 93000 ELECTROCARDIOGRAM COMPLETE: CPT | Performed by: FAMILY MEDICINE

## 2024-02-14 PROCEDURE — 99214 OFFICE O/P EST MOD 30 MIN: CPT | Performed by: FAMILY MEDICINE

## 2024-02-14 PROCEDURE — 81003 URINALYSIS AUTO W/O SCOPE: CPT | Performed by: FAMILY MEDICINE

## 2024-02-14 NOTE — PROGRESS NOTES
Assessment/Plan:   1. Chest discomfort  Reviewed patient's symptoms.  At this time, it is unclear as to exact cause of his chest discomfort.  His EKG did not appear to show any change from previous EKGs.  He was advised that he still would benefit from seeing cardiology as it has been greater than likely benign musculoskeletal as it is reproducible with palpation.  Will continue with routine monitoring.  Follow-up pending symptoms worsen.  - POCT ECG  - Ambulatory Referral to Cardiology; Future    2. Urinary frequency  Unclear to exact cause of patient's urinary frequency.  Urinalysis did not appear to show any abnormalities.  He may benefit from seeing urology again in the future  - POCT urine dip auto non-scope    3. Lumbar back pain  Unclear as to exact cause of patient's symptoms.  Symptoms may likely be musculoskeletal.  Urinalysis did not appear to show any signs of blood as well as a kidney stone.  Will call patient with results.  - XR spine lumbar minimum 4 views non injury; Future           There are no diagnoses linked to this encounter.      Subjective:       Chief Complaint   Patient presents with    Chest Pain     Pt complains of chest tightness last week but says it has now subsided      Patient ID: Ramsey Palmer is a 88 y.o. male.    Chest Pain   This is a new problem. The current episode started in the past 7 days. The onset quality is sudden. The problem has been resolved. The quality of the pain is described as pressure. The pain does not radiate. Pertinent negatives include no abdominal pain, back pain, cough, dizziness, fever, headaches, nausea, numbness, palpitations or shortness of breath. The pain is aggravated by nothing. He has tried nothing for the symptoms.   Urinary Frequency   This is a new problem. The current episode started in the past 7 days. The problem occurs every urination. The problem has been unchanged. The patient is experiencing no pain. There has been no fever.  Associated symptoms include frequency. Pertinent negatives include no chills, flank pain, hematuria, hesitancy or nausea. He has tried nothing for the symptoms.       Review of Systems   Constitutional:  Negative for activity change, chills, fatigue and fever.   HENT:  Negative for congestion, ear pain, sinus pressure and sore throat.    Eyes:  Negative for redness, itching and visual disturbance.   Respiratory:  Negative for cough and shortness of breath.    Cardiovascular:  Positive for chest pain. Negative for palpitations.   Gastrointestinal:  Negative for abdominal pain, diarrhea and nausea.   Endocrine: Negative for cold intolerance and heat intolerance.   Genitourinary:  Positive for frequency. Negative for dysuria, flank pain, hematuria and hesitancy.   Musculoskeletal:  Negative for arthralgias, back pain, gait problem and myalgias.   Skin:  Negative for color change.   Allergic/Immunologic: Negative for environmental allergies.   Neurological:  Negative for dizziness, numbness and headaches.   Psychiatric/Behavioral:  Negative for behavioral problems and sleep disturbance.        The following portions of the patient's history were reviewed and updated as appropriate : past family history, past medical history, past social history and past surgical history.    Current Outpatient Medications:     albuterol (Ventolin HFA) 90 mcg/act inhaler, Inhale 2 puffs every 6 (six) hours as needed for wheezing, Disp: 18 g, Rfl: 0    amLODIPine (NORVASC) 5 mg tablet, TAKE 1 TABLET BY MOUTH DAILY, Disp: 100 tablet, Rfl: 2    Cholecalciferol (Vitamin D3) 10 MCG (400 UNIT) CAPS, Take by mouth, Disp: , Rfl:     hydrochlorothiazide (MICROZIDE) 12.5 mg capsule, TAKE 1 CAPSULE BY MOUTH  EVERY OTHER DAY, Disp: 45 capsule, Rfl: 3    losartan (COZAAR) 50 mg tablet, Take 50 mg by mouth daily, Disp: , Rfl:     tamsulosin (FLOMAX) 0.4 mg, , Disp: , Rfl:     vitamin B-12 (CYANOCOBALAMIN) 100 MCG tablet, Take 100 mcg by mouth daily,  "Disp: , Rfl:     benzonatate (TESSALON) 200 MG capsule, Take 1 capsule (200 mg total) by mouth 3 (three) times a day as needed for cough (Patient not taking: Reported on 3/9/2023), Disp: 20 capsule, Rfl: 0    bimatoprost (LUMIGAN) 0.01 % ophthalmic drops, Administer 1 drop to both eyes daily at bedtime (Patient not taking: Reported on 11/15/2021), Disp: , Rfl:     clotrimazole (LOTRIMIN) 1 % cream, Apply topically 2 (two) times a day (Patient not taking: Reported on 6/13/2023), Disp: 30 g, Rfl: 2    finasteride (PROSCAR) 5 mg tablet, Take 5 mg by mouth daily (Patient not taking: Reported on 2/14/2024), Disp: , Rfl:     fluticasone-umeclidinium-vilanterol (Trelegy Ellipta) 100-62.5-25 mcg/actuation inhaler, Inhale 1 puff daily Rinse mouth after use. (Patient not taking: Reported on 6/13/2023), Disp: 60 blister, Rfl: 5    ILEVRO 0.3 % SUSP, , Disp: , Rfl:     losartan (COZAAR) 100 MG tablet, TAKE 1 TABLET BY MOUTH  DAILY (Patient not taking: Reported on 2/14/2024), Disp: 90 tablet, Rfl: 3    methylPREDNISolone 4 MG tablet therapy pack, Use as directed on package (Patient not taking: Reported on 3/30/2023), Disp: 1 each, Rfl: 0    methylPREDNISolone 4 MG tablet therapy pack, Use as directed on package (Patient not taking: Reported on 2/14/2024), Disp: 21 each, Rfl: 0    predniSONE 10 mg tablet, Take 6 tablets By mouth  In the morning Qd.   Decrease by  By 1 tablet daily until completed. (Patient not taking: Reported on 3/30/2023), Disp: 21 tablet, Rfl: 0    sildenafil (VIAGRA) 100 mg tablet, TAKE ONE TABLET BY MOUTH AS NEEDED 1 HOUR PRIOR TO SEXUAL ACTIVITY (Patient not taking: Reported on 6/13/2023), Disp: , Rfl:          Objective:         Vitals:    02/14/24 0905   BP: 120/70   BP Location: Left arm   Patient Position: Sitting   Cuff Size: Adult   Pulse: 72   Temp: 97.7 °F (36.5 °C)   TempSrc: Temporal   SpO2: 94%   Weight: 98.2 kg (216 lb 9.6 oz)   Height: 5' 10.87\" (1.8 m)     Physical Exam  Vitals reviewed. "   Constitutional:       Appearance: He is well-developed.   HENT:      Head: Normocephalic and atraumatic.      Nose: Nose normal.      Mouth/Throat:      Pharynx: No oropharyngeal exudate.   Eyes:      General: No scleral icterus.        Right eye: No discharge.         Left eye: No discharge.      Pupils: Pupils are equal, round, and reactive to light.   Neck:      Trachea: No tracheal deviation.   Cardiovascular:      Rate and Rhythm: Normal rate and regular rhythm.      Pulses:           Dorsalis pedis pulses are 2+ on the right side and 2+ on the left side.        Posterior tibial pulses are 2+ on the right side and 2+ on the left side.      Heart sounds: Normal heart sounds. No murmur heard.     No friction rub. No gallop.   Pulmonary:      Effort: Pulmonary effort is normal. No respiratory distress.      Breath sounds: Normal breath sounds. No wheezing or rales.   Abdominal:      General: Bowel sounds are normal. There is no distension.      Palpations: Abdomen is soft.      Tenderness: There is no abdominal tenderness. There is no guarding or rebound.   Musculoskeletal:         General: Normal range of motion.      Cervical back: Normal range of motion and neck supple.   Lymphadenopathy:      Head:      Right side of head: No submental or submandibular adenopathy.      Left side of head: No submental or submandibular adenopathy.      Cervical: No cervical adenopathy.      Right cervical: No superficial, deep or posterior cervical adenopathy.     Left cervical: No superficial, deep or posterior cervical adenopathy.   Skin:     General: Skin is warm and dry.      Findings: No erythema.   Neurological:      Mental Status: He is alert and oriented to person, place, and time.      Cranial Nerves: No cranial nerve deficit.      Sensory: No sensory deficit.   Psychiatric:         Mood and Affect: Mood is not anxious or depressed.         Speech: Speech normal.         Behavior: Behavior normal.         Thought  Content: Thought content normal.         Judgment: Judgment normal.

## 2024-02-19 ENCOUNTER — TELEPHONE (OUTPATIENT)
Dept: FAMILY MEDICINE CLINIC | Facility: CLINIC | Age: 89
End: 2024-02-19

## 2024-02-19 NOTE — TELEPHONE ENCOUNTER
----- Message from ab Eleazar DO sent at 2/14/2024  9:26 PM EST -----  Please call patient, Rishi access is not available.  Please advise him that his recent lumbar spine x-ray appeared to show severe degenerative disc disease throughout the entire lumbar spine.  This was worse over the L4-L5 as well as the L5-S1 spinal segments.  If he is having any persistent symptoms, he may benefit from seeing a spinal specialist.  I will place this referral for him if he would like.  Thank you

## 2024-02-20 DIAGNOSIS — M54.50 LUMBAR BACK PAIN: ICD-10-CM

## 2024-02-20 DIAGNOSIS — M46.1 SACROILIITIS (HCC): Primary | ICD-10-CM

## 2024-02-21 NOTE — TELEPHONE ENCOUNTER
Subjective:      Patient ID:  Jose Farfan is a 25 y.o. male who presents for   Chief Complaint   Patient presents with    Rash     Rash      The patient location is: home  The chief complaint leading to consultation is: rash    Visit type: audiovisual    Face to Face time with patient: 7 min  10 minutes of total time spent on the encounter, which includes face to face time and non-face to face time preparing to see the patient (eg, review of tests), Obtaining and/or reviewing separately obtained history, Documenting clinical information in the electronic or other health record, Independently interpreting results (not separately reported) and communicating results to the patient/family/caregiver, or Care coordination (not separately reported).     Each patient to whom he or she provides medical services by telemedicine is:  (1) informed of the relationship between the physician and patient and the respective role of any other health care provider with respect to management of the patient; and (2) notified that he or she may decline to receive medical services by telemedicine and may withdraw from such care at any time.    Notes: Pt presents today via video visit for rash  Location: trunk  Duration: over a yr  Symptoms: red, spreading  Exacerbated by: heat, sweating  Treatments tried: none  Not very itchy.    Review of Systems   Constitutional:  Negative for fever and chills.   Skin:  Positive for rash. Negative for itching.       Objective:   Physical Exam           Diagram Legend     Erythematous scaling macule/papule c/w actinic keratosis       Vascular papule c/w angioma      Pigmented verrucoid papule/plaque c/w seborrheic keratosis      Yellow umbilicated papule c/w sebaceous hyperplasia      Irregularly shaped tan macule c/w lentigo     1-2 mm smooth white papules consistent with Milia      Movable subcutaneous cyst with punctum c/w epidermal inclusion cyst      Subcutaneous movable cyst c/w  See triage note pilar cyst      Firm pink to brown papule c/w dermatofibroma      Pedunculated fleshy papule(s) c/w skin tag(s)      Evenly pigmented macule c/w junctional nevus     Mildly variegated pigmented, slightly irregular-bordered macule c/w mildly atypical nevus      Flesh colored to evenly pigmented papule c/w intradermal nevus       Pink pearly papule/plaque c/w basal cell carcinoma      Erythematous hyperkeratotic cursted plaque c/w SCC      Surgical scar with no sign of skin cancer recurrence      Open and closed comedones      Inflammatory papules and pustules      Verrucoid papule consistent consistent with wart     Erythematous eczematous patches and plaques     Dystrophic onycholytic nail with subungual debris c/w onychomycosis     Umbilicated papule    Erythematous-base heme-crusted tan verrucoid plaque consistent with inflamed seborrheic keratosis     Erythematous Silvery Scaling Plaque c/w Psoriasis     See annotation      Assessment / Plan:        Tinea versicolor  -     ketoconazole (NIZORAL) 2 % cream; AAA bid  Dispense: 60 g; Refill: 3    Rtc prn

## 2024-03-11 ENCOUNTER — CONSULT (OUTPATIENT)
Dept: PAIN MEDICINE | Facility: MEDICAL CENTER | Age: 89
End: 2024-03-11
Payer: COMMERCIAL

## 2024-03-11 VITALS
HEIGHT: 71 IN | SYSTOLIC BLOOD PRESSURE: 150 MMHG | HEART RATE: 82 BPM | OXYGEN SATURATION: 94 % | BODY MASS INDEX: 30.24 KG/M2 | WEIGHT: 216 LBS | DIASTOLIC BLOOD PRESSURE: 66 MMHG

## 2024-03-11 DIAGNOSIS — M47.816 LUMBAR SPONDYLOSIS: ICD-10-CM

## 2024-03-11 DIAGNOSIS — M54.50 LUMBAR BACK PAIN: ICD-10-CM

## 2024-03-11 PROCEDURE — 99204 OFFICE O/P NEW MOD 45 MIN: CPT | Performed by: PHYSICAL MEDICINE & REHABILITATION

## 2024-03-11 NOTE — PATIENT INSTRUCTIONS
Lower Back Exercises   AMBULATORY CARE:   Lower back exercises  help heal and strengthen your back muscles to prevent another injury. Ask your healthcare provider if you need to see a physical therapist for more advanced exercises.  Seek care immediately if:   You have severe pain that prevents you from moving.       Call your doctor if:   Your pain becomes worse.    You have new pain.    You have questions or concerns about your condition or care.    Do lower back exercises safely:   Do the exercises on a mat or firm surface (not on a bed).  A firm surface will support your spine and prevent low back pain.    Move slowly and smoothly.  Avoid fast or jerky motions.    Breathe normally.  Do not hold your breath.    Stop if you feel pain.  It is normal to feel some discomfort at first, but you should not feel pain. Regular exercise will help decrease your discomfort over time.    Lower back exercises:  Your healthcare provider may recommend that you do back exercises 10 to 30 minutes each day. He or she may also recommend that you do exercises 1 to 3 times each day. Ask your provider which exercises are best for you and how often to do them.  Ankle pumps:  Lie on your back. Move your foot up (with your toes pointing toward your head). Then, move your foot down (with your toes pointing away from you). Repeat this exercise 10 times on each side.         Heel slides:  Lie on your back. Slowly bend one leg and then straighten it. Next, bend the other leg and then straighten it. Repeat 10 times on each side.         Pelvic tilt:  Lie on your back with your knees bent and feet flat on the floor. Place your arms in a relaxed position beside your body. Tighten the muscles of your abdomen and flatten your back against the floor. Hold for 5 seconds. Repeat 5 times.         Back stretch:  Lie on your back with your hands behind your head. Bend your knees and turn the lower half of your body to one side. Hold this position for 10  seconds. Repeat 3 times on each side.         Straight leg raises:  Lie on your back with one leg straight. Bend the other knee. Tighten your abdomen and then slowly lift the straight leg up about 6 to 12 inches off the floor. Hold for 1 to 5 seconds. Lower your leg slowly. Repeat 10 times on each leg.         Knee-to-chest:  Lie on your back with your knees bent and feet flat on the floor. Pull one of your knees toward your chest and hold it there for 5 seconds. Return your leg to the starting position. Lift the other knee toward your chest and hold for 5 seconds. Do this 5 times on each side.         Cat and camel:  Place your hands and knees on the floor. Arch your back upward toward the ceiling and lower your head. Round out your spine as much as you can. Hold for 5 seconds. Lift your head upward and push your chest downward toward the floor. Hold for 5 seconds. Do 3 sets or as directed.         Wall squats:  Stand with your back against a wall. Tighten the muscles of your abdomen. Slowly lower your body until your knees are bent at a 45 degree angle. Hold this position for 5 seconds. Slowly move back up to a standing position. Repeat 10 times.         Curl up:  Lie on your back with your knees bent and feet flat on the floor. Place your hands, palms down, underneath the curve in your lower back. Next, with your elbows on the floor, lift your shoulders and chest 2 to 3 inches. Keep your head in line with your shoulders. Hold this position for 5 seconds. When you can do this exercise without pain for 10 to 15 seconds, you may add a rotation. While your shoulders and chest are lifted off the ground, turn slightly to the left and hold. Repeat on the other side.         Bird dog:  Place your hands and knees on the floor. Keep your wrists directly below your shoulders and your knees directly below your hips. Pull your belly button in toward your spine. Do not flatten or arch your back. Tighten your abdominal muscles.  Raise one arm straight out so that it is aligned with your head. Next, raise the leg opposite your arm. Hold this position for 15 seconds. Lower your arm and leg slowly and change sides. Do 5 sets.       Follow up with your doctor as directed:  Write down your questions so you remember to ask them during your visits.  © Copyright [employer] 2023 Information is for End User's use only and may not be sold, redistributed or otherwise used for commercial purposes.  The above information is an  only. It is not intended as medical advice for individual conditions or treatments. Talk to your doctor, nurse or pharmacist before following any medical regimen to see if it is safe and effective for you.  Core Strengthening Exercises   AMBULATORY CARE:   What you need to know about core strengthening exercises:  Your core includes the muscles of your lower back, hip, pelvis, and abdomen. Core strengthening exercises help heal and strengthen these muscles. This helps prevent another injury, and keeps your pelvis, spine, and hips in the correct position.  Call your doctor or physical therapist if:   You have sharp or worsening pain during exercise or at rest.    You have questions or concerns about your shoulder exercises.    Safety tips:  Talk to your healthcare provider before you start an exercise program. A physical therapist can teach you how to do core strengthening exercises safely.  Do the exercises on a mat or firm surface.  A firm surface will support your spine and prevent low back pain. Do not do these exercises on a bed.    Move slowly and smoothly.  Avoid fast or jerky motions.    Stop if you feel pain.  You may feel some discomfort at first, but you should not feel pain. Tell your provider or physical therapist if you have pain while you exercise. Regular exercise will help decrease your discomfort over time.    Breathe normally during core exercises.  Do not hold your breath. This may cause an increase  in blood pressure and prevent muscle strengthening. Your healthcare provider will tell you when to inhale and exhale during the exercise.    Begin all of your exercises with abdominal bracing.  Abdominal bracing helps warm up your core muscles. You can also practice abdominal bracing throughout the day. Lie on your back with your knees bent and feet flat on the floor. Place your arms in a relaxed position beside your body. Tighten your abdominal muscles. Pull your belly button in and up toward your spine. Hold for 5 seconds. Relax your muscles. Repeat 10 times.       Core strengthening exercises:  Your healthcare provider will tell you how often to do these exercises. The provider will also tell you how many repetitions of each exercise you should do. Hold each exercise for 5 seconds or as directed. As you get stronger, increase your hold to 10 to 15 seconds. You can do some of these exercises on a stability ball, or with a weight. Ask your healthcare provider how to use a stability ball or weight for these exercises:  Bridging:  Lie on your back with your knees bent and feet flat on the floor. Rest your arms at your side. Tighten your buttocks, and then lift your hips 1 inch off the floor. Hold for 5 seconds. When you can do this exercise without pain for 10 seconds, increase the distance you lift your hips. A good goal is to be able to lift your hips so that your shoulders, hips, and knees are in a straight line.         Dead bug:  Lie on your back with your knees bent and feet flat on the floor. Place your arms in a relaxed position beside your body. Begin with abdominal bracing. Next, raise one leg, keeping your knee bent. Hold for 5 seconds. Repeat with the other leg. When you can do this exercise without pain for 10 to 15 seconds, you may raise one straight leg and hold. Repeat with the other leg.         Quadruped:  Place your hands and knees on the floor. Keep your wrists directly below your shoulders and  your knees directly below your hips. Pull your belly button in toward your spine. Do not flatten or arch your back. Tighten your abdominal muscles below your belly button. Hold for 5 seconds. When you can do this exercise without pain for 10 to 15 seconds, you may extend one arm and hold. Repeat on the other side.         Side bridge exercises:      Standing side bridge:  Stand next to a wall and extend one arm toward the wall. Place your palm flat on the wall with your fingers pointing upward. Begin with abdominal bracing. Next, without moving your feet, slowly bend your arm to 90 degrees. Hold for 5 seconds. Repeat on the other side. When you can do this exercise without pain for 10 to 15 seconds, you may do the bent leg side bridge on the floor.         Bent leg side bridge:  Lie on one side with your legs, hips, and shoulders in a straight line. Prop yourself up onto your forearm so your elbow is directly below your shoulder. Bend your knees back to 90 degrees. Begin with abdominal bracing. Next, lift your hips and balance yourself on your forearm and knees. Hold for 5 seconds. Repeat on the other side. When you can do this exercise without pain for 10 to 15 seconds, you may do the straight leg side bridge on the floor.         Straight leg side bridge:  Lie on one side with your legs, hips, and shoulders in a straight line. Prop yourself up onto your forearm so your elbow is directly below your shoulder. Begin with abdominal bracing. Lift your hips off the floor and balance yourself on your forearm and the outside of your flexed foot. Do not let your ankle bend sideways. Hold for 5 seconds. Repeat on the other side. When you can do this exercise without pain for 10 to 15 seconds, ask your healthcare provider for more advanced exercises.       Superman:  Lie on your stomach. Extend your arms forward on the floor. Tighten your abdominal muscles and lift your right hand and left leg off the floor. Hold this  position. Slowly return to the starting position. Tighten your abdominal muscles and lift your left hand and right leg off the floor. Hold this position. Slowly return to the starting position.         Clam:  Lie on your side with your knees bent. Put your bottom arm under your head to keep your neck in line. Put your top hand on your hip to keep your pelvis from moving. Put your heels together, and keep them together during this exercise. Slowly raise your top knee toward the ceiling. Then lower your leg so your knees are together. Repeat this exercise 10 times. Then switch sides and do the exercise 10 times with the other leg.         Curl up:  Lie on your back with your knees bent and feet flat on the floor. Place your hands, palms down, underneath your lower back. Next, with your elbows on the floor, lift your shoulders and chest 2 to 3 inches off the floor. Keep your head in line with your shoulders. Hold this position. Slowly return to the starting position.         Straight leg raises:  Lie on your back with one leg straight. Bend the other knee and place your foot flat on the floor. Tighten your abdominal muscles. Keep your leg straight and slowly lift it straight up 6 to 12 inches off the floor. Hold this position. Lower your leg slowly. Do as many repetitions as directed on this side. Repeat with the other leg.         Plank:  Lie on your stomach. Bend your elbows and place your forearms flat on the floor. Lift your chest, stomach, and knees off the floor. Make sure your elbows are below your shoulders. Your body should be in a straight line. Do not let your hips or lower back sink to the ground. Squeeze your abdominal muscles together and hold for 15 seconds. To make this exercise harder, hold for 30 seconds or lift 1 leg at a time.         Bicycles:  Lie on your back. Bend both knees and bring them toward your chest. Your calves should be parallel to the floor. Place the palms of your hands on the back  of your head. Straighten your right leg and keep it lifted 2 inches off the floor. Raise your head and shoulders off the floor and twist towards your left. Keep your head and shoulders lifted. Bend your right knee while you straighten your left leg. Keep your left leg 2 inches off the floor. Twist your head and chest towards the left leg. Continue to straighten 1 leg at a time and twist.       Follow up with your doctor or physical therapist as directed:  Write down your questions so you remember to ask them during your visits.  © Copyright [employer] 2023 Information is for End User's use only and may not be sold, redistributed or otherwise used for commercial purposes.  The above information is an  only. It is not intended as medical advice for individual conditions or treatments. Talk to your doctor, nurse or pharmacist before following any medical regimen to see if it is safe and effective for you.

## 2024-03-11 NOTE — PROGRESS NOTES
Assessment  1. Lumbar spondylosis    2. Lumbar back pain        Plan  At this time the patient is doing relatively well and not requiring any additional care.  I will give him a home exercise program to work on.  If he does have any worsening of his symptoms I would like him to reach out to consider lumbar medial branch nerve blocks at L3-5 bilaterally with the intention of moving forward with radiofrequency ablation.    My impressions and treatment recommendations were discussed in detail with the patient who verbalized understanding and had no further questions.  Discharge instructions were provided. I personally saw and examined the patient and I agree with the above discussed plan of care.    No orders of the defined types were placed in this encounter.    No orders of the defined types were placed in this encounter.      History of Present Illness    Ramsey Palmer is a 88 y.o. male seen in consultation at the request of Dr. Bush regarding chronic lower back pain.  Since he had updated x-rays his symptoms have improved.  He was describing mild intensity pain with fluctuation increasing to above a 5/10.  Pain is intermittent without any typical pattern described as shooting and pressure-like localized to the axial lumbar spine.  He does note some referred pain into his thighs bilaterally but no radicular symptoms currently.    Also notes some weakness in the lower extremities.    Aggravating factors include standing and walking.    Diagnostic studies include x-rays of the lumbar spine demonstrating significant facet arthropathy of the lower lumbar facet joints.    He has not tried any conservative care at this point and is not interested in pursuing physical therapy but will perform some home exercises that we gave him.    Social history negative for tobacco marijuana and alcohol use.  Currently using ibuprofen and this does provide some benefit.    I have personally reviewed and/or updated the patient's  past medical history, past surgical history, family history, social history, current medications, allergies, and vital signs today.     Review of Systems   Constitutional:  Positive for activity change. Negative for fever and unexpected weight change.   HENT:  Positive for hearing loss. Negative for trouble swallowing.    Eyes:  Negative for visual disturbance.   Respiratory:  Positive for shortness of breath. Negative for wheezing.    Cardiovascular:  Negative for chest pain and palpitations.   Gastrointestinal:  Negative for constipation, diarrhea, nausea and vomiting.   Endocrine: Positive for polyuria. Negative for cold intolerance, heat intolerance and polydipsia.   Genitourinary:  Negative for difficulty urinating and frequency.   Musculoskeletal:  Positive for back pain and myalgias. Negative for arthralgias, gait problem and joint swelling.   Skin:  Negative for rash.   Neurological:  Positive for numbness. Negative for dizziness, seizures, syncope, weakness and headaches.   Hematological:  Does not bruise/bleed easily.   Psychiatric/Behavioral:  Negative for dysphoric mood.    All other systems reviewed and are negative.      Patient Active Problem List   Diagnosis    Benign essential hypertension    COPD (chronic obstructive pulmonary disease) (HCC)    Degeneration of intervertebral disc of lumbar region    Diastasis of rectus abdominis    Herniated lumbar intervertebral disc    Hyperlipidemia    Lumbar radiculitis    Neural foraminal stenosis of lumbar spine    Subdural hematoma (HCC)    Sacroiliitis (HCC)    SIVA (obstructive sleep apnea)    Nocturia    Osteoarthritis of spine with radiculopathy, lumbar region    Nausea    Dyspnea on exertion    Abnormal chest x-ray    NSVT (nonsustained ventricular tachycardia) (HCC)    Thyroid nodule    Celiac artery compression syndrome (HCC)       Past Medical History:   Diagnosis Date    Abnormal EKG     last assessed: 11/25/2014    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 last assessed: 10/22/2012    COPD with acute exacerbation (HCC)     last assessed: 2017    Diverticulosis     Eczema     last assessed: 2013    Esophagitis     Gout     last assessed: 2014    Nail avulsion of toe     Neoplasm of bladder     last assessed: 2013       Past Surgical History:   Procedure Laterality Date    HERNIA REPAIR      last assessed: 2014    KIDNEY SURGERY      description: removal of kidney stone last assessed: 2014    US GUIDED THYROID BIOPSY  2021       Family History   Problem Relation Age of Onset    Stroke Mother     Alzheimer's disease Father     Heart attack Sister     Cancer Family     Lung cancer Brother        Social History     Occupational History    Not on file   Tobacco Use    Smoking status: Former     Current packs/day: 0.00     Average packs/day: 2.0 packs/day for 33.0 years (66.0 ttl pk-yrs)     Types: Cigarettes     Start date:      Quit date:      Years since quittin.2    Smokeless tobacco: Never   Vaping Use    Vaping status: Never Used   Substance and Sexual Activity    Alcohol use: No    Drug use: No    Sexual activity: Not on file       Current Outpatient Medications on File Prior to Visit   Medication Sig    amLODIPine (NORVASC) 5 mg tablet TAKE 1 TABLET BY MOUTH DAILY    hydrochlorothiazide (MICROZIDE) 12.5 mg capsule TAKE 1 CAPSULE BY MOUTH  EVERY OTHER DAY    losartan (COZAAR) 50 mg tablet Take 50 mg by mouth daily    tamsulosin (FLOMAX) 0.4 mg     vitamin B-12 (CYANOCOBALAMIN) 100 MCG tablet Take 100 mcg by mouth daily    albuterol (Ventolin HFA) 90 mcg/act inhaler Inhale 2 puffs every 6 (six) hours as needed for wheezing (Patient not taking: Reported on 3/11/2024)    benzonatate (TESSALON) 200 MG capsule Take 1 capsule (200 mg total) by mouth 3 (three) times a day as needed for cough (Patient not taking: Reported on 3/9/2023)    bimatoprost (LUMIGAN) 0.01 % ophthalmic drops Administer 1 drop to both  "eyes daily at bedtime (Patient not taking: Reported on 11/15/2021)    Cholecalciferol (Vitamin D3) 10 MCG (400 UNIT) CAPS Take by mouth    clotrimazole (LOTRIMIN) 1 % cream Apply topically 2 (two) times a day (Patient not taking: Reported on 6/13/2023)    finasteride (PROSCAR) 5 mg tablet Take 5 mg by mouth daily (Patient not taking: Reported on 2/14/2024)    fluticasone-umeclidinium-vilanterol (Trelegy Ellipta) 100-62.5-25 mcg/actuation inhaler Inhale 1 puff daily Rinse mouth after use. (Patient not taking: Reported on 6/13/2023)    ILEVRO 0.3 % SUSP  (Patient not taking: Reported on 6/13/2023)    losartan (COZAAR) 100 MG tablet TAKE 1 TABLET BY MOUTH  DAILY (Patient not taking: Reported on 2/14/2024)    methylPREDNISolone 4 MG tablet therapy pack Use as directed on package (Patient not taking: Reported on 3/30/2023)    methylPREDNISolone 4 MG tablet therapy pack Use as directed on package (Patient not taking: Reported on 2/14/2024)    predniSONE 10 mg tablet Take 6 tablets By mouth  In the morning Qd.   Decrease by  By 1 tablet daily until completed. (Patient not taking: Reported on 3/30/2023)    sildenafil (VIAGRA) 100 mg tablet TAKE ONE TABLET BY MOUTH AS NEEDED 1 HOUR PRIOR TO SEXUAL ACTIVITY (Patient not taking: Reported on 6/13/2023)     No current facility-administered medications on file prior to visit.       Allergies   Allergen Reactions    Levetiracetam Confusion and Anxiety       Physical Exam    /66   Pulse 82   Ht 5' 11\" (1.803 m)   Wt 98 kg (216 lb)   SpO2 94%   BMI 30.13 kg/m²     Constitutional: normal, well developed, well nourished, alert, in no distress and non-toxic and no overt pain behavior.  Eyes: anicteric  HEENT: grossly intact  Neck: supple, symmetric, trachea midline and no masses   Pulmonary:even and unlabored  Cardiovascular:No edema or pitting edema present  Psychiatric:Mood and affect appropriate  Neurologic:Cranial Nerves II-XII grossly intact  Musculoskeletal:normal, " slightly forward flexed posture in the lumbar spine but no significant pain today        XR spine lumbar minimum 4 views non injury: Result Notes     Ihab Eleazar DO  2/14/2024  9:26 PM EST       Please call patient, Rishi access is not available.  Please advise him that his recent lumbar spine x-ray appeared to show severe degenerative disc disease throughout the entire lumbar spine.  This was worse over the L4-L5 as well as the L5-S1 spinal segments.  If he is having any persistent symptoms, he may benefit from seeing a spinal specialist.  I will place this referral for him if he would like.  Thank you            Study Result    Narrative & Impression         LUMBAR SPINE     INDICATION:   Low back pain, unspecified.      COMPARISON:  Comparison made with previous examination(s) dated (NM) 07-Jun-2021,(CT) 21-Jan-2020.     VIEWS:  XR SPINE LUMBAR MINIMUM 4 VIEWS NON INJURY  Images: 5     FINDINGS:     There are 5 non rib bearing lumbar vertebral bodies.      There is no evidence of acute fracture or destructive osseous lesion.     There is mild anterolisthesis of L5 on S1 and retrolisthesis of L4 and L5.     There is severe multilevel disc space narrowing with osteophytosis sclerosis throughout the lumbar spine worse at L4-5 and L5-S1. There is severe facet disease as well.     Vascular calcifications noted..     IMPRESSION:        Severe spondylosis throughout the lumbar spine worse at L4-5 and L5-S1. There is severe facet disease as well. Mild retrolisthesis of L4 on L5 and anterolisthesis of L5 on S1 present.     Electronically signed: 02/14/2024 12:32 PM Lokesh Bernal MD

## 2024-04-15 NOTE — PROGRESS NOTES
Cardiology Office Note  MD Adán Cordero MD, [facility]  Homero Ibrahim DO, [facility]  MD Dianna Street DO, [facility]  Ash Levine DO, [facility]  ----------------------------------------------------------------  [place] [address]    Ramsey Palmer 88 y.o. male MRN: [id number]  Unit/Bed#:  Encounter: [id number]      History of Present Illness:  It was a pleasure to see Ramsey Palmer in the office today for initial CV evaluation. He has a past medical history of hypertension, dyslipidemia, nonsustained VT, SIVA, COPD and subdural hematoma.  He establish care with us in April 2024.  The patient was previously followed by Dr. Lawson.  The patient had resistant hypertension over the years and intermittent episodes of shortness of breath.  Due to his symptoms, he was sent for ischemic evaluation and echocardiogram in 2021.  Stress test was found to be negative for myocardial ischemia and echocardiogram was overall showing normal left ventricular systolic function with only mild valvular disease.  Intubated 2024, he admitted that his effort tolerance decreased and his shortness of breath somewhat increased.  He was less active overall and noted that he may have had some pressure in the chest, but more of the shortness of breath sensation.  Denies lower extremity swelling orthopnea or paroxysmal nocturnal dyspnea.  Denies lightheadedness, dizziness or palpitations.    Review of Systems:  Review of Systems   Constitutional: Negative for decreased appetite, fever, weight gain and weight loss.   HENT:  Negative for congestion and sore throat.    Eyes:  Negative for visual disturbance.   Cardiovascular:  Positive for chest pain and dyspnea on exertion. Negative for leg swelling, near-syncope and palpitations.   Respiratory:  Positive for shortness of breath. Negative for cough.    Hematologic/Lymphatic: Negative for bleeding problem.   Skin:   Negative for rash.   Musculoskeletal:  Negative for myalgias and neck pain.   Gastrointestinal:  Negative for abdominal pain and nausea.   Neurological:  Negative for light-headedness and weakness.   Psychiatric/Behavioral:  Negative for depression.        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    COPD with acute exacerbation (HCC)     last assessed: 2017    Diverticulosis     Eczema     last assessed: 2013    Esophagitis     Gout     last assessed: 2014    Nail avulsion of toe     Neoplasm of bladder     last assessed: 2013       Past Surgical History:   Procedure Laterality Date    HERNIA REPAIR      last assessed: 2014    KIDNEY SURGERY      description: removal of kidney stone last assessed: 2014    US GUIDED THYROID BIOPSY  2021       Social History     Socioeconomic History    Marital status: /Civil Union     Spouse name: None    Number of children: None    Years of education: None    Highest education level: None   Occupational History    None   Tobacco Use    Smoking status: Former     Current packs/day: 0.00     Average packs/day: 2.0 packs/day for 33.0 years (66.0 ttl pk-yrs)     Types: Cigarettes     Start date:      Quit date: 1982     Years since quittin.3    Smokeless tobacco: Never   Vaping Use    Vaping status: Never Used   Substance and Sexual Activity    Alcohol use: No    Drug use: No    Sexual activity: None   Other Topics Concern    None   Social History Narrative    None     Social Determinants of Health     Financial Resource Strain: Not on file   Food Insecurity: Not on file   Transportation Needs: Not on file   Physical Activity: Not on file   Stress: Not on file   Social Connections: Not on file   Intimate Partner Violence: Not on file   Housing Stability: Not on file       Family History   Problem Relation Age of  Onset    Stroke Mother     Alzheimer's disease Father     Heart attack Sister     Cancer Family     Lung cancer Brother        Allergies   Allergen Reactions    Levetiracetam Confusion and Anxiety         Current Outpatient Medications:     albuterol (Ventolin HFA) 90 mcg/act inhaler, Inhale 2 puffs every 6 (six) hours as needed for wheezing, Disp: 18 g, Rfl: 0    amLODIPine (NORVASC) 5 mg tablet, TAKE 1 TABLET BY MOUTH DAILY, Disp: 100 tablet, Rfl: 2    Cholecalciferol (Vitamin D3) 10 MCG (400 UNIT) CAPS, Take by mouth, Disp: , Rfl:     hydrochlorothiazide (MICROZIDE) 12.5 mg capsule, TAKE 1 CAPSULE BY MOUTH  EVERY OTHER DAY, Disp: 45 capsule, Rfl: 3    losartan (COZAAR) 50 mg tablet, Take 50 mg by mouth daily, Disp: , Rfl:     tamsulosin (FLOMAX) 0.4 mg, , Disp: , Rfl:     vitamin B-12 (CYANOCOBALAMIN) 100 MCG tablet, Take 100 mcg by mouth daily, Disp: , Rfl:     benzonatate (TESSALON) 200 MG capsule, Take 1 capsule (200 mg total) by mouth 3 (three) times a day as needed for cough (Patient not taking: Reported on 3/9/2023), Disp: 20 capsule, Rfl: 0    bimatoprost (LUMIGAN) 0.01 % ophthalmic drops, Administer 1 drop to both eyes daily at bedtime (Patient not taking: Reported on 11/15/2021), Disp: , Rfl:     clotrimazole (LOTRIMIN) 1 % cream, Apply topically 2 (two) times a day (Patient not taking: Reported on 6/13/2023), Disp: 30 g, Rfl: 2    finasteride (PROSCAR) 5 mg tablet, Take 5 mg by mouth daily (Patient not taking: Reported on 2/14/2024), Disp: , Rfl:     fluticasone-umeclidinium-vilanterol (Trelegy Ellipta) 100-62.5-25 mcg/actuation inhaler, Inhale 1 puff daily Rinse mouth after use., Disp: 60 blister, Rfl: 5    ILEVRO 0.3 % SUSP, , Disp: , Rfl:     losartan (COZAAR) 100 MG tablet, TAKE 1 TABLET BY MOUTH  DAILY (Patient not taking: Reported on 2/14/2024), Disp: 90 tablet, Rfl: 3    methylPREDNISolone 4 MG tablet therapy pack, Use as directed on package (Patient not taking: Reported on 3/30/2023), Disp: 1  "each, Rfl: 0    methylPREDNISolone 4 MG tablet therapy pack, Use as directed on package (Patient not taking: Reported on 2024), Disp: 21 each, Rfl: 0    predniSONE 10 mg tablet, Take 6 tablets By mouth  In the morning Qd.   Decrease by  By 1 tablet daily until completed. (Patient not taking: Reported on 3/30/2023), Disp: 21 tablet, Rfl: 0    sildenafil (VIAGRA) 100 mg tablet, TAKE ONE TABLET BY MOUTH AS NEEDED 1 HOUR PRIOR TO SEXUAL ACTIVITY (Patient not taking: Reported on 2023), Disp: , Rfl:     Vitals:    24 0921   BP: 108/60   BP Location: Right arm   Patient Position: Sitting   Cuff Size: Large   Pulse: 66   Weight: 97.4 kg (214 lb 12.8 oz)   Height: 5' 11\" (1.803 m)     Body mass index is 29.96 kg/m².    PHYSICAL EXAMINATION:  Gen: Awake, Alert, NAD   Head/eyes: AT/NC, pupils equal and round, Anicteric  ENT: mmm  Neck: Supple, No elevated JVP, trachea midline  Resp: CTA bilaterally no w/r/r  CV: RRR +S1, S2, No m/r/g  Abd: Soft, NT/ND + BS  Ext: no LE edema bilaterally  Neuro: Follows commands, moves all extermities  Psych: Appropriate affect, normal mood, pleasant attitude, non-combative  Skin: warm; no rash, erythema or venous stasis changes on exposed skin    --------------------------------------------------------------------------------  TREADMILL STRESS  No results found for this or any previous visit.     --------------------------------------------------------------------------------  NUCLEAR STRESS TEST: No results found for this or any previous visit.    Results for orders placed during the hospital encounter of 21    NM myocardial perfusion spect (rx stress and/or rest)    Narrative  [address] [address] [phone]    Rest/Stress Gated SPECT Myocardial Perfusion Imaging After Regadenoson    Patient: SUNITA LOPEZ  MR number: [id number]  Account number: [id number]  : 1935  Age: 85 years  Gender: " Male  Status: Outpatient  Location: [address] [place] [address]  Height: 70 in  Weight: 208 lb  BP: 142/ 90 mmHg    Allergies: LEVETIRACETAM    Diagnosis: I45.10 - Unspecified right bundle-branch block, R06.00 - Dyspnea, unspecified    Interpreting Physician:  Wesley Zuluaga DO  Referring Physician:  Mo Lawson MD  Primary Physician:  Michelle Bush DO  Technician:  THOMAS Gilliam  RN:  Judy Joseph RN  Group:  [facility]  Cardiology Fellow:  Bill Dyson MD  Report Prepared by::  Judy Joseph RN    INDICATIONS: Detection of coronary artery disease.    HISTORY: The patient is a 85 year old  male. Chest pain status: no chest pain. Other symptoms: dyspnea. Coronary artery disease risk factors: dyslipidemia, hypertension, former smoker, and family history of premature coronary  artery disease. Cardiovascular history: none significant. Co-morbidity: history of lung disease/ COPD, SIVA. Medications: a calcium channel blocker, an ARB, a diuretic, and aspirin. Previous test results: abnormal ECG/ RBBB.    PHYSICAL EXAM: Baseline physical exam screening: no wheezes audible, no loud murmur.    REST ECG: Normal sinus rhythm. The ECG showed right bundle branch block.    PROCEDURE: The study was performed in the the [address] [place] [address]. A sitting regadenoson infusion pharmacologic stress test was performed. Gated SPECT myocardial perfusion imaging was performed after stress. Systolic blood  pressure was 142 mmHg, at the start of the study. Diastolic blood pressure was 90 mmHg, at the start of the study. The heart rate was 64 bpm, at the start of the study. IV double checked.  Regadenoson protocol:  HR bpm SBP mmHg DBP mmHg Symptoms  Baseline 64 142 90 none  1 min 68 116 60 moderate dyspnea, dizziness, nausea  2 min 62 120 80 subsiding  4 min 62 130 82 none  No medications or fluids given.    STRESS SUMMARY: Duration of pharmacologic stress was 3 min and 0 sec.  Maximal heart rate during stress was 68 bpm. The rate-pressure product for the peak heart rate and blood pressure was 9656. There was no chest pain during stress. The  stress test was terminated due to protocol completion. Pre oxygen saturation: 95 %. Peak oxygen saturation: 99 %. Arrhythmia during stress: isolated premature ventricular beats. The stress ECG was non-diagnostic.    ISOTOPE ADMINISTRATION:  Resting isotope administration Stress isotope administration  Agent Tetrofosmin Tetrofosmin  Dose 10.3 mCi 31.9 mCi  Date 06/07/2021 06/07/2021  Injection time 10:50 12:10  Imaging time 11:40 12:50  Injection-image interval 50 min 40 min    The radiopharmaceutical was injected at the peak effect of pharmacologic stress.    MYOCARDIAL PERFUSION IMAGING:  The image quality was good. Rotating projection images reveal mild diaphragmatic attenuation. Left ventricular size was normal. The TID ratio was 1.07.    PERFUSION DEFECTS:  -  There was a small, mild, fixed myocardial perfusion defect of the apex due to apical thinning.  -  There was a small, mild, fixed myocardial perfusion defect of the basal inferior wall due to diaphragm attenuation.    GATED SPECT:  The calculated left ventricular ejection fraction was 54 %. There was no left ventricular regional abnormality.    SUMMARY:  -  Stress results: There was no chest pain during stress.  -  ECG conclusions: The stress ECG was non-diagnostic.  -  Perfusion imaging: There was a small, mild, fixed myocardial perfusion defect of the apex due to apical thinning. There was a small, mild, fixed myocardial perfusion defect of the basal inferior wall due to diaphragm attenuation.  -  Gated SPECT: The calculated left ventricular ejection fraction was 54 %. There was no left ventricular regional abnormality.    IMPRESSIONS: Normal study after pharmacologic vasodilation without reproduction of symptoms. There was image artifact, without diagnostic evidence for perfusion  abnormality. Left ventricular systolic function was normal.    Prepared and signed by    Wesley Zuluaga DO  Signed 06/07/2021 15:45:31      --------------------------------------------------------------------------------  CATH:  No results found for this or any previous visit.    --------------------------------------------------------------------------------  ECHO:   No results found for this or any previous visit.    No results found for this or any previous visit.    --------------------------------------------------------------------------------  HOLTER  No results found for this or any previous visit.    No results found for this or any previous visit.    --------------------------------------------------------------------------------  CAROTIDS  No results found for this or any previous visit.     --------------------------------------------------------------------------------  ECGs:  No results found for this visit on 04/17/24.     Lab Results   Component Value Date    WBC 5.78 02/09/2024    HGB 13.6 02/09/2024    HCT 42.2 02/09/2024    MCV 93 02/09/2024     02/09/2024      Lab Results   Component Value Date    SODIUM 140 02/09/2024    K 4.1 02/09/2024     02/09/2024    CO2 29 02/09/2024    BUN 14 02/09/2024    CREATININE 0.70 02/09/2024    GLUC 108 (H) 01/03/2023    CALCIUM 9.2 02/09/2024      Lab Results   Component Value Date    HGBA1C 6.1 (H) 02/09/2024      Lab Results   Component Value Date    CHOL 167 12/01/2017    CHOL 156 07/22/2016    CHOL 154 07/06/2015     Lab Results   Component Value Date    HDL 42 02/09/2024    HDL 43 07/26/2022    HDL 39 (L) 09/12/2019     Lab Results   Component Value Date    LDLCALC 62 02/09/2024    LDLCALC 60 07/26/2022    LDLCALC 83 09/12/2019     Lab Results   Component Value Date    TRIG 155 (H) 02/09/2024    TRIG 192 (H) 07/26/2022    TRIG 177 (H) 09/12/2019     Renal artery duplex negative for MURIEL  Renin and aldosterone levels normal    No results  "found for: \"CHOLHDL\"   No results found for: \"INR\", \"PROTIME\"     1. NSVT (nonsustained ventricular tachycardia) (HCC)  -     Ambulatory Referral to Cardiology    2. Essential hypertension    3. Dyslipidemia    4. Chest discomfort  -     Ambulatory Referral to Cardiology    5. Shortness of breath  -     Ambulatory referral to Pulmonology; Future        IMPRESSION:    Shortness of breath  Precordial chest pain  Nonsustained VT by event recorder, April 2021  Pharmacologic nuclear stress test appears negative for myocardial ischemia, gated EF 54%, June 2021  Hypertension  LVEF 65%, indeterminate diastolic function, MAC, trace TR/WA with PASP 31 mmHg, March 2021  Dyslipidemia  SIVA  COPD  History of subdural hematoma    PLAN:  It was a pleasure to see Ramsey in the office today for follow-up CV evaluation.  He is here today due to his episodes of shortness of breath and chest discomfort.  His chest discomfort is fairly vague in description and nonspecific.  The patient had not been able to describe it fairly effectively, but did admit to progressive dyspnea on exertion.  He has no signs or symptoms of heart failure and clinically examines to be euvolemic.  Blood pressure and heart rate are currently stable.  The patient is tolerating his current medications without any reported adverse effects.  He cannot perform greater than 4 METS on a daily basis without significant exertional dyspnea.  Based on his clinical presentation, I have the following recommendations:    1.  We have discussed ischemic evaluation and echocardiogram to evaluate for any structural or coronary abnormalities that can result in his symptoms.  Previously, he had been tested in 2021, but his symptoms did progress.  Given his advanced age, the patient feels strongly he would like to hold off on this testing for now.  Risk and benefits discussed up to including death.  Patient understand these risks and will think about testing and plan to discuss " "further in the office in 3 months.  2.  Should he have any desire to undergo testing between now and then, he is aware that he can call the office and we will order the above tests.  3.  Recommend heart healthy diet low in sodium and carbohydrate.  4.  Would encourage physical activity as tolerated to build cardiovascular endurance.  5.  Continue current antihypertensive regimen including amlodipine, hydrochlorothiazide and losartan.  6.  Should his symptoms recur, especially worsening in frequency or severity or change in quality, recommend seeking immediate medical attention/dial 911.  7.  We will follow-up with him in 3 months to reassess his progress.    As always, please do not hesitate to call with any questions.    Portions of the record may have been created with voice recognition software. Occasional wrong word or \"sound a like\" substitutions may have occurred due to the inherent limitations of voice recognition software. Read the chart carefully and recognize, using context, where substitutions have occurred.      Signed: Homero Ibrahim DO, FACC, CLARA, FACP  "

## 2024-04-17 ENCOUNTER — OFFICE VISIT (OUTPATIENT)
Dept: CARDIOLOGY CLINIC | Facility: CLINIC | Age: 89
End: 2024-04-17
Payer: COMMERCIAL

## 2024-04-17 VITALS
HEART RATE: 66 BPM | HEIGHT: 71 IN | DIASTOLIC BLOOD PRESSURE: 60 MMHG | BODY MASS INDEX: 30.07 KG/M2 | SYSTOLIC BLOOD PRESSURE: 108 MMHG | WEIGHT: 214.8 LBS

## 2024-04-17 DIAGNOSIS — E78.5 DYSLIPIDEMIA: ICD-10-CM

## 2024-04-17 DIAGNOSIS — R06.02 SHORTNESS OF BREATH: ICD-10-CM

## 2024-04-17 DIAGNOSIS — R07.89 CHEST DISCOMFORT: ICD-10-CM

## 2024-04-17 DIAGNOSIS — I10 ESSENTIAL HYPERTENSION: ICD-10-CM

## 2024-04-17 DIAGNOSIS — I47.29 NSVT (NONSUSTAINED VENTRICULAR TACHYCARDIA) (HCC): Primary | ICD-10-CM

## 2024-04-17 PROCEDURE — 99214 OFFICE O/P EST MOD 30 MIN: CPT | Performed by: INTERNAL MEDICINE

## 2024-04-18 ENCOUNTER — TELEPHONE (OUTPATIENT)
Age: 89
End: 2024-04-18

## 2024-04-30 ENCOUNTER — CONSULT (OUTPATIENT)
Dept: PULMONOLOGY | Facility: CLINIC | Age: 89
End: 2024-04-30
Payer: COMMERCIAL

## 2024-04-30 VITALS
OXYGEN SATURATION: 93 % | BODY MASS INDEX: 28.9 KG/M2 | HEART RATE: 75 BPM | WEIGHT: 206.4 LBS | SYSTOLIC BLOOD PRESSURE: 110 MMHG | DIASTOLIC BLOOD PRESSURE: 68 MMHG | HEIGHT: 71 IN

## 2024-04-30 DIAGNOSIS — J44.9 CHRONIC OBSTRUCTIVE PULMONARY DISEASE, UNSPECIFIED COPD TYPE (HCC): Primary | ICD-10-CM

## 2024-04-30 DIAGNOSIS — G47.33 OSA (OBSTRUCTIVE SLEEP APNEA): ICD-10-CM

## 2024-04-30 DIAGNOSIS — R06.02 SHORTNESS OF BREATH: ICD-10-CM

## 2024-04-30 PROCEDURE — 99204 OFFICE O/P NEW MOD 45 MIN: CPT | Performed by: INTERNAL MEDICINE

## 2024-04-30 PROCEDURE — 1160F RVW MEDS BY RX/DR IN RCRD: CPT | Performed by: INTERNAL MEDICINE

## 2024-04-30 RX ORDER — TIOTROPIUM BROMIDE AND OLODATEROL 3.124; 2.736 UG/1; UG/1
2 SPRAY, METERED RESPIRATORY (INHALATION) DAILY
Qty: 4 G | Refills: 5 | Status: SHIPPED | OUTPATIENT
Start: 2024-04-30

## 2024-04-30 NOTE — PROGRESS NOTES
Pulmonary Outpatient Note   Ramsey Palmer 88 y.o. male MRN: [id number]  4/30/2024      Referring Physician: Homero Ibrahim DO    Reason for Consultation:    Chief Complaint   Patient presents with    Shortness of Breath         Assessment/Plan:    1. Chronic obstructive pulmonary disease, unspecified COPD type (HCC)  Assessment & Plan:  GOLD B Disease  No exacerbations  Remote smoker    Plan  Start Stiolto 2 puffs daily  Patient declines pulm rehab  Should get annual influenza vaccine  Is a candidate for [id number]- had prevnar 13 in 2017. Can discuss at next visit.    Orders:  -     tiotropium-olodaterol (Stiolto Respimat) 2.5-2.5 MCG/ACT inhaler; Inhale 2 puffs daily    2. Shortness of breath  -     Ambulatory referral to Pulmonology    3. SIVA (obstructive sleep apnea)  Assessment & Plan:  Previously diagnosed SIVA was not tolerant of CPAP in past but open to trying again with a smaller mask.  Has daytime sleepiness, naps frequently and dozes off.  Order home sleep study    Orders:  -     Ambulatory Referral to Sleep Medicine; Future          Health Maintenance  Immunization History   Administered Date(s) Administered    COVID-19 PFIZER VACCINE 0.3 ML IM 01/21/2021, 02/10/2021, 11/02/2021    H1N1, All Formulations 02/02/2010    INFLUENZA 11/01/2014, 11/05/2015, 01/06/2017, 11/20/2017, 09/23/2020, 12/14/2022    Influenza Split High Dose Preservative Free IM 11/01/2014, 11/05/2015, 01/06/2017, 11/20/2017    Influenza, high dose seasonal 0.7 mL 10/23/2018, 09/24/2019, 12/14/2022    Influenza, seasonal, injectable 09/21/2012, 09/21/2012, 11/04/2013    Pneumococcal Conjugate 13-Valent 11/28/2017    Pneumococcal Polysaccharide PPV23 1935, 10/03/2001    Zoster 12/01/2014          Return in 2 months (on 7/10/2024).    History of Present Illness   HPI:  Ramsey Palmer is a 88 y.o. male who presents to re-establish pulmonary care- referred back by his cardiologist..  He is here for shortness of breath  and wheezing.    He has a history of hypertension, gout.  He goes on prednisone a few times a year due to gout. Prednisone is not for breathing issues.    Was seen here last in 2019 by Dr Patel for SIVA and was on nocturnal CPAP.  He did not tolerate CPAP and no longer has CPAP.  His AHI in 2018 was 18- moderate SIVA.    He is here for shortness of breath and wheezing.      Quit smoking in 1982 but smoked 2 PPD for over 30 years.    He had PFTs in 2021 with moderate obstruction.  He was on Trelegy and albuterol in the past but has no current inhalers.      He is short of breath with activity.  Walking to the mailbox and back up a hill he stops half-way to catch his breath.  Any time of exercise he stops for shortness of breath.     He does not cough much.  He has some occasional wheeze.    Has post-nasal drip.  No heartburn.  No chest pain.    He is tired during the day.;  Naps most days, dozes off sitting down.  Does not snore anymore per his wife Does not wake up short of breath.  He does have witnessed apneas at night.  Does not wake up with a headache  He states he is not interested in CPAP.      Exposure history:    Exposure to pets/birds/farm animals: No    Social history:    Smoking: Over 60 pack-years but quit in 1982    Alcohol, ilicit drugs (inhalational/ IV): No vaping. No drugs. No alcohol    Worked as: Worked in [employer]      Review of Systems   Constitutional:  Negative for chills and fever.   HENT:  Positive for postnasal drip. Negative for ear pain and sore throat.    Eyes:  Negative for pain and visual disturbance.   Respiratory:  Positive for shortness of breath and wheezing. Negative for cough and chest tightness.    Cardiovascular:  Negative for chest pain and palpitations.   Gastrointestinal:  Negative for abdominal pain and vomiting.   Genitourinary:  Negative for dysuria and hematuria.   Musculoskeletal:  Negative for arthralgias and back pain.   Skin:  Negative for color change  and rash.   Neurological:  Negative for seizures and syncope.   All other systems reviewed and are negative.          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    COPD with acute exacerbation (HCC)     last assessed: 2/7/2017    Diverticulosis     Eczema     last assessed: 4/30/2013    Esophagitis     Gout     last assessed: 5/19/2014    Nail avulsion of toe     Neoplasm of bladder     last assessed: 4/30/2013     Past Surgical History:   Procedure Laterality Date    HERNIA REPAIR      last assessed: 11/25/2014    KIDNEY SURGERY      description: removal of kidney stone last assessed: 11/25/2014    US GUIDED THYROID BIOPSY  5/11/2021     Family History   Problem Relation Age of Onset    Stroke Mother     Alzheimer's disease Father     Heart attack Sister     Cancer Family     Lung cancer Brother      Meds/Allergies     Current Outpatient Medications:     amLODIPine (NORVASC) 5 mg tablet, TAKE 1 TABLET BY MOUTH DAILY, Disp: 100 tablet, Rfl: 2    Cholecalciferol (Vitamin D3) 10 MCG (400 UNIT) CAPS, Take by mouth, Disp: , Rfl:     clotrimazole (LOTRIMIN) 1 % cream, Apply topically 2 (two) times a day, Disp: 30 g, Rfl: 2    hydrochlorothiazide (MICROZIDE) 12.5 mg capsule, TAKE 1 CAPSULE BY MOUTH  EVERY OTHER DAY, Disp: 45 capsule, Rfl: 3    losartan (COZAAR) 50 mg tablet, Take 50 mg by mouth daily, Disp: , Rfl:     tamsulosin (FLOMAX) 0.4 mg, , Disp: , Rfl:     tiotropium-olodaterol (Stiolto Respimat) 2.5-2.5 MCG/ACT inhaler, Inhale 2 puffs daily, Disp: 4 g, Rfl: 5    vitamin B-12 (CYANOCOBALAMIN) 100 MCG tablet, Take 100 mcg by mouth daily, Disp: , Rfl:     finasteride (PROSCAR) 5 mg tablet, Take 5 mg by mouth daily (Patient not taking: Reported on 2/14/2024), Disp: , Rfl:     ILEVRO 0.3 % SUSP, , Disp: , Rfl:     sildenafil (VIAGRA) 100 mg tablet, TAKE ONE TABLET BY MOUTH  "AS NEEDED 1 HOUR PRIOR TO SEXUAL ACTIVITY (Patient not taking: Reported on 6/13/2023), Disp: , Rfl:   Allergies   Allergen Reactions    Levetiracetam Confusion and Anxiety       Vitals: Blood pressure 110/68, pulse 75, height 5' 11\" (1.803 m), weight 93.6 kg (206 lb 6.4 oz), SpO2 93%. Body mass index is 28.79 kg/m². Oxygen Therapy  SpO2: 93 %      Physical Exam  Physical Exam  Vitals and nursing note reviewed.   Constitutional:       General: He is not in acute distress.     Appearance: He is well-developed.   HENT:      Head: Normocephalic and atraumatic.   Eyes:      Conjunctiva/sclera: Conjunctivae normal.   Cardiovascular:      Rate and Rhythm: Normal rate and regular rhythm.      Heart sounds: No murmur heard.  Pulmonary:      Effort: Pulmonary effort is normal. No respiratory distress.      Breath sounds: Normal breath sounds.   Abdominal:      Palpations: Abdomen is soft.      Tenderness: There is no abdominal tenderness.   Musculoskeletal:         General: No swelling.      Cervical back: Neck supple.      Right lower leg: No edema.      Left lower leg: No edema.   Skin:     General: Skin is warm and dry.      Capillary Refill: Capillary refill takes less than 2 seconds.   Neurological:      Mental Status: He is alert.   Psychiatric:         Mood and Affect: Mood normal.         Labs:   I have personally reviewed pertinent lab results.    ABG: No results found for: \"PHART\", \"OVE6FSI\", \"PO2ART\", \"JVF6DWP\", \"P7ZUYIUK\", \"BEART\", \"SOURCE\",   BNP: No results found for: \"BNP\",   CBC:  Lab Results   Component Value Date    WBC 5.78 02/09/2024    HGB 13.6 02/09/2024    HCT 42.2 02/09/2024    MCV 93 02/09/2024     02/09/2024    EOSPCT 5 03/12/2021    EOSABS 0.44 03/12/2021    NEUTOPHILPCT 54 03/12/2021    LYMPHOPCT 30 03/12/2021   ,   CMP:   Lab Results   Component Value Date    SODIUM 140 02/09/2024    K 4.1 02/09/2024     02/09/2024    CO2 29 02/09/2024    BUN 14 02/09/2024    CREATININE 0.70 " "02/09/2024    CALCIUM 9.2 02/09/2024    AST 17 02/09/2024    ALT 12 02/09/2024    ALKPHOS 41 02/09/2024    PROT 6.8 12/01/2017    BILITOT 0.6 12/01/2017    EGFR 84 02/09/2024   ,   PT/INR: No results found for: \"PT\", \"INR\",   Troponin:   Lab Results   Component Value Date    TROPONINI <0.02 03/17/2021         Imaging and other studies: I have personally reviewed pertinent reports.   and I have personally reviewed pertinent films in PACS    CXR 1/2023- report only  Lungs clear    CT Chest 2021  1.  Clear lungs.   2.  Enlarged right and left pulmonary arteries which raises the possibility of   pulmonary arterial hypertension. Findings can be correlated with   echocardiography as warranted.   3.  Nonspecific mildly enlarged right paratracheal lymph node, which may be   reactive. No other enlarged, calcified or necrotic thoracic lymph nodes.   4.  Multinodular goiter.   5.  Bilateral renal cysts which can be further evaluated and characterized with   renal ultrasound.     Pulmonary function testing:   Pulmonary Functions Testing Results: 4/2021    Spirometry:  FEV1/FVC Ratio is  65%. FEV1 is  1.98L/ 72% of predicted. FVC is  3.06L/ 82% of predicted.     Lung volumes:  RV  4.34L/ 156% of predicted, TLC  7.35L/ 103% of predicted, RV/TLC ratio  59%     Diffusing capacity:   66% of predicted     IMPRESSION:   mild obstructive air flow limitation on spirometry with normal vital capacity   normal total lung capacity, increased residual volume and RV/TLC ratio indicating mild air trapping   mildly impaired diffusion capacity   obstructive flow volume    Sleep Study 2018  AHI 18- moderate SIVA      EKG, Pathology, and Other Studies: I have personally reviewed pertinent reports.     TTE 2021    Very technically challenging study.     Left ventricle is normal in size. Wall thickness is normal. Systolic   function is hyperdynamic with an ejection fraction over 65%. Wall motion   is within normal limits. Unable to assess diastolic " function.     Right ventricle cavity is normal. Systolic function is normal.     Mitral valve structure is normal. There is mild posterior annular   calcification. There is no regurgitation or stenosis.     Pulmonic Valve: The estimated pulmonary artery systolic pressure is   30.6 mmHg. Normal pulmonary artery pressure.     Ramsey Cyr M.D.  [facility]

## 2024-04-30 NOTE — ASSESSMENT & PLAN NOTE
GOLD B Disease  No exacerbations  Remote smoker    Plan  Start Stiolto 2 puffs daily  Patient declines pulm rehab  Should get annual influenza vaccine  Is a candidate for [id number]- had prevnar 13 in 2017. Can discuss at next visit.

## 2024-04-30 NOTE — ASSESSMENT & PLAN NOTE
Previously diagnosed SIVA was not tolerant of CPAP in past but open to trying again with a smaller mask.  Has daytime sleepiness, naps frequently and dozes off.  Order home sleep study

## 2024-05-02 DIAGNOSIS — G47.33 OSA (OBSTRUCTIVE SLEEP APNEA): Primary | ICD-10-CM

## 2024-05-17 ENCOUNTER — TELEPHONE (OUTPATIENT)
Age: 89
End: 2024-05-17

## 2024-05-17 NOTE — TELEPHONE ENCOUNTER
Pt called in stating he missed a call from the scheduling and they left a call back number on the voice message. But he been trying to reach out on that number but it been continuously busy. He is requesting if there is any chance practice can help him with his referral scheduled. Please do return call to patient. Thanks

## 2024-05-17 NOTE — TELEPHONE ENCOUNTER
Lvm for sleep medicine to see if it was them trying to contact the pt. I will return pt's phone call when they answer

## 2024-06-11 DIAGNOSIS — I10 BENIGN ESSENTIAL HYPERTENSION: ICD-10-CM

## 2024-06-11 RX ORDER — AMLODIPINE BESYLATE 5 MG/1
5 TABLET ORAL DAILY
Qty: 100 TABLET | Refills: 1 | Status: SHIPPED | OUTPATIENT
Start: 2024-06-11

## 2024-06-11 NOTE — TELEPHONE ENCOUNTER
Reason for call:   [x] Refill   [] Prior Auth  [] Other:     Office:   [] PCP/Provider -   [x] Specialty/Provider -       Does the patient have enough for 3 days?   [] Yes   [x] No - Send as HP to POD

## 2024-06-12 RX ORDER — HYDROCHLOROTHIAZIDE 12.5 MG/1
12.5 CAPSULE, GELATIN COATED ORAL EVERY MORNING
Qty: 45 CAPSULE | Refills: 0 | Status: SHIPPED | OUTPATIENT
Start: 2024-06-12

## 2024-07-09 ENCOUNTER — HOSPITAL ENCOUNTER (OUTPATIENT)
Dept: SLEEP CENTER | Facility: CLINIC | Age: 89
Discharge: HOME | End: 2024-07-09
Payer: COMMERCIAL

## 2024-07-09 DIAGNOSIS — G47.33 OSA (OBSTRUCTIVE SLEEP APNEA): ICD-10-CM

## 2024-07-09 PROCEDURE — G0399 HOME SLEEP TEST/TYPE 3 PORTA: HCPCS

## 2024-07-09 NOTE — PROGRESS NOTES
Home Sleep Study Documentation    HOME STUDY DEVICE: Noxturnal no                                           Barb G3 yes device # 21      Pre-Sleep Home Study:    Set-up and instructions performed by: Latoya    Technician performed demonstration for Patient: yes    Return demonstration performed by Patient: yes    Written instructions provided to Patient: yes    Patient signed consent form: yes        Post-Sleep Home Study:    Additional comments by Patient:     Home Sleep Study Failed:no:    Failure reason: N/A    Reported or Detected: N/A    Scored by: GIBSON Chopra

## 2024-07-10 DIAGNOSIS — J44.9 CHRONIC OBSTRUCTIVE PULMONARY DISEASE, UNSPECIFIED COPD TYPE (HCC): ICD-10-CM

## 2024-07-10 DIAGNOSIS — G47.33 OSA (OBSTRUCTIVE SLEEP APNEA): Primary | ICD-10-CM

## 2024-07-10 PROCEDURE — 95806 SLEEP STUDY UNATT&RESP EFFT: CPT | Performed by: INTERNAL MEDICINE

## 2024-07-14 NOTE — PROGRESS NOTES
Cardiology Office Note  MD Adán Cordero MD, [facility]  Homero Ibrahim DO, [facility]  MD Dianna Street DO, [facility]  Ash Levine DO, [facility]  [place] [address]    Ramsey Palmer 88 y.o. male MRN: [id number]  Unit/Bed#:  Encounter: [id number]      History of Present Illness:  It was a pleasure to see Ramsey Palmer in the office today for follow up CV evaluation. He has a past medical history of hypertension, dyslipidemia, nonsustained VT, SIVA, COPD and subdural hematoma.  He establish care with us in April 2024.  The patient was previously followed by Dr. Lawson.  The patient had resistant hypertension over the years and intermittent episodes of shortness of breath.  Due to his symptoms, he was sent for ischemic evaluation and echocardiogram in 2021.  Stress test was found to be negative for myocardial ischemia and echocardiogram was overall showing normal left ventricular systolic function with only mild valvular disease.  In 2024, he admitted that his effort tolerance decreased and his shortness of breath somewhat increased.  He was less active overall and noted that he may have had some pressure in the chest, but more of the shortness of breath sensation.  In July 2024, he admitted a gnawing sensation on the right side of his chest that lasted most of the day.  He did not seek medical attention for it.  He felt as though it was a muscle/pulling sensation.  His symptoms resolved fairly spontaneously toward the end of the day.  He did believe that it may have correlated with him taking Tums that helped with the symptoms.  Currently he feels back to his usual state of health.    Denies lower extremity swelling orthopnea or paroxysmal nocturnal dyspnea.  Denies lightheadedness, dizziness or palpitations.    Review of Systems:  Review of Systems   Constitutional:  Negative for decreased appetite, fever, weight gain and weight loss.   HENT:  Negative for congestion and sore throat.    Eyes:  Negative for visual disturbance.   Cardiovascular:  Positive for chest pain and dyspnea on exertion. Negative for leg swelling, near-syncope and palpitations.   Respiratory:  Positive for shortness of breath. Negative for cough.    Hematologic/Lymphatic: Negative for bleeding problem.   Skin:  Negative for rash.   Musculoskeletal:  Negative for myalgias and neck pain.   Gastrointestinal:  Negative for abdominal pain and nausea.   Neurological:  Negative for light-headedness and weakness.   Psychiatric/Behavioral:  Negative for depression.        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    COPD with acute exacerbation (HCC)     last assessed: 2017    Diverticulosis     Eczema     last assessed: 2013    Esophagitis     Gout     last assessed: 2014    Nail avulsion of toe     Neoplasm of bladder     last assessed: 2013       Past Surgical History:   Procedure Laterality Date    HERNIA REPAIR      last assessed: 2014    KIDNEY SURGERY      description: removal of kidney stone last assessed: 2014    US GUIDED THYROID BIOPSY  2021       Social History     Socioeconomic History    Marital status: /Civil Union     Spouse name: Not on file    Number of children: Not on file    Years of education: Not on file    Highest education level: Not on file   Occupational History    Not on file   Tobacco Use    Smoking status: Former     Current packs/day: 0.00     Average packs/day: 2.0 packs/day for 33.0 years (66.0 ttl pk-yrs)     Types: Cigarettes     Start date:      Quit date: 1982     Years since quittin.5    Smokeless tobacco: Never   Vaping Use    Vaping status: Never Used   Substance and Sexual Activity    Alcohol use: No     "Drug use: No    Sexual activity: Not on file   Other Topics Concern    Not on file   Social History Narrative    Not on file     Social Determinants of Health     Financial Resource Strain: Not on file   Food Insecurity: Not on file   Transportation Needs: Not on file   Physical Activity: Not on file   Stress: Not on file   Social Connections: Not on file   Intimate Partner Violence: Not on file   Housing Stability: Not on file       Family History   Problem Relation Age of Onset    Stroke Mother     Alzheimer's disease Father     Heart attack Sister     Cancer Family     Lung cancer Brother        Allergies   Allergen Reactions    Levetiracetam Confusion and Anxiety         Current Outpatient Medications:     amLODIPine (NORVASC) 5 mg tablet, Take 1 tablet (5 mg total) by mouth daily, Disp: 100 tablet, Rfl: 1    Cholecalciferol (Vitamin D3) 10 MCG (400 UNIT) CAPS, Take by mouth, Disp: , Rfl:     clotrimazole (LOTRIMIN) 1 % cream, Apply topically 2 (two) times a day, Disp: 30 g, Rfl: 2    hydroCHLOROthiazide 12.5 mg capsule, Take 1 capsule (12.5 mg total) by mouth every morning, Disp: 45 capsule, Rfl: 0    losartan (COZAAR) 50 mg tablet, Take 50 mg by mouth daily, Disp: , Rfl:     tamsulosin (FLOMAX) 0.4 mg, Take 0.4 mg by mouth daily with dinner, Disp: , Rfl:     tiotropium-olodaterol (Stiolto Respimat) 2.5-2.5 MCG/ACT inhaler, Inhale 2 puffs daily, Disp: 4 g, Rfl: 5    vitamin B-12 (CYANOCOBALAMIN) 100 MCG tablet, Take 100 mcg by mouth daily, Disp: , Rfl:     finasteride (PROSCAR) 5 mg tablet, Take 5 mg by mouth daily, Disp: , Rfl:     ILEVRO 0.3 % SUSP, , Disp: , Rfl:     sildenafil (VIAGRA) 100 mg tablet, TAKE ONE TABLET BY MOUTH AS NEEDED 1 HOUR PRIOR TO SEXUAL ACTIVITY (Patient not taking: Reported on 6/13/2023), Disp: , Rfl:     Vitals:    07/15/24 0944   BP: 110/56   Pulse: 79   SpO2: 94%   Weight: 94.8 kg (209 lb)   Height: 5' 11\" (1.803 m)       Body mass index is 29.15 kg/m².    PHYSICAL " EXAMINATION:  Gen: Awake, Alert, NAD   Head/eyes: AT/NC, pupils equal and round, Anicteric  ENT: mmm  Neck: Supple, No elevated JVP, trachea midline  Resp: CTA bilaterally no w/r/r  CV: RRR +S1, S2, No m/r/g  Abd: Soft, NT/ND + BS  Ext: no LE edema bilaterally  Neuro: Follows commands, moves all extermities  Psych: Appropriate affect, happy mood, pleasant attitude, non-combative  Skin: warm; no rash, erythema or venous stasis changes on exposed skin    --------------------------------------------------------------------------------  TREADMILL STRESS  No results found for this or any previous visit.     --------------------------------------------------------------------------------  NUCLEAR STRESS TEST: No results found for this or any previous visit.    Results for orders placed during the hospital encounter of 21    NM myocardial perfusion spect (rx stress and/or rest)    [address] [address] [phone]    Rest/Stress Gated SPECT Myocardial Perfusion Imaging After Regadenoson    Patient: SUNITA LOPEZ  MR number: [id number]  Account number: [id number]  : 1935  Age: 85 years  Gender: Male  Status: Outpatient  Location: [address] [place] [address] [facility]  Height: 70 in  Weight: 208 lb  BP: 142/ 90 mmHg    Allergies: LEVETIRACETAM    Diagnosis: I45.10 - Unspecified right bundle-branch block, R06.00 - Dyspnea, unspecified    Interpreting Physician:  Wesley Zuluaga DO  Referring Physician:  Mo Lawson MD  Primary Physician:  Michelle Bush DO  Technician:  Taty Tim CCT  RN:  Judy Joseph RN  Group:  [facility]  Cardiology Fellow:  Bill Dyson MD  Report Prepared by::  Judy Joseph RN    INDICATIONS: Detection of coronary artery disease.    HISTORY: The patient is a 85 year old  male. Chest pain status: no chest pain. Other symptoms: dyspnea. Coronary artery disease risk  factors: dyslipidemia, hypertension, former smoker, and family history of premature coronary  artery disease. Cardiovascular history: none significant. Co-morbidity: history of lung disease/ COPD, SIVA. Medications: a calcium channel blocker, an ARB, a diuretic, and aspirin. Previous test results: abnormal ECG/ RBBB.    PHYSICAL EXAM: Baseline physical exam screening: no wheezes audible, no loud murmur.    REST ECG: Normal sinus rhythm. The ECG showed right bundle branch block.    PROCEDURE: The study was performed in the [address] [facility]. A sitting regadenoson infusion pharmacologic stress test was performed. Gated SPECT myocardial perfusion imaging was performed after stress. Systolic blood  pressure was 142 mmHg, at the start of the study. Diastolic blood pressure was 90 mmHg, at the start of the study. The heart rate was 64 bpm, at the start of the study. IV double checked.  Regadenoson protocol:  HR bpm SBP mmHg DBP mmHg Symptoms  Baseline 64 142 90 none  1 min 68 116 60 moderate dyspnea, dizziness, nausea  2 min 62 120 80 subsiding  4 min 62 130 82 none  No medications or fluids given.    STRESS SUMMARY: Duration of pharmacologic stress was 3 min and 0 sec. Maximal heart rate during stress was 68 bpm. The rate-pressure product for the peak heart rate and blood pressure was 9656. There was no chest pain during stress. The  stress test was terminated due to protocol completion. Pre oxygen saturation: 95 %. Peak oxygen saturation: 99 %. Arrhythmia during stress: isolated premature ventricular beats. The stress ECG was non-diagnostic.    ISOTOPE ADMINISTRATION:  Resting isotope administration Stress isotope administration  Agent Tetrofosmin Tetrofosmin  Dose 10.3 mCi 31.9 mCi  Date 06/07/2021 06/07/2021  Injection time 10:50 12:10  Imaging time 11:40 12:50  Injection-image interval 50 min 40 min    The radiopharmaceutical was injected at the peak effect of pharmacologic stress.    MYOCARDIAL  PERFUSION IMAGING:  The image quality was good. Rotating projection images reveal mild diaphragmatic attenuation. Left ventricular size was normal. The TID ratio was 1.07.    PERFUSION DEFECTS:  -  There was a small, mild, fixed myocardial perfusion defect of the apex due to apical thinning.  -  There was a small, mild, fixed myocardial perfusion defect of the basal inferior wall due to diaphragm attenuation.    GATED SPECT:  The calculated left ventricular ejection fraction was 54 %. There was no left ventricular regional abnormality.    SUMMARY:  -  Stress results: There was no chest pain during stress.  -  ECG conclusions: The stress ECG was non-diagnostic.  -  Perfusion imaging: There was a small, mild, fixed myocardial perfusion defect of the apex due to apical thinning. There was a small, mild, fixed myocardial perfusion defect of the basal inferior wall due to diaphragm attenuation.  -  Gated SPECT: The calculated left ventricular ejection fraction was 54 %. There was no left ventricular regional abnormality.    IMPRESSIONS: Normal study after pharmacologic vasodilation without reproduction of symptoms. There was image artifact, without diagnostic evidence for perfusion abnormality. Left ventricular systolic function was normal.    Prepared and signed by    DO Carla Lucia 06/07/2021 15:45:31      --------------------------------------------------------------------------------  CATH:  No results found for this or any previous visit.    --------------------------------------------------------------------------------  ECHO:   No results found for this or any previous visit.    No results found for this or any previous visit.    --------------------------------------------------------------------------------  HOLTER  No results found for this or any previous visit.    No results found for this or any previous  "visit.    --------------------------------------------------------------------------------  CAROTIDS  No results found for this or any previous visit.     --------------------------------------------------------------------------------  ECGs:  No results found for this visit on 07/15/24.     Lab Results   Component Value Date    WBC 5.78 02/09/2024    HGB 13.6 02/09/2024    HCT 42.2 02/09/2024    MCV 93 02/09/2024     02/09/2024      Lab Results   Component Value Date    SODIUM 140 02/09/2024    K 4.1 02/09/2024     02/09/2024    CO2 29 02/09/2024    BUN 14 02/09/2024    CREATININE 0.70 02/09/2024    GLUC 108 (H) 01/03/2023    CALCIUM 9.2 02/09/2024      Lab Results   Component Value Date    HGBA1C 6.1 (H) 02/09/2024      Lab Results   Component Value Date    CHOL 167 12/01/2017    CHOL 156 07/22/2016    CHOL 154 07/06/2015     Lab Results   Component Value Date    HDL 42 02/09/2024    HDL 43 07/26/2022    HDL 39 (L) 09/12/2019     Lab Results   Component Value Date    LDLCALC 62 02/09/2024    LDLCALC 60 07/26/2022    LDLCALC 83 09/12/2019     Lab Results   Component Value Date    TRIG 155 (H) 02/09/2024    TRIG 192 (H) 07/26/2022    TRIG 177 (H) 09/12/2019     Renal artery duplex negative for MURIEL  Renin and aldosterone levels normal    No results found for: \"CHOLHDL\"   No results found for: \"INR\", \"PROTIME\"     1. Shortness of breath  2. Precordial chest pain  3. NSVT (nonsustained ventricular tachycardia) (HCC)  4. Essential hypertension  5. Dyslipidemia        IMPRESSION:    Shortness of breath  Precordial chest pain  Nonsustained VT by event recorder, April 2021  Pharmacologic nuclear stress test appears negative for myocardial ischemia, gated EF 54%, June 2021  Hypertension  LVEF 65%, indeterminate diastolic function, MAC, trace TR/CT with PASP 31 mmHg, March 2021  Dyslipidemia  Moderate SIVA  COPD  History of subdural hematoma    PLAN:  It was a pleasure to see Ramsey in the office today for " "follow-up CV evaluation.  He is here today for routine CV follow-up.  Since her last encounter, he feels fairly unchanged overall.  He has been having intermittent episodes of chest discomfort that may be musculoskeletal in nature.  It is unclear that they are necessarily anginal and they are very nonspecific.  He has no signs or symptoms of heart failure and clinically examines to be euvolemic.  Blood pressure and heart rate are currently stable.  He is tolerating his current medications without any reported adverse effects.  He can perform greater than 4 METS with mild exertional dyspnea.  Based on his clinical presentation, I have the following recommendations:    1.  At this time, we have discussed given his age and current clinical presentation, the patient would like to hold off on any further testing for now.  Should he develop any acute onset symptoms, the patient has agreed to seek immediate medical attention/dial 911.  2.  Would encourage physical activity as tolerated to build cardiovascular endurance.  3.  Continue current antihypertensive regimen including amlodipine, hydrochlorothiazide and losartan.  4.  Recommend a heart healthy diet low in sodium and carbohydrate.  5.  We will follow-up with him again in 6 months to reassess his progress.  Should the patient change his mind on testing, recommend reaching out to the office.    As always, please do not hesitate to call with any questions.    Portions of the record may have been created with voice recognition software. Occasional wrong word or \"sound a like\" substitutions may have occurred due to the inherent limitations of voice recognition software. Read the chart carefully and recognize, using context, where substitutions have occurred.      Signed: Homero Ibrahim DO, FACC, FASEVELIA, FACP  "

## 2024-07-15 ENCOUNTER — OFFICE VISIT (OUTPATIENT)
Dept: CARDIOLOGY CLINIC | Facility: CLINIC | Age: 89
End: 2024-07-15
Payer: COMMERCIAL

## 2024-07-15 VITALS
HEART RATE: 79 BPM | HEIGHT: 71 IN | BODY MASS INDEX: 29.26 KG/M2 | SYSTOLIC BLOOD PRESSURE: 110 MMHG | DIASTOLIC BLOOD PRESSURE: 56 MMHG | OXYGEN SATURATION: 94 % | WEIGHT: 209 LBS

## 2024-07-15 DIAGNOSIS — R06.02 SHORTNESS OF BREATH: Primary | ICD-10-CM

## 2024-07-15 DIAGNOSIS — I10 ESSENTIAL HYPERTENSION: ICD-10-CM

## 2024-07-15 DIAGNOSIS — I47.29 NSVT (NONSUSTAINED VENTRICULAR TACHYCARDIA) (HCC): ICD-10-CM

## 2024-07-15 DIAGNOSIS — R07.2 PRECORDIAL CHEST PAIN: ICD-10-CM

## 2024-07-15 DIAGNOSIS — E78.5 DYSLIPIDEMIA: ICD-10-CM

## 2024-07-15 PROCEDURE — 99214 OFFICE O/P EST MOD 30 MIN: CPT | Performed by: INTERNAL MEDICINE

## 2024-07-22 ENCOUNTER — TELEPHONE (OUTPATIENT)
Dept: PULMONOLOGY | Facility: CLINIC | Age: 89
End: 2024-07-22

## 2024-07-22 NOTE — TELEPHONE ENCOUNTER
I tried calling the patient on his house line, his cell line, and his wife's cell. No answer.  I left a message on his house line and wife's cell line.  I told him that he has at least moderate sleep apnea, maybe severe, and that he also had low oxygen at night.  He has symptoms consistent with sleep apnea.    The first recommendation is an in lab sleep study which I recommend. If he is not amenable to that the next recommendation would be a trial of AutoCPAP.  I have asked him or his wife to call me back and gave the office number.

## 2024-07-25 ENCOUNTER — NURSE TRIAGE (OUTPATIENT)
Age: 89
End: 2024-07-25

## 2024-07-25 NOTE — TELEPHONE ENCOUNTER
"Patient tested positive for COVID today via home test today. Patient has nasal congestion, cough, sore throat, muscle aches. No fever, no SOB. Mucous is green. We did review the home care protocol. Patient had Paxlovid in the past and is asking if that medication is appropriate now. IF so, please prescibe to Nick in [place]. Please call the patient back with any further advice [phone]      Reason for Disposition  • [1] COVID-19 diagnosed by positive lab test (e.g., PCR, rapid self-test kit) AND [2] mild symptoms (e.g., cough, fever, others) AND [3] no complications or SOB    Answer Assessment - Initial Assessment Questions  1. COVID-19 DIAGNOSIS: \"Who made your COVID-19 diagnosis?\" \"Was it confirmed by a positive lab test or self-test?\" If not diagnosed by a doctor (or NP/PA), ask \"Are there lots of cases (community spread) where you live?\" Note: See public health department website, if unsure.      Home test  2. COVID-19 EXPOSURE: \"Was there any known exposure to COVID before the symptoms began?\" CDC Definition of close contact: within 6 feet (2 meters) for a total of 15 minutes or more over a 24-hour period.       Daughter has Covid  3. ONSET: \"When did the COVID-19 symptoms start?\"       2 days  4. WORST SYMPTOM: \"What is your worst symptom?\" (e.g., cough, fever, shortness of breath, muscle aches)      Nasal congestion  5. COUGH: \"Do you have a cough?\" If Yes, ask: \"How bad is the cough?\"        yes  6. FEVER: \"Do you have a fever?\" If Yes, ask: \"What is your temperature, how was it measured, and when did it start?\"      denies  7. RESPIRATORY STATUS: \"Describe your breathing?\" (e.g., shortness of breath, wheezing, unable to speak)       Denies any worsening  8. BETTER-SAME-WORSE: \"Are you getting better, staying the same or getting worse compared to yesterday?\"  If getting worse, ask, \"In what way?\"      same  9. HIGH RISK DISEASE: \"Do you have any chronic medical problems?\" (e.g., asthma, heart or lung " "disease, weak immune system, obesity, etc.)      COPD  10. VACCINE: \"Have you had the COVID-19 vaccine?\" If Yes, ask: \"Which one, how many shots, when did you get it?\"        yes  11. BOOSTER: \"Have you received your COVID-19 booster?\" If Yes, ask: \"Which one and when did you get it?\"        Yes-1  12. PREGNANCY: \"Is there any chance you are pregnant?\" \"When was your last menstrual period?\"        N/A  13. OTHER SYMPTOMS: \"Do you have any other symptoms?\"  (e.g., chills, fatigue, headache, loss of smell or taste, muscle pain, sore throat)        No headache, sore throat, muscle pain   14. O2 SATURATION MONITOR:  \"Do you use an oxygen saturation monitor (pulse oximeter) at home?\" If Yes, ask \"What is your reading (oxygen level) today?\" \"What is your usual oxygen saturation reading?\" (e.g., 95%)        N/A    Protocols used: Coronavirus (COVID-19) Diagnosed or Suspected-ADULT-[place]    "

## 2024-07-26 ENCOUNTER — TELEPHONE (OUTPATIENT)
Age: 89
End: 2024-07-26

## 2024-07-26 DIAGNOSIS — I10 BENIGN ESSENTIAL HYPERTENSION: ICD-10-CM

## 2024-07-26 DIAGNOSIS — U07.1 COVID-19: Primary | ICD-10-CM

## 2024-07-26 RX ORDER — NIRMATRELVIR AND RITONAVIR 300-100 MG
3 KIT ORAL 2 TIMES DAILY
Qty: 30 TABLET | Refills: 0 | Status: SHIPPED | OUTPATIENT
Start: 2024-07-26 | End: 2024-08-01

## 2024-07-26 RX ORDER — HYDROCHLOROTHIAZIDE 12.5 MG/1
12.5 CAPSULE, GELATIN COATED ORAL EVERY MORNING
Qty: 90 CAPSULE | Refills: 1 | Status: SHIPPED | OUTPATIENT
Start: 2024-07-26

## 2024-07-26 NOTE — TELEPHONE ENCOUNTER
Patient's wife called stating that the pharmacy needs clarification from physician in order to fill Paxlovid Rx.  Noted that once wife disconnected call, there is a note in the chart that pharmacy was already contacted by Montserrat Ortiz.

## 2024-07-26 NOTE — TELEPHONE ENCOUNTER
Pharmacy called in regards to Paxlovid having an interaction with Tamsulosin. Pharmacyu would like to know if provider would like patient to stop the Tamsulosin while taking the Paxlovid or change the Paxlovid to a different medication. Pharmacy would like a call back.

## 2024-08-01 ENCOUNTER — OFFICE VISIT (OUTPATIENT)
Dept: PULMONOLOGY | Facility: CLINIC | Age: 89
End: 2024-08-01
Payer: COMMERCIAL

## 2024-08-01 VITALS
WEIGHT: 210 LBS | BODY MASS INDEX: 29.4 KG/M2 | HEART RATE: 74 BPM | RESPIRATION RATE: 16 BRPM | HEIGHT: 71 IN | OXYGEN SATURATION: 95 % | DIASTOLIC BLOOD PRESSURE: 70 MMHG | SYSTOLIC BLOOD PRESSURE: 138 MMHG

## 2024-08-01 DIAGNOSIS — G47.33 OSA (OBSTRUCTIVE SLEEP APNEA): Primary | ICD-10-CM

## 2024-08-01 DIAGNOSIS — J44.9 CHRONIC OBSTRUCTIVE PULMONARY DISEASE, UNSPECIFIED COPD TYPE (HCC): ICD-10-CM

## 2024-08-01 PROCEDURE — G2211 COMPLEX E/M VISIT ADD ON: HCPCS | Performed by: INTERNAL MEDICINE

## 2024-08-01 PROCEDURE — 99214 OFFICE O/P EST MOD 30 MIN: CPT | Performed by: INTERNAL MEDICINE

## 2024-08-01 NOTE — ASSESSMENT & PLAN NOTE
GOLD B Disease  No exacerbations  Remote smoker  Doing well    Plan  Continue Stiolto 2 puffs daily  Patient declined pulm rehab in past  Should get annual influenza vaccine

## 2024-08-01 NOTE — PROGRESS NOTES
Pulmonary Outpatient Note   Ramsey Palmer 88 y.o. male MRN: [id number]  8/1/2024          Reason for Consultation:    Chief Complaint   Patient presents with    Sleep Apnea    COPD         Assessment/Plan:    1. SIVA (obstructive sleep apnea)  -     CPAP Auto New DME          Health Maintenance  Immunization History   Administered Date(s) Administered    COVID-19 PFIZER VACCINE 0.3 ML IM 01/21/2021, 02/10/2021, 11/02/2021    H1N1, All Formulations 02/02/2010    INFLUENZA 11/01/2014, 11/05/2015, 01/06/2017, 11/20/2017, 09/23/2020, 12/14/2022    Influenza Split High Dose Preservative Free IM 11/01/2014, 11/05/2015, 01/06/2017, 11/20/2017    Influenza, high dose seasonal 0.7 mL 10/23/2018, 09/24/2019, 12/14/2022    Influenza, seasonal, injectable 09/21/2012, 09/21/2012, 11/04/2013    Pneumococcal Conjugate 13-Valent 11/28/2017    Pneumococcal Polysaccharide PPV23 1935, 10/03/2001    Zoster 12/01/2014          Return in about 2 months (around 10/1/2024).    History of Present Illness   HPI:  Ramsey Palmer is a 88 y.o. male who presents for follow-up for COPD and SIVA.  Also with history of hypertension and gout.    Seen in April. Maintained on Stiolto.  Declined pulm rehab.  SIVA- Intolerant of CPAP in past  Had Home sleep study 5/2024- HANG of 22.8.  Low SPO2 of 77%.  A CPAP titration study was recommended by the sleep physician.    COPD- stable. Not using albuterol. On Stiolto.  No current symptoms.      April 2024 consult    Quit smoking in 1982 but smoked 2 PPD for over 30 years.    He had PFTs in 2021 with moderate obstruction.  He was on Trelegy and albuterol in the past but has no current inhalers.    He is short of breath with activity.  Walking to the mailbox and back up a hill he stops MCC to catch his breath.  Any time of exercise he stops for shortness of breath.     He does not cough much.  He has some occasional wheeze.    Has post-nasal drip.  No heartburn.  No chest pain.    He is tired  during the day.;  Naps most days, dozes off sitting down.  Does not snore anymore per his wife Does not wake up short of breath.  He does have witnessed apneas at night.  Does not wake up with a headache  He states he is not interested in CPAP.      Exposure history:    Exposure to pets/birds/farm animals: No    Social history:    Smoking: Over 60 pack-years but quit in 1982    Alcohol, ilicit drugs (inhalational/ IV): No vaping. No drugs. No alcohol    Worked as: Worked in [employer]      Review of Systems   Constitutional:  Positive for fatigue. Negative for chills and fever.   HENT:  Negative for ear pain and sore throat.    Eyes:  Negative for pain and visual disturbance.   Respiratory:  Negative for cough and shortness of breath.    Cardiovascular:  Negative for chest pain and palpitations.   Gastrointestinal:  Negative for abdominal pain and vomiting.   Genitourinary:  Negative for dysuria and hematuria.   Musculoskeletal:  Negative for arthralgias and back pain.   Skin:  Negative for color change and rash.   Neurological:  Negative for seizures and syncope.   All other systems reviewed and are negative.          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    COPD with acute exacerbation (HCC)     last assessed: 2/7/2017    Diverticulosis     Eczema     last assessed: 4/30/2013    Esophagitis     Gout     last assessed: 5/19/2014    Nail avulsion of toe     Neoplasm of bladder     last assessed: 4/30/2013     Past Surgical History:   Procedure Laterality Date    HERNIA REPAIR      last assessed: 11/25/2014    KIDNEY SURGERY      description: removal of kidney stone last assessed: 11/25/2014    US GUIDED THYROID BIOPSY  5/11/2021     Family History   Problem Relation Age of Onset    Stroke Mother     Alzheimer's disease Father     Heart attack Sister      "Cancer Family     Lung cancer Brother      Meds/Allergies     Current Outpatient Medications:     amLODIPine (NORVASC) 5 mg tablet, Take 1 tablet (5 mg total) by mouth daily, Disp: 100 tablet, Rfl: 1    Cholecalciferol (Vitamin D3) 10 MCG (400 UNIT) CAPS, Take by mouth, Disp: , Rfl:     clotrimazole (LOTRIMIN) 1 % cream, Apply topically 2 (two) times a day, Disp: 30 g, Rfl: 2    finasteride (PROSCAR) 5 mg tablet, Take 5 mg by mouth daily, Disp: , Rfl:     hydroCHLOROthiazide 12.5 mg capsule, Take 1 capsule (12.5 mg total) by mouth every morning, Disp: 90 capsule, Rfl: 1    losartan (COZAAR) 50 mg tablet, Take 50 mg by mouth daily, Disp: , Rfl:     tamsulosin (FLOMAX) 0.4 mg, Take 0.4 mg by mouth daily with dinner, Disp: , Rfl:     tiotropium-olodaterol (Stiolto Respimat) 2.5-2.5 MCG/ACT inhaler, Inhale 2 puffs daily, Disp: 4 g, Rfl: 5    vitamin B-12 (CYANOCOBALAMIN) 100 MCG tablet, Take 100 mcg by mouth daily, Disp: , Rfl:     ILEVRO 0.3 % SUSP, , Disp: , Rfl:     sildenafil (VIAGRA) 100 mg tablet, TAKE ONE TABLET BY MOUTH AS NEEDED 1 HOUR PRIOR TO SEXUAL ACTIVITY (Patient not taking: Reported on 6/13/2023), Disp: , Rfl:   Allergies   Allergen Reactions    Levetiracetam Confusion and Anxiety       Vitals: Blood pressure 138/70, pulse 74, resp. rate 16, height 5' 11\" (1.803 m), weight 95.3 kg (210 lb), SpO2 95%. Body mass index is 29.29 kg/m². Oxygen Therapy  SpO2: 95 %      Physical Exam  Physical Exam  Vitals and nursing note reviewed.   Constitutional:       General: He is not in acute distress.     Appearance: He is well-developed.   HENT:      Head: Normocephalic and atraumatic.   Eyes:      Conjunctiva/sclera: Conjunctivae normal.   Cardiovascular:      Rate and Rhythm: Normal rate and regular rhythm.      Heart sounds: No murmur heard.  Pulmonary:      Effort: Pulmonary effort is normal. No respiratory distress.      Breath sounds: Normal breath sounds.   Abdominal:      Palpations: Abdomen is soft.      " "Tenderness: There is no abdominal tenderness.   Musculoskeletal:         General: No swelling.      Cervical back: Neck supple.      Right lower leg: No edema.      Left lower leg: No edema.   Skin:     General: Skin is warm and dry.      Capillary Refill: Capillary refill takes less than 2 seconds.   Neurological:      Mental Status: He is alert.   Psychiatric:         Mood and Affect: Mood normal.         Labs:   I have personally reviewed pertinent lab results.    ABG: No results found for: \"PHART\", \"RYV7IHA\", \"PO2ART\", \"EPK1MWS\", \"P4MCKSVW\", \"BEART\", \"SOURCE\",   BNP: No results found for: \"BNP\",   CBC:  Lab Results   Component Value Date    WBC 5.78 02/09/2024    HGB 13.6 02/09/2024    HCT 42.2 02/09/2024    MCV 93 02/09/2024     02/09/2024    EOSPCT 5 03/12/2021    EOSABS 0.44 03/12/2021    NEUTOPHILPCT 54 03/12/2021    LYMPHOPCT 30 03/12/2021   ,   CMP:   Lab Results   Component Value Date    SODIUM 140 02/09/2024    K 4.1 02/09/2024     02/09/2024    CO2 29 02/09/2024    BUN 14 02/09/2024    CREATININE 0.70 02/09/2024    CALCIUM 9.2 02/09/2024    AST 17 02/09/2024    ALT 12 02/09/2024    ALKPHOS 41 02/09/2024    PROT 6.8 12/01/2017    BILITOT 0.6 12/01/2017    EGFR 84 02/09/2024   ,   PT/INR: No results found for: \"PT\", \"INR\",   Troponin:   Lab Results   Component Value Date    TROPONINI <0.02 03/17/2021         Imaging and other studies: I have personally reviewed pertinent reports.   and I have personally reviewed pertinent films in PACS    CXR 1/2023- report only  Lungs clear    CT Chest 2021  1.  Clear lungs.   2.  Enlarged right and left pulmonary arteries which raises the possibility of   pulmonary arterial hypertension. Findings can be correlated with   echocardiography as warranted.   3.  Nonspecific mildly enlarged right paratracheal lymph node, which may be   reactive. No other enlarged, calcified or necrotic thoracic lymph nodes.   4.  Multinodular goiter.   5.  Bilateral renal cysts " which can be further evaluated and characterized with   renal ultrasound.     Pulmonary function testing:     Home Sleep Study 7/10/24  TESTING RESULTS:  The test results are from [facility].  The total time in bed (analysis time) was 426.5 minutes.  The patient had a total of 156 respiratory events made up of 112 obstructive apneas, 0 central apneas, 0 mixed apneas and 44 hypopneas resulting in a respiratory event index (HANG) of 22.8.  The lowest SpO2 recorded is 77%.     IMPRESSION:     This test is consistent with at least moderate obstructive sleep apnea.  2.   Significant hypoxemia was noted.      RECOMMENDATION:  A CPAP titration study is recommended due to sleep apnea and hypoxemia       Pulmonary Functions Testing Results: 4/2021    Spirometry:  FEV1/FVC Ratio is  65%. FEV1 is  1.98L/ 72% of predicted. FVC is  3.06L/ 82% of predicted.     Lung volumes:  RV  4.34L/ 156% of predicted, TLC  7.35L/ 103% of predicted, RV/TLC ratio  59%     Diffusing capacity:   66% of predicted     IMPRESSION:   mild obstructive air flow limitation on spirometry with normal vital capacity   normal total lung capacity, increased residual volume and RV/TLC ratio indicating mild air trapping   mildly impaired diffusion capacity   obstructive flow volume    Sleep Study 2018  AHI 18- moderate SIVA      EKG, Pathology, and Other Studies: I have personally reviewed pertinent reports.     TTE 2021    Very technically challenging study.     Left ventricle is normal in size. Wall thickness is normal. Systolic   function is hyperdynamic with an ejection fraction over 65%. Wall motion   is within normal limits. Unable to assess diastolic function.     Right ventricle cavity is normal. Systolic function is normal.     Mitral valve structure is normal. There is mild posterior annular   calcification. There is no regurgitation or stenosis.     Pulmonic Valve: The estimated pulmonary artery systolic pressure is   30.6 mmHg. Normal pulmonary artery  pressure.     Ramsey Cyr M.D.  [facility]

## 2024-08-01 NOTE — ASSESSMENT & PLAN NOTE
Previously diagnosed SIVA was not tolerant of CPAP face mask in past but open to trying again with a smaller mask.  Has daytime sleepiness, naps frequently and dozes off.    Home Sleep Study showed moderate SIVA with hypoxemia  Patient declines in lab CPAP titration  He is amenable to trial of CPAP with nasal mask  I ordered this and will have him follow-up in 2 months to assess compliance and efficacy.

## 2024-08-02 LAB
DME PARACHUTE DELIVERY DATE REQUESTED: NORMAL
DME PARACHUTE ITEM DESCRIPTION: NORMAL
DME PARACHUTE ORDER STATUS: NORMAL
DME PARACHUTE SUPPLIER NAME: NORMAL
DME PARACHUTE SUPPLIER PHONE: NORMAL

## 2024-08-09 LAB

## 2024-08-12 LAB

## 2024-08-13 ENCOUNTER — TELEPHONE (OUTPATIENT)
Age: 89
End: 2024-08-13

## 2024-08-28 ENCOUNTER — OFFICE VISIT (OUTPATIENT)
Dept: PODIATRY | Facility: CLINIC | Age: 89
End: 2024-08-28
Payer: COMMERCIAL

## 2024-08-28 VITALS — HEIGHT: 71 IN | WEIGHT: 213 LBS | RESPIRATION RATE: 18 BRPM | BODY MASS INDEX: 29.82 KG/M2

## 2024-08-28 DIAGNOSIS — G62.9 PERIPHERAL NERVE DISORDER: Primary | ICD-10-CM

## 2024-08-28 PROCEDURE — 99212 OFFICE O/P EST SF 10 MIN: CPT | Performed by: PODIATRIST

## 2024-08-28 NOTE — PROGRESS NOTES
"Ambulatory Visit  Name: Ramsey Palmer      : 1935      MRN: [id number]  Encounter Provider: Ramsye Salguero DPM  Encounter Date: 2024   Encounter [facility] [place]    Explained to patient that his symptoms are most consistent with peripheral neuropathy although he denies diabetes or lower back issues.  Advised him to wear shoes rather than walking in his home barefoot.  Do not recommend any medication such as gabapentin as risk of medication outweigh benefits.  Explained that he will need to live with this condition.  Reappoint as needed    Assessment & Plan   1. Peripheral nerve disorder      History of Present Illness     Ramsey Palmer is a 88 y.o. male who presents complaining of a numb sensation on the tops of each foot and a peculiar discomfort when he wakes up and goes to the bathroom at night.  This discomfort is not present if he wear shoes.  He states that he has had this issue for years.  He states that it is a relatively trivial issue to him he just wanted to make sure that everything was okay.    Review of Systems   Respiratory:  Positive for shortness of breath.    Musculoskeletal:  Positive for arthralgias and gait problem.   Neurological:  Positive for numbness.           Objective     Resp 18   Ht 5' 11\" (1.803 m)   Wt 96.6 kg (213 lb)   BMI 29.71 kg/m²     Physical Exam  Constitutional:       Appearance: Normal appearance.   Cardiovascular:      Comments: No palpable pedal pulses bilateral  Musculoskeletal:         General: Normal range of motion.   Skin:     General: Skin is warm.   Neurological:      General: No focal deficit present.      Mental Status: He is oriented to person, place, and time.      Comments: Sensorium intact per [place] monofilament.           Administrative Statements           "

## 2024-09-18 ENCOUNTER — RA CDI HCC (OUTPATIENT)
Dept: OTHER | Facility: HOSPITAL | Age: 89
End: 2024-09-18

## 2024-09-24 ENCOUNTER — OFFICE VISIT (OUTPATIENT)
Dept: FAMILY MEDICINE CLINIC | Facility: CLINIC | Age: 89
End: 2024-09-24
Payer: COMMERCIAL

## 2024-09-24 VITALS
HEIGHT: 71 IN | WEIGHT: 212.6 LBS | TEMPERATURE: 97.7 F | OXYGEN SATURATION: 96 % | HEART RATE: 64 BPM | DIASTOLIC BLOOD PRESSURE: 70 MMHG | BODY MASS INDEX: 29.76 KG/M2 | SYSTOLIC BLOOD PRESSURE: 126 MMHG

## 2024-09-24 DIAGNOSIS — Z23 ENCOUNTER FOR IMMUNIZATION: ICD-10-CM

## 2024-09-24 DIAGNOSIS — I10 BENIGN ESSENTIAL HYPERTENSION: ICD-10-CM

## 2024-09-24 DIAGNOSIS — E04.1 THYROID NODULE: ICD-10-CM

## 2024-09-24 DIAGNOSIS — Z00.00 MEDICARE ANNUAL WELLNESS VISIT, SUBSEQUENT: Primary | ICD-10-CM

## 2024-09-24 DIAGNOSIS — G62.9 NEUROPATHY: ICD-10-CM

## 2024-09-24 DIAGNOSIS — J43.9 PULMONARY EMPHYSEMA, UNSPECIFIED EMPHYSEMA TYPE (HCC): ICD-10-CM

## 2024-09-24 DIAGNOSIS — G47.33 OSA (OBSTRUCTIVE SLEEP APNEA): ICD-10-CM

## 2024-09-24 PROCEDURE — 99214 OFFICE O/P EST MOD 30 MIN: CPT | Performed by: FAMILY MEDICINE

## 2024-09-24 PROCEDURE — G0008 ADMIN INFLUENZA VIRUS VAC: HCPCS

## 2024-09-24 PROCEDURE — G0439 PPPS, SUBSEQ VISIT: HCPCS | Performed by: FAMILY MEDICINE

## 2024-09-24 PROCEDURE — 90662 IIV NO PRSV INCREASED AG IM: CPT

## 2024-09-24 NOTE — PATIENT INSTRUCTIONS
Medicare Preventive Visit Patient Instructions  Thank you for completing your Welcome to Medicare Visit or Medicare Annual Wellness Visit today. Your next wellness visit will be due in one year (9/25/2025).  The screening/preventive services that you may require over the next 5-10 years are detailed below. Some tests may not apply to you based off risk factors and/or age. Screening tests ordered at today's visit but not completed yet may show as past due. Also, please note that scanned in results may not display below.  Preventive Screenings:  Service Recommendations Previous Testing/Comments   Colorectal Cancer Screening  Colonoscopy    Fecal Occult Blood Test (FOBT)/Fecal Immunochemical Test (FIT)  Fecal DNA/Cologuard Test  Flexible Sigmoidoscopy Age: 45-75 years old   Colonoscopy: every 10 years (May be performed more frequently if at higher risk)  OR  FOBT/FIT: every 1 year  OR  Cologuard: every 3 years  OR  Sigmoidoscopy: every 5 years  Screening may be recommended earlier than age 45 if at higher risk for colorectal cancer. Also, an individualized decision between you and your healthcare provider will decide whether screening between the ages of 76-85 would be appropriate. Colonoscopy: Not on file  FOBT/FIT: Not on file  Cologuard: Not on file  Sigmoidoscopy: Not on file    Screening Not Indicated     Prostate Cancer Screening Individualized decision between patient and health care provider in men between ages of 55-69   Medicare will cover every 12 months beginning on the day after your 50th birthday PSA: 2.0 ng/mL     Screening Not Indicated     Hepatitis C Screening Once for adults born between 1945 and 1965  More frequently in patients at high risk for Hepatitis C Hep C Antibody: 12/01/2017    Screening Current   Diabetes Screening 1-2 times per year if you're at risk for diabetes or have pre-diabetes Fasting glucose: 90 mg/dL (2/9/2024)  A1C: 6.1 % (2/9/2024)  Screening Current   Cholesterol Screening  Once every 5 years if you don't have a lipid disorder. May order more often based on risk factors. Lipid panel: 02/09/2024  Screening Not Indicated  History Lipid Disorder      Other Preventive Screenings Covered by Medicare:  Abdominal Aortic Aneurysm (AAA) Screening: covered once if your at risk. You're considered to be at risk if you have a family history of AAA or a male between the age of 65-75 who smoking at least 100 cigarettes in your lifetime.  Lung Cancer Screening: covers low dose CT scan once per year if you meet all of the following conditions: (1) Age 55-77; (2) No signs or symptoms of lung cancer; (3) Current smoker or have quit smoking within the last 15 years; (4) You have a tobacco smoking history of at least 20 pack years (packs per day x number of years you smoked); (5) You get a written order from a healthcare provider.  Glaucoma Screening: covered annually if you're considered high risk: (1) You have diabetes OR (2) Family history of glaucoma OR (3)  aged 50 and older OR (4)  American aged 65 and older  Osteoporosis Screening: covered every 2 years if you meet one of the following conditions: (1) Have a vertebral abnormality; (2) On glucocorticoid therapy for more than 3 months; (3) Have primary hyperparathyroidism; (4) On osteoporosis medications and need to assess response to drug therapy.  HIV Screening: covered annually if you're between the age of 15-65. Also covered annually if you are younger than 15 and older than 65 with risk factors for HIV infection. For pregnant patients, it is covered up to 3 times per pregnancy.    Immunizations:  Immunization Recommendations   Influenza Vaccine Annual influenza vaccination during flu season is recommended for all persons aged >= 6 months who do not have contraindications   Pneumococcal Vaccine   * Pneumococcal conjugate vaccine = PCV13 (Prevnar 13), PCV15 (Vaxneuvance), PCV20 (Prevnar 20)  * Pneumococcal polysaccharide  vaccine = PPSV23 (Pneumovax) Adults 19-65 yo with certain risk factors or if 65+ yo  If never received any pneumonia vaccine: recommend Prevnar 20 (PCV20)  Give PCV20 if previously received 1 dose of PCV13 or PPSV23   Hepatitis B Vaccine 3 dose series if at intermediate or high risk (ex: diabetes, end stage renal disease, liver disease)   Respiratory syncytial virus (RSV) Vaccine - COVERED BY MEDICARE PART D  * RSVPreF3 (Arexvy) CDC recommends that adults 60 years of age and older may receive a single dose of RSV vaccine using shared clinical decision-making (SCDM)   Tetanus (Td) Vaccine - COST NOT COVERED BY MEDICARE PART B Following completion of primary series, a booster dose should be given every 10 years to maintain immunity against tetanus. Td may also be given as tetanus wound prophylaxis.   Tdap Vaccine - COST NOT COVERED BY MEDICARE PART B Recommended at least once for all adults. For pregnant patients, recommended with each pregnancy.   Shingles Vaccine (Shingrix) - COST NOT COVERED BY MEDICARE PART B  2 shot series recommended in those 19 years and older who have or will have weakened immune systems or those 50 years and older     Health Maintenance Due:      Topic Date Due   • Hepatitis C Screening  Completed     Immunizations Due:      Topic Date Due   • COVID-19 Vaccine (4 - 2023-24 season) 09/01/2024   • Influenza Vaccine (1) 09/01/2024     Advance Directives   What are advance directives?  Advance directives are legal documents that state your wishes and plans for medical care. These plans are made ahead of time in case you lose your ability to make decisions for yourself. Advance directives can apply to any medical decision, such as the treatments you want, and if you want to donate organs.   What are the types of advance directives?  There are many types of advance directives, and each state has rules about how to use them. You may choose a combination of any of the following:  Living will:  This  is a written record of the treatment you want. You can also choose which treatments you do not want, which to limit, and which to stop at a certain time. This includes surgery, medicine, IV fluid, and tube feedings.   Durable power of  for healthcare (DPAHC):  This is a written record that states who you want to make healthcare choices for you when you are unable to make them for yourself. This person, called a proxy, is usually a family member or a friend. You may choose more than 1 proxy.  Do not resuscitate (DNR) order:  A DNR order is used in case your heart stops beating or you stop breathing. It is a request not to have certain forms of treatment, such as CPR. A DNR order may be included in other types of advance directives.  Medical directive:  This covers the care that you want if you are in a coma, near death, or unable to make decisions for yourself. You can list the treatments you want for each condition. Treatment may include pain medicine, surgery, blood transfusions, dialysis, IV or tube feedings, and a ventilator (breathing machine).  Values history:  This document has questions about your views, beliefs, and how you feel and think about life. This information can help others choose the care that you would choose.  Why are advance directives important?  An advance directive helps you control your care. Although spoken wishes may be used, it is better to have your wishes written down. Spoken wishes can be misunderstood, or not followed. Treatments may be given even if you do not want them. An advance directive may make it easier for your family to make difficult choices about your care.   Weight Management   Why it is important to manage your weight:  Being overweight increases your risk of health conditions such as heart disease, high blood pressure, type 2 diabetes, and certain types of cancer. It can also increase your risk for osteoarthritis, sleep apnea, and other respiratory problems. Aim  for a slow, steady weight loss. Even a small amount of weight loss can lower your risk of health problems.  How to lose weight safely:  A safe and healthy way to lose weight is to eat fewer calories and get regular exercise. You can lose up about 1 pound a week by decreasing the number of calories you eat by 500 calories each day.   Healthy meal plan for weight management:  A healthy meal plan includes a variety of foods, contains fewer calories, and helps you stay healthy. A healthy meal plan includes the following:  Eat whole-grain foods more often.  A healthy meal plan should contain fiber. Fiber is the part of grains, fruits, and vegetables that is not broken down by your body. Whole-grain foods are healthy and provide extra fiber in your diet. Some examples of whole-grain foods are whole-wheat breads and pastas, oatmeal, brown rice, and bulgur.  Eat a variety of vegetables every day.  Include dark, leafy greens such as spinach, kale, macrina greens, and mustard greens. Eat yellow and orange vegetables such as carrots, sweet potatoes, and winter squash.   Eat a variety of fruits every day.  Choose fresh or canned fruit (canned in its own juice or light syrup) instead of juice. Fruit juice has very little or no fiber.  Eat low-fat dairy foods.  Drink fat-free (skim) milk or 1% milk. Eat fat-free yogurt and low-fat cottage cheese. Try low-fat cheeses such as mozzarella and other reduced-fat cheeses.  Choose meat and other protein foods that are low in fat.  Choose beans or other legumes such as split peas or lentils. Choose fish, skinless poultry (chicken or turkey), or lean cuts of red meat (beef or pork). Before you cook meat or poultry, cut off any visible fat.   Use less fat and oil.  Try baking foods instead of frying them. Add less fat, such as margarine, sour cream, regular salad dressing and mayonnaise to foods. Eat fewer high-fat foods. Some examples of high-fat foods include french fries, doughnuts, ice  cream, and cakes.  Eat fewer sweets.  Limit foods and drinks that are high in sugar. This includes candy, cookies, regular soda, and sweetened drinks.  Exercise:  Exercise at least 30 minutes per day on most days of the week. Some examples of exercise include walking, biking, dancing, and swimming. You can also fit in more physical activity by taking the stairs instead of the elevator or parking farther away from stores. Ask your healthcare provider about the best exercise plan for you.      © Copyright [employer] 2018 Information is for End User's use only and may not be sold, redistributed or otherwise used for commercial purposes. All illustrations and images included in CareNotes® are the copyrighted property of [employer] or [employer]

## 2024-09-24 NOTE — ASSESSMENT & PLAN NOTE
Blood pressure appears stable.  Continue with routine home monitoring as well as current treatment with amlodipine, hydrochlorothiazide as well as losartan.

## 2024-09-24 NOTE — PROGRESS NOTES
Ambulatory Visit  Name: Ramsey Palmer      : 1935      MRN: [id number]  Encounter Provider: Michelle Bush DO  Encounter Date: 2024   Encounter department: [facility]    Assessment & Plan  Benign essential hypertension  Blood pressure appears stable.  Continue with routine home monitoring as well as current treatment with amlodipine, hydrochlorothiazide as well as losartan.       Pulmonary emphysema, unspecified emphysema type (HCC)  Stable.  He denies any recent exacerbations.  Continue with his current treatment of Stiolto       Thyroid nodule  Reviewed patient's previous pathology.  He was seen by surgical oncology however he decided not to pursue any further testing/treatment.  At this time, he was advised that his symptoms appear inconclusive (50% chance of malignancy).  He was advised 3 years ago that he would benefit from a repeat ultrasound however did not pursue this.  He was advised to continue to think over this.  If he would like, I will place a order for an ultrasound for him.  At this time, he did not want to pursue this at all.       Neuropathy  Symptoms appear secondary to neuropathy.  At this time, reviewed different treatment options.  If he has any persisting symptoms, consider further treatment with gabapentin.  Follow-up with patient in 6 months.       SIVA (obstructive sleep apnea)         Encounter for immunization    Orders:    influenza vaccine, high-dose, PF 0.5 mL (Fluzone High Dose)    Medicare annual wellness visit, subsequent            Preventive health issues were discussed with patient, and age appropriate screening tests were ordered as noted in patient's After Visit Summary. Personalized health advice and appropriate referrals for health education or preventive services given if needed, as noted in patient's After Visit Summary.    History of Present Illness     HPI   Patient is a 88-year-old male presents today for a follow-up on his  chronic conditions.  He has hypertension, pulmonary emphysema, thyroid nodules and sleep apnea.  He has been taking his medications regularly.  He denies adverse reactions with his medications  Patient Care Team:  Michelle Bush DO as PCP - General  Michelle Bush DO as PCP - PCP-[facility] ([facility]  DO Marium Stoddard DO Roderick Quiros, MD as Surgeon (Surgical Oncology)    Review of Systems   Constitutional:  Negative for activity change, chills, fatigue and fever.   HENT:  Negative for congestion, ear pain, sinus pressure and sore throat.    Eyes:  Negative for redness, itching and visual disturbance.   Respiratory:  Negative for cough and shortness of breath.    Cardiovascular:  Negative for chest pain and palpitations.   Gastrointestinal:  Negative for abdominal pain, diarrhea and nausea.   Endocrine: Negative for cold intolerance and heat intolerance.   Genitourinary:  Negative for dysuria, flank pain and frequency.   Musculoskeletal:  Negative for arthralgias, back pain, gait problem and myalgias.   Skin:  Negative for color change.   Allergic/Immunologic: Negative for environmental allergies.   Neurological:  Negative for dizziness, numbness and headaches.   Psychiatric/Behavioral:  Negative for behavioral problems and sleep disturbance.      Medical History Reviewed by provider this encounter:       Annual Wellness Visit Questionnaire   Ramsey is here for his Subsequent Wellness visit. Last Medicare Wellness visit information reviewed, patient interviewed, no change since last AWV.     Health Risk Assessment:   Patient rates overall health as fair. Patient feels that their physical health rating is same. Patient is satisfied with their life. Eyesight was rated as slightly worse. Hearing was rated as slightly worse. Patients states they are never, rarely angry. Patient states they are never, rarely unusually tired/fatigued. Pain experienced in the last 7 days has been none.  Patient states that he has experienced no weight loss or gain in last 6 months.     Depression Screening:   PHQ-2 Score: 0      Fall Risk Screening:   In the past year, patient has experienced: no history of falling in past year      Home Safety:  Patient does not have trouble with stairs inside or outside of their home. Patient has working smoke alarms and has working carbon monoxide detector. Home safety hazards include: none.     Nutrition:   Current diet is Regular.     Medications:   Patient is currently taking over-the-counter supplements. OTC medications include: see medication list. Patient is able to manage medications.     Activities of Daily Living (ADLs)/Instrumental Activities of Daily Living (IADLs):   Walk and transfer into and out of bed and chair?: Yes  Dress and groom yourself?: Yes    Bathe or shower yourself?: Yes    Feed yourself? Yes  Do your laundry/housekeeping?: Yes  Manage your money, pay your bills and track your expenses?: Yes  Make your own meals?: Yes    Do your own shopping?: Yes    Previous Hospitalizations:   Any hospitalizations or ED visits within the last 12 months?: No      Advance Care Planning:   Living will: Yes    Durable POA for healthcare: Yes    Advanced directive: Yes    Advanced directive counseling given: Yes    ACP document given: Yes    Patient declined ACP directive: No    End of Life Decisions reviewed with patient: Yes    Provider agrees with end of life decisions: Yes      Cognitive Screening:   Provider or family/friend/caregiver concerned regarding cognition?: No    PREVENTIVE SCREENINGS      Cardiovascular Screening:    General: Screening Not Indicated, History Lipid Disorder, Risks and Benefits Discussed and Screening Current      Diabetes Screening:     General: Screening Current and Risks and Benefits Discussed      Colorectal Cancer Screening:     General: Screening Not Indicated and Risks and Benefits Discussed      Prostate Cancer Screening:    General:  Screening Not Indicated and Risks and Benefits Discussed      Osteoporosis Screening:    General: Risks and Benefits Discussed and Screening Not Indicated      Abdominal Aortic Aneurysm (AAA) Screening:    Risk factors include: tobacco use        General: Risks and Benefits Discussed and Screening Not Indicated      Lung Cancer Screening:     General: Screening Not Indicated and Risks and Benefits Discussed      Hepatitis C Screening:    General: Screening Current and Risks and Benefits Discussed    Screening, Brief Intervention, and Referral to Treatment (SBIRT)    Screening  Typical number of drinks in a day: 0  Typical number of drinks in a week: 0  Interpretation: Low risk drinking behavior.    AUDIT-C Screenin) How often did you have a drink containing alcohol in the past year? never  2) How many drinks did you have on a typical day when you were drinking in the past year? 0  3) How often did you have 6 or more drinks on one occasion in the past year? never    AUDIT-C Score: 0  Interpretation: Score 0-3 (male): Negative screen for alcohol misuse    Single Item Drug Screening:  How often have you used an illegal drug (including marijuana) or a prescription medication for non-medical reasons in the past year? never    Single Item Drug Screen Score: 0  Interpretation: Negative screen for possible drug use disorder       No results found.    Objective     There were no vitals taken for this visit.    Physical Exam  Vitals reviewed.   Constitutional:       General: He is not in acute distress.     Appearance: Normal appearance. He is well-developed.   HENT:      Head: Normocephalic and atraumatic.      Right Ear: Tympanic membrane, ear canal and external ear normal. There is no impacted cerumen.      Left Ear: Tympanic membrane, ear canal and external ear normal. There is no impacted cerumen.      Nose: Nose normal. No congestion or rhinorrhea.      Mouth/Throat:      Mouth: Mucous membranes are moist.       Pharynx: No oropharyngeal exudate or posterior oropharyngeal erythema.   Eyes:      General: No scleral icterus.        Right eye: No discharge.         Left eye: No discharge.      Extraocular Movements: Extraocular movements intact.      Conjunctiva/sclera: Conjunctivae normal.      Pupils: Pupils are equal, round, and reactive to light.   Neck:      Trachea: No tracheal deviation.   Cardiovascular:      Rate and Rhythm: Normal rate and regular rhythm.      Pulses: Normal pulses.           Dorsalis pedis pulses are 2+ on the right side and 2+ on the left side.        Posterior tibial pulses are 2+ on the right side and 2+ on the left side.      Heart sounds: Normal heart sounds. No murmur heard.     No friction rub. No gallop.   Pulmonary:      Effort: Pulmonary effort is normal. No respiratory distress.      Breath sounds: Normal breath sounds. No wheezing, rhonchi or rales.   Abdominal:      General: Bowel sounds are normal. There is no distension.      Palpations: Abdomen is soft.      Tenderness: There is no abdominal tenderness. There is no guarding or rebound.   Musculoskeletal:         General: Normal range of motion.      Cervical back: Normal range of motion and neck supple.      Right lower leg: No edema.      Left lower leg: No edema.   Lymphadenopathy:      Head:      Right side of head: No submental or submandibular adenopathy.      Left side of head: No submental or submandibular adenopathy.      Cervical: No cervical adenopathy.      Right cervical: No superficial, deep or posterior cervical adenopathy.     Left cervical: No superficial, deep or posterior cervical adenopathy.   Skin:     General: Skin is warm and dry.      Findings: No erythema.   Neurological:      General: No focal deficit present.      Mental Status: He is alert and oriented to person, place, and time.      Cranial Nerves: No cranial nerve deficit.      Sensory: Sensation is intact. No sensory deficit.      Motor: Motor function  is intact.   Psychiatric:         Attention and Perception: Attention and perception normal.         Mood and Affect: Mood is not anxious or depressed.         Speech: Speech normal.         Behavior: Behavior normal.         Thought Content: Thought content normal.         Judgment: Judgment normal.

## 2024-09-24 NOTE — ASSESSMENT & PLAN NOTE
Reviewed patient's previous pathology.  He was seen by surgical oncology however he decided not to pursue any further testing/treatment.  At this time, he was advised that his symptoms appear inconclusive (50% chance of malignancy).  He was advised 3 years ago that he would benefit from a repeat ultrasound however did not pursue this.  He was advised to continue to think over this.  If he would like, I will place a order for an ultrasound for him.  At this time, he did not want to pursue this at all.

## 2024-10-17 ENCOUNTER — OFFICE VISIT (OUTPATIENT)
Dept: PULMONOLOGY | Facility: CLINIC | Age: 89
End: 2024-10-17
Payer: COMMERCIAL

## 2024-10-17 VITALS
DIASTOLIC BLOOD PRESSURE: 72 MMHG | BODY MASS INDEX: 29.4 KG/M2 | OXYGEN SATURATION: 96 % | SYSTOLIC BLOOD PRESSURE: 110 MMHG | TEMPERATURE: 97.7 F | HEART RATE: 74 BPM | WEIGHT: 210 LBS | HEIGHT: 71 IN

## 2024-10-17 DIAGNOSIS — G47.33 OSA (OBSTRUCTIVE SLEEP APNEA): ICD-10-CM

## 2024-10-17 DIAGNOSIS — J44.9 CHRONIC OBSTRUCTIVE PULMONARY DISEASE, UNSPECIFIED COPD TYPE (HCC): Primary | ICD-10-CM

## 2024-10-17 PROCEDURE — G2211 COMPLEX E/M VISIT ADD ON: HCPCS | Performed by: INTERNAL MEDICINE

## 2024-10-17 PROCEDURE — 99213 OFFICE O/P EST LOW 20 MIN: CPT | Performed by: INTERNAL MEDICINE

## 2024-10-17 RX ORDER — ALBUTEROL SULFATE 90 UG/1
2 INHALANT RESPIRATORY (INHALATION) EVERY 6 HOURS PRN
Qty: 18 G | Refills: 1 | Status: SHIPPED | OUTPATIENT
Start: 2024-10-17

## 2024-10-17 NOTE — PROGRESS NOTES
"Pulmonary Outpatient Note   Ramsey Palmer 88 y.o. male MRN: [id number]  10/17/2024          Reason for Consultation:    Chief Complaint   Patient presents with    COPD    Sleep Apnea         Assessment/Plan:    1. Chronic obstructive pulmonary disease, unspecified COPD type (HCC)  Assessment & Plan:  Minimal symptoms  Stiolto too expensive  He reports minimal worsening in symptoms since stopping stiolto    Plan  PRN albuterol  Patient declined pulm rehab in past  Orders:  -     albuterol (Ventolin HFA) 90 mcg/act inhaler; Inhale 2 puffs every 6 (six) hours as needed for wheezing  2. SIVA (obstructive sleep apnea)  Assessment & Plan:  Reviewed compliance  CPAP  27/30 nights used  Auto CPAP 5-20.  AHI 1.2  Median leak 9.6  Average use 3 hours 22 minutes    Offered CPAP clinic for troubleshooting. Patient declined  I encouraged more frequent use.              Health Maintenance  Immunization History   Administered Date(s) Administered    COVID-19 PFIZER VACCINE 0.3 ML IM 01/21/2021, 02/10/2021, 11/02/2021    H1N1, All Formulations 02/02/2010    INFLUENZA 11/01/2014, 11/05/2015, 01/06/2017, 11/20/2017, 09/23/2020, 12/14/2022    Influenza Split High Dose Preservative Free IM 11/01/2014, 11/05/2015, 01/06/2017, 11/20/2017, 09/24/2024    Influenza, high dose seasonal 0.7 mL 10/23/2018, 09/24/2019, 12/14/2022    Influenza, seasonal, injectable 09/21/2012, 09/21/2012, 11/04/2013    Pneumococcal Conjugate 13-Valent 11/28/2017    Pneumococcal Polysaccharide PPV23 1935, 10/03/2001    Zoster 12/01/2014          Return in about 6 months (around 4/17/2025).    History of Present Illness   HPI:  Ramsey Palmer is a 88 y.o. male who presents for follow-up for COPD and SIVA.  Also with history of hypertension and gout.    SIVA-    Using it every night per report. Reports he \"tears it off.\"  Feels the pressure is too high.    He is now using the full mask over mouth and nose.  This is the third mask you've tried.  Still " tired during the day.    No shortness of breath. No cough.  He ran out of [employer] OTR.  The inhaler is $90 a month    CPAP  27/30 nights used  Auto CPAP 5-20.  AHI 1.2  Median leak 9.6  Average use 3 hours 22 minutes      Exposure history:    Exposure to pets/birds/farm animals: No    Social history:    Smoking: Over 60 pack-years but quit in 1982    Alcohol, ilicit drugs (inhalational/ IV): No vaping. No drugs. No alcohol    Worked as: Worked in [employer]      Review of Systems   Constitutional:  Negative for chills and fever.   HENT:  Negative for ear pain and sore throat.    Eyes:  Negative for pain and visual disturbance.   Respiratory:  Negative for cough and shortness of breath.    Cardiovascular:  Negative for chest pain and palpitations.   Gastrointestinal:  Negative for abdominal pain and vomiting.   Genitourinary:  Negative for dysuria and hematuria.   Musculoskeletal:  Negative for arthralgias and back pain.   Skin:  Negative for color change and rash.   Neurological:  Negative for seizures and syncope.   All other systems reviewed and are negative.          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    COPD with acute exacerbation (HCC)     last assessed: 2/7/2017    Diverticulosis     Eczema     last assessed: 4/30/2013    Esophagitis     Gout     last assessed: 5/19/2014    Nail avulsion of toe     Neoplasm of bladder     last assessed: 4/30/2013     Past Surgical History:   Procedure Laterality Date    HERNIA REPAIR      last assessed: 11/25/2014    KIDNEY SURGERY      description: removal of kidney stone last assessed: 11/25/2014    US GUIDED THYROID BIOPSY  5/11/2021     Family History   Problem Relation Age of Onset    Stroke Mother     Alzheimer's disease Father     Heart attack Sister     Cancer Family     Lung cancer Brother   "    Meds/Allergies     Current Outpatient Medications:     albuterol (Ventolin HFA) 90 mcg/act inhaler, Inhale 2 puffs every 6 (six) hours as needed for wheezing, Disp: 18 g, Rfl: 1    amLODIPine (NORVASC) 5 mg tablet, Take 1 tablet (5 mg total) by mouth daily, Disp: 100 tablet, Rfl: 1    Cholecalciferol (Vitamin D3) 10 MCG (400 UNIT) CAPS, Take by mouth, Disp: , Rfl:     clotrimazole (LOTRIMIN) 1 % cream, Apply topically 2 (two) times a day, Disp: 30 g, Rfl: 2    hydroCHLOROthiazide 12.5 mg capsule, Take 1 capsule (12.5 mg total) by mouth every morning, Disp: 90 capsule, Rfl: 1    losartan (COZAAR) 50 mg tablet, Take 50 mg by mouth daily, Disp: , Rfl:     tamsulosin (FLOMAX) 0.4 mg, Take 0.4 mg by mouth daily with dinner, Disp: , Rfl:     vitamin B-12 (CYANOCOBALAMIN) 100 MCG tablet, Take 100 mcg by mouth daily, Disp: , Rfl:     ILEVRO 0.3 % SUSP, , Disp: , Rfl:     sildenafil (VIAGRA) 100 mg tablet, TAKE ONE TABLET BY MOUTH AS NEEDED 1 HOUR PRIOR TO SEXUAL ACTIVITY (Patient not taking: Reported on 6/13/2023), Disp: , Rfl:     tiotropium-olodaterol (Stiolto Respimat) 2.5-2.5 MCG/ACT inhaler, Inhale 2 puffs daily (Patient not taking: Reported on 10/17/2024), Disp: 4 g, Rfl: 5  Allergies   Allergen Reactions    Levetiracetam Confusion and Anxiety       Vitals: Blood pressure 110/72, pulse 74, temperature 97.7 °F (36.5 °C), temperature source Tympanic, height 5' 11\" (1.803 m), weight 95.3 kg (210 lb), SpO2 96%. Body mass index is 29.29 kg/m². Oxygen Therapy  SpO2: 96 %  Oxygen Therapy: None (Room air)      Physical Exam  Physical Exam  Vitals and nursing note reviewed.   Constitutional:       General: He is not in acute distress.     Appearance: He is well-developed.   HENT:      Head: Normocephalic and atraumatic.   Eyes:      Conjunctiva/sclera: Conjunctivae normal.   Cardiovascular:      Rate and Rhythm: Normal rate and regular rhythm.      Heart sounds: No murmur heard.  Pulmonary:      Effort: Pulmonary effort is " "normal. No respiratory distress.      Breath sounds: Normal breath sounds.   Abdominal:      Palpations: Abdomen is soft.      Tenderness: There is no abdominal tenderness.   Musculoskeletal:         General: No swelling.      Cervical back: Neck supple.      Right lower leg: No edema.      Left lower leg: No edema.   Skin:     General: Skin is warm and dry.      Capillary Refill: Capillary refill takes less than 2 seconds.   Neurological:      Mental Status: He is alert.   Psychiatric:         Mood and Affect: Mood normal.         Labs:   I have personally reviewed pertinent lab results.    ABG: No results found for: \"PHART\", \"ILZ8FBR\", \"PO2ART\", \"KKH2YOU\", \"X4PEROXM\", \"BEART\", \"SOURCE\",   BNP: No results found for: \"BNP\",   CBC:  Lab Results   Component Value Date    WBC 5.78 02/09/2024    HGB 13.6 02/09/2024    HCT 42.2 02/09/2024    MCV 93 02/09/2024     02/09/2024    EOSPCT 5 03/12/2021    EOSABS 0.44 03/12/2021    NEUTOPHILPCT 54 03/12/2021    LYMPHOPCT 30 03/12/2021   ,   CMP:   Lab Results   Component Value Date    SODIUM 140 02/09/2024    K 4.1 02/09/2024     02/09/2024    CO2 29 02/09/2024    BUN 14 02/09/2024    CREATININE 0.70 02/09/2024    CALCIUM 9.2 02/09/2024    AST 17 02/09/2024    ALT 12 02/09/2024    ALKPHOS 41 02/09/2024    PROT 6.8 12/01/2017    BILITOT 0.6 12/01/2017    EGFR 84 02/09/2024   ,   PT/INR: No results found for: \"PT\", \"INR\",   Troponin:   Lab Results   Component Value Date    TROPONINI <0.02 03/17/2021         Imaging and other studies: I have personally reviewed pertinent reports.   and I have personally reviewed pertinent films in PACS    CXR 1/2023- report only  Lungs clear    CT Chest 2021  1.  Clear lungs.   2.  Enlarged right and left pulmonary arteries which raises the possibility of   pulmonary arterial hypertension. Findings can be correlated with   echocardiography as warranted.   3.  Nonspecific mildly enlarged right paratracheal lymph node, which may be "   reactive. No other enlarged, calcified or necrotic thoracic lymph nodes.   4.  Multinodular goiter.   5.  Bilateral renal cysts which can be further evaluated and characterized with   renal ultrasound.     Pulmonary function testing:     Home Sleep Study 7/10/24  TESTING RESULTS:  The test results are from [facility].  The total time in bed (analysis time) was 426.5 minutes.  The patient had a total of 156 respiratory events made up of 112 obstructive apneas, 0 central apneas, 0 mixed apneas and 44 hypopneas resulting in a respiratory event index (HANG) of 22.8.  The lowest SpO2 recorded is 77%.     IMPRESSION:     This test is consistent with at least moderate obstructive sleep apnea.  2.   Significant hypoxemia was noted.      RECOMMENDATION:  A CPAP titration study is recommended due to sleep apnea and hypoxemia       Pulmonary Functions Testing Results: 4/2021    Spirometry:  FEV1/FVC Ratio is  65%. FEV1 is  1.98L/ 72% of predicted. FVC is  3.06L/ 82% of predicted.     Lung volumes:  RV  4.34L/ 156% of predicted, TLC  7.35L/ 103% of predicted, RV/TLC ratio  59%     Diffusing capacity:   66% of predicted     IMPRESSION:   mild obstructive air flow limitation on spirometry with normal vital capacity   normal total lung capacity, increased residual volume and RV/TLC ratio indicating mild air trapping   mildly impaired diffusion capacity   obstructive flow volume    Sleep Study 2018  AHI 18- moderate SIVA      EKG, Pathology, and Other Studies: I have personally reviewed pertinent reports.     TTE 2021    Very technically challenging study.     Left ventricle is normal in size. Wall thickness is normal. Systolic   function is hyperdynamic with an ejection fraction over 65%. Wall motion   is within normal limits. Unable to assess diastolic function.     Right ventricle cavity is normal. Systolic function is normal.     Mitral valve structure is normal. There is mild posterior annular   calcification. There is no  regurgitation or stenosis.     Pulmonic Valve: The estimated pulmonary artery systolic pressure is   30.6 mmHg. Normal pulmonary artery pressure.     Ramsey Cyr M.D.  [facility]

## 2024-10-17 NOTE — ASSESSMENT & PLAN NOTE
Minimal symptoms  Stiolto too expensive  He reports minimal worsening in symptoms since stopping stiolto    Plan  PRN albuterol  Patient declined pulm rehab in past  
Reviewed compliance  CPAP  27/30 nights used  Auto CPAP 5-20.  AHI 1.2  Median leak 9.6  Average use 3 hours 22 minutes    Offered CPAP clinic for troubleshooting. Patient declined  I encouraged more frequent use.  
yes

## 2024-10-18 ENCOUNTER — TELEPHONE (OUTPATIENT)
Age: 89
End: 2024-10-18

## 2024-10-18 DIAGNOSIS — G47.33 OSA (OBSTRUCTIVE SLEEP APNEA): Primary | ICD-10-CM

## 2024-10-18 NOTE — TELEPHONE ENCOUNTER
OK I ordered a change now  
Pt states that last night the air was so strong on his CPAP. He is wondering when to expect the change in pressure. Please advise   
Anselmo Ames)  Surgery  [facility], [address] [place] [address]  Phone: [phone]  Fax: [phone]  Follow Up Time:

## 2024-10-24 LAB
DME PARACHUTE DELIVERY DATE ACTUAL: NORMAL
DME PARACHUTE DELIVERY DATE REQUESTED: NORMAL
DME PARACHUTE ITEM DESCRIPTION: NORMAL
DME PARACHUTE ORDER STATUS: NORMAL
DME PARACHUTE SUPPLIER NAME: NORMAL
DME PARACHUTE SUPPLIER PHONE: NORMAL

## 2024-11-27 ENCOUNTER — OFFICE VISIT (OUTPATIENT)
Dept: URGENT CARE | Facility: CLINIC | Age: 89
End: 2024-11-27
Payer: COMMERCIAL

## 2024-11-27 VITALS
WEIGHT: 213 LBS | BODY MASS INDEX: 29.71 KG/M2 | SYSTOLIC BLOOD PRESSURE: 118 MMHG | OXYGEN SATURATION: 96 % | RESPIRATION RATE: 17 BRPM | TEMPERATURE: 98.9 F | HEART RATE: 59 BPM | DIASTOLIC BLOOD PRESSURE: 72 MMHG

## 2024-11-27 DIAGNOSIS — T16.2XXA FOREIGN BODY OF LEFT EAR, INITIAL ENCOUNTER: Primary | ICD-10-CM

## 2024-11-27 PROCEDURE — 99213 OFFICE O/P EST LOW 20 MIN: CPT | Performed by: PHYSICIAN ASSISTANT

## 2024-11-27 PROCEDURE — 69200 CLEAR OUTER EAR CANAL: CPT | Performed by: PHYSICIAN ASSISTANT

## 2024-11-27 NOTE — PROGRESS NOTES
[facility]    NAME: Ramsey Palmer is a 89 y.o. male  : 1935    MRN: [id number]  DATE: 2024  TIME: 12:07 PM    Assessment and Plan   Foreign body of left ear, initial encounter [T16.2XXA]  1. Foreign body of left ear, initial encounter  Foreign body removal          Patient Instructions     Patient Instructions   Follow-up as needed.    Chief Complaint     Chief Complaint   Patient presents with    Foreign Body in Ear     L side FB in ear. Pts end of the hearing aid is stuck in his L ear. No pain. Got stuck around 8pm last night.        History of Present Illness   Ramsey Palmer presents to the clinic c/o  89-year-old male comes in with tip of hearing aid stuck in left ear canal.  Has tried to wash out without success.    No pain.  Wears hearing aids both years.    Foreign Body in Ear  Associated symptoms include hearing loss.       Review of Systems   Review of Systems   Constitutional: Negative.    HENT:  Positive for hearing loss. Negative for ear discharge and ear pain.         Tronic hearing loss no acute changes       Current Medications     Long-Term Medications   Medication Sig Dispense Refill    amLODIPine (NORVASC) 5 mg tablet Take 1 tablet (5 mg total) by mouth daily 100 tablet 1    Cholecalciferol (Vitamin D3) 10 MCG (400 UNIT) CAPS Take by mouth      clotrimazole (LOTRIMIN) 1 % cream Apply topically 2 (two) times a day 30 g 2    hydroCHLOROthiazide 12.5 mg capsule Take 1 capsule (12.5 mg total) by mouth every morning 90 capsule 1    ILEVRO 0.3 % SUSP  (Patient not taking: Reported on 2023)      losartan (COZAAR) 50 mg tablet Take 50 mg by mouth daily      sildenafil (VIAGRA) 100 mg tablet TAKE ONE TABLET BY MOUTH AS NEEDED 1 HOUR PRIOR TO SEXUAL ACTIVITY (Patient not taking: Reported on 2023)      tamsulosin (FLOMAX) 0.4 mg Take 0.4 mg by mouth daily with dinner      tiotropium-olodaterol (Stiolto Respimat) 2.5-2.5 MCG/ACT inhaler Inhale 2 puffs daily  (Patient not taking: Reported on 10/17/2024) 4 g 5    vitamin B-12 (CYANOCOBALAMIN) 100 MCG tablet Take 100 mcg by mouth daily         Current Allergies     Allergies as of 11/27/2024 - Reviewed 11/27/2024   Allergen Reaction Noted    Levetiracetam Confusion and Anxiety 12/23/2016          The following portions of the patient's history were reviewed and updated as appropriate: allergies, current medications, past family history, past medical history, past social history, past surgical history and problem list.  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    COPD with acute exacerbation (HCC)     last assessed: 2/7/2017    Diverticulosis     Eczema     last assessed: 4/30/2013    Esophagitis     Gout     last assessed: 5/19/2014    Nail avulsion of toe     Neoplasm of bladder     last assessed: 4/30/2013     Past Surgical History:   Procedure Laterality Date    HERNIA REPAIR      last assessed: 11/25/2014    KIDNEY SURGERY      description: removal of kidney stone last assessed: 11/25/2014    US GUIDED THYROID BIOPSY  5/11/2021     Family History   Problem Relation Age of Onset    Stroke Mother     Alzheimer's disease Father     Heart attack Sister     Cancer Family     Lung cancer Brother        Objective   /72   Pulse 59   Temp 98.9 °F (37.2 °C) (Tympanic)   Resp 17   Wt 96.6 kg (213 lb)   SpO2 96%   BMI 29.71 kg/m²   No LMP for male patient.       Physical Exam     Physical Exam  Vitals and nursing note reviewed.   Constitutional:       General: He is not in acute distress.     Appearance: He is well-developed. He is not ill-appearing, toxic-appearing or diaphoretic.      Comments: Pleasant older gentleman in no acute distress.  Hard of hearing   HENT:      Right Ear: Tympanic membrane, ear canal and external ear normal. There is no impacted cerumen.      Left Ear: Tympanic membrane  and external ear normal. There is no impacted cerumen.      Ears:      Comments: Foreign body left ear canal  Cardiovascular:      Rate and Rhythm: Normal rate and regular rhythm.   Pulmonary:      Effort: Pulmonary effort is normal.   Skin:     General: Skin is warm and dry.   Neurological:      Mental Status: He is alert and oriented to person, place, and time.   Psychiatric:         Mood and Affect: Mood normal.         Behavior: Behavior normal.       [place] Protocol:  Procedure performed by: (medical tech held light to ear to help provider see better)  Consent given by: patient  Patient understanding: patient states understanding of the procedure being performed  Patient identity confirmed: verbally with patient  Foreign body removal    Date/Time: 11/27/2024 10:30 AM    Performed by: Neeta Quintanilla PA-C  Authorized by: Neeta Quintanilla PA-C  Body area: ear  Location details: left ear  Localization method: ENT speculum  Removal mechanism: alligator forceps  Complexity: simple  1 objects recovered.  Objects recovered: hearing aid tip  Post-procedure assessment: foreign body removed  Patient tolerance: patient tolerated the procedure well with no immediate complications

## 2024-12-03 DIAGNOSIS — I10 BENIGN ESSENTIAL HYPERTENSION: ICD-10-CM

## 2024-12-04 RX ORDER — AMLODIPINE BESYLATE 5 MG/1
5 TABLET ORAL DAILY
Qty: 100 TABLET | Refills: 1 | Status: SHIPPED | OUTPATIENT
Start: 2024-12-04

## 2024-12-16 ENCOUNTER — OFFICE VISIT (OUTPATIENT)
Dept: FAMILY MEDICINE CLINIC | Facility: CLINIC | Age: 89
End: 2024-12-16
Payer: COMMERCIAL

## 2024-12-16 ENCOUNTER — APPOINTMENT (OUTPATIENT)
Dept: RADIOLOGY | Facility: CLINIC | Age: 89
End: 2024-12-16
Payer: COMMERCIAL

## 2024-12-16 VITALS
DIASTOLIC BLOOD PRESSURE: 80 MMHG | SYSTOLIC BLOOD PRESSURE: 124 MMHG | HEART RATE: 68 BPM | WEIGHT: 211 LBS | OXYGEN SATURATION: 98 % | BODY MASS INDEX: 29.43 KG/M2 | TEMPERATURE: 97.9 F

## 2024-12-16 DIAGNOSIS — J44.9 CHRONIC OBSTRUCTIVE PULMONARY DISEASE, UNSPECIFIED COPD TYPE (HCC): ICD-10-CM

## 2024-12-16 DIAGNOSIS — J40 BRONCHITIS: ICD-10-CM

## 2024-12-16 DIAGNOSIS — J40 BRONCHITIS: Primary | ICD-10-CM

## 2024-12-16 PROCEDURE — 99214 OFFICE O/P EST MOD 30 MIN: CPT

## 2024-12-16 PROCEDURE — 71046 X-RAY EXAM CHEST 2 VIEWS: CPT

## 2024-12-16 PROCEDURE — G2211 COMPLEX E/M VISIT ADD ON: HCPCS

## 2024-12-16 RX ORDER — DOXYCYCLINE 100 MG/1
100 CAPSULE ORAL EVERY 12 HOURS SCHEDULED
Qty: 14 CAPSULE | Refills: 0 | Status: SHIPPED | OUTPATIENT
Start: 2024-12-16 | End: 2024-12-23

## 2024-12-16 RX ORDER — PREDNISONE 10 MG/1
TABLET ORAL
Qty: 21 TABLET | Refills: 0 | Status: SHIPPED | OUTPATIENT
Start: 2024-12-16

## 2024-12-16 RX ORDER — ALBUTEROL SULFATE 90 UG/1
2 INHALANT RESPIRATORY (INHALATION) EVERY 6 HOURS PRN
Qty: 18 G | Refills: 1 | Status: SHIPPED | OUTPATIENT
Start: 2024-12-16

## 2024-12-16 NOTE — PATIENT INSTRUCTIONS
Start antibiotic today - doxycycline.   Start steroid tomorrow - prednisone taper.   Use albuterol inhaler up to 4x per day if needed for wheezing.

## 2024-12-16 NOTE — PROGRESS NOTES
Name: Ramsey Palmer      : 1935      MRN: [id number]  Encounter Provider: Michelle Mccall PA-C  Encounter Date: 2024   Encounter department: [facility]  :  Assessment & Plan  Bronchitis  Based on patient's symptoms today, concerned about bronchitis versus COPD exacerbation.  Will cover for bacterial infection due to length of symptoms with doxycycline twice daily x 7 days.  Recommend steroid taper as well.  Will rule out PNA with chest x-ray.  Recommend albuterol as well, patient reports he is not using any inhalers due to cost concerns however discussed with him that albuterol tends to be covered pretty well with insurance and would certainly help him with bilateral wheezing that is heard on examination today.  Oxygen level 98%.  Patient can certainly added Mucinex for his symptoms as well if needed.  Recommend follow-up if symptoms do not improve.  Patient agreeable with plan today, will let us know if anything worsens.  Orders:  •  XR chest pa and lateral; Future  •  doxycycline hyclate (VIBRAMYCIN) 100 mg capsule; Take 1 capsule (100 mg total) by mouth every 12 (twelve) hours for 7 days  •  predniSONE 10 mg tablet; Take 6 tablets by mouth on day one. Decrease by one tablet daily for 5 days total.    Chronic obstructive pulmonary disease, unspecified COPD type (HCC)    Orders:  •  albuterol (Ventolin HFA) 90 mcg/act inhaler; Inhale 2 puffs every 6 (six) hours as needed for wheezing           History of Present Illness     Patient presents today with symptoms that started on  with sinus congestion.  Reports last Wednesday it turned into a productive cough, which is productive of white and green sputum.  Reports at times he can have pain through his chest with significant coughing episodes.  At rest, there is no chest pain or shortness of breath.  Reports no fevers or chills.  Reports no shortness of breath or wheezing.  Has been using Tussin over-the-counter  and thinks he may have used too much due to bowel issues, which are improving now that he is no longer taking this over-the-counter.      Review of Systems   Constitutional:  Negative for chills, fatigue and fever.   HENT:  Positive for congestion and sinus pressure. Negative for postnasal drip, sinus pain and sore throat.    Respiratory:  Positive for cough. Negative for chest tightness and shortness of breath.    Cardiovascular:  Negative for chest pain, palpitations and leg swelling.   Neurological:  Negative for dizziness, light-headedness and headaches.       Objective   /80 (BP Location: Left arm, Patient Position: Sitting, Cuff Size: Standard)   Pulse 68   Temp 97.9 °F (36.6 °C) (Temporal)   Wt 95.7 kg (211 lb)   SpO2 98%   BMI 29.43 kg/m²      Physical Exam  Vitals and nursing note reviewed.   Constitutional:       General: He is not in acute distress.     Appearance: Normal appearance. He is normal weight. He is not ill-appearing.   HENT:      Head: Normocephalic and atraumatic.      Right Ear: Tympanic membrane normal.      Left Ear: Tympanic membrane normal.      Nose: Congestion present.      Mouth/Throat:      Mouth: Mucous membranes are moist.      Pharynx: Oropharynx is clear.   Eyes:      Extraocular Movements: Extraocular movements intact.      Pupils: Pupils are equal, round, and reactive to light.   Cardiovascular:      Rate and Rhythm: Normal rate and regular rhythm.      Heart sounds: No murmur heard.  Pulmonary:      Effort: Pulmonary effort is normal. No respiratory distress.      Breath sounds: No stridor. Wheezing present. No rhonchi or rales.   Musculoskeletal:      Cervical back: Normal range of motion.      Right lower leg: No edema.      Left lower leg: No edema.   Lymphadenopathy:      Cervical: No cervical adenopathy.   Skin:     General: Skin is warm and dry.   Neurological:      General: No focal deficit present.      Mental Status: He is alert and oriented to person,  place, and time. Mental status is at baseline.   Psychiatric:         Mood and Affect: Mood normal.         Behavior: Behavior normal.         Judgment: Judgment normal.

## 2024-12-16 NOTE — ASSESSMENT & PLAN NOTE
Orders:  •  albuterol (Ventolin HFA) 90 mcg/act inhaler; Inhale 2 puffs every 6 (six) hours as needed for wheezing

## 2024-12-17 ENCOUNTER — RESULTS FOLLOW-UP (OUTPATIENT)
Dept: FAMILY MEDICINE CLINIC | Facility: CLINIC | Age: 89
End: 2024-12-17

## 2025-01-13 NOTE — PROGRESS NOTES
Cardiology Office Note  MD Adán Cordero MD, [facility]  Homero Ibrahim DO, [facility]  MD Dianna Street DO, [facility]  Ash Levine DO, [facility]  ----------------------------------------------------------------  [place] [address]    Ramsey Palmer 89 y.o. male MRN: [id number]  Unit/Bed#:  Encounter: [id number]      History of Present Illness:  It was a pleasure to see Ramsey Palmer in the office today for follow up CV evaluation. He has a past medical history of hypertension, dyslipidemia, nonsustained VT, SIVA, COPD and subdural hematoma.  He establish care with us in April 2024.  The patient was previously followed by Dr. Lawson.  The patient had resistant hypertension over the years and intermittent episodes of shortness of breath.  Due to his symptoms, he was sent for ischemic evaluation and echocardiogram in 2021.  Stress test was found to be negative for myocardial ischemia and echocardiogram was overall showing normal left ventricular systolic function with only mild valvular disease.  In 2024, he admitted that his effort tolerance decreased and his shortness of breath somewhat increased.  He was less active overall and noted that he may have had some pressure in the chest, but more of the shortness of breath sensation.  In July 2024, he admitted a gnawing sensation on the right side of his chest that lasted most of the day.  He did not seek medical attention for it.  He felt as though it was a muscle/pulling sensation.  His symptoms resolved fairly spontaneously toward the end of the day.  He did believe that it may have correlated with him taking Tums that helped with the symptoms.  At that time, he was offered testing including ischemic evaluation, but declined.  His symptoms continued to be resolved over the course of 2024 into 2025.  He feels to be in his usual state of health.    He denies any chest pain, pressure,  tightness or squeezing.  Denies lightheadedness, dizziness or palpitations.  Denies lower extremity swelling, orthopnea or paroxysmal nocturnal dyspnea.  Admits to some numbness in his feet.    Review of Systems:  Review of Systems   Constitutional: Negative for decreased appetite, fever, weight gain and weight loss.   HENT:  Negative for congestion and sore throat.    Eyes:  Negative for visual disturbance.   Cardiovascular:  Negative for chest pain, dyspnea on exertion, leg swelling, near-syncope and palpitations.   Respiratory:  Negative for cough and shortness of breath.    Hematologic/Lymphatic: Negative for bleeding problem.   Skin:  Negative for rash.   Musculoskeletal:  Negative for myalgias and neck pain.   Gastrointestinal:  Negative for abdominal pain and nausea.   Neurological:  Negative for light-headedness and weakness.   Psychiatric/Behavioral:  Negative for depression.        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    COPD with acute exacerbation (HCC)     last assessed: 2/7/2017    Diverticulosis     Eczema     last assessed: 4/30/2013    Esophagitis     Gout     last assessed: 5/19/2014    Nail avulsion of toe     Neoplasm of bladder     last assessed: 4/30/2013       Past Surgical History:   Procedure Laterality Date    HERNIA REPAIR      last assessed: 11/25/2014    KIDNEY SURGERY      description: removal of kidney stone last assessed: 11/25/2014    US GUIDED THYROID BIOPSY  5/11/2021       Social History     Socioeconomic History    Marital status: /Civil Union     Spouse name: Not on file    Number of children: Not on file    Years of education: Not on file    Highest education level: Not on file   Occupational History    Not on file   Tobacco Use    Smoking status: Former     Current packs/day: 0.00     Average packs/day: 2.0 packs/day for 33.0 years (66.0 ttl  pk-yrs)     Types: Cigarettes     Start date:      Quit date: 1982     Years since quittin.0    Smokeless tobacco: Never   Vaping Use    Vaping status: Never Used   Substance and Sexual Activity    Alcohol use: No    Drug use: No    Sexual activity: Not on file   Other Topics Concern    Not on file   Social History Narrative    Not on file     Social Drivers of Health     Financial Resource Strain: Not on file   Food Insecurity: No Food Insecurity (2024)    Nursing - Inadequate Food Risk Classification     Worried About Running Out of Food in the Last Year: Never true     Ran Out of Food in the Last Year: Never true     Ran Out of Food in the Last Year: Not on file   Transportation Needs: No Transportation Needs (2024)    PRAPARE - Transportation     Lack of Transportation (Medical): No     Lack of Transportation (Non-Medical): No   Physical Activity: Not on file   Stress: Not on file   Social Connections: Not on file   Intimate Partner Violence: Not on file   Housing Stability: Low Risk  (2024)    Housing Stability Vital Sign     Unable to Pay for Housing in the Last Year: No     Number of Times Moved in the Last Year: 0     Homeless in the Last Year: No       Family History   Problem Relation Age of Onset    Stroke Mother     Alzheimer's disease Father     Heart attack Sister     Cancer Family     Lung cancer Brother        Allergies   Allergen Reactions    Levetiracetam Confusion and Anxiety         Current Outpatient Medications:     albuterol (Ventolin HFA) 90 mcg/act inhaler, Inhale 2 puffs every 6 (six) hours as needed for wheezing, Disp: 18 g, Rfl: 1    amLODIPine (NORVASC) 5 mg tablet, Take 1 tablet (5 mg total) by mouth daily, Disp: 100 tablet, Rfl: 1    Cholecalciferol (Vitamin D3) 10 MCG (400 UNIT) CAPS, Take by mouth, Disp: , Rfl:     clotrimazole (LOTRIMIN) 1 % cream, Apply topically 2 (two) times a day, Disp: 30 g, Rfl: 2    hydroCHLOROthiazide 12.5 mg capsule, Take 1 capsule  "(12.5 mg total) by mouth every morning, Disp: 90 capsule, Rfl: 1    losartan (COZAAR) 50 mg tablet, Take 50 mg by mouth daily, Disp: , Rfl:     tamsulosin (FLOMAX) 0.4 mg, Take 0.4 mg by mouth daily with dinner, Disp: , Rfl:     tiotropium-olodaterol (Stiolto Respimat) 2.5-2.5 MCG/ACT inhaler, Inhale 2 puffs daily, Disp: 4 g, Rfl: 5    vitamin B-12 (CYANOCOBALAMIN) 100 MCG tablet, Take 100 mcg by mouth daily, Disp: , Rfl:     ILEVRO 0.3 % SUSP, , Disp: , Rfl:     sildenafil (VIAGRA) 100 mg tablet, TAKE ONE TABLET BY MOUTH AS NEEDED 1 HOUR PRIOR TO SEXUAL ACTIVITY (Patient not taking: Reported on 6/13/2023), Disp: , Rfl:     Vitals:    01/15/25 0913   BP: 134/80   Pulse: 66   SpO2: 94%   Weight: 96.2 kg (212 lb)   Height: 5' 11\" (1.803 m)         Body mass index is 29.57 kg/m².    PHYSICAL EXAMINATION:  Gen: Awake, Alert, NAD   Head/eyes: AT/NC, pupils equal and round, Anicteric  ENT: mmm  Neck: Supple, No elevated JVP, trachea midline  Resp: CTA bilaterally no w/r/r  CV: RRR +S1, S2, No m/r/g  Abd: Soft, NT/ND + BS  Ext: no LE edema bilaterally  Neuro: Follows commands, moves all extermities  Psych: Appropriate affect, pleasant mood, pleasant attitude, non-combative  Skin: warm; no rash, erythema or venous stasis changes on exposed skin    --------------------------------------------------------------------------------  TREADMILL STRESS  No results found for this or any previous visit.     --------------------------------------------------------------------------------  NUCLEAR STRESS TEST: No results found for this or any previous visit.    Results for orders placed during the hospital encounter of 06/07/21    NM myocardial perfusion spect (rx stress and/or rest)    [address] [address] [phone]    Rest/Stress Gated SPECT Myocardial Perfusion Imaging After Regadenoson    Patient: SUNITA LOPEZ  MR number: [id number]  Account number: " [id number]  : 1935  Age: 85 years  Gender: Male  Status: Outpatient  Location: [address] [place] [address]  Height: 70 in  Weight: 208 lb  BP: 142/ 90 mmHg    Allergies: LEVETIRACETAM    Diagnosis: I45.10 - Unspecified right bundle-branch block, R06.00 - Dyspnea, unspecified    Interpreting Physician:  Wesley Zuluaga DO  Referring Physician:  Mo Lawson MD  Primary Physician:  Michelle Bush DO  Technician:  THOMAS Gilliam  RN:  Judy Joseph RN  Group:  [facility]  Cardiology Fellow:  Bill Dyson MD  Report Prepared by::  Judy Joseph RN    INDICATIONS: Detection of coronary artery disease.    HISTORY: The patient is a 85 year old  male. Chest pain status: no chest pain. Other symptoms: dyspnea. Coronary artery disease risk factors: dyslipidemia, hypertension, former smoker, and family history of premature coronary  artery disease. Cardiovascular history: none significant. Co-morbidity: history of lung disease/ COPD, SIVA. Medications: a calcium channel blocker, an ARB, a diuretic, and aspirin. Previous test results: abnormal ECG/ RBBB.    PHYSICAL EXAM: Baseline physical exam screening: no wheezes audible, no loud murmur.    REST ECG: Normal sinus rhythm. The ECG showed right bundle branch block.    PROCEDURE: The study was performed in the the [address] [place] [address]. A sitting regadenoson infusion pharmacologic stress test was performed. Gated SPECT myocardial perfusion imaging was performed after stress. Systolic blood  pressure was 142 mmHg, at the start of the study. Diastolic blood pressure was 90 mmHg, at the start of the study. The heart rate was 64 bpm, at the start of the study. IV double checked.  Regadenoson protocol:  HR bpm SBP mmHg DBP mmHg Symptoms  Baseline 64 142 90 none  1 min 68 116 60 moderate dyspnea, dizziness, nausea  2 min 62 120 80 subsiding  4 min 62 130 82 none  No medications or fluids given.    STRESS SUMMARY:  Duration of pharmacologic stress was 3 min and 0 sec. Maximal heart rate during stress was 68 bpm. The rate-pressure product for the peak heart rate and blood pressure was 9656. There was no chest pain during stress. The  stress test was terminated due to protocol completion. Pre oxygen saturation: 95 %. Peak oxygen saturation: 99 %. Arrhythmia during stress: isolated premature ventricular beats. The stress ECG was non-diagnostic.    ISOTOPE ADMINISTRATION:  Resting isotope administration Stress isotope administration  Agent Tetrofosmin Tetrofosmin  Dose 10.3 mCi 31.9 mCi  Date 06/07/2021 06/07/2021  Injection time 10:50 12:10  Imaging time 11:40 12:50  Injection-image interval 50 min 40 min    The radiopharmaceutical was injected at the peak effect of pharmacologic stress.    MYOCARDIAL PERFUSION IMAGING:  The image quality was good. Rotating projection images reveal mild diaphragmatic attenuation. Left ventricular size was normal. The TID ratio was 1.07.    PERFUSION DEFECTS:  -  There was a small, mild, fixed myocardial perfusion defect of the apex due to apical thinning.  -  There was a small, mild, fixed myocardial perfusion defect of the basal inferior wall due to diaphragm attenuation.    GATED SPECT:  The calculated left ventricular ejection fraction was 54 %. There was no left ventricular regional abnormality.    SUMMARY:  -  Stress results: There was no chest pain during stress.  -  ECG conclusions: The stress ECG was non-diagnostic.  -  Perfusion imaging: There was a small, mild, fixed myocardial perfusion defect of the apex due to apical thinning. There was a small, mild, fixed myocardial perfusion defect of the basal inferior wall due to diaphragm attenuation.  -  Gated SPECT: The calculated left ventricular ejection fraction was 54 %. There was no left ventricular regional abnormality.    IMPRESSIONS: Normal study after pharmacologic vasodilation without reproduction of symptoms. There was image  artifact, without diagnostic evidence for perfusion abnormality. Left ventricular systolic function was normal.    Prepared and signed by    DO Carla Lucia 06/07/2021 15:45:31      --------------------------------------------------------------------------------  CATH:  No results found for this or any previous visit.    --------------------------------------------------------------------------------  ECHO:   No results found for this or any previous visit.    No results found for this or any previous visit.    --------------------------------------------------------------------------------  HOLTER  No results found for this or any previous visit.    No results found for this or any previous visit.    --------------------------------------------------------------------------------  CAROTIDS  No results found for this or any previous visit.     --------------------------------------------------------------------------------  ECGs:  No results found for this visit on 01/15/25.     Lab Results   Component Value Date    WBC 5.78 02/09/2024    HGB 13.6 02/09/2024    HCT 42.2 02/09/2024    MCV 93 02/09/2024     02/09/2024      Lab Results   Component Value Date    SODIUM 140 02/09/2024    K 4.1 02/09/2024     02/09/2024    CO2 29 02/09/2024    BUN 14 02/09/2024    CREATININE 0.70 02/09/2024    GLUC 108 (H) 01/03/2023    CALCIUM 9.2 02/09/2024      Lab Results   Component Value Date    HGBA1C 6.1 (H) 02/09/2024      Lab Results   Component Value Date    CHOL 167 12/01/2017    CHOL 156 07/22/2016    CHOL 154 07/06/2015     Lab Results   Component Value Date    HDL 42 02/09/2024    HDL 43 07/26/2022    HDL 39 (L) 09/12/2019     Lab Results   Component Value Date    LDLCALC 62 02/09/2024    LDLCALC 60 07/26/2022    LDLCALC 83 09/12/2019     Lab Results   Component Value Date    TRIG 155 (H) 02/09/2024    TRIG 192 (H) 07/26/2022    TRIG 177 (H) 09/12/2019     Renal artery duplex negative for  "MURIEL  Renin and aldosterone levels normal    No results found for: \"CHOLHDL\"   No results found for: \"INR\", \"PROTIME\"     1. Shortness of breath  2. Precordial chest pain  3. NSVT (nonsustained ventricular tachycardia) (HCC)  4. Essential hypertension  5. 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  He is here today for routine CV follow-up.  Since her last encounter, he has been feeling well overall.  He has no chest pain concerning for angina and no signs or symptoms of heart failure.  Clinically he examines to be euvolemic.  Blood pressure and heart rate are currently stable.  He is tolerating his current medications without any reported adverse effects.  He can perform greater than 4 METS on daily basis doubt significant exertional symptoms.  Currently his chief complaint is associated with some numbness in the feet which may represent neuropathy.  This is unclear.  Based on his clinical presentation, I have the following recommendations:    1.  Recommend 30 minutes a day, 5 days a week of moderate intensity activity to build cardiovascular endurance.  2.  Would encourage a heart healthy diet low in sodium and carbohydrate.  3.  Would encourage him to speak with primary care regarding his numbness in his feet in case there is any evidence of neuropathy by history.  This may be treatable and help with his quality of life.  4.  Continue current antihypertensive regimen including hydrochlorothiazide, " "amlodipine and losartan.  5.  LDL is currently to goal.  Would continue to monitor every 6 months as scheduled.  6.  He is otherwise up-to-date with CV testing.  7.  We will follow-up with him in 1 year to reassess his progress.    As always, please do not hesitate to call with any questions.    Portions of the record may have been created with voice recognition software. Occasional wrong word or \"sound a like\" substitutions may have occurred due to the inherent limitations of voice recognition software. Read the chart carefully and recognize, using context, where substitutions have occurred.      Signed: Homero Ibrahim DO, FACC, FASE, FASNC, FACP  "

## 2025-01-15 ENCOUNTER — OFFICE VISIT (OUTPATIENT)
Dept: CARDIOLOGY CLINIC | Facility: CLINIC | Age: OVER 89
End: 2025-01-15
Payer: COMMERCIAL

## 2025-01-15 VITALS
HEIGHT: 71 IN | DIASTOLIC BLOOD PRESSURE: 80 MMHG | OXYGEN SATURATION: 94 % | SYSTOLIC BLOOD PRESSURE: 134 MMHG | WEIGHT: 212 LBS | HEART RATE: 66 BPM | BODY MASS INDEX: 29.68 KG/M2

## 2025-01-15 DIAGNOSIS — R07.2 PRECORDIAL CHEST PAIN: ICD-10-CM

## 2025-01-15 DIAGNOSIS — I10 ESSENTIAL HYPERTENSION: ICD-10-CM

## 2025-01-15 DIAGNOSIS — E78.5 DYSLIPIDEMIA: ICD-10-CM

## 2025-01-15 DIAGNOSIS — I47.29 NSVT (NONSUSTAINED VENTRICULAR TACHYCARDIA) (HCC): ICD-10-CM

## 2025-01-15 DIAGNOSIS — R06.02 SHORTNESS OF BREATH: Primary | ICD-10-CM

## 2025-01-15 PROCEDURE — 99214 OFFICE O/P EST MOD 30 MIN: CPT | Performed by: INTERNAL MEDICINE

## 2025-01-28 ENCOUNTER — TELEPHONE (OUTPATIENT)
Age: OVER 89
End: 2025-01-28

## 2025-01-28 DIAGNOSIS — M79.671 RIGHT FOOT PAIN: ICD-10-CM

## 2025-01-28 RX ORDER — METHYLPREDNISOLONE 4 MG/1
TABLET ORAL
Qty: 21 EACH | Refills: 0 | Status: SHIPPED | OUTPATIENT
Start: 2025-01-28

## 2025-01-28 NOTE — TELEPHONE ENCOUNTER
Patient stated he's had ongoing problem with gout and for past couple of days, he's had a flare up on his right foot. He said last time this happened, Dr Bush prescribed medication to ease the flare up; he couldn't remember name of medication. He stated it hurts to even walk on right foot and requests medication to be sent to [facility] pharmacy in [place].

## 2025-02-25 ENCOUNTER — NURSE TRIAGE (OUTPATIENT)
Age: OVER 89
End: 2025-02-25

## 2025-02-25 NOTE — TELEPHONE ENCOUNTER
"Received call from patient with complaints of lightheadedness.  States that symptoms started last week.  He had a fall in the shower last week with no injuries.  Reports that he has intermittent lightheadedness described as mild and \"not often\". It is worse in the morning when he first gets up.  Wife checked BP today and it was  96/62, HR 78.  Patient does not normally check his BP.  He is on amlodipine 5 mg daily and HCTZ 12.5 mg daily.  Has been compliant with medications.    Scheduled for appointment on Thursday 2/27.  Patient declined appointment for today with Dr. Fischer stating that he would prefer to see Dr. Bush.  Care advice given and instructed to continue to check BP at least once daily and bring log to appointment on Thursday. Understanding verbalized.     Reason for Disposition   MILD dizziness (e.g., walking normally) and has NOT been evaluated by physician for this (Exception: Dizziness caused by heat exposure, sudden standing, or poor fluid intake.)    Answer Assessment - Initial Assessment Questions  1. DESCRIPTION: \"Describe your dizziness.\"      Feels lightheaded, especially when he first gets up in the morning  2. LIGHTHEADED: \"Do you feel lightheaded?\" (e.g., somewhat faint, woozy, weak upon standing)      Yes  3. VERTIGO: \"Do you feel like either you or the room is spinning or tilting?\" (i.e., vertigo)      Denies  4. SEVERITY: \"How bad is it?\"  \"Do you feel like you are going to faint?\" \"Can you stand and walk?\"      Mild- lightheadedness comes and goes  5. ONSET:  \"When did the dizziness begin?\"      About a week ago  6. AGGRAVATING FACTORS: \"Does anything make it worse?\" (e.g., standing, change in head position)      Change in position  7. HEART RATE: \"Can you tell me your heart rate?\" \"How many beats in 15 seconds?\"  (Note: Not all patients can do this.)        78  8. CAUSE: \"What do you think is causing the dizziness?\" (e.g., decreased fluids or food, diarrhea, emotional distress, " "heat exposure, new medicine, sudden standing, vomiting; unknown)      Unsure  9. RECURRENT SYMPTOM: \"Have you had dizziness before?\" If Yes, ask: \"When was the last time?\" \"What happened that time?\"      Denies  10. OTHER SYMPTOMS: \"Do you have any other symptoms?\" (e.g., fever, chest pain, vomiting, diarrhea, bleeding)        Also having neck pain    Protocols used: Dizziness-Adult-[place]    "

## 2025-02-27 ENCOUNTER — OFFICE VISIT (OUTPATIENT)
Dept: FAMILY MEDICINE CLINIC | Facility: CLINIC | Age: OVER 89
End: 2025-02-27
Payer: COMMERCIAL

## 2025-02-27 ENCOUNTER — APPOINTMENT (OUTPATIENT)
Dept: RADIOLOGY | Facility: CLINIC | Age: OVER 89
End: 2025-02-27
Payer: COMMERCIAL

## 2025-02-27 ENCOUNTER — RESULTS FOLLOW-UP (OUTPATIENT)
Dept: FAMILY MEDICINE CLINIC | Facility: CLINIC | Age: OVER 89
End: 2025-02-27

## 2025-02-27 VITALS
OXYGEN SATURATION: 99 % | TEMPERATURE: 97.6 F | WEIGHT: 216 LBS | BODY MASS INDEX: 31.99 KG/M2 | SYSTOLIC BLOOD PRESSURE: 112 MMHG | HEIGHT: 69 IN | DIASTOLIC BLOOD PRESSURE: 70 MMHG | HEART RATE: 86 BPM

## 2025-02-27 DIAGNOSIS — J44.1 COPD EXACERBATION (HCC): ICD-10-CM

## 2025-02-27 DIAGNOSIS — R42 DIZZINESS: Primary | ICD-10-CM

## 2025-02-27 DIAGNOSIS — M54.2 CERVICALGIA: ICD-10-CM

## 2025-02-27 PROCEDURE — 72040 X-RAY EXAM NECK SPINE 2-3 VW: CPT

## 2025-02-27 PROCEDURE — G2211 COMPLEX E/M VISIT ADD ON: HCPCS | Performed by: FAMILY MEDICINE

## 2025-02-27 PROCEDURE — 99214 OFFICE O/P EST MOD 30 MIN: CPT | Performed by: FAMILY MEDICINE

## 2025-02-27 RX ORDER — MECLIZINE HYDROCHLORIDE 25 MG/1
25 TABLET ORAL 3 TIMES DAILY PRN
Qty: 30 TABLET | Refills: 0 | Status: SHIPPED | OUTPATIENT
Start: 2025-02-27

## 2025-02-27 RX ORDER — DOXYCYCLINE 100 MG/1
100 TABLET ORAL 2 TIMES DAILY
Qty: 14 TABLET | Refills: 0 | Status: SHIPPED | OUTPATIENT
Start: 2025-02-27 | End: 2025-03-06

## 2025-02-27 NOTE — PROGRESS NOTES
Name: Ramsey Palmer      : 1935      MRN: [id number]  Encounter Provider: Michelle Bush DO  Encounter Date: 2025   Encounter department: [facility]  :  Assessment & Plan  Dizziness  Reviewed patient symptoms today.  At this time, symptoms appear to likely secondary to BPPV.  He did not appear to have any focal neurologic deficits on his exam today.  At this time, reviewed the differential possible causes of this problem with him.  He states that he does hydrate very well.  At this time, we will start treat with meclizine 25 mg 3 times daily as needed.  If any symptoms should worsen, consider further evaluation.  Orders:    meclizine (ANTIVERT) 25 mg tablet; Take 1 tablet (25 mg total) by mouth 3 (three) times a day as needed for dizziness    COPD exacerbation (HCC)  Patient appears to have a COPD exacerbation.  At this time, continue with supportive care.  Maintain hydration.  He may continue with his current use of his inhalers.  Will start treat with doxycycline.  Will hold off on the addition of prednisone at this time.  Orders:    doxycycline (ADOXA) 100 MG tablet; Take 1 tablet (100 mg total) by mouth 2 (two) times a day for 7 days    Cervicalgia  Has had associated neck pain.  He feels a stiffening of his neck.  At this time, will check x-ray to rule out gross abnormalities.              History of Present Illness   Dizziness  This is a new problem. The current episode started in the past 7 days. The problem occurs intermittently. The problem has been unchanged. Associated symptoms include neck pain and vertigo. Pertinent negatives include no abdominal pain, arthralgias, chest pain, chills, congestion, coughing, fatigue, fever, headaches, myalgias, nausea, numbness, sore throat, visual change or weakness. The symptoms are aggravated by twisting and standing. He has tried nothing for the symptoms.     Review of Systems   Constitutional:  Negative for activity change,  "chills, fatigue and fever.   HENT:  Negative for congestion, ear pain, sinus pressure and sore throat.    Eyes:  Negative for redness, itching and visual disturbance.   Respiratory:  Negative for cough and shortness of breath.    Cardiovascular:  Negative for chest pain and palpitations.   Gastrointestinal:  Negative for abdominal pain, diarrhea and nausea.   Endocrine: Negative for cold intolerance and heat intolerance.   Genitourinary:  Negative for dysuria, flank pain and frequency.   Musculoskeletal:  Positive for neck pain. Negative for arthralgias, back pain, gait problem and myalgias.   Skin:  Negative for color change.   Allergic/Immunologic: Negative for environmental allergies.   Neurological:  Positive for dizziness and vertigo. Negative for weakness, numbness and headaches.   Psychiatric/Behavioral:  Negative for behavioral problems and sleep disturbance.        Objective   /70   Pulse 86   Temp 97.6 °F (36.4 °C)   Ht 5' 9\" (1.753 m)   Wt 98 kg (216 lb)   SpO2 99%   BMI 31.90 kg/m²      Physical Exam  Vitals reviewed.   Constitutional:       General: He is not in acute distress.     Appearance: Normal appearance. He is well-developed.   HENT:      Head: Normocephalic and atraumatic.      Right Ear: Tympanic membrane, ear canal and external ear normal. There is no impacted cerumen.      Left Ear: Tympanic membrane, ear canal and external ear normal. There is no impacted cerumen.      Nose: Nose normal. No congestion or rhinorrhea.      Mouth/Throat:      Mouth: Mucous membranes are moist.      Pharynx: No oropharyngeal exudate or posterior oropharyngeal erythema.   Eyes:      General: No scleral icterus.        Right eye: No discharge.         Left eye: No discharge.      Extraocular Movements: Extraocular movements intact.      Conjunctiva/sclera: Conjunctivae normal.      Pupils: Pupils are equal, round, and reactive to light.   Neck:      Trachea: No tracheal deviation.   Cardiovascular:    "   Rate and Rhythm: Normal rate and regular rhythm.      Pulses: Normal pulses.           Dorsalis pedis pulses are 2+ on the right side and 2+ on the left side.        Posterior tibial pulses are 2+ on the right side and 2+ on the left side.      Heart sounds: Normal heart sounds. No murmur heard.     No friction rub. No gallop.   Pulmonary:      Effort: Pulmonary effort is normal. No respiratory distress.      Breath sounds: Normal breath sounds. No wheezing, rhonchi or rales.   Abdominal:      General: Bowel sounds are normal. There is no distension.      Palpations: Abdomen is soft.      Tenderness: There is no abdominal tenderness. There is no guarding or rebound.   Musculoskeletal:         General: Normal range of motion.      Cervical back: Normal range of motion and neck supple.      Right lower leg: No edema.      Left lower leg: No edema.   Lymphadenopathy:      Head:      Right side of head: No submental or submandibular adenopathy.      Left side of head: No submental or submandibular adenopathy.      Cervical: No cervical adenopathy.      Right cervical: No superficial, deep or posterior cervical adenopathy.     Left cervical: No superficial, deep or posterior cervical adenopathy.   Skin:     General: Skin is warm and dry.      Findings: No erythema.   Neurological:      General: No focal deficit present.      Mental Status: He is alert and oriented to person, place, and time.      Cranial Nerves: No cranial nerve deficit.      Sensory: Sensation is intact. No sensory deficit.      Motor: Motor function is intact.   Psychiatric:         Attention and Perception: Attention and perception normal.         Mood and Affect: Mood is not anxious or depressed.         Speech: Speech normal.         Behavior: Behavior normal.         Thought Content: Thought content normal.         Judgment: Judgment normal.

## 2025-02-28 DIAGNOSIS — M54.2 CERVICALGIA: Primary | ICD-10-CM

## 2025-03-03 NOTE — TELEPHONE ENCOUNTER
----- Message from Michelle Bush DO sent at 2/28/2025  5:01 PM EST -----  Please call patient, physical therapy order placed.  Thank you  ----- Message -----  From: Sherry Curtis MA  Sent: 2/28/2025   1:59 PM EST  To: Michelle Bush DO    Spoke with pt he is agreeable to physical therapy and would like an order placed.

## 2025-03-17 DIAGNOSIS — I10 BENIGN ESSENTIAL HYPERTENSION: ICD-10-CM

## 2025-03-18 ENCOUNTER — EVALUATION (OUTPATIENT)
Dept: PHYSICAL THERAPY | Facility: CLINIC | Age: OVER 89
End: 2025-03-18
Payer: COMMERCIAL

## 2025-03-18 DIAGNOSIS — M54.2 CERVICALGIA: Primary | ICD-10-CM

## 2025-03-18 PROCEDURE — 97161 PT EVAL LOW COMPLEX 20 MIN: CPT

## 2025-03-18 PROCEDURE — 97110 THERAPEUTIC EXERCISES: CPT

## 2025-03-18 RX ORDER — HYDROCHLOROTHIAZIDE 12.5 MG/1
12.5 CAPSULE ORAL EVERY MORNING
Qty: 90 CAPSULE | Refills: 0 | Status: SHIPPED | OUTPATIENT
Start: 2025-03-18

## 2025-03-18 NOTE — TELEPHONE ENCOUNTER
Reason for call:   [x] Refill   [] Prior Auth  [] Other:     Office:   [] PCP/Provider -   [x] Specialty/Provider - Cardio Pod    Medication:   - Hydrochlorothiazide 12.5 mg- take 1 capsule by mouth every morning       Pharmacy: [facility]    Local Pharmacy   Does the patient have enough for 3 days?   [] Yes   [x] No - Send as HP to POD    Mail Away Pharmacy   Does the patient have enough for 10 days?   [] Yes   [] No - Send as HP to POD

## 2025-03-18 NOTE — PROGRESS NOTES
PT Evaluation     Today's date: 3/18/2025  Patient name: Ramsey Palmer  : 1935  MRN: [id number]  Referring provider: Michelle Bush DO  Dx:   Encounter Diagnosis     ICD-10-CM    1. Cervicalgia  M54.2 Ambulatory Referral to Physical Therapy                     Assessment  Impairments: abnormal muscle tone, abnormal or restricted ROM, activity intolerance, impaired physical strength, lacks appropriate home exercise program, pain with function, scapular dyskinesis, poor posture  and poor body mechanics  Symptom irritability: low    Assessment details: Pt is a 89 y.o. year old male that presents to outpatient physical therapy with cervicalgia. Pt demonstrates signs and symptoms that point to cervical neck pain with impaired movement coordination due to muscle imbalance as well as neck pain with mobility deficits. Pt demonstrates increased tone of upper traps, levator scaps, and suboccipitals, with decreased strength of deep neck flexors and scapular retractors. Poor mobility with gentle testing to the cervical region with lateral glides and up glides. Pt demonstrates increased pain, decreased ROM, decreased strength, decreased activity tolerance, and decreased functional activity due to pain and limited ROM. Pt appears motivated; HEP was reviewed and given to patient. Pt would benefit from skilled physical therapy to address noted impairments, meet patient's goals, and to return to PLOF.   Thank you for this referral.    Understanding of Dx/Px/POC: good     Prognosis: good    Goals  STGs:   1. Pt will be able to demonstrate an increase of strength by at least 1/2 grade within 4 weeks   2. Pt will be able to achieve increased ROM by at least 25% within 4 weeks.   3. Pt will be able to report pain less than 4/10 at worse within 4 weeks.   4. Pt will be able to demonstrate improved deep neck flexor endurance by at least 10 seconds within 4 weeks   5. Pt will be able to demonstrate improve cervical and  thoracic PA joint mobility to WNL without symptoms within 4 weeks.     LTGs:   1. Pt will be independent with all IADLs without pain or discomfort upon discharge.   2. Pt will be independent with HEP upon discharge   3. Pt will be able to report no pain or discomfort with all work duties and recreational activities for a full day upon discharge.  4. Pt will be able to report 'no difficulty' with turn head either direction to improve driving capabilities without compensations upon discharge     Plan  Patient would benefit from: PT eval and skilled physical therapy  Planned modality interventions: low level laser therapy, cryotherapy, electrical stimulation/Russian stimulation, iontophoresis, traction, ultrasound, unattended electrical stimulation, TENS and thermotherapy: hydrocollator packs    Planned therapy interventions: abdominal trunk stabilization, joint mobilization, manual therapy, massage, muscle pump exercises, neuromuscular re-education, patient education, postural training, strengthening, stretching, therapeutic activities, therapeutic exercise, therapeutic training, home exercise program, functional ROM exercises, flexibility, breathing training, body mechanics training, behavior modification, balance, activity modification, IADL retraining and patient/caregiver education    Frequency: 1-2x week  Duration in weeks: 8  Treatment plan discussed with: patient    Subjective Evaluation    History of Present Illness  Mechanism of injury: Pt states that he has been having neck pain/discomfort for the past few months/years. Indicates that he does not have that much pain, but he has limited ROM when looking left and right and it feels that his neck 'hits a wall' each direction. Reports a few falls over the last few years which may have increase his neck stiffness but is unable to attribute any FLAVIO.     Denies changes in bowel/bladder. Denies saddle anesthesia. Denies numbness/tingling    VBI: (-) Diplopia, (+)  Dizziness, (-) Dysphagia, (-) Dysarthria, (-) Drop attacks, (-) Nausea, (-) Vomiting, (-) Sensory changes, (-) Nystagmus       Goals: increase ROM and be able to drive better (neck motion)      Imaging findings: Cervical Xray on 2025 - FINDINGS:       - No acute fracture or subluxation.        - Anatomic alignment       - Disc height loss C4-5 through C6-7 with vertebral body endplate spurring.  Patient Goals  Patient goals for therapy: increased motion    Pain  Current pain ratin  At best pain ratin  At worst pain ratin  Quality: dull ache, pressure and pulling  Relieving factors: change in position, relaxation and rest  Progression: no change    Social Support  Lives with: spouse    Employment status: not working    Objective     Static Posture     Head  Forward.    Cervical Spine  Shifted left.    Shoulders  Elevated and rounded.    Palpation   Left   Hypertonic in the levator scapulae, scalenes and upper trapezius.   Tenderness of the cervical paraspinals, levator scapulae, scalenes, suboccipitals and upper trapezius.   Trigger point to upper trapezius.     Right   Hypertonic in the levator scapulae and upper trapezius.   Tenderness of the cervical paraspinals, scalenes and upper trapezius.     Neurological Testing     Sensation   Cervical/Thoracic   Left   Intact: light touch    Right   Intact: light touch    Active Range of Motion   Cervical/Thoracic Spine       Cervical    Flexion:  Restriction level: moderate  Extension:  Restriction level: maximal  Left lateral flexion: 12 degrees     with pain  Right lateral flexion: 18 degrees     with pain  Left rotation: 40 degrees with pain  Right rotation: 67 degrees       Joint Play   Joints within functional limits: 1st rib     Hypomobile: C1, C2, C3, C4, C5, C6, C7, T1 and T2     Strength/Myotome Testing     Left Shoulder     Planes of Motion   Flexion: 4+   Extension: 4+   Abduction: 4+   External rotation at 0°: 4+   Internal rotation at 0°: 4+  "    Isolated Muscles   Upper trapezius: 5     Right Shoulder     Planes of Motion   Flexion: 4+   Extension: 4+   Abduction: 4+   External rotation at 0°: 4+   Internal rotation at 0°: 4+     Isolated Muscles   Upper trapezius: 5     Left Elbow   Flexion: 4+    Right Elbow   Flexion: 4+    Tests   Cervical   Positive neck flexor muscle endurance test.  Negative vertical compression, cervical distraction, VBI and craniocervical flexion test .     Left   Positive Spurling's Test A and cervical flexion-rotation test .     Right   Negative Spurling's Test A and cervical flexion-rotation test.     Left Shoulder   Negative ULTT1, ULTT3 and ULTT4.     Right Shoulder   Negative ULTT1, ULTT3 and ULTT4.     Lumbar   Negative vertical compression.     Additional Tests Details  DNF endurance testin seconds with loss of form           Precautions:   Patient Active Problem List   Diagnosis   • Benign essential hypertension   • COPD (chronic obstructive pulmonary disease) (HCC)   • Degeneration of intervertebral disc of lumbar region   • Diastasis of rectus abdominis   • Herniated lumbar intervertebral disc   • Hyperlipidemia   • Lumbar radiculitis   • Neural foraminal stenosis of lumbar spine   • Subdural hematoma (HCC)   • Sacroiliitis (HCC)   • SIVA (obstructive sleep apnea)   • Nocturia   • Osteoarthritis of spine with radiculopathy, lumbar region   • Nausea   • Dyspnea on exertion   • Abnormal chest x-ray   • NSVT (nonsustained ventricular tachycardia) (HCC)   • Thyroid nodule   • Celiac artery compression syndrome (HCC)       Daily Treatment Diary    Date 3/18            FOTO IE -             Re-Eval IE               Manuals    UT, LS STM             Up glide to C2-C7 (B)                                       Neuro Re-Ed     Supine chin tuck             Supine chin tuck with head lift                                       Ther Ex    Bike - ROM             Chin tuck (AAROM) 2x10            UT stretch 10\" hold 3x (B)        "     SNAG with towel 10x ea direction            Cervical extension with towel 2x10             Standing shoulder ext and row with TB             Seated scapular retraction with TB             Seated thoracic extension with half foam roll                                                    Ther Activity                              Gait Training                              Modalities

## 2025-03-24 ENCOUNTER — OFFICE VISIT (OUTPATIENT)
Dept: FAMILY MEDICINE CLINIC | Facility: CLINIC | Age: OVER 89
End: 2025-03-24
Payer: COMMERCIAL

## 2025-03-24 ENCOUNTER — RESULTS FOLLOW-UP (OUTPATIENT)
Dept: FAMILY MEDICINE CLINIC | Facility: CLINIC | Age: OVER 89
End: 2025-03-24

## 2025-03-24 ENCOUNTER — APPOINTMENT (OUTPATIENT)
Dept: LAB | Facility: CLINIC | Age: OVER 89
End: 2025-03-24
Payer: COMMERCIAL

## 2025-03-24 ENCOUNTER — APPOINTMENT (OUTPATIENT)
Dept: RADIOLOGY | Facility: CLINIC | Age: OVER 89
End: 2025-03-24
Payer: COMMERCIAL

## 2025-03-24 ENCOUNTER — OFFICE VISIT (OUTPATIENT)
Dept: PHYSICAL THERAPY | Facility: CLINIC | Age: OVER 89
End: 2025-03-24
Payer: COMMERCIAL

## 2025-03-24 VITALS
SYSTOLIC BLOOD PRESSURE: 140 MMHG | HEART RATE: 57 BPM | BODY MASS INDEX: 32.29 KG/M2 | OXYGEN SATURATION: 97 % | HEIGHT: 69 IN | DIASTOLIC BLOOD PRESSURE: 62 MMHG | TEMPERATURE: 97.8 F | WEIGHT: 218 LBS

## 2025-03-24 DIAGNOSIS — M79.672 LEFT FOOT PAIN: ICD-10-CM

## 2025-03-24 DIAGNOSIS — I10 BENIGN ESSENTIAL HYPERTENSION: ICD-10-CM

## 2025-03-24 DIAGNOSIS — J44.9 CHRONIC OBSTRUCTIVE PULMONARY DISEASE, UNSPECIFIED COPD TYPE (HCC): ICD-10-CM

## 2025-03-24 DIAGNOSIS — M54.2 CERVICALGIA: Primary | ICD-10-CM

## 2025-03-24 DIAGNOSIS — M1A.9XX0 CHRONIC GOUT WITHOUT TOPHUS, UNSPECIFIED CAUSE, UNSPECIFIED SITE: Primary | ICD-10-CM

## 2025-03-24 DIAGNOSIS — M79.672 LEFT FOOT PAIN: Primary | ICD-10-CM

## 2025-03-24 DIAGNOSIS — M79.671 RIGHT FOOT PAIN: ICD-10-CM

## 2025-03-24 DIAGNOSIS — R42 DIZZINESS: ICD-10-CM

## 2025-03-24 PROBLEM — S06.5XAA SUBDURAL HEMATOMA (HCC): Status: RESOLVED | Noted: 2017-01-06 | Resolved: 2025-03-24

## 2025-03-24 LAB — URATE SERPL-MCNC: 8.4 MG/DL (ref 3.5–8.5)

## 2025-03-24 PROCEDURE — 99214 OFFICE O/P EST MOD 30 MIN: CPT | Performed by: FAMILY MEDICINE

## 2025-03-24 PROCEDURE — 73630 X-RAY EXAM OF FOOT: CPT

## 2025-03-24 PROCEDURE — 97110 THERAPEUTIC EXERCISES: CPT

## 2025-03-24 PROCEDURE — 36415 COLL VENOUS BLD VENIPUNCTURE: CPT

## 2025-03-24 PROCEDURE — 84550 ASSAY OF BLOOD/URIC ACID: CPT

## 2025-03-24 PROCEDURE — G2211 COMPLEX E/M VISIT ADD ON: HCPCS | Performed by: FAMILY MEDICINE

## 2025-03-24 PROCEDURE — 97140 MANUAL THERAPY 1/> REGIONS: CPT

## 2025-03-24 RX ORDER — METHYLPREDNISOLONE 4 MG/1
TABLET ORAL
Qty: 21 EACH | Refills: 0 | Status: SHIPPED | OUTPATIENT
Start: 2025-03-24

## 2025-03-24 RX ORDER — ALLOPURINOL 100 MG/1
100 TABLET ORAL DAILY
Qty: 30 TABLET | Refills: 5 | Status: SHIPPED | OUTPATIENT
Start: 2025-03-24

## 2025-03-24 NOTE — PROGRESS NOTES
Daily Note     Today's date: 3/24/2025  Patient name: Ramsey Palmer  : 1935  MRN: [id number]  Referring provider: Michelle Bush DO  Dx:   Encounter Diagnosis     ICD-10-CM    1. Cervicalgia  M54.2                      Subjective: Pt states that he is doing well today. Indicates that he was working on his exercises at home regularly until he left them at his sons house.       Objective: See treatment diary below      Assessment: Tolerated treatment well. Reviewed HEP with patient. Manual techniques were performed to increase cervical joint mobility and ROM. Reported moderated 'tightness and tenderness' with trigger point release to bilateral upper traps. Cueing given to limit increase shoulder elevation with activities, to performed scapular retraction and to limit compensations with valsava with activities. Cueing given to breath with activities. (O2 sats remained WNL throughout session). Patient demonstrated fatigue post treatment and would benefit from continued PT      Plan: Continue per plan of care.      Precautions:   Patient Active Problem List   Diagnosis    Benign essential hypertension    COPD (chronic obstructive pulmonary disease) (HCC)    Degeneration of intervertebral disc of lumbar region    Diastasis of rectus abdominis    Herniated lumbar intervertebral disc    Hyperlipidemia    Lumbar radiculitis    Neural foraminal stenosis of lumbar spine    Subdural hematoma (HCC)    Sacroiliitis (HCC)    SIVA (obstructive sleep apnea)    Nocturia    Osteoarthritis of spine with radiculopathy, lumbar region    Nausea    Dyspnea on exertion    Abnormal chest x-ray    NSVT (nonsustained ventricular tachycardia) (HCC)    Thyroid nodule    Celiac artery compression syndrome (HCC)       Daily Treatment Diary    Date 3/18 3/24           FOTO IE -             Re-Eval IE               Manuals    UT, LS STM  ROBERT           Up glide to C2-C7 (B)  ROBERT                                     Neuro Re-Ed     Supine  "chin tuck             Supine chin tuck with head lift  1\" hold 20x                                     Ther Ex    Bike - ROM             Chin tuck (AAROM)* 2x10 2x10           UT stretch* 10\" hold 3x (B) 10\" hold 3x (B)           SNAG with towel* 10x ea direction 10x ea direction           Cervical extension with towel* 2x10  2x10           Standing shoulder ext and row with TB  Ext only today pink TB 3x10 *           Seated scapular retraction with TB  Green TB 2x10 *           Seated thoracic extension with half foam roll  NV                                                  Ther Activity                              Gait Training                              Modalities                                       "

## 2025-03-24 NOTE — ASSESSMENT & PLAN NOTE
Stable.  Continue with routine home monitoring as well as current treatment with amlodipine, hydrochlorothiazide as well as his losartan.

## 2025-03-24 NOTE — ASSESSMENT & PLAN NOTE
Stable.  He denies any recent exacerbations.  Continue with his current treatment of Stiolto as well as albuterol.

## 2025-03-24 NOTE — PROGRESS NOTES
Name: Ramsey Palmer      : 1935      MRN: [id number]  Encounter Provider: Michelle Bush DO  Encounter Date: 3/24/2025   Encounter department: [facility]  :  Assessment & Plan  Left foot pain  Unclear as exact cause of patient's foot pain.  Symptoms appear concerning for possible gout.  Will check x-rays of his feet bilaterally.  Also check uric acid level.  If uric acid is elevated, will consider further treatment with allopurinol.  Orders:    XR foot 3+ vw left; Future    Uric acid; Future    Benign essential hypertension  Stable.  Continue with routine home monitoring as well as current treatment with amlodipine, hydrochlorothiazide as well as his losartan.       Chronic obstructive pulmonary disease, unspecified COPD type (HCC)  Stable.  He denies any recent exacerbations.  Continue with his current treatment of Stiolto as well as albuterol.       Dizziness         Right foot pain    Orders:    methylPREDNISolone 4 MG tablet therapy pack; Use as directed on package           History of Present Illness   HPI  Patient is a 89-year-old male presents today for a follow-up on his chronic conditions.  He has hypertension, COPD, previous dizziness.  Has been taking his medications regularly.  He denies adverse reactions with his medications.  He has a CC today of left foot pain.  Has had this for the past few weeks.  Symptoms started gradually.  Review of Systems   Constitutional:  Negative for activity change, chills, fatigue and fever.   HENT:  Negative for congestion, ear pain, sinus pressure and sore throat.    Eyes:  Negative for redness, itching and visual disturbance.   Respiratory:  Negative for cough and shortness of breath.    Cardiovascular:  Negative for chest pain and palpitations.   Gastrointestinal:  Negative for abdominal pain, diarrhea and nausea.   Endocrine: Negative for cold intolerance and heat intolerance.   Genitourinary:  Negative for dysuria, flank pain and  "frequency.   Musculoskeletal:  Negative for arthralgias, back pain, gait problem and myalgias.   Skin:  Negative for color change.   Allergic/Immunologic: Negative for environmental allergies.   Neurological:  Negative for dizziness, numbness and headaches.   Psychiatric/Behavioral:  Negative for behavioral problems and sleep disturbance.        Objective   /62 (BP Location: Left arm, Patient Position: Sitting, Cuff Size: Large)   Pulse 57   Temp 97.8 °F (36.6 °C) (Temporal)   Ht 5' 9\" (1.753 m)   Wt 98.9 kg (218 lb)   SpO2 97%   BMI 32.19 kg/m²      Physical Exam  Vitals reviewed.   Constitutional:       General: He is not in acute distress.     Appearance: Normal appearance. He is well-developed.   HENT:      Head: Normocephalic and atraumatic.      Right Ear: Tympanic membrane, ear canal and external ear normal. There is no impacted cerumen.      Left Ear: Tympanic membrane, ear canal and external ear normal. There is no impacted cerumen.      Nose: Nose normal. No congestion or rhinorrhea.      Mouth/Throat:      Mouth: Mucous membranes are moist.      Pharynx: No oropharyngeal exudate or posterior oropharyngeal erythema.   Eyes:      General: No scleral icterus.        Right eye: No discharge.         Left eye: No discharge.      Extraocular Movements: Extraocular movements intact.      Conjunctiva/sclera: Conjunctivae normal.      Pupils: Pupils are equal, round, and reactive to light.   Neck:      Trachea: No tracheal deviation.   Cardiovascular:      Rate and Rhythm: Normal rate and regular rhythm.      Pulses: Normal pulses.           Dorsalis pedis pulses are 2+ on the right side and 2+ on the left side.        Posterior tibial pulses are 2+ on the right side and 2+ on the left side.      Heart sounds: Normal heart sounds. No murmur heard.     No friction rub. No gallop.   Pulmonary:      Effort: Pulmonary effort is normal. No respiratory distress.      Breath sounds: Normal breath sounds. No " wheezing, rhonchi or rales.   Abdominal:      General: Bowel sounds are normal. There is no distension.      Palpations: Abdomen is soft.      Tenderness: There is no abdominal tenderness. There is no guarding or rebound.   Musculoskeletal:         General: Normal range of motion.      Cervical back: Normal range of motion and neck supple.      Right lower leg: No edema.      Left lower leg: No edema.   Lymphadenopathy:      Head:      Right side of head: No submental or submandibular adenopathy.      Left side of head: No submental or submandibular adenopathy.      Cervical: No cervical adenopathy.      Right cervical: No superficial, deep or posterior cervical adenopathy.     Left cervical: No superficial, deep or posterior cervical adenopathy.   Skin:     General: Skin is warm and dry.      Findings: No erythema.   Neurological:      General: No focal deficit present.      Mental Status: He is alert and oriented to person, place, and time.      Cranial Nerves: No cranial nerve deficit.      Sensory: Sensation is intact. No sensory deficit.      Motor: Motor function is intact.   Psychiatric:         Attention and Perception: Attention and perception normal.         Mood and Affect: Mood is not anxious or depressed.         Speech: Speech normal.         Behavior: Behavior normal.         Thought Content: Thought content normal.         Judgment: Judgment normal.

## 2025-04-02 ENCOUNTER — OFFICE VISIT (OUTPATIENT)
Dept: PHYSICAL THERAPY | Facility: CLINIC | Age: OVER 89
End: 2025-04-02
Payer: COMMERCIAL

## 2025-04-02 DIAGNOSIS — M54.2 CERVICALGIA: Primary | ICD-10-CM

## 2025-04-02 PROCEDURE — 97140 MANUAL THERAPY 1/> REGIONS: CPT

## 2025-04-02 PROCEDURE — 97110 THERAPEUTIC EXERCISES: CPT

## 2025-04-02 NOTE — PROGRESS NOTES
Daily Note     Today's date: 2025  Patient name: Ramsey Palmer  : 1935  MRN: [id number]  Referring provider: Michelle Bush DO  Dx:   Encounter Diagnosis     ICD-10-CM    1. Cervicalgia  M54.2                      Subjective: Pt states that his neck has been feeling pretty good overall. Indicates that he does not really have to much pain and his neck has also stopped cracking. Feels that his neck is feeling pretty good and he can keep up with his HEP vs formal physical therapy.       Objective: See treatment diary below      Assessment: Tolerated treatment well. Patient demonstrated fatigue post treatment, exhibited good technique with therapeutic exercises, and would benefit from continued PT but the plan is to transition to HEP at this time due to reported of having no issues and wanted to move to HEP at this time. Recommended to continue with HEP; stretching and postural strengthening activities.       Plan:    Advised patient to continue with home exercise program which I reviewed with them today. Advised patient to contact me with any future questions or concerns regarding exercise. Pt is in agreement with discharge plan.       Precautions:   Patient Active Problem List   Diagnosis    Benign essential hypertension    COPD (chronic obstructive pulmonary disease) (HCC)    Degeneration of intervertebral disc of lumbar region    Diastasis of rectus abdominis    Herniated lumbar intervertebral disc    Hyperlipidemia    Lumbar radiculitis    Neural foraminal stenosis of lumbar spine    Subdural hematoma (HCC)    Sacroiliitis (HCC)    SIVA (obstructive sleep apnea)    Nocturia    Osteoarthritis of spine with radiculopathy, lumbar region    Nausea    Dyspnea on exertion    Abnormal chest x-ray    NSVT (nonsustained ventricular tachycardia) (HCC)    Thyroid nodule    Celiac artery compression syndrome (HCC)       Daily Treatment Diary    Date 3/18 3/24 4/2          FOTO IE -             Re-Eval IE       "         Manuals    UT, LS STM  JM JM          Up glide to C2-C7 (B)  JM                                     Neuro Re-Ed     Supine chin tuck             Supine chin tuck with head lift  1\" hold 20x Standing ext with head lift at wall 10x                                    Ther Ex    Bike - ROM             Chin tuck (AAROM)* 2x10 2x10           UT stretch* 10\" hold 3x (B) 10\" hold 3x (B) 30\" hold 3x (B)          SNAG with towel* 10x ea direction 10x ea direction 10x ea direction          Cervical extension with towel* 2x10  2x10 2x10          Standing shoulder ext and row with TB  Ext only today pink TB 3x10 * Black TB 2x10 ea           Seated scapular retraction with TB  Green TB 2x10 *           Seated thoracic extension with half foam roll  NV 15x                                                 Ther Activity                              Gait Training                              Modalities                                         "

## 2025-04-04 ENCOUNTER — TELEPHONE (OUTPATIENT)
Age: OVER 89
End: 2025-04-04

## 2025-04-04 NOTE — TELEPHONE ENCOUNTER
Pt called the office to let the provider know that the medication that was sent in methylprednisone is not helping with the pain. Pt was offered an appointment to come back in to discuss, pt refused. Pt would like the provider to review and send something else in. Please review

## 2025-04-05 DIAGNOSIS — M79.672 LEFT FOOT PAIN: Primary | ICD-10-CM

## 2025-04-07 ENCOUNTER — TELEPHONE (OUTPATIENT)
Age: OVER 89
End: 2025-04-07

## 2025-04-07 NOTE — TELEPHONE ENCOUNTER
Caller: Neeta     Doctor and/or Office: Dr. Tinoco /Tracey/Sabino     #: [phone]     Escalation: Appointment Patient has a bad flare up of gout will go to either location, Is leaving on a trip May  1st

## 2025-04-07 NOTE — TELEPHONE ENCOUNTER
Pt saw Dr. Salguero before and was not happy. I provided pt with Podiatry phone number and he will contact podiatry to see which location can get him in the soonest.

## 2025-04-21 ENCOUNTER — OFFICE VISIT (OUTPATIENT)
Dept: PODIATRY | Facility: CLINIC | Age: OVER 89
End: 2025-04-21
Payer: COMMERCIAL

## 2025-04-21 VITALS — BODY MASS INDEX: 32.29 KG/M2 | WEIGHT: 218 LBS | HEIGHT: 69 IN

## 2025-04-21 DIAGNOSIS — M10.071 ACUTE IDIOPATHIC GOUT OF RIGHT FOOT: ICD-10-CM

## 2025-04-21 PROCEDURE — 99213 OFFICE O/P EST LOW 20 MIN: CPT | Performed by: PODIATRIST

## 2025-04-21 NOTE — PATIENT INSTRUCTIONS
"  Patient Education     Low-purine diet   The Basics   Written by the doctors and editors at [facility]   Why do I need a low-purine diet? -- Purines are natural substances found in some foods. When the body digests purines, it makes a waste product called \"uric acid.\"  When too much uric acid builds up in the body, it can lead to health problems. Doctors might recommend following a low-purine diet as part of your treatment if you have:   Uric acid stones - These are a type of kidney stone.   Gout - Gout is a form of arthritis. It can cause attacks of pain and swelling in the joints.  Limiting foods high in purines is only 1 part of a treatment plan. These conditions are also treated with medicines. For people with gout, diet changes alone will probably not help much with symptoms. But doctors do usually recommend avoiding alcohol and any other foods that cause attacks.  What can I eat and drink on a low-purine diet?    Grains - Popcorn, whole-grain breads, pastas, cereals, rice.   Fruits - All fruits.   Vegetables - All vegetables, except those that are moderate and high in purines. Vegetables to avoid or limit are listed below.   Dairy - All types of dairy are low in purines. But low-fat or fat-free milk, yogurt, or cheeses are the best for you.   Meats, poultry, seafood, and proteins - Eggs, nuts, peanut butter.   Other foods and drinks - Water, tea, coffee, cocoa, condiments like salt, herbs, olives, pickles, relishes, vinegar, olive oil, sugar, sweets, gelatin.  What foods and drinks should I avoid or limit on a low-purine diet?    Grains to avoid or limit - Oatmeal, wheat bran, wheat germ.   Vegetables to avoid or limit - Asparagus, spinach, cauliflower, green peas, mushrooms.   Meats, poultry, seafood, and proteins to avoid or limit - Red meat (beef, veal, lamb, pork), poultry, gallo, organ meats (sweetbreads, liver, kidneys, heart, brains), fish (mackerel, sardines, anchovies, herring, trout, anderson, cod, " tuna), seafood (scallops, mussels, shrimp, lobster, crab, oysters), tripe, wild game (goose, duck, venison), mincemeat, lunch meats, turkey, dried beans, peas, lentils.   Other foods and drinks to avoid or limit - Gravies, meat sauces, alcohol, yeast and yeast extracts (taken as supplements). Meat- or fish-based soups, broths, and bouillons.  What else should I know? -- If you have excess body weight and gout, losing weight can help with symptoms. Drinking plenty of water can also help with problems caused by too much uric acid in your body.  All topics are updated as new evidence becomes available and our peer review process is complete.  This topic retrieved from [employer] on: Apr 04, 2024.  Topic 047839 Version 1.0  Release: 32.2.4 - C32.93  © 2024 UpToDate, Inc. and/or its affiliates. All rights reserved.  Consumer Information Use and Disclaimer   Disclaimer: This generalized information is a limited summary of diagnosis, treatment, and/or medication information. It is not meant to be comprehensive and should be used as a tool to help the user understand and/or assess potential diagnostic and treatment options. It does NOT include all information about conditions, treatments, medications, side effects, or risks that may apply to a specific patient. It is not intended to be medical advice or a substitute for the medical advice, diagnosis, or treatment of a health care provider based on the health care provider's examination and assessment of a patient's specific and unique circumstances. Patients must speak with a health care provider for complete information about their health, medical questions, and treatment options, including any risks or benefits regarding use of medications. This information does not endorse any treatments or medications as safe, effective, or approved for treating a specific patient. UpToDate, Inc. and its affiliates disclaim any warranty or liability relating to this information or the use  thereof.The use of this information is governed by the Terms of Use, available at https://www.wolterskluwer.com/en/know/clinical-effectiveness-terms. 2024© UpToDate, Inc. and its affiliates and/or licensors. All rights reserved.  Copyright   © 2024 [employer] and/or its affiliates. All rights reserved.

## 2025-04-21 NOTE — PROGRESS NOTES
"Name: Rasmey Palmer      : 1935      MRN: [id number]  Encounter Provider: Topher Tinoco DPM  Encounter Date: 2025   Encounter department: [facility] [place]  :  Assessment & Plan  Acute idiopathic gout of right foot  UA is 8.4. He had not been taking allopurinol but he is now.     The acute pain is doing better with the oral steroid. No further acute care, reviewed his PCP visit 3/24/2025    Left foot XR shows mild degenerative changes but nothing acute    Educated on diet, foods to avoid. Make sure to take your allopurinol daily.       Orders:  •  Ambulatory Referral to Podiatry        History of Present Illness   HPI  Ramsey Palmer is a 89 y.o. male who presents with chronic foot pain. He has h/o gout, his entire foot hurts today but is has been getting better. He was put on a prednisone taper which is helping. His doctor started him on allopurinol which he is now taking.       Review of Systems  As stated in HPI, otherwise normal    Medical History Reviewed by provider this encounter:  Tobacco  Allergies  Meds  Problems  Med Hx  Surg Hx  Fam Hx           Objective   Ht 5' 9\" (1.753 m)   Wt 98.9 kg (218 lb)   BMI 32.19 kg/m²      Physical Exam  Vitals reviewed.   Cardiovascular:      Rate and Rhythm: Normal rate.      Pulses: Normal pulses.           Dorsalis pedis pulses are 2+ on the right side and 2+ on the left side.        Posterior tibial pulses are 2+ on the right side and 2+ on the left side.   Pulmonary:      Effort: No respiratory distress.   Musculoskeletal:         General: Swelling present. No tenderness.      Right foot: Normal range of motion.      Left foot: Normal range of motion.   Feet:      Right foot:      Protective Sensation: 10 sites tested.  10 sites sensed.      Left foot:      Protective Sensation: 10 sites tested.  10 sites sensed.   Skin:     Capillary Refill: Capillary refill takes less than 2 seconds.      Findings: No bruising. "   Neurological:      Mental Status: He is alert.      Sensory: No sensory deficit.

## 2025-04-28 ENCOUNTER — OFFICE VISIT (OUTPATIENT)
Dept: FAMILY MEDICINE CLINIC | Facility: CLINIC | Age: OVER 89
End: 2025-04-28
Payer: COMMERCIAL

## 2025-04-28 VITALS
WEIGHT: 217 LBS | OXYGEN SATURATION: 95 % | TEMPERATURE: 98.3 F | BODY MASS INDEX: 32.05 KG/M2 | SYSTOLIC BLOOD PRESSURE: 92 MMHG | DIASTOLIC BLOOD PRESSURE: 58 MMHG | HEART RATE: 81 BPM

## 2025-04-28 DIAGNOSIS — R06.09 DOE (DYSPNEA ON EXERTION): ICD-10-CM

## 2025-04-28 DIAGNOSIS — M1A.9XX0 CHRONIC GOUT WITHOUT TOPHUS, UNSPECIFIED CAUSE, UNSPECIFIED SITE: ICD-10-CM

## 2025-04-28 DIAGNOSIS — J44.9 CHRONIC OBSTRUCTIVE PULMONARY DISEASE, UNSPECIFIED COPD TYPE (HCC): Primary | ICD-10-CM

## 2025-04-28 DIAGNOSIS — R79.9 ABNORMAL FINDING OF BLOOD CHEMISTRY, UNSPECIFIED: ICD-10-CM

## 2025-04-28 PROCEDURE — 99214 OFFICE O/P EST MOD 30 MIN: CPT | Performed by: FAMILY MEDICINE

## 2025-04-28 PROCEDURE — G2211 COMPLEX E/M VISIT ADD ON: HCPCS | Performed by: FAMILY MEDICINE

## 2025-04-28 NOTE — ASSESSMENT & PLAN NOTE
Unclear as exact cause of patient's dyspnea on exertion.  Symptoms appear concerning for possible COPD.  At this time, will check blood work to further rule out any systemic abnormalities.  Will call with results.  He has been on albuterol however he states that he has not been taking the Stiolto.  This was too expensive for him.  Will see if he may consider other inhalers.  Will reach out to pulmonology if needed.

## 2025-04-28 NOTE — PROGRESS NOTES
Name: Ramsey Palmer      : 1935      MRN: [id number]  Encounter Provider: Michelle Bush DO  Encounter Date: 2025   Encounter department: [facility]  :  Assessment & Plan  Chronic obstructive pulmonary disease, unspecified COPD type (HCC)  Unclear as exact cause of patient's dyspnea on exertion.  Symptoms appear concerning for possible COPD.  At this time, will check blood work to further rule out any systemic abnormalities.  Will call with results.  He has been on albuterol however he states that he has not been taking the Stiolto.  This was too expensive for him.  Will see if he may consider other inhalers.  Will reach out to pulmonology if needed.       ALMANZA (dyspnea on exertion)    Orders:    CBC and Platelet; Future    Comprehensive metabolic panel; Future    Iron Panel (Includes Ferritin, Iron Sat%, Iron, and TIBC); Future    B-Type Natriuretic Peptide(BNP); Future    Chronic gout without tophus, unspecified cause, unspecified site    Orders:    Uric acid; Future    Abnormal finding of blood chemistry, unspecified    Orders:    Iron Panel (Includes Ferritin, Iron Sat%, Iron, and TIBC); Future           History of Present Illness   Patient is a 89-year-old male presents today with CC of fatigue/dyspnea on exertion.  He states that he has noticed this for the past few weeks.  Symptoms started gradually.  He states that he will be doing work outside and quickly is fatigued and developed shortness of breath.  He denies any chest pain palpitations or lightheadedness.  Fatigue  Associated symptoms include fatigue. Pertinent negatives include no abdominal pain, arthralgias, chest pain, chills, congestion, coughing, fever, headaches, myalgias, nausea, numbness or sore throat.     Review of Systems   Constitutional:  Positive for fatigue. Negative for activity change, chills and fever.   HENT:  Negative for congestion, ear pain, sinus pressure and sore throat.    Eyes:   Negative for redness, itching and visual disturbance.   Respiratory:  Negative for cough and shortness of breath.    Cardiovascular:  Negative for chest pain and palpitations.   Gastrointestinal:  Negative for abdominal pain, diarrhea and nausea.   Endocrine: Negative for cold intolerance and heat intolerance.   Genitourinary:  Negative for dysuria, flank pain and frequency.   Musculoskeletal:  Negative for arthralgias, back pain, gait problem and myalgias.   Skin:  Negative for color change.   Allergic/Immunologic: Negative for environmental allergies.   Neurological:  Negative for dizziness, numbness and headaches.   Psychiatric/Behavioral:  Negative for behavioral problems and sleep disturbance.        Objective   BP 92/58 (BP Location: Left arm, Patient Position: Sitting, Cuff Size: Large)   Pulse 81   Temp 98.3 °F (36.8 °C) (Temporal)   Wt 98.4 kg (217 lb)   SpO2 95%   BMI 32.05 kg/m²      Physical Exam  Vitals reviewed.   Constitutional:       General: He is not in acute distress.     Appearance: Normal appearance. He is well-developed.   HENT:      Head: Normocephalic and atraumatic.      Right Ear: Tympanic membrane, ear canal and external ear normal. There is no impacted cerumen.      Left Ear: Tympanic membrane, ear canal and external ear normal. There is no impacted cerumen.      Nose: Nose normal. No congestion or rhinorrhea.      Mouth/Throat:      Mouth: Mucous membranes are moist.      Pharynx: No oropharyngeal exudate or posterior oropharyngeal erythema.   Eyes:      General: No scleral icterus.        Right eye: No discharge.         Left eye: No discharge.      Extraocular Movements: Extraocular movements intact.      Conjunctiva/sclera: Conjunctivae normal.      Pupils: Pupils are equal, round, and reactive to light.   Neck:      Trachea: No tracheal deviation.   Cardiovascular:      Rate and Rhythm: Normal rate and regular rhythm.      Pulses: Normal pulses.           Dorsalis pedis pulses  are 2+ on the right side and 2+ on the left side.        Posterior tibial pulses are 2+ on the right side and 2+ on the left side.      Heart sounds: Normal heart sounds. No murmur heard.     No friction rub. No gallop.   Pulmonary:      Effort: Pulmonary effort is normal. No respiratory distress.      Breath sounds: Normal breath sounds. No wheezing, rhonchi or rales.   Abdominal:      General: Bowel sounds are normal. There is no distension.      Palpations: Abdomen is soft.      Tenderness: There is no abdominal tenderness. There is no guarding or rebound.   Musculoskeletal:         General: Normal range of motion.      Cervical back: Normal range of motion and neck supple.      Right lower leg: No edema.      Left lower leg: No edema.   Lymphadenopathy:      Head:      Right side of head: No submental or submandibular adenopathy.      Left side of head: No submental or submandibular adenopathy.      Cervical: No cervical adenopathy.      Right cervical: No superficial, deep or posterior cervical adenopathy.     Left cervical: No superficial, deep or posterior cervical adenopathy.   Skin:     General: Skin is warm and dry.      Findings: No erythema.   Neurological:      General: No focal deficit present.      Mental Status: He is alert and oriented to person, place, and time.      Cranial Nerves: No cranial nerve deficit.      Sensory: Sensation is intact. No sensory deficit.      Motor: Motor function is intact.   Psychiatric:         Attention and Perception: Attention and perception normal.         Mood and Affect: Mood is not anxious or depressed.         Speech: Speech normal.         Behavior: Behavior normal.         Thought Content: Thought content normal.         Judgment: Judgment normal.

## 2025-04-29 ENCOUNTER — APPOINTMENT (OUTPATIENT)
Dept: LAB | Facility: CLINIC | Age: OVER 89
End: 2025-04-29
Attending: FAMILY MEDICINE
Payer: COMMERCIAL

## 2025-04-29 ENCOUNTER — TELEPHONE (OUTPATIENT)
Age: OVER 89
End: 2025-04-29

## 2025-04-29 ENCOUNTER — RESULTS FOLLOW-UP (OUTPATIENT)
Dept: FAMILY MEDICINE CLINIC | Facility: CLINIC | Age: OVER 89
End: 2025-04-29

## 2025-04-29 DIAGNOSIS — R06.09 DOE (DYSPNEA ON EXERTION): ICD-10-CM

## 2025-04-29 DIAGNOSIS — M1A.9XX0 CHRONIC GOUT WITHOUT TOPHUS, UNSPECIFIED CAUSE, UNSPECIFIED SITE: ICD-10-CM

## 2025-04-29 DIAGNOSIS — R79.9 ABNORMAL FINDING OF BLOOD CHEMISTRY, UNSPECIFIED: ICD-10-CM

## 2025-04-29 LAB
ALBUMIN SERPL BCG-MCNC: 4 G/DL (ref 3.5–5)
ALP SERPL-CCNC: 48 U/L (ref 34–104)
ALT SERPL W P-5'-P-CCNC: 14 U/L (ref 7–52)
ANION GAP SERPL CALCULATED.3IONS-SCNC: 15 MMOL/L (ref 4–13)
AST SERPL W P-5'-P-CCNC: 21 U/L (ref 13–39)
BILIRUB SERPL-MCNC: 0.62 MG/DL (ref 0.2–1)
BNP SERPL-MCNC: 10 PG/ML (ref 0–100)
BUN SERPL-MCNC: 19 MG/DL (ref 5–25)
CALCIUM SERPL-MCNC: 9.5 MG/DL (ref 8.4–10.2)
CHLORIDE SERPL-SCNC: 103 MMOL/L (ref 96–108)
CO2 SERPL-SCNC: 25 MMOL/L (ref 21–32)
CREAT SERPL-MCNC: 0.84 MG/DL (ref 0.6–1.3)
ERYTHROCYTE [DISTWIDTH] IN BLOOD BY AUTOMATED COUNT: 13 % (ref 11.6–15.1)
FERRITIN SERPL-MCNC: 30 NG/ML (ref 30–336)
GFR SERPL CREATININE-BSD FRML MDRD: 77 ML/MIN/1.73SQ M
GLUCOSE P FAST SERPL-MCNC: 95 MG/DL (ref 65–99)
HCT VFR BLD AUTO: 45 % (ref 36.5–49.3)
HGB BLD-MCNC: 14.3 G/DL (ref 12–17)
IRON SATN MFR SERPL: 22 % (ref 15–50)
IRON SERPL-MCNC: 80 UG/DL (ref 50–212)
MCH RBC QN AUTO: 29.8 PG (ref 26.8–34.3)
MCHC RBC AUTO-ENTMCNC: 31.8 G/DL (ref 31.4–37.4)
MCV RBC AUTO: 94 FL (ref 82–98)
PLATELET # BLD AUTO: 206 THOUSANDS/UL (ref 149–390)
PMV BLD AUTO: 11 FL (ref 8.9–12.7)
POTASSIUM SERPL-SCNC: 4 MMOL/L (ref 3.5–5.3)
PROT SERPL-MCNC: 6.6 G/DL (ref 6.4–8.4)
RBC # BLD AUTO: 4.8 MILLION/UL (ref 3.88–5.62)
SODIUM SERPL-SCNC: 143 MMOL/L (ref 135–147)
TIBC SERPL-MCNC: 357 UG/DL (ref 250–450)
TRANSFERRIN SERPL-MCNC: 255 MG/DL (ref 203–362)
UIBC SERPL-MCNC: 277 UG/DL (ref 155–355)
URATE SERPL-MCNC: 7.9 MG/DL (ref 3.5–8.5)
WBC # BLD AUTO: 6.41 THOUSAND/UL (ref 4.31–10.16)

## 2025-04-29 PROCEDURE — 83550 IRON BINDING TEST: CPT

## 2025-04-29 PROCEDURE — 85027 COMPLETE CBC AUTOMATED: CPT

## 2025-04-29 PROCEDURE — 83540 ASSAY OF IRON: CPT

## 2025-04-29 PROCEDURE — 84550 ASSAY OF BLOOD/URIC ACID: CPT

## 2025-04-29 PROCEDURE — 83880 ASSAY OF NATRIURETIC PEPTIDE: CPT

## 2025-04-29 PROCEDURE — 36415 COLL VENOUS BLD VENIPUNCTURE: CPT

## 2025-04-29 PROCEDURE — 82728 ASSAY OF FERRITIN: CPT

## 2025-04-29 PROCEDURE — 80053 COMPREHEN METABOLIC PANEL: CPT

## 2025-04-29 NOTE — TELEPHONE ENCOUNTER
Patient's wife is requesting printouts of recent lab work so he can take them with him to [place]. Ramsey's lab work is still in process. Patient would like call back once ready, [phone].

## 2025-04-30 NOTE — RESULT ENCOUNTER NOTE
Spoke with pt. And informed them of the lab results per Dr. Bush. Pt is picking up the paperwork of lab results today.

## 2025-05-31 ENCOUNTER — OFFICE VISIT (OUTPATIENT)
Dept: URGENT CARE | Facility: MEDICAL CENTER | Age: OVER 89
End: 2025-05-31
Payer: COMMERCIAL

## 2025-05-31 ENCOUNTER — HOSPITAL ENCOUNTER (EMERGENCY)
Facility: HOSPITAL | Age: OVER 89
Discharge: HOME | End: 2025-05-31
Attending: EMERGENCY MEDICINE | Admitting: EMERGENCY MEDICINE
Payer: COMMERCIAL

## 2025-05-31 ENCOUNTER — APPOINTMENT (OUTPATIENT)
Dept: RADIOLOGY | Facility: MEDICAL CENTER | Age: OVER 89
End: 2025-05-31
Payer: COMMERCIAL

## 2025-05-31 ENCOUNTER — NURSE TRIAGE (OUTPATIENT)
Dept: OTHER | Facility: OTHER | Age: OVER 89
End: 2025-05-31

## 2025-05-31 VITALS
HEART RATE: 68 BPM | WEIGHT: 216.05 LBS | BODY MASS INDEX: 30.13 KG/M2 | OXYGEN SATURATION: 93 % | SYSTOLIC BLOOD PRESSURE: 160 MMHG | RESPIRATION RATE: 20 BRPM | DIASTOLIC BLOOD PRESSURE: 72 MMHG | TEMPERATURE: 97.5 F

## 2025-05-31 VITALS
TEMPERATURE: 97.7 F | BODY MASS INDEX: 30.52 KG/M2 | DIASTOLIC BLOOD PRESSURE: 71 MMHG | SYSTOLIC BLOOD PRESSURE: 139 MMHG | OXYGEN SATURATION: 96 % | HEIGHT: 71 IN | HEART RATE: 96 BPM | RESPIRATION RATE: 18 BRPM | WEIGHT: 218 LBS

## 2025-05-31 DIAGNOSIS — R06.02 SHORTNESS OF BREATH: ICD-10-CM

## 2025-05-31 DIAGNOSIS — J44.9 CHRONIC OBSTRUCTIVE PULMONARY DISEASE, UNSPECIFIED COPD TYPE (HCC): ICD-10-CM

## 2025-05-31 DIAGNOSIS — I10 HYPERTENSION: ICD-10-CM

## 2025-05-31 DIAGNOSIS — R06.02 SHORTNESS OF BREATH: Primary | ICD-10-CM

## 2025-05-31 DIAGNOSIS — J44.1 COPD WITH ACUTE EXACERBATION (HCC): Primary | ICD-10-CM

## 2025-05-31 DIAGNOSIS — I45.10 RIGHT BUNDLE BRANCH BLOCK: ICD-10-CM

## 2025-05-31 LAB
ALBUMIN SERPL BCG-MCNC: 4.2 G/DL (ref 3.5–5)
ALP SERPL-CCNC: 45 U/L (ref 34–104)
ALT SERPL W P-5'-P-CCNC: 13 U/L (ref 7–52)
ANION GAP SERPL CALCULATED.3IONS-SCNC: 8 MMOL/L (ref 4–13)
AST SERPL W P-5'-P-CCNC: 16 U/L (ref 13–39)
BASOPHILS # BLD AUTO: 0.06 THOUSANDS/ÂΜL (ref 0–0.1)
BASOPHILS NFR BLD AUTO: 1 % (ref 0–1)
BILIRUB SERPL-MCNC: 0.51 MG/DL (ref 0.2–1)
BNP SERPL-MCNC: 33 PG/ML (ref 0–100)
BUN SERPL-MCNC: 15 MG/DL (ref 5–25)
CALCIUM SERPL-MCNC: 9.7 MG/DL (ref 8.4–10.2)
CARDIAC TROPONIN I PNL SERPL HS: 9 NG/L (ref ?–50)
CHLORIDE SERPL-SCNC: 102 MMOL/L (ref 96–108)
CO2 SERPL-SCNC: 29 MMOL/L (ref 21–32)
CREAT SERPL-MCNC: 0.85 MG/DL (ref 0.6–1.3)
EOSINOPHIL # BLD AUTO: 0.27 THOUSAND/ÂΜL (ref 0–0.61)
EOSINOPHIL NFR BLD AUTO: 4 % (ref 0–6)
ERYTHROCYTE [DISTWIDTH] IN BLOOD BY AUTOMATED COUNT: 12.7 % (ref 11.6–15.1)
GFR SERPL CREATININE-BSD FRML MDRD: 77 ML/MIN/1.73SQ M
GLUCOSE SERPL-MCNC: 102 MG/DL (ref 65–140)
HCT VFR BLD AUTO: 44.6 % (ref 36.5–49.3)
HGB BLD-MCNC: 14.6 G/DL (ref 12–17)
IMM GRANULOCYTES # BLD AUTO: 0.01 THOUSAND/UL (ref 0–0.2)
IMM GRANULOCYTES NFR BLD AUTO: 0 % (ref 0–2)
LYMPHOCYTES # BLD AUTO: 1.97 THOUSANDS/ÂΜL (ref 0.6–4.47)
LYMPHOCYTES NFR BLD AUTO: 26 % (ref 14–44)
MCH RBC QN AUTO: 29.4 PG (ref 26.8–34.3)
MCHC RBC AUTO-ENTMCNC: 32.7 G/DL (ref 31.4–37.4)
MCV RBC AUTO: 90 FL (ref 82–98)
MONOCYTES # BLD AUTO: 0.55 THOUSAND/ÂΜL (ref 0.17–1.22)
MONOCYTES NFR BLD AUTO: 7 % (ref 4–12)
NEUTROPHILS # BLD AUTO: 4.69 THOUSANDS/ÂΜL (ref 1.85–7.62)
NEUTS SEG NFR BLD AUTO: 62 % (ref 43–75)
NRBC BLD AUTO-RTO: 0 /100 WBCS
PLATELET # BLD AUTO: 198 THOUSANDS/UL (ref 149–390)
PMV BLD AUTO: 10.6 FL (ref 8.9–12.7)
POTASSIUM SERPL-SCNC: 3.8 MMOL/L (ref 3.5–5.3)
PROT SERPL-MCNC: 6.9 G/DL (ref 6.4–8.4)
RBC # BLD AUTO: 4.96 MILLION/UL (ref 3.88–5.62)
SODIUM SERPL-SCNC: 139 MMOL/L (ref 135–147)
WBC # BLD AUTO: 7.55 THOUSAND/UL (ref 4.31–10.16)

## 2025-05-31 PROCEDURE — 96374 THER/PROPH/DIAG INJ IV PUSH: CPT

## 2025-05-31 PROCEDURE — 94760 N-INVAS EAR/PLS OXIMETRY 1: CPT

## 2025-05-31 PROCEDURE — 94640 AIRWAY INHALATION TREATMENT: CPT

## 2025-05-31 PROCEDURE — 94664 DEMO&/EVAL PT USE INHALER: CPT

## 2025-05-31 PROCEDURE — 99285 EMERGENCY DEPT VISIT HI MDM: CPT | Performed by: EMERGENCY MEDICINE

## 2025-05-31 PROCEDURE — 80053 COMPREHEN METABOLIC PANEL: CPT | Performed by: EMERGENCY MEDICINE

## 2025-05-31 PROCEDURE — 99213 OFFICE O/P EST LOW 20 MIN: CPT

## 2025-05-31 PROCEDURE — 99285 EMERGENCY DEPT VISIT HI MDM: CPT

## 2025-05-31 PROCEDURE — 85025 COMPLETE CBC W/AUTO DIFF WBC: CPT | Performed by: EMERGENCY MEDICINE

## 2025-05-31 PROCEDURE — 93005 ELECTROCARDIOGRAM TRACING: CPT

## 2025-05-31 PROCEDURE — 84484 ASSAY OF TROPONIN QUANT: CPT | Performed by: EMERGENCY MEDICINE

## 2025-05-31 PROCEDURE — 36415 COLL VENOUS BLD VENIPUNCTURE: CPT | Performed by: EMERGENCY MEDICINE

## 2025-05-31 PROCEDURE — 83880 ASSAY OF NATRIURETIC PEPTIDE: CPT | Performed by: EMERGENCY MEDICINE

## 2025-05-31 PROCEDURE — 71046 X-RAY EXAM CHEST 2 VIEWS: CPT

## 2025-05-31 PROCEDURE — 94644 CONT INHLJ TX 1ST HOUR: CPT

## 2025-05-31 RX ORDER — TIOTROPIUM BROMIDE AND OLODATEROL 3.124; 2.736 UG/1; UG/1
2 SPRAY, METERED RESPIRATORY (INHALATION) DAILY
Qty: 4 G | Refills: 0 | Status: SHIPPED | OUTPATIENT
Start: 2025-05-31 | End: 2025-06-11 | Stop reason: SDUPTHER

## 2025-05-31 RX ORDER — SODIUM CHLORIDE FOR INHALATION 0.9 %
12 VIAL, NEBULIZER (ML) INHALATION ONCE
Status: COMPLETED | OUTPATIENT
Start: 2025-05-31 | End: 2025-05-31

## 2025-05-31 RX ORDER — PREDNISONE 10 MG/1
TABLET ORAL
Qty: 48 TABLET | Refills: 0 | Status: SHIPPED | OUTPATIENT
Start: 2025-05-31

## 2025-05-31 RX ORDER — ALBUTEROL SULFATE 5 MG/ML
10 SOLUTION RESPIRATORY (INHALATION) ONCE
Status: COMPLETED | OUTPATIENT
Start: 2025-05-31 | End: 2025-05-31

## 2025-05-31 RX ORDER — METHYLPREDNISOLONE SODIUM SUCCINATE 125 MG/2ML
125 INJECTION, POWDER, LYOPHILIZED, FOR SOLUTION INTRAMUSCULAR; INTRAVENOUS ONCE
Status: COMPLETED | OUTPATIENT
Start: 2025-05-31 | End: 2025-05-31

## 2025-05-31 RX ORDER — ALBUTEROL SULFATE 90 UG/1
2 INHALANT RESPIRATORY (INHALATION) EVERY 6 HOURS PRN
Qty: 18 G | Refills: 0 | Status: SHIPPED | OUTPATIENT
Start: 2025-05-31 | End: 2025-06-11 | Stop reason: SDUPTHER

## 2025-05-31 RX ADMIN — ISODIUM CHLORIDE 12 ML: 0.03 SOLUTION RESPIRATORY (INHALATION) at 12:33

## 2025-05-31 RX ADMIN — ALBUTEROL SULFATE 10 MG: 2.5 SOLUTION RESPIRATORY (INHALATION) at 12:33

## 2025-05-31 RX ADMIN — METHYLPREDNISOLONE SODIUM SUCCINATE 125 MG: 125 INJECTION, POWDER, FOR SOLUTION INTRAMUSCULAR; INTRAVENOUS at 12:32

## 2025-05-31 RX ADMIN — IPRATROPIUM BROMIDE 1 MG: 0.5 SOLUTION RESPIRATORY (INHALATION) at 12:33

## 2025-05-31 NOTE — PROGRESS NOTES
[facility]        NAME: Ramsey Palmer is a 89 y.o. male  : 1935    MRN: [id number]  DATE: May 31, 2025  TIME: 11:26 AM    Assessment and Plan   Shortness of breath [R06.02]  1. Shortness of breath  ECG 12 lead    XR chest pa and lateral      Patient reports increased shortness of breath for the past 3 days.  EKG in department shows bigeminy. EKG appears changed from 2024 where bigeminy was not present on EKG.  Recommended patient be evaluated in the emergency department at this time due to EKG changes with associated shortness of breath.  Patient is refusing to be transferred via ambulance.  Patient prefers to have wife transport him by private vehicle at this time.  Patient's vitals are stable at this time, patient denies chest pain.      Patient Instructions   Follow up in ED     Follow up with PCP in 3-5 days.  Proceed to  ER if symptoms worsen.    If tests have been performed at [facility], our office will contact you with results if changes need to be made to the care plan discussed with you at the visit.  You can review your full results on [facility] MyChart.    Chief Complaint     Chief Complaint   Patient presents with    Cold Like Symptoms     States ran out of nebulizer's at home. Relates for the last 2-3 days cannot always catch breath. Denies any CP. No call to PCP         History of Present Illness       89-year-old male presents with 2 to 3-day history of shortness of breath.  Patient reports that intermittently for the past 3 days he has had a hard time catching his breath.  Patient denies chest pain fevers, cough , nausea abdominal pain and congestion.  Patient denies leg swelling.  Patient reports he has been wearing his CPAP at night.  Patient reports that the shortness of breath comes and goes, and seems to be worse during the daytime.        Review of Systems   Review of Systems   Constitutional:  Negative for activity change, appetite change, chills, diaphoresis,  fatigue, fever and unexpected weight change.   HENT:  Negative for congestion, dental problem, drooling, facial swelling, postnasal drip, rhinorrhea, sinus pressure, sinus pain, sneezing, sore throat and trouble swallowing.    Eyes:  Negative for photophobia, pain, discharge, redness and visual disturbance.   Respiratory:  Positive for shortness of breath. Negative for chest tightness, wheezing and stridor.    Cardiovascular:  Negative for chest pain, palpitations and leg swelling.   Gastrointestinal:  Negative for abdominal distention, abdominal pain, diarrhea, nausea and vomiting.   Genitourinary:  Negative for decreased urine volume, difficulty urinating, flank pain, frequency, penile discharge, testicular pain and urgency.   Musculoskeletal:  Negative for arthralgias, back pain, gait problem, joint swelling and neck pain.   Skin:  Negative for color change, pallor, rash and wound.   Allergic/Immunologic: Negative for environmental allergies and food allergies.   Neurological:  Negative for dizziness, syncope, facial asymmetry, weakness, light-headedness, numbness and headaches.   Hematological:  Negative for adenopathy. Does not bruise/bleed easily.   Psychiatric/Behavioral:  Negative for agitation, behavioral problems, confusion and hallucinations. The patient is not nervous/anxious.          Current Medications     Current Medications[1]    Current Allergies     Allergies as of 05/31/2025 - Reviewed 05/31/2025   Allergen Reaction Noted    Levetiracetam Confusion and Anxiety 12/23/2016            The following portions of the patient's history were reviewed and updated as appropriate: allergies, current medications, past family history, past medical history, past social history, past surgical history and problem list.     Past Medical History[2]    Past Surgical History[3]    Family History[4]      Medications have been verified.        Objective   /71   Pulse 96   Temp 97.7 °F (36.5 °C)   Resp 18    "Ht 5' 11\" (1.803 m)   Wt 98.9 kg (218 lb)   SpO2 96%   BMI 30.40 kg/m²   No LMP for male patient.       Physical Exam     Physical Exam  Constitutional:       General: He is not in acute distress.     Appearance: Normal appearance. He is not ill-appearing, toxic-appearing or diaphoretic.   HENT:      Head: Normocephalic and atraumatic.      Right Ear: Tympanic membrane, ear canal and external ear normal. There is no impacted cerumen.      Left Ear: Tympanic membrane, ear canal and external ear normal. There is no impacted cerumen.      Nose: Nose normal. No congestion or rhinorrhea.      Mouth/Throat:      Mouth: Mucous membranes are moist.      Pharynx: No oropharyngeal exudate or posterior oropharyngeal erythema.     Eyes:      General: No scleral icterus.        Right eye: No discharge.         Left eye: No discharge.      Extraocular Movements: Extraocular movements intact.      Conjunctiva/sclera: Conjunctivae normal.      Pupils: Pupils are equal, round, and reactive to light.       Cardiovascular:      Rate and Rhythm: Normal rate. Rhythm irregular.      Pulses: Normal pulses.   Pulmonary:      Effort: Pulmonary effort is normal.      Breath sounds: Examination of the right-lower field reveals decreased breath sounds. Examination of the left-lower field reveals decreased breath sounds. Decreased breath sounds present.   Abdominal:      Palpations: Abdomen is soft.     Musculoskeletal:         General: Normal range of motion.      Cervical back: Normal range of motion and neck supple.     Skin:     General: Skin is warm.      Capillary Refill: Capillary refill takes less than 2 seconds.     Neurological:      General: No focal deficit present.      Mental Status: He is alert and oriented to person, place, and time. Mental status is at baseline.      Cranial Nerves: No cranial nerve deficit.      Sensory: No sensory deficit.      Motor: No weakness.      Coordination: Coordination normal.      Gait: Gait " normal.      Deep Tendon Reflexes: Reflexes normal.     Psychiatric:         Mood and Affect: Mood normal.         Behavior: Behavior normal.         Thought Content: Thought content normal.         Judgment: Judgment normal.                        [1]   Current Outpatient Medications:     albuterol (Ventolin HFA) 90 mcg/act inhaler, Inhale 2 puffs every 6 (six) hours as needed for wheezing, Disp: 18 g, Rfl: 1    allopurinol (ZYLOPRIM) 100 mg tablet, Take 1 tablet (100 mg total) by mouth daily, Disp: 30 tablet, Rfl: 5    amLODIPine (NORVASC) 5 mg tablet, Take 1 tablet (5 mg total) by mouth daily, Disp: 100 tablet, Rfl: 1    Cholecalciferol (Vitamin D3) 10 MCG (400 UNIT) CAPS, Take by mouth, Disp: , Rfl:     clotrimazole (LOTRIMIN) 1 % cream, Apply topically 2 (two) times a day, Disp: 30 g, Rfl: 2    hydroCHLOROthiazide 12.5 mg capsule, Take 1 capsule (12.5 mg total) by mouth every morning, Disp: 90 capsule, Rfl: 0    ILEVRO 0.3 % SUSP, , Disp: , Rfl:     losartan (COZAAR) 50 mg tablet, Take 50 mg by mouth daily, Disp: , Rfl:     meclizine (ANTIVERT) 25 mg tablet, Take 1 tablet (25 mg total) by mouth 3 (three) times a day as needed for dizziness, Disp: 30 tablet, Rfl: 0    methylPREDNISolone 4 MG tablet therapy pack, Use as directed on package (Patient not taking: Reported on 4/28/2025), Disp: 21 each, Rfl: 0    sildenafil (VIAGRA) 100 mg tablet, TAKE ONE TABLET BY MOUTH AS NEEDED 1 HOUR PRIOR TO SEXUAL ACTIVITY (Patient not taking: Reported on 6/13/2023), Disp: , Rfl:     tamsulosin (FLOMAX) 0.4 mg, Take 0.4 mg by mouth daily with dinner, Disp: , Rfl:     tiotropium-olodaterol (Stiolto Respimat) 2.5-2.5 MCG/ACT inhaler, Inhale 2 puffs daily, Disp: 4 g, Rfl: 5    vitamin B-12 (CYANOCOBALAMIN) 100 MCG tablet, Take 100 mcg by mouth daily, Disp: , Rfl:   [2]   Past Medical History:  Diagnosis Date    Abnormal EKG     last assessed: 11/25/2014    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 last assessed:  10/22/2012    COPD with acute exacerbation (HCC)     last assessed: 2/7/2017    Diverticulosis     Eczema     last assessed: 4/30/2013    Esophagitis     Gout     last assessed: 5/19/2014    Nail avulsion of toe     Neoplasm of bladder     last assessed: 4/30/2013   [3]   Past Surgical History:  Procedure Laterality Date    HERNIA REPAIR      last assessed: 11/25/2014    KIDNEY SURGERY      description: removal of kidney stone last assessed: 11/25/2014    US GUIDED THYROID BIOPSY  5/11/2021   [4]   Family History  Problem Relation Name Age of Onset    Stroke Mother      Alzheimer's disease Father      Heart attack Sister      Cancer Family sibling     Lung cancer Brother

## 2025-05-31 NOTE — ED PROVIDER NOTES
Time reflects when diagnosis was documented in both MDM as applicable and the Disposition within this note       Time User Action Codes Description Comment    5/31/2025  2:05 PM Bob Montesinos [J44.1] COPD with acute exacerbation (HCC)     5/31/2025  2:05 PM Bob Montesinos [I10] Hypertension     5/31/2025  2:05 PM Bob Montesinos [I45.10] Right bundle branch block     5/31/2025  2:05 PM Bob Montesinos [J44.9] Chronic obstructive pulmonary disease, unspecified COPD type (HCC)           ED Disposition       ED Disposition   Discharge    Condition   Stable    Date/Time   Sat May 31, 2025  2:04 PM    Comment   Ramsey Palmer discharge to home/self care.                   Assessment & Plan       Medical Decision Making  Abnormal EKG, patient with right bundle branch block which is been present before.    Shortness of breath and hypertension-will do cardiac workup to rule out ACS, dysrhythmia, pericarditis, myocarditis, chest x-ray was performed as outpatient is primarily viewed by myself is negative for pneumonia, pneumothorax, congestive heart failure.  Will treat for COPD exacerbation, reassess    Amount and/or Complexity of Data Reviewed  Labs: ordered.    Risk  Prescription drug management.        ED Course as of 05/31/25 1407   Sat May 31, 2025   1403 Lungs clear to auscultation bilateral, patient feels well to go home.  Will discharge to home.       Medications   methylPREDNISolone sodium succinate (Solu-MEDROL) injection 125 mg (125 mg Intravenous Given 5/31/25 1232)   albuterol inhalation solution 10 mg (10 mg Nebulization Given 5/31/25 1233)   ipratropium (ATROVENT) 0.02 % inhalation solution 1 mg (1 mg Nebulization Given 5/31/25 1233)   sodium chloride 0.9 % inhalation solution 12 mL (12 mL Nebulization Given 5/31/25 1233)       ED Risk Strat Scores                    No data recorded                            History of Present Illness       Chief Complaint   Patient presents with    Shortness  of Breath     Pt c/o SOB for the past several days. Pt was just seen today at urgent care and was told to come to the ED for EKG changes, pt denies cp/n/v/d or fevers       Past Medical History[1]   Past Surgical History[2]   Family History[3]   Social History[4]   E-Cigarette/Vaping    E-Cigarette Use Never User       E-Cigarette/Vaping Substances    Nicotine No     THC No     CBD No     Flavoring No     Other No     Unknown No       I have reviewed and agree with the history as documented.     89-year-old male presents for evaluation from urgent care for abnormal EKG.  I am unable to visualize the EKG what is put in provider's chart is bigeminy with right bundle branch block.  Patient has a history of a right bundle branch block previously.  Patient reports that for the past 4 days has been having intermittent shortness of breath which comes and goes.  He feels better with using his breathing treatments at home.  No cough, URI symptoms, EXTR edema, calf pain, chest pain, respecters for DVT or PE.  Patient did have outpatient chest x-ray which was reviewed by myself and had no acute abnormalities.      History provided by:  Patient  Shortness of Breath  Associated symptoms: no abdominal pain, no chest pain, no ear pain, no fever, no rash, no sore throat, no vomiting and no wheezing        Review of Systems   Constitutional:  Negative for activity change, appetite change, fatigue and fever.   HENT:  Negative for congestion, dental problem, ear pain, rhinorrhea and sore throat.    Eyes:  Negative for pain and redness.   Respiratory:  Positive for shortness of breath. Negative for chest tightness and wheezing.    Cardiovascular:  Negative for chest pain and palpitations.   Gastrointestinal:  Negative for abdominal pain, blood in stool, constipation, diarrhea, nausea and vomiting.   Endocrine: Negative for cold intolerance and heat intolerance.   Genitourinary:  Negative for dysuria, frequency and hematuria.    Musculoskeletal:  Negative for arthralgias and myalgias.   Skin:  Negative for color change, pallor and rash.   Neurological:  Negative for weakness and numbness.   Hematological:  Does not bruise/bleed easily.   Psychiatric/Behavioral:  Negative for agitation, hallucinations and suicidal ideas.            Objective       ED Triage Vitals [05/31/25 1147]   Temperature Pulse Blood Pressure Respirations SpO2 Patient Position - Orthostatic VS   97.5 °F (36.4 °C) 66 151/75 18 96 % Sitting      Temp Source Heart Rate Source BP Location FiO2 (%) Pain Score    Oral Monitor Right arm -- No Pain      Vitals      Date and Time Temp Pulse SpO2 Resp BP Pain Score FACES Pain Rating User   05/31/25 1234 -- -- 96 % -- -- -- -- AP   05/31/25 1147 97.5 °F (36.4 °C) 66 96 % 18 151/75 No Pain -- [facility]            Physical Exam  Constitutional:       Appearance: He is well-developed.   HENT:      Head: Normocephalic and atraumatic.     Eyes:      General: No scleral icterus.     Conjunctiva/sclera: Conjunctivae normal.     Neck:      Vascular: No JVD.      Trachea: No tracheal deviation.     Cardiovascular:      Rate and Rhythm: Normal rate and regular rhythm.   Pulmonary:      Effort: Pulmonary effort is normal. No respiratory distress.      Breath sounds: Examination of the right-upper field reveals decreased breath sounds and wheezing. Examination of the left-upper field reveals decreased breath sounds and wheezing. Examination of the right-middle field reveals decreased breath sounds and wheezing. Examination of the left-middle field reveals decreased breath sounds and wheezing. Examination of the right-lower field reveals decreased breath sounds and wheezing. Examination of the left-lower field reveals decreased breath sounds and wheezing. Decreased breath sounds and wheezing present.   Abdominal:      General: There is no distension.     Musculoskeletal:         General: No deformity. Normal range of motion.      Cervical back:  Normal range of motion.      Right lower leg: No tenderness. No edema.      Left lower leg: No tenderness. No edema.     Skin:     Coloration: Skin is not pale.      Findings: No erythema or rash.     Neurological:      Mental Status: He is alert and oriented to person, place, and time.     Psychiatric:         Behavior: Behavior normal.         Results Reviewed       Procedure Component Value Units Date/Time    HS Troponin I 2hr [[id number]]     Lab Status: No result Specimen: Blood     HS Troponin 0hr (reflex protocol) [[id number]]  (Normal) Collected: 05/31/25 1224    Lab Status: Final result Specimen: Blood from Hand, Left Updated: 05/31/25 1252     hs TnI 0hr 9 ng/L     Comprehensive metabolic panel [[id number]] Collected: 05/31/25 1224    Lab Status: Final result Specimen: Blood from Hand, Left Updated: 05/31/25 1250     Sodium 139 mmol/L      Potassium 3.8 mmol/L      Chloride 102 mmol/L      CO2 29 mmol/L      ANION GAP 8 mmol/L      BUN 15 mg/dL      Creatinine 0.85 mg/dL      Glucose 102 mg/dL      Calcium 9.7 mg/dL      AST 16 U/L      ALT 13 U/L      Alkaline Phosphatase 45 U/L      Total Protein 6.9 g/dL      Albumin 4.2 g/dL      Total Bilirubin 0.51 mg/dL      eGFR 77 ml/min/1.73sq m     Narrative:      National Kid[employer] Disease Foundation guidelines for Chronic Kidney Disease (CKD):     Stage 1 with normal or high GFR (GFR > 90 mL/min/1.73 square meters)    Stage 2 Mild CKD (GFR = 60-89 mL/min/1.73 square meters)    Stage 3A Moderate CKD (GFR = 45-59 mL/min/1.73 square meters)    Stage 3B Moderate CKD (GFR = 30-44 mL/min/1.73 square meters)    Stage 4 Severe CKD (GFR = 15-29 mL/min/1.73 square meters)    Stage 5 End Stage CKD (GFR <15 mL/min/1.73 square meters)  Note: GFR calculation is accurate only with a steady state creatinine    B-Type Natriuretic Peptide(BNP) [[id number]]  (Normal) Collected: 05/31/25 1224    Lab Status: Final result Specimen: Blood from Hand, Left Updated: 05/31/25 1250     BNP 33  pg/mL     CBC and differential [[id number]] Collected: 05/31/25 1224    Lab Status: Final result Specimen: Blood from Hand, Left Updated: 05/31/25 1229     WBC 7.55 Thousand/uL      RBC 4.96 Million/uL      Hemoglobin 14.6 g/dL      Hematocrit 44.6 %      MCV 90 fL      MCH 29.4 pg      MCHC 32.7 g/dL      RDW 12.7 %      MPV 10.6 fL      Platelets 198 Thousands/uL      nRBC 0 /100 WBCs      Segmented % 62 %      Immature Grans % 0 %      Lymphocytes % 26 %      Monocytes % 7 %      Eosinophils Relative 4 %      Basophils Relative 1 %      Absolute Neutrophils 4.69 Thousands/µL      Absolute Immature Grans 0.01 Thousand/uL      Absolute Lymphocytes 1.97 Thousands/µL      Absolute Monocytes 0.55 Thousand/µL      Eosinophils Absolute 0.27 Thousand/µL      Basophils Absolute 0.06 Thousands/µL             No orders to display       ECG 12 Lead Documentation Only    Date/Time: 5/31/2025 1:11 PM    Performed by: Bob Montesinos MD  Authorized by: Bob Montesinos MD    ECG reviewed by me, the ED Provider: yes    Patient location:  ED  Rate:     ECG rate:  77    ECG rate assessment: normal    Rhythm:     Rhythm: sinus rhythm    Ectopy:     Ectopy: PAC    QRS:     QRS axis:  Normal    QRS intervals:  Normal  Conduction:     Conduction: abnormal      Abnormal conduction: complete RBBB    ST segments:     ST segments:  Normal  T waves:     T waves: normal        ED Medication and Procedure Management   Prior to Admission Medications   Prescriptions Last Dose Informant Patient Reported? Taking?   Cholecalciferol (Vitamin D3) 10 MCG (400 UNIT) CAPS  Self Yes No   Sig: Take by mouth   ILEVRO 0.3 % SUSP  Self Yes No   Patient not taking: Reported on 6/13/2023   albuterol (Ventolin HFA) 90 mcg/act inhaler  Self No No   Sig: Inhale 2 puffs every 6 (six) hours as needed for wheezing   albuterol (Ventolin HFA) 90 mcg/act inhaler   No Yes   Sig: Inhale 2 puffs every 6 (six) hours as needed for wheezing   allopurinol (ZYLOPRIM) 100 mg  tablet   No No   Sig: Take 1 tablet (100 mg total) by mouth daily   amLODIPine (NORVASC) 5 mg tablet  Self No No   Sig: Take 1 tablet (5 mg total) by mouth daily   clotrimazole (LOTRIMIN) 1 % cream  Self No No   Sig: Apply topically 2 (two) times a day   hydroCHLOROthiazide 12.5 mg capsule   No No   Sig: Take 1 capsule (12.5 mg total) by mouth every morning   losartan (COZAAR) 50 mg tablet  Self Yes No   Sig: Take 50 mg by mouth daily   meclizine (ANTIVERT) 25 mg tablet   No No   Sig: Take 1 tablet (25 mg total) by mouth 3 (three) times a day as needed for dizziness   methylPREDNISolone 4 MG tablet therapy pack   No No   Sig: Use as directed on package   Patient not taking: Reported on 4/28/2025   sildenafil (VIAGRA) 100 mg tablet  Self Yes No   Sig: TAKE ONE TABLET BY MOUTH AS NEEDED 1 HOUR PRIOR TO SEXUAL ACTIVITY   Patient not taking: Reported on 6/13/2023   tamsulosin (FLOMAX) 0.4 mg  Self Yes No   Sig: Take 0.4 mg by mouth daily with dinner   tiotropium-olodaterol (Stiolto Respimat) 2.5-2.5 MCG/ACT inhaler  Self No No   Sig: Inhale 2 puffs daily   tiotropium-olodaterol (Stiolto Respimat) 2.5-2.5 MCG/ACT inhaler   No Yes   Sig: Inhale 2 puffs daily   vitamin B-12 (CYANOCOBALAMIN) 100 MCG tablet  Self Yes No   Sig: Take 100 mcg by mouth daily      Facility-Administered Medications: None     Patient's Medications   Discharge Prescriptions    PREDNISONE 10 MG TABLET    Take 5 tabs x 4 days, then 4 tabs x 4 days, then 3 tabs x 2 days, then 2 tabs x 2 days, then 1 tab x 2 days       Start Date: 5/31/2025 End Date: --       Order Dose: --       Quantity: 48 tablet    Refills: 0     No discharge procedures on file.  ED SEPSIS DOCUMENTATION   Time reflects when diagnosis was documented in both MDM as applicable and the Disposition within this note       Time User Action Codes Description Comment    5/31/2025  2:05 PM Bob Montesinos [J44.1] COPD with acute exacerbation (HCC)     5/31/2025  2:05 PM Bob Montesinos Add  [I10] Hypertension     2025  2:05 PM Bob Montesinos [I45.10] Right bundle branch block     2025  2:05 PM Bob Montesinos [J44.9] Chronic obstructive pulmonary disease, unspecified COPD type (HCC)                    [1]   Past Medical History:  Diagnosis Date    Abnormal EKG     last assessed: 2014    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    COPD with acute exacerbation (HCC)     last assessed: 2017    Diverticulosis     Eczema     last assessed: 2013    Esophagitis     Gout     last assessed: 2014    Nail avulsion of toe     Neoplasm of bladder     last assessed: 2013   [2]   Past Surgical History:  Procedure Laterality Date    HERNIA REPAIR      last assessed: 2014    KIDNEY SURGERY      description: removal of kidney stone last assessed: 2014    US GUIDED THYROID BIOPSY  2021   [3]   Family History  Problem Relation Name Age of Onset    Stroke Mother      Alzheimer's disease Father      Heart attack Sister      Cancer Family sibling     Lung cancer Brother     [4]   Social History  Tobacco Use    Smoking status: Former     Current packs/day: 0.00     Average packs/day: 2.0 packs/day for 33.0 years (66.0 ttl pk-yrs)     Types: Cigarettes     Start date:      Quit date: 1982     Years since quittin.4    Smokeless tobacco: Never   Vaping Use    Vaping status: Never Used   Substance Use Topics    Alcohol use: No    Drug use: No        Bob Montesinos MD  25 [phone]

## 2025-05-31 NOTE — TELEPHONE ENCOUNTER
"REASON FOR CONVERSATION: Shortness of Breath    SYMPTOMS: sensation of not being able to get full breath, different than usual COPD symptoms, concerned for covid  Asking primarily if UC will be able to provide an XR    OTHER HEALTH INFORMATION: NSVT, COPD, SIVA    PROTOCOL DISPOSITION: See [facility] Within 4 Hours (Or PCP Triage)    CARE ADVICE PROVIDED: Recommended UC evaluation, ED precautions reviewed    PRACTICE FOLLOW-UP: N/A          Reason for Disposition   [1] MILD difficulty breathing (e.g., minimal/no SOB at rest, SOB with walking, pulse <100) AND [2] NEW-onset or WORSE than normal    Answer Assessment - Initial Assessment Questions  1. RESPIRATORY STATUS: \"Describe your breathing?\" (e.g., wheezing, shortness of breath, unable to speak, severe coughing)         Feeling of not begin about to get his breath  Denies rapid breathing or wheezing  Denies dizziness    2. ONSET: \"When did this breathing problem begin?\"         2 days    3. RECURRENT SYMPTOM: \"Have you had difficulty breathing before?\" If Yes, ask: \"When was the last time?\" and \"What happened that time?\"         Different than COPD symptoms, concern for covid    4. CARDIAC HISTORY: \"Do you have any history of heart disease?\" (e.g., heart attack, angina, bypass surgery, angioplasty)         NSVT    5 LUNG HISTORY: \"Do you have any history of lung disease?\"  (e.g., pulmonary embolus, asthma, emphysema)        COPD, SIVA      Calling primarily to ask if UC will be able to provide and XR.    Protocols used: Breathing Difficulty-Adult-AH    "

## 2025-05-31 NOTE — TELEPHONE ENCOUNTER
"Regarding: SOB/awaiting covid results  ----- Message from Rayray GRANGER sent at 5/31/2025  9:04 AM EDT -----  Wife stated, \"My  is having trouble breathing since yesterday he is able to catch his breath after awhile but he does have SOB, we are also doing a home COVID test and awaiting the results. We would like to avoid going to the hospital and would prefer to go to a urgent care if need be.\"    "

## 2025-06-01 LAB
ATRIAL RATE: 127 BPM
ATRIAL RATE: 63 BPM
P AXIS: 70 DEGREES
P AXIS: 93 DEGREES
QRS AXIS: 79 DEGREES
QRS AXIS: 81 DEGREES
QRSD INTERVAL: 158 MS
QRSD INTERVAL: 160 MS
QT INTERVAL: 438 MS
QT INTERVAL: 446 MS
QTC INTERVAL: 495 MS
QTC INTERVAL: 521 MS
T WAVE AXIS: 52 DEGREES
T WAVE AXIS: 63 DEGREES
VENTRICULAR RATE: 77 BPM
VENTRICULAR RATE: 82 BPM

## 2025-06-01 PROCEDURE — 93010 ELECTROCARDIOGRAM REPORT: CPT | Performed by: INTERNAL MEDICINE

## 2025-06-02 ENCOUNTER — TELEPHONE (OUTPATIENT)
Age: OVER 89
End: 2025-06-02

## 2025-06-02 ENCOUNTER — TELEPHONE (OUTPATIENT)
Dept: FAMILY MEDICINE CLINIC | Facility: CLINIC | Age: OVER 89
End: 2025-06-02

## 2025-06-02 NOTE — TELEPHONE ENCOUNTER
Patient wife called  in to schedule Emergency Room follow up upon review no appointment within one week warm transfer to [facility] at [facility] clerical was successful

## 2025-06-02 NOTE — TELEPHONE ENCOUNTER
Patient was seen in the ER recently and is scheduled June 16th at 3:15pm with Dr. Bush. Patient's wife would like to make Dr. Bush aware of this encase the appointment is too far.

## 2025-06-03 ENCOUNTER — OFFICE VISIT (OUTPATIENT)
Dept: FAMILY MEDICINE CLINIC | Facility: CLINIC | Age: OVER 89
End: 2025-06-03
Payer: COMMERCIAL

## 2025-06-03 VITALS
TEMPERATURE: 97.6 F | HEIGHT: 71 IN | DIASTOLIC BLOOD PRESSURE: 80 MMHG | HEART RATE: 44 BPM | OXYGEN SATURATION: 94 % | WEIGHT: 217 LBS | BODY MASS INDEX: 30.38 KG/M2 | SYSTOLIC BLOOD PRESSURE: 120 MMHG

## 2025-06-03 DIAGNOSIS — J44.9 CHRONIC OBSTRUCTIVE PULMONARY DISEASE, UNSPECIFIED COPD TYPE (HCC): Primary | ICD-10-CM

## 2025-06-03 DIAGNOSIS — R06.09 DYSPNEA ON EXERTION: ICD-10-CM

## 2025-06-03 PROCEDURE — G2211 COMPLEX E/M VISIT ADD ON: HCPCS | Performed by: FAMILY MEDICINE

## 2025-06-03 PROCEDURE — 99213 OFFICE O/P EST LOW 20 MIN: CPT | Performed by: FAMILY MEDICINE

## 2025-06-03 NOTE — ASSESSMENT & PLAN NOTE
Recent ER visit likely due to exacerbation of COPD.  Continue with albuterol as needed and Stiolto for maintenance.  Complete 14-day prednisone taper.  Has follow-up with pulmonary/sleep upcoming and regular follow-up with his PCP in this office on 6/16.

## 2025-06-03 NOTE — PROGRESS NOTES
Name: Ramsey Palmer      : 1935      MRN: [id number]  Encounter Provider: Ash Fischer DO  Encounter Date: 6/3/2025   Encounter department: [facility]    Assessment & Plan  Chronic obstructive pulmonary disease, unspecified COPD type (HCC)  Recent ER visit likely due to exacerbation of COPD.  Continue with albuterol as needed and Stiolto for maintenance.  Complete 14-day prednisone taper.  Has follow-up with pulmonary/sleep upcoming and regular follow-up with his PCP in this office on .       Dyspnea on exertion  Improved on current steroid taper.  Continue to monitor.              Patient does have some bradycardia on exam today though he is asymptomatic.  Recommended we continue to monitor this.  If he develops lightheadedness or dizziness with bradycardia may need return to cardiology or Holter monitor to establish severity of issue.        History of Present Illness     Patient presents in follow-up from recent emergency room visit.  Presented to urgent care with shortness of breath and due to some ECG changes was referred to the ER where he had a full workup.  No cardiac issue was determined at that time and diagnosis of COPD exacerbation was made.  He was discharged with instructions to continue his Stiolto and his albuterol inhaler and was placed on a 14-day prednisone taper.  Today his breathing is fine though he does have some fatigue.  Denies dizziness or lightheadedness.      Review of Systems   Constitutional: Negative.    Respiratory: Negative.     Cardiovascular: Negative.    Gastrointestinal: Negative.    Genitourinary: Negative.    Musculoskeletal: Negative.    Psychiatric/Behavioral: Negative.       Past Medical History[1]  Past Surgical History[2]  Family History[3]  Social History[4]  Medications[5]  Allergies   Allergen Reactions   • Levetiracetam Confusion and Anxiety     Immunization History   Administered Date(s) Administered   • COVID-19 PFIZER  "VACCINE 0.3 ML IM 01/21/2021, 02/10/2021, 11/02/2021   • H1N1, All Formulations 02/02/2010   • INFLUENZA 11/01/2014, 11/05/2015, 01/06/2017, 11/20/2017, 09/23/2020, 12/14/2022   • Influenza Split High Dose Preservative Free IM 11/01/2014, 11/05/2015, 01/06/2017, 11/20/2017, 09/24/2024   • Influenza, high dose seasonal 0.7 mL 10/23/2018, 09/24/2019, 12/14/2022   • Influenza, seasonal, injectable 09/21/2012, 09/21/2012, 11/04/2013   • Pneumococcal Conjugate 13-Valent 11/28/2017   • Pneumococcal Polysaccharide PPV23 1935, 10/03/2001   • Zoster 12/01/2014     Objective   /80   Pulse (!) 44   Temp 97.6 °F (36.4 °C)   Ht 5' 11\" (1.803 m)   Wt 98.4 kg (217 lb)   SpO2 94%   BMI 30.27 kg/m²     Physical Exam  Vitals and nursing note reviewed.   Constitutional:       General: He is not in acute distress.     Appearance: Normal appearance.   HENT:      Head: Normocephalic and atraumatic.     Eyes:      Pupils: Pupils are equal, round, and reactive to light.       Cardiovascular:      Rate and Rhythm: Normal rate and regular rhythm.      Pulses: Normal pulses.      Heart sounds: Normal heart sounds.   Pulmonary:      Effort: Pulmonary effort is normal.      Breath sounds: Normal breath sounds.     Musculoskeletal:         General: Normal range of motion.      Cervical back: Normal range of motion.     Skin:     General: Skin is warm and dry.     Neurological:      General: No focal deficit present.      Mental Status: He is alert and oriented to person, place, and time. Mental status is at baseline.     Psychiatric:         Mood and Affect: Mood normal.         Behavior: Behavior normal.         Thought Content: Thought content normal.         Judgment: Judgment normal.                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   • COPD with acute exacerbation (HCC)     last assessed: " 2017   • Diverticulosis    • Eczema     last assessed: 2013   • Esophagitis    • Gout     last assessed: 2014   • Nail avulsion of toe    • Neoplasm of bladder     last assessed: 2013   [2]  Past Surgical History:  Procedure Laterality Date   • HERNIA REPAIR      last assessed: 2014   • KIDNEY SURGERY      description: removal of kidney stone last assessed: 2014   • US GUIDED THYROID BIOPSY  2021   [3]  Family History  Problem Relation Name Age of Onset   • Stroke Mother     • Alzheimer's disease Father     • Heart attack Sister     • Cancer Family sibling    • Lung cancer Brother     [4]  Social History  Tobacco Use   • Smoking status: Former     Current packs/day: 0.00     Average packs/day: 2.0 packs/day for 33.0 years (66.0 ttl pk-yrs)     Types: Cigarettes     Start date:      Quit date:      Years since quittin.4   • Smokeless tobacco: Never   Vaping Use   • Vaping status: Never Used   Substance and Sexual Activity   • Alcohol use: No   • Drug use: No   [5]  Current Outpatient Medications on File Prior to Visit   Medication Sig   • albuterol (Ventolin HFA) 90 mcg/act inhaler Inhale 2 puffs every 6 (six) hours as needed for wheezing   • allopurinol (ZYLOPRIM) 100 mg tablet Take 1 tablet (100 mg total) by mouth daily   • amLODIPine (NORVASC) 5 mg tablet Take 1 tablet (5 mg total) by mouth daily   • Cholecalciferol (Vitamin D3) 10 MCG (400 UNIT) CAPS Take by mouth   • clotrimazole (LOTRIMIN) 1 % cream Apply topically 2 (two) times a day   • hydroCHLOROthiazide 12.5 mg capsule Take 1 capsule (12.5 mg total) by mouth every morning   • losartan (COZAAR) 50 mg tablet Take 50 mg by mouth in the morning.   • meclizine (ANTIVERT) 25 mg tablet Take 1 tablet (25 mg total) by mouth 3 (three) times a day as needed for dizziness   • predniSONE 10 mg tablet Take 5 tabs x 4 days, then 4 tabs x 4 days, then 3 tabs x 2 days, then 2 tabs x 2 days, then 1 tab x 2 days   • tamsulosin  (FLOMAX) 0.4 mg Take 0.4 mg by mouth daily with dinner   • tiotropium-olodaterol (Stiolto Respimat) 2.5-2.5 MCG/ACT inhaler Inhale 2 puffs daily   • vitamin B-12 (CYANOCOBALAMIN) 100 MCG tablet Take 100 mcg by mouth in the morning.   • ILEVRO 0.3 % SUSP  (Patient not taking: Reported on 6/13/2023)   • methylPREDNISolone 4 MG tablet therapy pack Use as directed on package (Patient not taking: Reported on 6/3/2025)   • sildenafil (VIAGRA) 100 mg tablet TAKE ONE TABLET BY MOUTH AS NEEDED 1 HOUR PRIOR TO SEXUAL ACTIVITY (Patient not taking: Reported on 6/13/2023)

## 2025-06-11 ENCOUNTER — PATIENT OUTREACH (OUTPATIENT)
Dept: CASE MANAGEMENT | Facility: OTHER | Age: OVER 89
End: 2025-06-11

## 2025-06-11 ENCOUNTER — OFFICE VISIT (OUTPATIENT)
Dept: PULMONOLOGY | Facility: CLINIC | Age: OVER 89
End: 2025-06-11
Payer: COMMERCIAL

## 2025-06-11 ENCOUNTER — TELEPHONE (OUTPATIENT)
Dept: PULMONOLOGY | Facility: CLINIC | Age: OVER 89
End: 2025-06-11

## 2025-06-11 VITALS
OXYGEN SATURATION: 96 % | WEIGHT: 217 LBS | TEMPERATURE: 96.9 F | HEART RATE: 35 BPM | DIASTOLIC BLOOD PRESSURE: 58 MMHG | BODY MASS INDEX: 30.38 KG/M2 | SYSTOLIC BLOOD PRESSURE: 140 MMHG | HEIGHT: 71 IN

## 2025-06-11 DIAGNOSIS — J44.9 CHRONIC OBSTRUCTIVE PULMONARY DISEASE, UNSPECIFIED COPD TYPE (HCC): ICD-10-CM

## 2025-06-11 DIAGNOSIS — G47.33 OSA (OBSTRUCTIVE SLEEP APNEA): Primary | ICD-10-CM

## 2025-06-11 PROCEDURE — G2211 COMPLEX E/M VISIT ADD ON: HCPCS | Performed by: INTERNAL MEDICINE

## 2025-06-11 PROCEDURE — 99214 OFFICE O/P EST MOD 30 MIN: CPT | Performed by: INTERNAL MEDICINE

## 2025-06-11 RX ORDER — ALBUTEROL SULFATE 90 UG/1
2 INHALANT RESPIRATORY (INHALATION) EVERY 6 HOURS PRN
Qty: 18 G | Refills: 4 | Status: SHIPPED | OUTPATIENT
Start: 2025-06-11

## 2025-06-11 RX ORDER — TIOTROPIUM BROMIDE AND OLODATEROL 3.124; 2.736 UG/1; UG/1
2 SPRAY, METERED RESPIRATORY (INHALATION) DAILY
Qty: 4 G | Refills: 11 | Status: SHIPPED | OUTPATIENT
Start: 2025-06-11

## 2025-06-11 NOTE — ASSESSMENT & PLAN NOTE
Recent exacerbation- Recommend he restart Stiolto- if too expensive will refer to Complex Care Management Program.   PRN albuterol.   Refer to pulm rehab.  Orders:    tiotropium-olodaterol (Stiolto Respimat) 2.5-2.5 MCG/ACT inhaler; Inhale 2 puffs daily    albuterol (Ventolin HFA) 90 mcg/act inhaler; Inhale 2 puffs every 6 (six) hours as needed for wheezing    Ambulatory Referral to Complex Care Management Program; Future    Ambulatory Referral to Pulmonary Rehabilitation; Future

## 2025-06-11 NOTE — PROGRESS NOTES
Follow-up  Visit - Pulmonary Medicine   Name: Ramsey Palmer      : 1935      MRN: [id number]  Encounter Provider: Ramsey Cyr MD  Encounter Date: 2025   Encounter department: [facility]  :  Assessment & Plan  Chronic obstructive pulmonary disease, unspecified COPD type (HCC)  Recent exacerbation- Recommend he restart Stiolto- if too expensive will refer to Complex Care Management Program.   PRN albuterol.   Refer to pulm rehab.  Orders:    tiotropium-olodaterol (Stiolto Respimat) 2.5-2.5 MCG/ACT inhaler; Inhale 2 puffs daily    albuterol (Ventolin HFA) 90 mcg/act inhaler; Inhale 2 puffs every 6 (six) hours as needed for wheezing    Ambulatory Referral to Complex Care Management Program; Future    Ambulatory Referral to Pulmonary Rehabilitation; Future    SIVA (obstructive sleep apnea)  Moderate SIVA  SIVA- Used  days -6/10  20 days ?4 hours.  Average use 4 hours 18 minutes  CPAP 5-8  AHI 1.1  Has issues with leak.  Using full face mask.  Tired during the day.    Encouraged more frequent use  Trial of nasal pillow mask  Encouraged him to reach out to DME company with issues as well  Offered DME clinic visit with equipment- declined for now.  Orders:    PAP DME Resupply/Reorder      Return in about 6 months (around 2025).    History of Present Illness   Ramsey Palmer is a 89 y.o. male who presents for follow-up for COPD.  He is on PRN albuterol. Stiolto was too expensive.    .  He had a flare in May went to the ED and got steroids  He feels closer back to baseline.  He currently feels short of breath walking around the house.  He isnot coughing.  Not wheezing  He is on albuterol- uses inhaler occasionally- not every day.  He does not have a nebulizer- he does notw a    SIVA- Used  days -6/10  20 days ?4 hours.  Average use 4 hours 18 minutes  CPAP 5-8  AHI 1.1  Has issues with leak.  Using full face mask.  Tired during the  day.        From prior note below    Exposure history:     Exposure to pets/birds/farm animals: No     Social history:     Smoking: Over 60 pack-years but quit in 1982     Alcohol, ilicit drugs (inhalational/ IV): No vaping. No drugs. No alcohol     Worked as: Worked in [employer]    Review of Systems    Aside from what is mentioned in the HPI, ROS is otherwise negative    Past Medical History   Past Medical History[1]  Past Surgical History[2]  Family History[3]   reports that he quit smoking about 43 years ago. His smoking use included cigarettes. He started smoking about 76 years ago. He has a 66 pack-year smoking history. He has never used smokeless tobacco. He reports that he does not drink alcohol and does not use drugs.  Current Outpatient Medications   Medication Instructions    albuterol (Ventolin HFA) 90 mcg/act inhaler 2 puffs, Inhalation, Every 6 hours PRN    allopurinol (ZYLOPRIM) 100 mg, Oral, Daily    amLODIPine (NORVASC) 5 mg, Oral, Daily    Cholecalciferol (Vitamin D3) 10 MCG (400 UNIT) CAPS Take by mouth    clotrimazole (LOTRIMIN) 1 % cream Topical, 2 times daily    hydroCHLOROthiazide 12.5 mg, Oral, Every morning    ILEVRO 0.3 % SUSP No dose, route, or frequency recorded.    losartan (COZAAR) 50 mg, Daily    meclizine (ANTIVERT) 25 mg, Oral, 3 times daily PRN    predniSONE 10 mg tablet Take 5 tabs x 4 days, then 4 tabs x 4 days, then 3 tabs x 2 days, then 2 tabs x 2 days, then 1 tab x 2 days    sildenafil (VIAGRA) 100 mg tablet TAKE ONE TABLET BY MOUTH AS NEEDED 1 HOUR PRIOR TO SEXUAL ACTIVITY    tamsulosin (FLOMAX) 0.4 mg, Daily with dinner    tiotropium-olodaterol (Stiolto Respimat) 2.5-2.5 MCG/ACT inhaler 2 puffs, Inhalation, Daily    vitamin B-12 (CYANOCOBALAMIN) 100 mcg, Daily   Allergies[4]      Medical History Reviewed by provider this encounter:  Tobacco  Allergies  Meds  Problems  Med Hx  Surg Hx  Fam Hx     .    Objective   /58   Pulse (!) 35   Temp (!) 96.9  "°F (36.1 °C) (Tympanic)   Ht 5' 11\" (1.803 m)   Wt 98.4 kg (217 lb)   SpO2 96%   BMI 30.27 kg/m²     Physical Exam  Vitals and nursing note reviewed.   Constitutional:       General: He is not in acute distress.     Appearance: He is well-developed.   HENT:      Head: Normocephalic and atraumatic.     Eyes:      Conjunctiva/sclera: Conjunctivae normal.       Cardiovascular:      Rate and Rhythm: Normal rate and regular rhythm.      Heart sounds: No murmur heard.  Pulmonary:      Effort: Pulmonary effort is normal. No respiratory distress.      Breath sounds: Normal breath sounds.   Abdominal:      Palpations: Abdomen is soft.      Tenderness: There is no abdominal tenderness.     Musculoskeletal:         General: No swelling.      Cervical back: Neck supple.     Skin:     General: Skin is warm and dry.      Capillary Refill: Capillary refill takes less than 2 seconds.     Neurological:      Mental Status: He is alert.     Psychiatric:         Mood and Affect: Mood normal.           Diagnostic Data:  Labs: I personally reviewed the most recent laboratory data pertinent to today's visit.  Component      Latest Ref [facility] 5/31/2025   WBC      4.31 - 10.16 Thousand/uL 7.55    RBC      3.88 - 5.62 Million/uL 4.96    Hemoglobin      12.0 - 17.0 g/dL 14.6    Hematocrit      36.5 - 49.3 % 44.6    MCV      82 - 98 fL 90    MCH      26.8 - 34.3 pg 29.4    MCHC      31.4 - 37.4 g/dL 32.7    RDW      11.6 - 15.1 % 12.7    MPV      8.9 - 12.7 fL 10.6    Platelet Count      149 - 390 Thousands/uL 198    nRBC      /100 WBCs 0    Segmented %      43 - 75 % 62    Immature Grans %      0 - 2 % 0    Lymphocytes %      14 - 44 % 26    Monocytes %      4 - 12 % 7    Eosinophils %      0 - 6 % 4    Basophils %      0 - 1 % 1    Absolute Neutrophils      1.85 - 7.62 Thousands/µL 4.69    Absolute Immature Grans      0.00 - 0.20 Thousand/uL 0.01    Absolute Lymphocytes      0.60 - 4.47 Thousands/µL 1.97    Absolute Monocytes      0.17 - " 1.22 Thousand/µL 0.55    Absolute Eosinophils      0.00 - 0.61 Thousand/µL 0.27    Absolute Basophils      0.00 - 0.10 Thousands/µL 0.06    Sodium      135 - 147 mmol/L 139    Potassium      3.5 - 5.3 mmol/L 3.8    Chloride      96 - 108 mmol/L 102    Carbon Dioxide      21 - 32 mmol/L 29    ANION GAP      4 - 13 mmol/L 8    BUN      5 - 25 mg/dL 15    Creatinine      0.60 - 1.30 mg/dL 0.85    GLUCOSE      65 - 140 mg/dL 102    Calcium      8.4 - 10.2 mg/dL 9.7    AST      13 - 39 U/L 16    ALT      7 - 52 U/L 13    ALK PHOS      34 - 104 U/L 45    Total Protein      6.4 - 8.4 g/dL 6.9    Albumin      3.5 - 5.0 g/dL 4.2    Total Bilirubin      0.20 - 1.00 mg/dL 0.51    GFR, Calculated      ml/min/1.73sq m 77    BNP      0 - 100 pg/mL 33          Radiology results:  Radiology Results Review: I have reviewed the following images/report studies in PACS:     CXR 5/2025  No acute cardiopulmonary disease.     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    CXR 1/2023- report only  Lungs clear     CT Chest 2021  1.  Clear lungs.   2.  Enlarged right and left pulmonary arteries which raises the possibility of   pulmonary arterial hypertension. Findings can be correlated with   echocardiography as warranted.   3.  Nonspecific mildly enlarged right paratracheal lymph node, which may be   reactive. No other enlarged, calcified or necrotic thoracic lymph nodes.   4.  Multinodular goiter.   5.  Bilateral renal cysts which can be further evaluated and characterized with   renal ultrasound.     PFT/spirometry results:  Home Sleep Study 7/10/24  TESTING RESULTS:  The test results are from [facility].  The total time in bed (analysis time) was 426.5 minutes.  The patient had a total of 156 respiratory events made up of 112 obstructive apneas, 0 central apneas, 0 mixed apneas and 44 hypopneas resulting in a respiratory event index (HANG) of 22.8.  The lowest SpO2 recorded is 77%.      IMPRESSION:     This test is consistent with at least moderate obstructive sleep apnea.  2.   Significant hypoxemia was noted.      RECOMMENDATION:  A CPAP titration study is recommended due to sleep apnea and hypoxemia        Pulmonary Functions Testing Results: 4/2021     Spirometry:  FEV1/FVC Ratio is  65%. FEV1 is  1.98L/ 72% of predicted. FVC is  3.06L/ 82% of predicted.     Lung volumes:  RV  4.34L/ 156% of predicted, TLC  7.35L/ 103% of predicted, RV/TLC ratio  59%     Diffusing capacity:   66% of predicted     IMPRESSION:   mild obstructive air flow limitation on spirometry with normal vital capacity   normal total lung capacity, increased residual volume and RV/TLC ratio indicating mild air trapping   mildly impaired diffusion capacity   obstructive flow volume     Sleep Study 2018  AHI 18- moderate SIVA    Other studies:  TTE 2021    Very technically challenging study.     Left ventricle is normal in size. Wall thickness is normal. Systolic   function is hyperdynamic with an ejection fraction over 65%. Wall motion   is within normal limits. Unable to assess diastolic function.     Right ventricle cavity is normal. Systolic function is normal.     Mitral valve structure is normal. There is mild posterior annular   calcification. There is no regurgitation or stenosis.     Pulmonic Valve: The estimated pulmonary artery systolic pressure is   30.6 mmHg. Normal pulmonary artery pressure.     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    COPD with acute exacerbation (HCC)     last assessed: 2/7/2017    Diverticulosis     Eczema     last assessed: 4/30/2013    Esophagitis     Gout     last assessed: 5/19/2014    Nail avulsion of toe     Neoplasm of bladder     last assessed: 4/30/2013   [2]   Past Surgical History:  Procedure Laterality Date    HERNIA  REPAIR      last assessed: 11/25/2014    KIDNEY SURGERY      description: removal of kidney stone last assessed: 11/25/2014    US GUIDED THYROID BIOPSY  5/11/2021   [3]   Family History  Problem Relation Name Age of Onset    Stroke Mother      Alzheimer's disease Father      Heart attack Sister      Cancer Family sibling     Lung cancer Brother     [4]   Allergies  Allergen Reactions    Levetiracetam Confusion and Anxiety

## 2025-06-11 NOTE — ASSESSMENT & PLAN NOTE
Moderate SIVA  SIVA- Used 27/30 days 5/12-6/10  20 days ?4 hours.  Average use 4 hours 18 minutes  CPAP 5-8  AHI 1.1  Has issues with leak.  Using full face mask.  Tired during the day.    Encouraged more frequent use  Trial of nasal pillow mask  Encouraged him to reach out to DME company with issues as well  Offered DME clinic visit with equipment- declined for now.  Orders:    PAP DME Resupply/Reorder

## 2025-06-11 NOTE — PROGRESS NOTES
OP RT CM outreach Pt.OP RT CM took baton from [facility]. OP RT CM left VM message w/ contact info requesting a return call. If no call received OP RT CM will outreach in one week.   ----------------------------------------------------------------------------------------

## 2025-06-12 LAB

## 2025-06-16 ENCOUNTER — OFFICE VISIT (OUTPATIENT)
Dept: FAMILY MEDICINE CLINIC | Facility: CLINIC | Age: OVER 89
End: 2025-06-16
Payer: COMMERCIAL

## 2025-06-16 ENCOUNTER — TELEPHONE (OUTPATIENT)
Dept: FAMILY MEDICINE CLINIC | Facility: CLINIC | Age: OVER 89
End: 2025-06-16

## 2025-06-16 VITALS
SYSTOLIC BLOOD PRESSURE: 124 MMHG | BODY MASS INDEX: 30.15 KG/M2 | WEIGHT: 215.4 LBS | HEART RATE: 69 BPM | HEIGHT: 71 IN | DIASTOLIC BLOOD PRESSURE: 80 MMHG | OXYGEN SATURATION: 97 %

## 2025-06-16 DIAGNOSIS — J44.9 CHRONIC OBSTRUCTIVE PULMONARY DISEASE, UNSPECIFIED COPD TYPE (HCC): Primary | ICD-10-CM

## 2025-06-16 DIAGNOSIS — G47.33 OSA (OBSTRUCTIVE SLEEP APNEA): ICD-10-CM

## 2025-06-16 PROCEDURE — G2211 COMPLEX E/M VISIT ADD ON: HCPCS | Performed by: FAMILY MEDICINE

## 2025-06-16 PROCEDURE — 99213 OFFICE O/P EST LOW 20 MIN: CPT | Performed by: FAMILY MEDICINE

## 2025-06-16 NOTE — ASSESSMENT & PLAN NOTE
Patient's low energy may likely be due to his uncontrolled sleep apnea.  At this time, reviewed pulmonology notes.  Recommendations were made for patient to switch to nasal pillows for CPAP.  Will help patient connect with his sleep device supplier

## 2025-06-16 NOTE — PROGRESS NOTES
Name: Ramsey Palmer      : 1935      MRN: [id number]  Encounter Provider: Michelle Bush DO  Encounter Date: 2025   Encounter department: [facility]  :  Assessment & Plan  Chronic obstructive pulmonary disease, unspecified COPD type (HCC)  Stable.  At this time, continue with his current treatment as well as regular follow-up with pulmonology.  He has been doing well with his current inhalers.       SIVA (obstructive sleep apnea)  Patient's low energy may likely be due to his uncontrolled sleep apnea.  At this time, reviewed pulmonology notes.  Recommendations were made for patient to switch to nasal pillows for CPAP.  Will help patient connect with his sleep device supplier              History of Present Illness   HPI  Patient is a 89-year-old male presents today for a follow-up on his COPD.  Since last visit, he states that his breathing has been much better.  He was seen after his hospital stay at this office here as well as by his pulmonologist last week.  He states that his steroids were discontinued.  He was placed on treatment with Stiolto.  He is continue with his albuterol as needed.  He states that he still has problems with decreased energy.  He states that he does wake up multiple times at nighttime.  He still has not reached out to to the sleep DME to assess new mask/nasal pillows.  Review of Systems   Constitutional:  Negative for activity change, chills, fatigue and fever.   HENT:  Negative for congestion, ear pain, sinus pressure and sore throat.    Eyes:  Negative for redness, itching and visual disturbance.   Respiratory:  Negative for cough and shortness of breath.    Cardiovascular:  Negative for chest pain and palpitations.   Gastrointestinal:  Negative for abdominal pain, diarrhea and nausea.   Endocrine: Negative for cold intolerance and heat intolerance.   Genitourinary:  Negative for dysuria, flank pain and frequency.   Musculoskeletal:  Negative  "for arthralgias, back pain, gait problem and myalgias.   Skin:  Negative for color change.   Allergic/Immunologic: Negative for environmental allergies.   Neurological:  Negative for dizziness, numbness and headaches.   Psychiatric/Behavioral:  Negative for behavioral problems and sleep disturbance.        Objective   /80 (BP Location: Right arm, Patient Position: Sitting, Cuff Size: Adult)   Pulse 69   Ht 5' 11\" (1.803 m)   Wt 97.7 kg (215 lb 6.4 oz)   SpO2 97%   BMI 30.04 kg/m²      Physical Exam  Vitals reviewed.   Constitutional:       General: He is not in acute distress.     Appearance: Normal appearance. He is well-developed.   HENT:      Head: Normocephalic and atraumatic.      Right Ear: Tympanic membrane, ear canal and external ear normal. There is no impacted cerumen.      Left Ear: Tympanic membrane, ear canal and external ear normal. There is no impacted cerumen.      Nose: Nose normal. No congestion or rhinorrhea.      Mouth/Throat:      Mouth: Mucous membranes are moist.      Pharynx: No oropharyngeal exudate or posterior oropharyngeal erythema.     Eyes:      General: No scleral icterus.        Right eye: No discharge.         Left eye: No discharge.      Extraocular Movements: Extraocular movements intact.      Conjunctiva/sclera: Conjunctivae normal.      Pupils: Pupils are equal, round, and reactive to light.     Neck:      Trachea: No tracheal deviation.     Cardiovascular:      Rate and Rhythm: Normal rate and regular rhythm.      Pulses: Normal pulses.           Dorsalis pedis pulses are 2+ on the right side and 2+ on the left side.        Posterior tibial pulses are 2+ on the right side and 2+ on the left side.      Heart sounds: Normal heart sounds. No murmur heard.     No friction rub. No gallop.   Pulmonary:      Effort: Pulmonary effort is normal. No respiratory distress.      Breath sounds: Normal breath sounds. No wheezing, rhonchi or rales.   Abdominal:      General: Bowel " sounds are normal. There is no distension.      Palpations: Abdomen is soft.      Tenderness: There is no abdominal tenderness. There is no guarding or rebound.     Musculoskeletal:         General: Normal range of motion.      Cervical back: Normal range of motion and neck supple.      Right lower leg: No edema.      Left lower leg: No edema.   Lymphadenopathy:      Head:      Right side of head: No submental or submandibular adenopathy.      Left side of head: No submental or submandibular adenopathy.      Cervical: No cervical adenopathy.      Right cervical: No superficial, deep or posterior cervical adenopathy.     Left cervical: No superficial, deep or posterior cervical adenopathy.     Skin:     General: Skin is warm and dry.      Findings: No erythema.     Neurological:      General: No focal deficit present.      Mental Status: He is alert and oriented to person, place, and time.      Cranial Nerves: No cranial nerve deficit.      Sensory: Sensation is intact. No sensory deficit.      Motor: Motor function is intact.     Psychiatric:         Attention and Perception: Attention and perception normal.         Mood and Affect: Mood is not anxious or depressed.         Speech: Speech normal.         Behavior: Behavior normal.         Thought Content: Thought content normal.         Judgment: Judgment normal.

## 2025-06-16 NOTE — ASSESSMENT & PLAN NOTE
Stable.  At this time, continue with his current treatment as well as regular follow-up with pulmonology.  He has been doing well with his current inhalers.

## 2025-06-16 NOTE — TELEPHONE ENCOUNTER
Contacted [facility] ([phone]) They will reach out to patient directly to schedule sleep mask fitting that was ordered by Pul.

## 2025-06-18 ENCOUNTER — PATIENT OUTREACH (OUTPATIENT)
Dept: CASE MANAGEMENT | Facility: OTHER | Age: OVER 89
End: 2025-06-18

## 2025-06-18 NOTE — PROGRESS NOTES
OP RT CM outreach Pt today for inhaler assistance and initial assessment. Pt wife Neeta answered the phone and states that Ramsey is [place] DeHe and would prefer her to speak to me. Neeta states that Olayinka breathing is ok, but he is more SOB then he is used to being and is frustrated he can not keep up with their yard work anymore. He is not coughing much and is bringing up clear mucus occasionally. Neeta denies fever and chills. Neeta states that Ramsey quit smoking 40 years ago.   Neeta states that they have the COPD booklet and understand the zone tool. OP RT CM reminded Neeta to try to walk Ramsey through pursed lip breathing when he feels acutely SOB. She states understanding. Neeta states that Ramsey does not use oxygen but does have apulse oximeter at home. Neeta declines using it currently.   Neeta states that Ramsey uses stiolto every day and rinses his mouth. This is affordable to them through [employer]. She conforms he also has a rescue inhaler albuterol which he uses occasionally  but was afraid to use it too much. OP RT CM reminded her he is able to take 2 puffs every 6 hours if needed for acute SOB or wheezing. Neeta confirms understanding of this.   Neeta enquired about Olayinka new CPAP maskas he is unable to sleep well with his current mask. OP RT CM confirmed that Dr Cyr ordered a nasal mask and bhakti is supposed to reach out to Pt. OP RT CM gave Neeta Baeza contact info in case they do not call today.   Neeta states Pt is interested in pulmonary rehab but is concerned about the driving distance. OP RT CM gave a list of locations w/ contact info for consideration.   Neeta is also interested in BB group however [facility] is too far away. OP RT CM let her know there is also one [facility] Will outreach lindsey in two weeks to check in on Pt resp status.Neeta has OP RT CM contact info and was encouraged to call with questions or  concerns  ------------------------------------------------------------------

## 2025-07-02 ENCOUNTER — PATIENT OUTREACH (OUTPATIENT)
Dept: CASE MANAGEMENT | Facility: OTHER | Age: OVER 89
End: 2025-07-02

## 2025-07-02 NOTE — PROGRESS NOTES
OP RT CM outreach Pt for weekly follow up and 30 day review. OP RT CM left a voicemail with contact info requesting a return phonecall. OP RT CM placed an outgoing in basket message to self. OP RT CM will outreach {Pt in one week if return call not received.   --------------------------------------------------------------------------------------------------  OP RT CM received return call from Pt spouse. Neeta states that Ramsey is excited to start pulm rehab.     Neeta is concerned about Bhagat decreased exercise tolerance since his hospitalization in May. Neeta states the episodes of SOB are occurring when Pt is outside trying to do anything. She states he used to keep up the whole yard. OP RT CM advised that it is just too hot and humid for out door activities and Ramsey should enjoy activities in the air conditioned environment. OP RT CM advised Neeta that Ramsey can take two puffs of his albuterol inhaler every 6 hours if he is SOB. OP RT CM explained that pulm rehab is going to be tremendously helpful in assisting Ramsey in achieving exercise tolerance and increasing his ability to do ADLs.     Neeta denies Ramsey having any fever or chills and his cough is NP. Ramsey does not use oxygen. Neeta states that they have a pulse ox but they do not use it.     OP RT CM will outreach again in two weeks to find out if Ramsey is doing well with pulm rehab, and consider closing this case. Pt and spouse have contact info and are encouraged to call with questions or concerns.

## 2025-07-11 ENCOUNTER — CLINICAL SUPPORT (OUTPATIENT)
Dept: PULMONOLOGY | Facility: HOSPITAL | Age: OVER 89
End: 2025-07-11
Attending: INTERNAL MEDICINE
Payer: COMMERCIAL

## 2025-07-11 DIAGNOSIS — J44.9 CHRONIC OBSTRUCTIVE PULMONARY DISEASE, UNSPECIFIED COPD TYPE (HCC): Primary | ICD-10-CM

## 2025-07-11 PROCEDURE — 94625 PHY/QHP OP PULM RHB W/O MNTR: CPT

## 2025-07-11 NOTE — PROGRESS NOTES
Pulmonary Rehabilitation   Assessment and Individualized Treatment Plan  INITIAL       Today's date: 2025  # of Exercise Sessions Completed:   Patient name: Ramsey Palmer     : 1935       MRN: [id number]  Referring Physician: Ramsey Cyr MD  Pulmonologist: Ramsey Cyr MD  Provider: Luis Fernando  Clinician: Cornelia Sarabia MS, CEP    Dx:    Encounter Diagnosis   Name Primary?    Chronic obstructive pulmonary disease, unspecified COPD type (HCC)      Date of onset: 2025      Treatment is tailored to this patient's individual needs.  The ITP was reviewed with the patient and all questions were answered to their satisfaction.  Additional ITP documentation can be found electronically including daily and monthly exercise summaries, daily session notes with ECG summaries, education notes, daily medication reconciliation, and daily physician supervision.      INITIAL EVALUATION SUMMARY    Current functional status with ADLs:  Limited in ADLs due to ongoing SOB  Current exercise:  No current exercise  Clinical Comments:   Ramsey completed an initial 6MWT today ambulating 960 ft on room air. SpO2 maintained >92% with ambulation. Ramsey reports compliance with inhaler use, however feels minimal benefit.       Medical History:   Past Medical History[1]    Family History:  Family History[2]    Allergies:   Levetiracetam    Current Medications:   Current Medications[3]    Physical Limitations: bilateral hip pain    Fall Risk: Low   Comments: Ambulates with a steady gait with no assist device    Cultural needs: none    PFT:  Yes     FEV1/FVC ratio of 65%   FEV1 of 72% predicted  mildobstruction.    Medication compliance:  .Yes  Comments: Pt reports to be compliant with medications    Pulmonary Disease Risk Factors:  smoking    Sleep Disorders:   obstructive sleep apnea:  CPCP/BiPAP:  yes      EXERCISE ASSESSMENT AND GOALS     Initial  Fitness Assessment:    6MWT:    Resting:  BP:  128/78  HR: 61  SpO2: 94  Dyspnea: 2/10  Exercise:  BP: 148/76  HR: 83  SpO2: 93%  Dyspnea: 7-8/10  Distance walked:  960 ft  METs:  2.39  ECG Summary: NSR w/ PACs  Symptoms: ALMANZA, hip pain    Work Status:   retired    SMART Exercise Goals:   improved 6MWT distance by 100-165 ft (30-50m), reduced RPD at rest, reduced RPD during exercise, improved peak METs by 0.5 to 1.0 tolerated in pulmonary rehab exercise session, and SpO2 >88% during exercise    Patient Specific EXERCISE GOALS:     decreased rest needed with physical activity/exercise, increased muscular strength, increased energy, increased stamina with ADLs, more independence at home, continue with a regular exercise regimen at home, and reduced number of breaks needed with ADLs      PHYSCIAN PRESCRIBED EXERCISE    Current Aerobic Exercise Prescription       Frequency: 2 days/week   Supplement with home exercise 2+ days/wk as tolerated        Minutes: 20-40         METS: 1.8-2.3              SpO2: >88%              RPD: 4-6/10                      HR: RHR +30-40bpm   RPE: 4-5/10         Modalities: Treadmill, UBE, NuStep, Recumbent bike, and Room walking      Aerobic Exercise Prescription Plan for Progression:    Frequency: 2 days/week    Supplement with home exercise 2+ days/wk as tolerated       Minutes: 30-40        METS: 2.0-2.5              SpO2: >88%              RPD: 4-6/10                      HR:RHR +30-40bpm   RPE: 4-5/10        Modalities: Treadmill, UBE, NuStep, Recumbent bike, and Room walking       Strength trainin-3 days / week  12-15 repetitions  1-2 sets per modality   Will be added following 2-3 weeks of monitored exercise sessions    Modalities: Pull Downs, Lateral Raise, Arm Extension, Arm Curl, Sit to Stands, and Front Raises       EXERCISE PLAN AND PROGRESSION    Progress toward Exercise Goals:    Reviewed Pt goals and determined plan of care, Will continue to educate and progress as tolerated.    Exercise Plan:    patient will attend  "rehab 2-3 times per week to complete 24 exercise sessions, titrate supplemental oxygen as needed to maintain SpO2>88% with exercise, learn to conserve energy with ADLs , reduce time sitting at home, begin a home exercise program , regular attendance in pulmonary rehab, and begin resistance training in rehab session    Education:   pursed lip breathing, diaphragmatic breathing, relaxation breathing, home exercise guidelines, benefits of exercise for pulmonary disease, RPE scale, RPD scale, O2 saturation monitoring, and appropriate O2 response to exercise      NUTRITION ASSESSMENT AND GOALS    Initial Weight:  218.0 lbs  Current Weight:     Height:   Ht Readings from Last 1 Encounters:   06/16/25 5' 11\" (1.803 m)       Rate Your Plate Score: 53/81    Dietary habits reported at initial evaluation:  Does not watch anything in his diet. Reports adequate hydration    ETOH:   Social History     Substance and Sexual Activity   Alcohol Use No       SMART Goals:   Improved Rate Your Plate score  >64, eat 6 or more servings of grain products per day, eat 5 or more servings of fruits and vegetables a day, eat 2 or more servings of low fat milk or yogurt a day, never add salt to food when cooking or at the table, rarely/never eat salty snacks, and choose low sugar desserts and sweets    Patient Specific NUTRITION GOALS:    increase water intake to reduce/thin mucus production    NUTRITION PLAN AND PROGRESSION    Progress toward Nutritional goals:    Reviewed Pt goals and determined plan of care, Will continue to educate and progress as tolerated.    Nutrition Plan: Increase intake of fruits and vegetables  class:  Reading Food Labels  class:  Heart Healthy Eating    SMART goals are based Rate Your Plate Dietary Self-Assessment. These are the areas in which the patient could score higher on the assessment.  Goals include recommendations for a heart healthy diet based on American Heart Association.    Nutrition Education: low " sodium diet  maintaining hydration      PSYCHOSOCIAL ASSESSMENT AND GOALS    Date of last assessment: 7/11/2025  Depression screening:  PHQ-9 = 6    Interpretation:  5-9 = Mild Depression  Anxiety screening:  MAIKEL-7 = 1    Interpretation: 0-4  = Not anxious    Pt self-report of depression and anxiety   Patient reports they are coping well with good social support and denies depression or anxiety    Self-reported stress level:  2   Stressors: onset of current health condition  Stress Management: practice relaxation techniques  exercise  spend time with family    Quality of Life Screen:  (Higher score indicates disease impact on QOL)    [facility] COOP score: 26/40       CAT:  17/40     Social Support:    patient lives with spouse  spouse    SMART Goals:    CAT score of <10  PHQ-9 - reduced severity by one level  Physical Fitness in [facility] Score < 3  Daily Activity in [facility] Score < 3  Social Activities in [facility] Score < 3  Pain in [facility] Score < 3  Overall Health in [facility] Score < 3  Quality of Life in [facility] Score < 3   Change in Health in [facility] Score < 3     Patient Specific PSYCHOCOSOCIAL GOALS:    address emotional health concerns with PCP and spend time with family     Psychosocial Assessment as it relates to rehabilitation: Patient denies issues with his/her family or home life that may affect their rehabilitation efforts.     PSYCHOSOCIAL PLAN AND PROGRESSION    Information to begin using [employer] was provided as well as contact information for counseling through  [facility].    Progress toward Psychosocial goals:    Reviewed Pt goals and determined plan of care, Will continue to educate and progress as tolerated.    Psychosocial Plan: Class:  Stress and Pulmonary Disease, exercise, keep a positive mindset, and enjoy family    Psychosocial Education: signs and symptoms of depression  benefits of a positive support system  stress management techniques  benefits of enrolling in  [facility]  depression and pulmonary disease  tools to manage fear/anxiety related to becoming SOB          OTHER CORE COMPONENT ASSESSMENT AND GOALS      SOBQ: 57/120   (Higher score indicates greater breathlessness)     Tobacco Use:   Former user    Oxygen needs:   Rest:  room air  Exercise/physical activity:  room air  Sleep:   CPAP/BiPap    Does Pt monitor home SpO2? no   Average SpO2 at rest:  does not monitor at home   Average SpO2 with ADLs/physical activity:  does not monitor at home      Use of Rescue Inhaler: Yes:  1 times per day    Use of Maintenance Inhaler: Yes:  2 times per day    Use of Nebulizer Treatments:  No    Patient practices breathing techniques at home:  No and Reviewed with patient on:  7/11/2025      SMART Core Component Goals:   improvement on SOBQ by 5-8 point reduction  demonstrate pursed lip breathing  monitor home SpO2  maintain SpO2 >88% with exercise/physical activity  medication compliance  reduced RPD with exercise  reduced number of COPD exacerbations    Patient Specific CORE COMPONENT GOALS:    compliance with CPAP  medication compliance  monitor home SpO2    OTHER CORE COMPONENTS  PLAN AND PROGRESSION    Tobacco Use Intervention:     N/A: Pt has a remote history of smoking    Oxygen Goal: Maintain SpO2>88% during exercise or as advised by pulmonolgist    Progress toward Core Component goals:    Reviewed Pt goals and determined plan of care, Will continue to educate and progress as tolerated.    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 last assessed: 10/22/2012    COPD with acute exacerbation (HCC)     last assessed: 2/7/2017    Diverticulosis     Eczema     last assessed: 4/30/2013    Esophagitis     Gout     last assessed: 5/19/2014    Nail avulsion of toe     Neoplasm of bladder     last assessed: 4/30/2013   [2]   Family History  Problem Relation Name Age of Onset    Stroke Mother      Alzheimer's disease Father      Heart attack Sister      Cancer Family sibling     Lung cancer Brother     [3]   Current Outpatient Medications   Medication Sig Dispense Refill    albuterol (Ventolin HFA) 90 mcg/act inhaler Inhale 2 puffs every 6 (six) hours as needed for wheezing 18 g 4    allopurinol (ZYLOPRIM) 100 mg tablet Take 1 tablet (100 mg total) by mouth daily 30 tablet 5    amLODIPine (NORVASC) 5 mg tablet Take 1 tablet (5 mg total) by mouth daily 100 tablet 1    Cholecalciferol (Vitamin D3) 10 MCG (400 UNIT) CAPS Take by mouth      clotrimazole (LOTRIMIN) 1 % cream Apply topically 2 (two) times a day 30 g 2    hydroCHLOROthiazide 12.5 mg capsule Take 1 capsule (12.5 mg total) by mouth every morning 90 capsule 0    ILEVRO 0.3 % SUSP  (Patient not taking: Reported on 6/13/2023)      losartan (COZAAR) 50 mg tablet Take 50 mg by mouth in the morning.      meclizine (ANTIVERT) 25 mg tablet Take 1 tablet (25 mg total) by mouth 3 (three) times a day as needed for dizziness 30 tablet 0    tamsulosin (FLOMAX) 0.4 mg Take 0.4 mg by mouth daily with dinner      tiotropium-olodaterol (Stiolto Respimat) 2.5-2.5 MCG/ACT inhaler Inhale 2 puffs daily 4 g 11    vitamin B-12 (CYANOCOBALAMIN) 100 MCG tablet Take 100 mcg by mouth in the morning.       No current facility-administered medications for this visit.

## 2025-07-15 ENCOUNTER — CLINICAL SUPPORT (OUTPATIENT)
Dept: PULMONOLOGY | Facility: HOSPITAL | Age: OVER 89
End: 2025-07-15
Attending: INTERNAL MEDICINE
Payer: COMMERCIAL

## 2025-07-15 ENCOUNTER — PATIENT OUTREACH (OUTPATIENT)
Dept: CASE MANAGEMENT | Facility: OTHER | Age: OVER 89
End: 2025-07-15

## 2025-07-15 DIAGNOSIS — J44.9 CHRONIC OBSTRUCTIVE PULMONARY DISEASE, UNSPECIFIED COPD TYPE (HCC): Primary | ICD-10-CM

## 2025-07-15 DIAGNOSIS — I10 BENIGN ESSENTIAL HYPERTENSION: ICD-10-CM

## 2025-07-15 PROCEDURE — 94625 PHY/QHP OP PULM RHB W/O MNTR: CPT

## 2025-07-16 ENCOUNTER — PATIENT OUTREACH (OUTPATIENT)
Dept: CASE MANAGEMENT | Facility: OTHER | Age: OVER 89
End: 2025-07-16

## 2025-07-16 RX ORDER — HYDROCHLOROTHIAZIDE 12.5 MG/1
12.5 CAPSULE ORAL EVERY MORNING
Qty: 90 CAPSULE | Refills: 1 | Status: SHIPPED | OUTPATIENT
Start: 2025-07-16

## 2025-07-16 NOTE — PROGRESS NOTES
". OP RT CM had a missed incoming call this a.m. OP RT CM will return call.   ________________________________OP RT CM called and spoke with patient regarding his breathing, medications and CPAP. Ramsey stated he is feeling well, still getting SOB with little exertion. We discussed pulmonary rehab and deconditioning. His first appt. was yesterday with pulmonary rehab and he stated \"it went well\".     We discussed pursed lip breathing exercises as well as modifying activities ( trying not to bend at the waist) etc. We reviewed COPD booklet and zone tools.      Ramsey is using Stiolto BID and Albuterol rescue when needed. Typically TID. He received his Stiolto for $15.00/monthly through [employer] .      Ramsey stated he wears his CPAP for about 4hours per night since needing to go to the bathroom several times throughout the night.     Olayinka'  wife Neeta joined the conversation and stated that Dr. Cyr was looking into another pill that would help with his breathing but can be expensive. I will re-read thru chart notes and inquire what this medications may be. Possibly Daliresp. Since pt. has [employer], medication may be subsidized if on the formulary. OP RT CM will inquire with pulm. Provider    OP RT CM will outreach patient next week and family has my contact information.   "

## 2025-07-17 ENCOUNTER — APPOINTMENT (OUTPATIENT)
Dept: PULMONOLOGY | Facility: HOSPITAL | Age: OVER 89
End: 2025-07-17
Attending: INTERNAL MEDICINE
Payer: COMMERCIAL

## 2025-07-18 ENCOUNTER — APPOINTMENT (EMERGENCY)
Dept: CT IMAGING | Facility: HOSPITAL | Age: OVER 89
DRG: 190 | End: 2025-07-18
Payer: COMMERCIAL

## 2025-07-18 ENCOUNTER — HOSPITAL ENCOUNTER (EMERGENCY)
Facility: HOSPITAL | Age: OVER 89
Discharge: HOME | DRG: 190 | End: 2025-07-18
Attending: EMERGENCY MEDICINE
Payer: COMMERCIAL

## 2025-07-18 ENCOUNTER — NURSE TRIAGE (OUTPATIENT)
Age: OVER 89
End: 2025-07-18

## 2025-07-18 VITALS
WEIGHT: 217.59 LBS | RESPIRATION RATE: 20 BRPM | BODY MASS INDEX: 30.35 KG/M2 | HEART RATE: 72 BPM | DIASTOLIC BLOOD PRESSURE: 72 MMHG | OXYGEN SATURATION: 95 % | SYSTOLIC BLOOD PRESSURE: 141 MMHG | TEMPERATURE: 98.2 F

## 2025-07-18 DIAGNOSIS — J44.1 COPD EXACERBATION (HCC): Primary | ICD-10-CM

## 2025-07-18 PROBLEM — R79.89 POSITIVE D DIMER: Status: ACTIVE | Noted: 2025-07-18

## 2025-07-18 LAB
2HR DELTA HS TROPONIN: -2 NG/L
ANION GAP SERPL CALCULATED.3IONS-SCNC: 7 MMOL/L (ref 4–13)
ATRIAL RATE: 71 BPM
ATRIAL RATE: 92 BPM
BASOPHILS # BLD AUTO: 0.06 THOUSANDS/ÂΜL (ref 0–0.1)
BASOPHILS NFR BLD AUTO: 1 % (ref 0–1)
BNP SERPL-MCNC: 57 PG/ML (ref 0–100)
BUN SERPL-MCNC: 14 MG/DL (ref 5–25)
CALCIUM SERPL-MCNC: 9 MG/DL (ref 8.4–10.2)
CARDIAC TROPONIN I PNL SERPL HS: 11 NG/L (ref ?–50)
CARDIAC TROPONIN I PNL SERPL HS: 13 NG/L (ref ?–50)
CHLORIDE SERPL-SCNC: 102 MMOL/L (ref 96–108)
CO2 SERPL-SCNC: 27 MMOL/L (ref 21–32)
CREAT SERPL-MCNC: 0.95 MG/DL (ref 0.6–1.3)
D DIMER PPP FEU-MCNC: 1.25 UG/ML FEU
EOSINOPHIL # BLD AUTO: 0.19 THOUSAND/ÂΜL (ref 0–0.61)
EOSINOPHIL NFR BLD AUTO: 4 % (ref 0–6)
ERYTHROCYTE [DISTWIDTH] IN BLOOD BY AUTOMATED COUNT: 13.2 % (ref 11.6–15.1)
GFR SERPL CREATININE-BSD FRML MDRD: 70 ML/MIN/1.73SQ M
GLUCOSE SERPL-MCNC: 108 MG/DL (ref 65–140)
HCT VFR BLD AUTO: 39.7 % (ref 36.5–49.3)
HGB BLD-MCNC: 13.6 G/DL (ref 12–17)
IMM GRANULOCYTES # BLD AUTO: 0.01 THOUSAND/UL (ref 0–0.2)
IMM GRANULOCYTES NFR BLD AUTO: 0 % (ref 0–2)
LYMPHOCYTES # BLD AUTO: 1.21 THOUSANDS/ÂΜL (ref 0.6–4.47)
LYMPHOCYTES NFR BLD AUTO: 22 % (ref 14–44)
MCH RBC QN AUTO: 29.8 PG (ref 26.8–34.3)
MCHC RBC AUTO-ENTMCNC: 34.3 G/DL (ref 31.4–37.4)
MCV RBC AUTO: 87 FL (ref 82–98)
MONOCYTES # BLD AUTO: 0.87 THOUSAND/ÂΜL (ref 0.17–1.22)
MONOCYTES NFR BLD AUTO: 16 % (ref 4–12)
NEUTROPHILS # BLD AUTO: 3.1 THOUSANDS/ÂΜL (ref 1.85–7.62)
NEUTS SEG NFR BLD AUTO: 57 % (ref 43–75)
NRBC BLD AUTO-RTO: 0 /100 WBCS
P AXIS: 61 DEGREES
P AXIS: 69 DEGREES
PLATELET # BLD AUTO: 142 THOUSANDS/UL (ref 149–390)
PMV BLD AUTO: 10.2 FL (ref 8.9–12.7)
POTASSIUM SERPL-SCNC: 4 MMOL/L (ref 3.5–5.3)
PR INTERVAL: 176 MS
PR INTERVAL: 178 MS
QRS AXIS: 77 DEGREES
QRS AXIS: 83 DEGREES
QRSD INTERVAL: 158 MS
QRSD INTERVAL: 164 MS
QT INTERVAL: 434 MS
QT INTERVAL: 452 MS
QTC INTERVAL: 491 MS
QTC INTERVAL: 536 MS
RBC # BLD AUTO: 4.56 MILLION/UL (ref 3.88–5.62)
SODIUM SERPL-SCNC: 136 MMOL/L (ref 135–147)
T WAVE AXIS: 18 DEGREES
T WAVE AXIS: 43 DEGREES
VENTRICULAR RATE: 71 BPM
VENTRICULAR RATE: 92 BPM
WBC # BLD AUTO: 5.44 THOUSAND/UL (ref 4.31–10.16)

## 2025-07-18 PROCEDURE — 99285 EMERGENCY DEPT VISIT HI MDM: CPT

## 2025-07-18 PROCEDURE — 80048 BASIC METABOLIC PNL TOTAL CA: CPT

## 2025-07-18 PROCEDURE — 93005 ELECTROCARDIOGRAM TRACING: CPT

## 2025-07-18 PROCEDURE — 94644 CONT INHLJ TX 1ST HOUR: CPT

## 2025-07-18 PROCEDURE — 93010 ELECTROCARDIOGRAM REPORT: CPT | Performed by: INTERNAL MEDICINE

## 2025-07-18 PROCEDURE — 84484 ASSAY OF TROPONIN QUANT: CPT

## 2025-07-18 PROCEDURE — 71275 CT ANGIOGRAPHY CHEST: CPT

## 2025-07-18 PROCEDURE — 94760 N-INVAS EAR/PLS OXIMETRY 1: CPT

## 2025-07-18 PROCEDURE — 36415 COLL VENOUS BLD VENIPUNCTURE: CPT

## 2025-07-18 PROCEDURE — 85379 FIBRIN DEGRADATION QUANT: CPT

## 2025-07-18 PROCEDURE — 85025 COMPLETE CBC W/AUTO DIFF WBC: CPT

## 2025-07-18 PROCEDURE — 05HY33Z INSERTION OF INFUSION DEVICE INTO UPPER VEIN, PERCUTANEOUS APPROACH: ICD-10-PCS | Performed by: EMERGENCY MEDICINE

## 2025-07-18 PROCEDURE — 96374 THER/PROPH/DIAG INJ IV PUSH: CPT

## 2025-07-18 PROCEDURE — 83880 ASSAY OF NATRIURETIC PEPTIDE: CPT

## 2025-07-18 RX ORDER — METHYLPREDNISOLONE SODIUM SUCCINATE 125 MG/2ML
125 INJECTION, POWDER, LYOPHILIZED, FOR SOLUTION INTRAMUSCULAR; INTRAVENOUS ONCE
Status: COMPLETED | OUTPATIENT
Start: 2025-07-18 | End: 2025-07-18

## 2025-07-18 RX ORDER — SODIUM CHLORIDE FOR INHALATION 0.9 %
12 VIAL, NEBULIZER (ML) INHALATION ONCE
Status: COMPLETED | OUTPATIENT
Start: 2025-07-18 | End: 2025-07-18

## 2025-07-18 RX ORDER — ALBUTEROL SULFATE 5 MG/ML
10 SOLUTION RESPIRATORY (INHALATION) ONCE
Status: COMPLETED | OUTPATIENT
Start: 2025-07-18 | End: 2025-07-18

## 2025-07-18 RX ADMIN — METHYLPREDNISOLONE SODIUM SUCCINATE 125 MG: 125 INJECTION, POWDER, FOR SOLUTION INTRAMUSCULAR; INTRAVENOUS at 11:01

## 2025-07-18 RX ADMIN — IPRATROPIUM BROMIDE 1 MG: 0.5 SOLUTION RESPIRATORY (INHALATION) at 10:38

## 2025-07-18 RX ADMIN — IOHEXOL 85 ML: 350 INJECTION, SOLUTION INTRAVENOUS at 14:31

## 2025-07-18 RX ADMIN — ISODIUM CHLORIDE 12 ML: 0.03 SOLUTION RESPIRATORY (INHALATION) at 10:38

## 2025-07-18 RX ADMIN — ALBUTEROL SULFATE 10 MG: 2.5 SOLUTION RESPIRATORY (INHALATION) at 10:38

## 2025-07-18 NOTE — ED PROCEDURE NOTE
Procedure  Complex Venous Access Line    Date/Time: 7/18/2025 2:04 PM    Performed by: Marilynn Cassidy MD  Authorized by: Marilynn Cassidy MD    Patient location:  ED  Consent:     Consent obtained:  Verbal  Pre-procedure details:     Hand hygiene: Hand hygiene performed prior to insertion      Sterile barrier technique: All elements of maximal sterile technique followed      Skin preparation:  2% chlorhexidine    Skin preparation agent: Skin preparation agent completely dried prior to procedure    Procedure details:     Complex Venous Access Line Type: US Guided Peripheral IV      Peripheral IV Indications: other specified disorders of the vein      Orientation:  Right    Location:  Forearm    Catheter size:  20 gauge    Approach: percutaneous technique used      Ultrasound image availability:  Not saved    Sterile ultrasound techniques: Sterile gel and sterile probe covers were used      Number of attempts:  2    Successful placement: yes      Landmarks identified: yes    Post-procedure details:     Post-procedure:  Dressing applied    Assessment:  Free fluid flow    Patient tolerance of procedure:  Tolerated well, no immediate complications                   Marilynn Cassidy MD  07/18/25 0501

## 2025-07-18 NOTE — ED PROVIDER NOTES
Time reflects when diagnosis was documented in both MDM as applicable and the Disposition within this note       Time User Action Codes Description Comment    7/18/2025  3:59 PM Mary Kay Mitchell Add [J44.1] COPD exacerbation (HCC)           ED Disposition       ED Disposition   Discharge    Condition   Stable    Date/Time   Fri Jul 18, 2025  4:02 PM    Comment   Ramsey ALTAMIRANO Estela discharge to home/self care.                   Assessment & Plan       Medical Decision Making  DDX including but not limited to: pneumonia, pleural effusion, CHF, PE, COPD exacerbation, metabolic abnormality    Patient does not appear fluid overloaded on exam.  BNP was normal.  D-dimer was elevated so PE study was performed.  No pulmonary embolus or other acute findings such as pneumonia or pleural effusion.  All other blood work was normal.  Troponin was baseline for patient.  Symptoms improved after heart neb and steroids.  Was able to ambulate and maintain his oxygen saturation above 95% without any symptoms.  Will send home at this time as he is stable.    I have discussed the plan to discharge pt from ED. The patient was discharged in stable condition.  Patient ambulated off the department.  Extensive return to emergency department precautions were discussed.  Follow up with appropriate providers including primary care physician was discussed.  Patient and/or their  primary decision maker expressed understanding.  Patient remained stable during entire emergency department stay.          Amount and/or Complexity of Data Reviewed  Labs: ordered. Decision-making details documented in ED Course.  Radiology: ordered. Decision-making details documented in ED Course.    Risk  Prescription drug management.        ED Course as of 07/18/25 1605   Fri Jul 18, 2025   1108 hs TnI 0hr: 13  9 1 month ago   1108 BNP: 57   1122 D-Dimer, Quant(!): 1.25  Will get PE study   1345 Per RN the line that pt has is too distal for CT to use for contrast.  "Multiple nurses have attempted multiple times to get another line however unsuccessful. Will have ER resident attempt   1548 CTA chest pe study  No pulmonary embolus or other acute findings.   1556 Discussed all results with the patient.  Patient reports he is feeling better.  Will do pulse ox with ambulation.  If patient remains stable then will send home.   1602 Pt remained at 95% with ambulation and had no sx       Medications   albuterol inhalation solution 10 mg (10 mg Nebulization Given 7/18/25 1038)   ipratropium (ATROVENT) 0.02 % inhalation solution 1 mg (1 mg Nebulization Given 7/18/25 1038)   sodium chloride 0.9 % inhalation solution 12 mL (12 mL Nebulization Given 7/18/25 1038)   methylPREDNISolone sodium succinate (Solu-MEDROL) injection 125 mg (125 mg Intravenous Given 7/18/25 1101)   iohexol (OMNIPAQUE) 350 MG/ML injection (SINGLE-DOSE) 85 mL (85 mL Intravenous Given 7/18/25 1431)       ED Risk Strat Scores                    No data recorded                            History of Present Illness       Chief Complaint   Patient presents with    Shortness of Breath     Pt states he is having a COPD exacerbation. Pt started with cold like symptoms approx 2 days ago. Pt mention that triggered his SOB. Pt states he barely slept last night due to trouble breathing        Past Medical History[1]   Past Surgical History[2]   Family History[3]   Social History[4]   E-Cigarette/Vaping    E-Cigarette Use Never User       E-Cigarette/Vaping Substances    Nicotine No     THC No     CBD No     Flavoring No     Other No     Unknown No       I have reviewed and agree with the history as documented.     89-year-old male with past medical history of COPD presents today with complaint of shortness of breath.  Reports on Wednesday night he started to feel like he had a cold, had a sore throat, cough and congestion.  States that last night he woke up in the middle of the night and \"could not breathe\".  States he was very " short of breath and had chest tightness.  States that he did use his albuterol inhaler which did relieve some of his symptoms.  Patient is also reporting increased dyspnea on exertion.  Denies any fever, chills, back pain, abdominal pain, nausea, vomiting or diarrhea.  No increase in his baseline leg swelling.        Review of Systems   Constitutional:  Negative for chills and fever.   HENT:  Positive for congestion and sore throat.    Respiratory:  Positive for cough, chest tightness and shortness of breath.    Gastrointestinal:  Negative for diarrhea, nausea and vomiting.   Neurological:  Negative for dizziness and light-headedness.           Objective       ED Triage Vitals   Temperature Pulse Blood Pressure Respirations SpO2 Patient Position - Orthostatic VS   07/18/25 1006 07/18/25 1006 07/18/25 1006 07/18/25 1006 07/18/25 1006 07/18/25 1006   98.2 °F (36.8 °C) 62 134/71 20 95 % Sitting      Temp Source Heart Rate Source BP Location FiO2 (%) Pain Score    07/18/25 1006 07/18/25 1006 07/18/25 1006 -- 07/18/25 1513    Oral Monitor Right arm  No Pain      Vitals      Date and Time Temp Pulse SpO2 Resp BP Pain Score FACES Pain Rating User   07/18/25 1513 -- 72 95 % 20 141/72 No Pain -- BM   07/18/25 1045 -- 62 99 % 20 128/67 -- -- AEN   07/18/25 1006 98.2 °F (36.8 °C) 62 95 % 20 134/71 -- -- ML            Physical Exam  Vitals and nursing note reviewed.   Constitutional:       General: He is not in acute distress.     Appearance: He is well-developed.   HENT:      Head: Normocephalic and atraumatic.     Eyes:      Conjunctiva/sclera: Conjunctivae normal.       Cardiovascular:      Rate and Rhythm: Normal rate and regular rhythm.      Heart sounds: No murmur heard.  Pulmonary:      Effort: Pulmonary effort is normal. No respiratory distress.      Breath sounds: Decreased breath sounds present.   Abdominal:      Palpations: Abdomen is soft.      Tenderness: There is no abdominal tenderness.     Musculoskeletal:          General: No swelling.      Cervical back: Neck supple.     Skin:     General: Skin is warm and dry.      Capillary Refill: Capillary refill takes less than 2 seconds.     Neurological:      Mental Status: He is alert.     Psychiatric:         Mood and Affect: Mood normal.         Results Reviewed       Procedure Component Value Units Date/Time    HS Troponin I 2hr [[id number]]  (Normal) Collected: 07/18/25 1210    Lab Status: Final result Specimen: Blood from Arm, Left Updated: 07/18/25 1240     hs TnI 2hr 11 ng/L      Delta 2hr hsTnI -2 ng/L     D-Dimer [[id number]]  (Abnormal) Collected: 07/18/25 1032    Lab Status: Final result Specimen: Blood from Arm, Left Updated: 07/18/25 1114     D-Dimer, Quant 1.25 ug/ml FEU     Narrative:      In the evaluation for possible pulmonary embolism, in the appropriate (Well's Score of 4 or less) patient, the age adjusted d-dimer cutoff for this patient can be calculated as:    Age x 0.01 (in ug/mL) for Age-adjusted D-dimer exclusion threshold for a patient over 50 years.    HS Troponin 0hr (reflex protocol) [[id number]]  (Normal) Collected: 07/18/25 1032    Lab Status: Final result Specimen: Blood from Arm, Left Updated: 07/18/25 1102     hs TnI 0hr 13 ng/L     B-Type Natriuretic Peptide(BNP) [[id number]]  (Normal) Collected: 07/18/25 1032    Lab Status: Final result Specimen: Blood from Arm, Left Updated: 07/18/25 1102     BNP 57 pg/mL     Basic metabolic panel [[id number]] Collected: 07/18/25 1032    Lab Status: Final result Specimen: Blood from Arm, Left Updated: 07/18/25 1056     Sodium 136 mmol/L      Potassium 4.0 mmol/L      Chloride 102 mmol/L      CO2 27 mmol/L      ANION GAP 7 mmol/L      BUN 14 mg/dL      Creatinine 0.95 mg/dL      Glucose 108 mg/dL      Calcium 9.0 mg/dL      eGFR 70 ml/min/1.73sq m     Narrative:      National Kid[employer] Foundation guidelines for Chronic Kidney Disease (CKD):     Stage 1 with normal or high GFR (GFR > 90 mL/min/1.73 square  meters)    Stage 2 Mild CKD (GFR = 60-89 mL/min/1.73 square meters)    Stage 3A Moderate CKD (GFR = 45-59 mL/min/1.73 square meters)    Stage 3B Moderate CKD (GFR = 30-44 mL/min/1.73 square meters)    Stage 4 Severe CKD (GFR = 15-29 mL/min/1.73 square meters)    Stage 5 End Stage CKD (GFR <15 mL/min/1.73 square meters)  Note: GFR calculation is accurate only with a steady state creatinine    CBC and differential [[id number]]  (Abnormal) Collected: 07/18/25 1032    Lab Status: Final result Specimen: Blood from Arm, Left Updated: 07/18/25 1039     WBC 5.44 Thousand/uL      RBC 4.56 Million/uL      Hemoglobin 13.6 g/dL      Hematocrit 39.7 %      MCV 87 fL      MCH 29.8 pg      MCHC 34.3 g/dL      RDW 13.2 %      MPV 10.2 fL      Platelets 142 Thousands/uL      nRBC 0 /100 WBCs      Segmented % 57 %      Immature Grans % 0 %      Lymphocytes % 22 %      Monocytes % 16 %      Eosinophils Relative 4 %      Basophils Relative 1 %      Absolute Neutrophils 3.10 Thousands/µL      Absolute Immature Grans 0.01 Thousand/uL      Absolute Lymphocytes 1.21 Thousands/µL      Absolute Monocytes 0.87 Thousand/µL      Eosinophils Absolute 0.19 Thousand/µL      Basophils Absolute 0.06 Thousands/µL             CTA chest pe study   Final Interpretation by Mike Cruz MD (07/18 2323)      No pulmonary embolus or other acute findings.      Computerized Assisted Algorithm (CAA) may have aided analysis of applicable images.      Resident: Rolando Tejada I, the attending radiologist, have reviewed the images and agree with the final report above.      Workstation performed: [id number]             ECG 12 Lead Documentation Only    Date/Time: 7/18/2025 10:35 AM    Performed by: Mary Kay Mitchell PA-C  Authorized by: Mary Kay Mitchell PA-C    Indications / Diagnosis:  SOB  ECG reviewed by me, the ED Provider: yes    Patient location:  ED  Previous ECG:     Previous ECG:  Compared to current    Similarity:  No  change  Interpretation:     Interpretation: normal    Rate:     ECG rate:  71  Rhythm:     Rhythm: sinus rhythm    Ectopy:     Ectopy: PAC    Comments:      RBBB  ECG 12 Lead Documentation Only    Date/Time: 7/18/2025 1:30 PM    Performed by: Mary Kay Mitchell PA-C  Authorized by: Mary Kay Mitchell PA-C    Indications / Diagnosis:  2 hour  Patient location:  ED  Previous ECG:     Previous ECG:  Compared to current    Similarity:  Changes noted  Interpretation:     Interpretation: non-specific    Rate:     ECG rate:  92  Rhythm:     Rhythm: sinus rhythm    Ectopy:     Ectopy: PAC and PVCs    QRS:     QRS axis:  Normal    QRS intervals:  Normal  Conduction:     Conduction: normal    ST segments:     ST segments:  Normal  T waves:     T waves: normal    Comments:      RBBB      ED Medication and Procedure Management   Prior to Admission Medications   Prescriptions Last Dose Informant Patient Reported? Taking?   Cholecalciferol (Vitamin D3) 10 MCG (400 UNIT) CAPS  Self Yes No   Sig: Take by mouth   ILEVRO 0.3 % SUSP  Self Yes No   Patient not taking: Reported on 6/13/2023   albuterol (Ventolin HFA) 90 mcg/act inhaler   No No   Sig: Inhale 2 puffs every 6 (six) hours as needed for wheezing   allopurinol (ZYLOPRIM) 100 mg tablet  Self No No   Sig: Take 1 tablet (100 mg total) by mouth daily   amLODIPine (NORVASC) 5 mg tablet  Self No No   Sig: Take 1 tablet (5 mg total) by mouth daily   clotrimazole (LOTRIMIN) 1 % cream  Self No No   Sig: Apply topically 2 (two) times a day   hydroCHLOROthiazide 12.5 mg capsule   No No   Sig: Take 1 capsule (12.5 mg total) by mouth every morning   losartan (COZAAR) 50 mg tablet  Self Yes No   Sig: Take 50 mg by mouth in the morning.   meclizine (ANTIVERT) 25 mg tablet  Self No No   Sig: Take 1 tablet (25 mg total) by mouth 3 (three) times a day as needed for dizziness   tamsulosin (FLOMAX) 0.4 mg  Self Yes No   Sig: Take 0.4 mg by mouth daily with dinner   tiotropium-olodaterol  (Stiolto Respimat) 2.5-2.5 MCG/ACT inhaler   No No   Sig: Inhale 2 puffs daily   vitamin B-12 (CYANOCOBALAMIN) 100 MCG tablet  Self Yes No   Sig: Take 100 mcg by mouth in the morning.      Facility-Administered Medications: None     Patient's Medications   Discharge Prescriptions    No medications on file     No discharge procedures on file.  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    COPD with acute exacerbation (HCC)     last assessed: 2017    Diverticulosis     Eczema     last assessed: 2013    Esophagitis     Gout     last assessed: 2014    Nail avulsion of toe     Neoplasm of bladder     last assessed: 2013   [2]   Past Surgical History:  Procedure Laterality Date    HERNIA REPAIR      last assessed: 2014    KIDNEY SURGERY      description: removal of kidney stone last assessed: 2014    US GUIDED THYROID BIOPSY  2021   [3]   Family History  Problem Relation Name Age of Onset    Stroke Mother      Alzheimer's disease Father      Heart attack Sister      Cancer Family sibling     Lung cancer Brother     [4]   Social History  Tobacco Use    Smoking status: Former     Current packs/day: 0.00     Average packs/day: 2.0 packs/day for 33.0 years (66.0 ttl pk-yrs)     Types: Cigarettes     Start date:      Quit date:      Years since quittin.5    Smokeless tobacco: Never   Vaping Use    Vaping status: Never Used   Substance Use Topics    Alcohol use: No    Drug use: No        Mary Kay Mitchell PA-C  [phone]

## 2025-07-18 NOTE — TELEPHONE ENCOUNTER
REASON FOR CONVERSATION: Shortness of Breath and No Triage Call    SYMPTOMS: shortness of breath. Unable to triage when called pt was already at ED    OTHER HEALTH INFORMATION: Pt currently at [facility]09    PROTOCOL DISPOSITION: No Contact Call    CARE ADVICE PROVIDED: NA    PRACTICE FOLLOW-UP: NA      Reason for Disposition   Patient already left for the hospital/clinic    Answer Assessment - Initial Assessment Questions  Pt has already gone to ER    Protocols used: No Contact or Duplicate Contact Call-Adult-[place]

## 2025-07-18 NOTE — ED NOTES
Patient reporting onset of right sided jaw pain. VSS. Patient denies CP or dental issues. ETTA made aware.      Quin Hernandez RN  07/18/25 1038

## 2025-07-18 NOTE — ASSESSMENT & PLAN NOTE
Home medications include Norvasc 5 mg daily, HCTZ 12.5 mg daily and losartan 50 mg daily  Continue home antihypertensives and monitor, titrate as needed

## 2025-07-18 NOTE — ASSESSMENT & PLAN NOTE
Patient is an 89-year-old male with past medical history significant for COPD, hypertension, and obstructive sleep apnea who presented to the ER with 3-day history of worsening shortness of breath    Patient follows with [facility] pulmonology, most recently seen in the office 6/25  Home regimen is Stiolto, and albuterol as needed  Patient presented to the ER with worsening dyspnea not controlled on home inhalers  In the ER patient was given albuterol/Atrovent nebulizer treatment, IV Solu-Medrol 125 mg and referred for admission  CT chest:

## 2025-07-18 NOTE — TELEPHONE ENCOUNTER
Regarding: SOB  ----- Message from Ender HEATH sent at 7/18/2025  8:51 AM EDT -----  Neeta called in requesting same day appointment for Patient, patient is currently having SOB started last night, and wife is considering a visit to the emergency department if patient is not able to come in today to see .

## 2025-07-20 ENCOUNTER — APPOINTMENT (EMERGENCY)
Dept: CT IMAGING | Facility: HOSPITAL | Age: OVER 89
DRG: 190 | End: 2025-07-20
Payer: COMMERCIAL

## 2025-07-20 ENCOUNTER — HOSPITAL ENCOUNTER (INPATIENT)
Facility: HOSPITAL | Age: OVER 89
LOS: 1 days | Discharge: HOME HEALTH | DRG: 190 | End: 2025-07-21
Attending: EMERGENCY MEDICINE | Admitting: INTERNAL MEDICINE
Payer: COMMERCIAL

## 2025-07-20 ENCOUNTER — APPOINTMENT (EMERGENCY)
Dept: RADIOLOGY | Facility: HOSPITAL | Age: OVER 89
DRG: 190 | End: 2025-07-20
Payer: COMMERCIAL

## 2025-07-20 DIAGNOSIS — R79.89 ELEVATED TROPONIN: ICD-10-CM

## 2025-07-20 DIAGNOSIS — E78.2 MIXED HYPERLIPIDEMIA: ICD-10-CM

## 2025-07-20 DIAGNOSIS — R06.00 DYSPNEA: ICD-10-CM

## 2025-07-20 DIAGNOSIS — J44.1 COPD EXACERBATION (HCC): Primary | ICD-10-CM

## 2025-07-20 PROBLEM — D69.6 THROMBOCYTOPENIA (HCC): Status: ACTIVE | Noted: 2025-07-20

## 2025-07-20 PROBLEM — R19.7 DIARRHEA: Status: ACTIVE | Noted: 2025-07-20

## 2025-07-20 LAB
2HR DELTA HS TROPONIN: 124 NG/L
4HR DELTA HS TROPONIN: -2 NG/L
ALBUMIN SERPL BCG-MCNC: 3.7 G/DL (ref 3.5–5)
ALP SERPL-CCNC: 51 U/L (ref 34–104)
ALT SERPL W P-5'-P-CCNC: 21 U/L (ref 7–52)
ANION GAP SERPL CALCULATED.3IONS-SCNC: 6 MMOL/L (ref 4–13)
APTT PPP: 25 SECONDS (ref 23–34)
AST SERPL W P-5'-P-CCNC: 27 U/L (ref 13–39)
ATRIAL RATE: 131 BPM
ATRIAL RATE: 65 BPM
ATRIAL RATE: 74 BPM
ATRIAL RATE: 92 BPM
ATRIAL RATE: 93 BPM
BASOPHILS # BLD AUTO: 0.04 THOUSANDS/ÂΜL (ref 0–0.1)
BASOPHILS NFR BLD AUTO: 1 % (ref 0–1)
BILIRUB DIRECT SERPL-MCNC: 0.13 MG/DL (ref 0–0.2)
BILIRUB SERPL-MCNC: 0.52 MG/DL (ref 0.2–1)
BNP SERPL-MCNC: 72 PG/ML (ref 0–100)
BUN SERPL-MCNC: 26 MG/DL (ref 5–25)
CALCIUM SERPL-MCNC: 8.6 MG/DL (ref 8.4–10.2)
CARDIAC TROPONIN I PNL SERPL HS: 10 NG/L (ref 8–18)
CARDIAC TROPONIN I PNL SERPL HS: 12 NG/L (ref ?–50)
CARDIAC TROPONIN I PNL SERPL HS: 138 NG/L (ref ?–50)
CARDIAC TROPONIN I PNL SERPL HS: 14 NG/L (ref ?–50)
CHLORIDE SERPL-SCNC: 101 MMOL/L (ref 96–108)
CO2 SERPL-SCNC: 27 MMOL/L (ref 21–32)
CREAT SERPL-MCNC: 0.9 MG/DL (ref 0.6–1.3)
EOSINOPHIL # BLD AUTO: 0.06 THOUSAND/ÂΜL (ref 0–0.61)
EOSINOPHIL NFR BLD AUTO: 1 % (ref 0–6)
ERYTHROCYTE [DISTWIDTH] IN BLOOD BY AUTOMATED COUNT: 13.7 % (ref 11.6–15.1)
FLUAV AG UPPER RESP QL IA.RAPID: NEGATIVE
FLUAV RNA RESP QL NAA+PROBE: NEGATIVE
FLUBV AG UPPER RESP QL IA.RAPID: NEGATIVE
FLUBV RNA RESP QL NAA+PROBE: NEGATIVE
GFR SERPL CREATININE-BSD FRML MDRD: 75 ML/MIN/1.73SQ M
GLUCOSE SERPL-MCNC: 109 MG/DL (ref 65–140)
HCT VFR BLD AUTO: 39.8 % (ref 36.5–49.3)
HGB BLD-MCNC: 13.2 G/DL (ref 12–17)
IMM GRANULOCYTES # BLD AUTO: 0.04 THOUSAND/UL (ref 0–0.2)
IMM GRANULOCYTES NFR BLD AUTO: 1 % (ref 0–2)
INR PPP: 1.02 (ref 0.85–1.19)
LIPASE SERPL-CCNC: 38 U/L (ref 11–82)
LYMPHOCYTES # BLD AUTO: 0.98 THOUSANDS/ÂΜL (ref 0.6–4.47)
LYMPHOCYTES NFR BLD AUTO: 11 % (ref 14–44)
MCH RBC QN AUTO: 29.5 PG (ref 26.8–34.3)
MCHC RBC AUTO-ENTMCNC: 33.2 G/DL (ref 31.4–37.4)
MCV RBC AUTO: 89 FL (ref 82–98)
MONOCYTES # BLD AUTO: 0.78 THOUSAND/ÂΜL (ref 0.17–1.22)
MONOCYTES NFR BLD AUTO: 9 % (ref 4–12)
NEUTROPHILS # BLD AUTO: 6.82 THOUSANDS/ÂΜL (ref 1.85–7.62)
NEUTS SEG NFR BLD AUTO: 77 % (ref 43–75)
NRBC BLD AUTO-RTO: 0 /100 WBCS
P AXIS: 69 DEGREES
P AXIS: 75 DEGREES
P AXIS: 78 DEGREES
PLATELET # BLD AUTO: 129 THOUSANDS/UL (ref 149–390)
PMV BLD AUTO: 10.3 FL (ref 8.9–12.7)
POTASSIUM SERPL-SCNC: 3.5 MMOL/L (ref 3.5–5.3)
PR INTERVAL: 172 MS
PR INTERVAL: 176 MS
PR INTERVAL: 182 MS
PR INTERVAL: 216 MS
PROT SERPL-MCNC: 6.2 G/DL (ref 6.4–8.4)
PROTHROMBIN TIME: 13.6 SECONDS (ref 12.3–15)
QRS AXIS: 85 DEGREES
QRS AXIS: 91 DEGREES
QRS AXIS: 92 DEGREES
QRS AXIS: 95 DEGREES
QRS AXIS: 97 DEGREES
QRSD INTERVAL: 158 MS
QRSD INTERVAL: 160 MS
QRSD INTERVAL: 166 MS
QRSD INTERVAL: 168 MS
QRSD INTERVAL: 174 MS
QT INTERVAL: 368 MS
QT INTERVAL: 436 MS
QT INTERVAL: 444 MS
QT INTERVAL: 446 MS
QT INTERVAL: 460 MS
QTC INTERVAL: 463 MS
QTC INTERVAL: 493 MS
QTC INTERVAL: 495 MS
QTC INTERVAL: 510 MS
QTC INTERVAL: 542 MS
RBC # BLD AUTO: 4.48 MILLION/UL (ref 3.88–5.62)
RSV RNA RESP QL NAA+PROBE: NEGATIVE
SARS-COV+SARS-COV-2 AG RESP QL IA.RAPID: NEGATIVE
SARS-COV-2 RNA RESP QL NAA+PROBE: NEGATIVE
SODIUM SERPL-SCNC: 134 MMOL/L (ref 135–147)
T WAVE AXIS: 25 DEGREES
T WAVE AXIS: 50 DEGREES
T WAVE AXIS: 59 DEGREES
T WAVE AXIS: 72 DEGREES
T WAVE AXIS: 73 DEGREES
TSH SERPL DL<=0.05 MIU/L-ACNC: 3.79 UIU/ML (ref 0.45–4.5)
VENTRICULAR RATE: 108 BPM
VENTRICULAR RATE: 65 BPM
VENTRICULAR RATE: 74 BPM
VENTRICULAR RATE: 75 BPM
VENTRICULAR RATE: 93 BPM
WBC # BLD AUTO: 8.72 THOUSAND/UL (ref 4.31–10.16)

## 2025-07-20 PROCEDURE — 84443 ASSAY THYROID STIM HORMONE: CPT | Performed by: EMERGENCY MEDICINE

## 2025-07-20 PROCEDURE — 87811 SARS-COV-2 COVID19 W/OPTIC: CPT | Performed by: EMERGENCY MEDICINE

## 2025-07-20 PROCEDURE — 93005 ELECTROCARDIOGRAM TRACING: CPT

## 2025-07-20 PROCEDURE — 85025 COMPLETE CBC W/AUTO DIFF WBC: CPT | Performed by: EMERGENCY MEDICINE

## 2025-07-20 PROCEDURE — 96374 THER/PROPH/DIAG INJ IV PUSH: CPT

## 2025-07-20 PROCEDURE — 87804 INFLUENZA ASSAY W/OPTIC: CPT | Performed by: EMERGENCY MEDICINE

## 2025-07-20 PROCEDURE — 83690 ASSAY OF LIPASE: CPT | Performed by: EMERGENCY MEDICINE

## 2025-07-20 PROCEDURE — 99291 CRITICAL CARE FIRST HOUR: CPT | Performed by: EMERGENCY MEDICINE

## 2025-07-20 PROCEDURE — 84484 ASSAY OF TROPONIN QUANT: CPT | Performed by: EMERGENCY MEDICINE

## 2025-07-20 PROCEDURE — 83880 ASSAY OF NATRIURETIC PEPTIDE: CPT | Performed by: EMERGENCY MEDICINE

## 2025-07-20 PROCEDURE — 85730 THROMBOPLASTIN TIME PARTIAL: CPT | Performed by: EMERGENCY MEDICINE

## 2025-07-20 PROCEDURE — 99285 EMERGENCY DEPT VISIT HI MDM: CPT

## 2025-07-20 PROCEDURE — 93010 ELECTROCARDIOGRAM REPORT: CPT | Performed by: STUDENT IN AN ORGANIZED HEALTH CARE EDUCATION/TRAINING PROGRAM

## 2025-07-20 PROCEDURE — 71045 X-RAY EXAM CHEST 1 VIEW: CPT

## 2025-07-20 PROCEDURE — 99223 1ST HOSP IP/OBS HIGH 75: CPT | Performed by: INTERNAL MEDICINE

## 2025-07-20 PROCEDURE — 80048 BASIC METABOLIC PNL TOTAL CA: CPT | Performed by: EMERGENCY MEDICINE

## 2025-07-20 PROCEDURE — 94760 N-INVAS EAR/PLS OXIMETRY 1: CPT

## 2025-07-20 PROCEDURE — 94640 AIRWAY INHALATION TREATMENT: CPT

## 2025-07-20 PROCEDURE — 84484 ASSAY OF TROPONIN QUANT: CPT | Performed by: INTERNAL MEDICINE

## 2025-07-20 PROCEDURE — 87505 NFCT AGENT DETECTION GI: CPT | Performed by: PHYSICIAN ASSISTANT

## 2025-07-20 PROCEDURE — 96375 TX/PRO/DX INJ NEW DRUG ADDON: CPT

## 2025-07-20 PROCEDURE — 87637 SARSCOV2&INF A&B&RSV AMP PRB: CPT | Performed by: INTERNAL MEDICINE

## 2025-07-20 PROCEDURE — 80076 HEPATIC FUNCTION PANEL: CPT | Performed by: EMERGENCY MEDICINE

## 2025-07-20 PROCEDURE — 85610 PROTHROMBIN TIME: CPT | Performed by: EMERGENCY MEDICINE

## 2025-07-20 PROCEDURE — 36415 COLL VENOUS BLD VENIPUNCTURE: CPT | Performed by: EMERGENCY MEDICINE

## 2025-07-20 PROCEDURE — 74176 CT ABD & PELVIS W/O CONTRAST: CPT

## 2025-07-20 RX ORDER — LEVALBUTEROL INHALATION SOLUTION 1.25 MG/3ML
1.25 SOLUTION RESPIRATORY (INHALATION)
Status: DISCONTINUED | OUTPATIENT
Start: 2025-07-20 | End: 2025-07-21 | Stop reason: HOSPADM

## 2025-07-20 RX ORDER — ONDANSETRON 2 MG/ML
4 INJECTION INTRAMUSCULAR; INTRAVENOUS EVERY 6 HOURS PRN
Status: DISCONTINUED | OUTPATIENT
Start: 2025-07-20 | End: 2025-07-21 | Stop reason: HOSPADM

## 2025-07-20 RX ORDER — ATORVASTATIN CALCIUM 40 MG/1
40 TABLET, FILM COATED ORAL
Status: DISCONTINUED | OUTPATIENT
Start: 2025-07-20 | End: 2025-07-21 | Stop reason: HOSPADM

## 2025-07-20 RX ORDER — LOSARTAN POTASSIUM 50 MG/1
50 TABLET ORAL DAILY
Status: DISCONTINUED | OUTPATIENT
Start: 2025-07-21 | End: 2025-07-21 | Stop reason: HOSPADM

## 2025-07-20 RX ORDER — AMLODIPINE BESYLATE 5 MG/1
5 TABLET ORAL DAILY
Status: DISCONTINUED | OUTPATIENT
Start: 2025-07-21 | End: 2025-07-21 | Stop reason: HOSPADM

## 2025-07-20 RX ORDER — TAMSULOSIN HYDROCHLORIDE 0.4 MG/1
0.4 CAPSULE ORAL
Status: DISCONTINUED | OUTPATIENT
Start: 2025-07-20 | End: 2025-07-21 | Stop reason: HOSPADM

## 2025-07-20 RX ORDER — HEPARIN SODIUM 5000 [USP'U]/ML
5000 INJECTION, SOLUTION INTRAVENOUS; SUBCUTANEOUS EVERY 8 HOURS SCHEDULED
Status: DISCONTINUED | OUTPATIENT
Start: 2025-07-20 | End: 2025-07-21 | Stop reason: HOSPADM

## 2025-07-20 RX ORDER — ALBUTEROL SULFATE 0.83 MG/ML
5 SOLUTION RESPIRATORY (INHALATION) ONCE
Status: COMPLETED | OUTPATIENT
Start: 2025-07-20 | End: 2025-07-20

## 2025-07-20 RX ORDER — DOCUSATE SODIUM 100 MG/1
100 CAPSULE, LIQUID FILLED ORAL 2 TIMES DAILY
Status: DISCONTINUED | OUTPATIENT
Start: 2025-07-20 | End: 2025-07-21 | Stop reason: HOSPADM

## 2025-07-20 RX ORDER — IPRATROPIUM BROMIDE AND ALBUTEROL SULFATE 2.5; .5 MG/3ML; MG/3ML
3 SOLUTION RESPIRATORY (INHALATION) ONCE
Status: COMPLETED | OUTPATIENT
Start: 2025-07-20 | End: 2025-07-20

## 2025-07-20 RX ORDER — ASPIRIN 81 MG/1
81 TABLET ORAL DAILY
Status: DISCONTINUED | OUTPATIENT
Start: 2025-07-21 | End: 2025-07-21 | Stop reason: HOSPADM

## 2025-07-20 RX ORDER — AZITHROMYCIN 250 MG/1
500 TABLET, FILM COATED ORAL EVERY 24 HOURS
Status: DISCONTINUED | OUTPATIENT
Start: 2025-07-20 | End: 2025-07-21 | Stop reason: HOSPADM

## 2025-07-20 RX ORDER — ALLOPURINOL 100 MG/1
100 TABLET ORAL DAILY
Status: DISCONTINUED | OUTPATIENT
Start: 2025-07-21 | End: 2025-07-21 | Stop reason: HOSPADM

## 2025-07-20 RX ORDER — ALBUTEROL SULFATE 90 UG/1
2 INHALANT RESPIRATORY (INHALATION) EVERY 6 HOURS PRN
Status: DISCONTINUED | OUTPATIENT
Start: 2025-07-20 | End: 2025-07-21 | Stop reason: HOSPADM

## 2025-07-20 RX ORDER — GUAIFENESIN/DEXTROMETHORPHAN 100-10MG/5
10 SYRUP ORAL ONCE
Status: COMPLETED | OUTPATIENT
Start: 2025-07-20 | End: 2025-07-20

## 2025-07-20 RX ORDER — METHYLPREDNISOLONE SODIUM SUCCINATE 125 MG/2ML
80 INJECTION, POWDER, LYOPHILIZED, FOR SOLUTION INTRAMUSCULAR; INTRAVENOUS ONCE
Status: COMPLETED | OUTPATIENT
Start: 2025-07-20 | End: 2025-07-20

## 2025-07-20 RX ORDER — ONDANSETRON 2 MG/ML
4 INJECTION INTRAMUSCULAR; INTRAVENOUS ONCE
Status: COMPLETED | OUTPATIENT
Start: 2025-07-20 | End: 2025-07-20

## 2025-07-20 RX ORDER — METHYLPREDNISOLONE SODIUM SUCCINATE 40 MG/ML
40 INJECTION, POWDER, LYOPHILIZED, FOR SOLUTION INTRAMUSCULAR; INTRAVENOUS EVERY 8 HOURS SCHEDULED
Status: DISCONTINUED | OUTPATIENT
Start: 2025-07-20 | End: 2025-07-21 | Stop reason: HOSPADM

## 2025-07-20 RX ADMIN — GUAIFENESIN AND DEXTROMETHORPHAN 10 ML: 100; 10 SYRUP ORAL at 22:35

## 2025-07-20 RX ADMIN — LEVALBUTEROL HYDROCHLORIDE 1.25 MG: 1.25 SOLUTION RESPIRATORY (INHALATION) at 19:31

## 2025-07-20 RX ADMIN — HEPARIN SODIUM 5000 UNITS: 5000 INJECTION INTRAVENOUS; SUBCUTANEOUS at 22:19

## 2025-07-20 RX ADMIN — ATORVASTATIN CALCIUM 40 MG: 40 TABLET, FILM COATED ORAL at 16:25

## 2025-07-20 RX ADMIN — AZITHROMYCIN DIHYDRATE 500 MG: 250 TABLET, FILM COATED ORAL at 16:25

## 2025-07-20 RX ADMIN — UMECLIDINIUM 1 PUFF: 62.5 AEROSOL, POWDER ORAL at 16:26

## 2025-07-20 RX ADMIN — TAMSULOSIN HYDROCHLORIDE 0.4 MG: 0.4 CAPSULE ORAL at 16:25

## 2025-07-20 RX ADMIN — METHYLPREDNISOLONE SODIUM SUCCINATE 40 MG: 40 INJECTION, POWDER, FOR SOLUTION INTRAMUSCULAR; INTRAVENOUS at 22:19

## 2025-07-20 RX ADMIN — ONDANSETRON 4 MG: 2 INJECTION INTRAMUSCULAR; INTRAVENOUS at 10:14

## 2025-07-20 RX ADMIN — OLODATEROL RESPIMAT INHALATION SPRAY 2 PUFF: 2.5 SPRAY, METERED RESPIRATORY (INHALATION) at 16:26

## 2025-07-20 RX ADMIN — METHYLPREDNISOLONE SODIUM SUCCINATE 80 MG: 125 INJECTION, POWDER, FOR SOLUTION INTRAMUSCULAR; INTRAVENOUS at 09:08

## 2025-07-20 RX ADMIN — METHYLPREDNISOLONE SODIUM SUCCINATE 40 MG: 40 INJECTION, POWDER, FOR SOLUTION INTRAMUSCULAR; INTRAVENOUS at 16:25

## 2025-07-20 RX ADMIN — ALBUTEROL SULFATE 5 MG: 2.5 SOLUTION RESPIRATORY (INHALATION) at 07:51

## 2025-07-20 RX ADMIN — HEPARIN SODIUM 5000 UNITS: 5000 INJECTION INTRAVENOUS; SUBCUTANEOUS at 16:26

## 2025-07-20 RX ADMIN — IPRATROPIUM BROMIDE AND ALBUTEROL SULFATE 3 ML: .5; 3 SOLUTION RESPIRATORY (INHALATION) at 07:52

## 2025-07-20 NOTE — ASSESSMENT & PLAN NOTE
Home medications include losartan 50 mg daily, hydrochlorothiazide 12.5 mg daily, and Norvasc 5 mg daily  Continue home regimen and titrate as needed

## 2025-07-20 NOTE — ED PROVIDER NOTES
[address] [facility] [place]. [place], [address]  Tel.  [phone]  Fax. 100 [facility] 60  [place], [address]  Tel.  [phone]  Fax. [phone] [address]  Tel.  [phone]  Fax. [phone]     PROPER SLEEP HYGIENE    What to avoid  Do not have drinks with caffeine, such as coffee or black tea, for 8 hours before bed. Do not smoke or use other types of tobacco near bedtime. Nicotine is a stimulant and can keep you awake. Avoid drinking alcohol late in the evening, because it can cause you to wake in the middle of the night. Do not eat a big meal close to bedtime. If you are hungry, eat a light snack. Do not drink a lot of water close to bedtime, because the need to urinate may wake you up during the night. Do not read or watch TV in bed. Use the bed only for sleeping and sexual activity. What to try  Go to bed at the same time every night, and wake up at the same time every morning. Do not take naps during the day. Keep your bedroom quiet, dark, and cool. Get regular exercise, but not within 3 to 4 hours of your bedtime. .  Sleep on a comfortable pillow and mattress. If watching the clock makes you anxious, turn it facing away from you so you cannot see the time. If you worry when you lie down, start a worry book. Well before bedtime, write down your worries, and then set the book and your concerns aside. Try meditation or other relaxation techniques before you go to bed. If you cannot fall asleep, get up and go to another room until you feel sleepy. Do something relaxing. Repeat your bedtime routine before you go to bed again. Make your house quiet and calm about an hour before bedtime. Turn down the lights, turn off the TV, log off the computer, and turn down the volume on music. This can help you relax after a busy day.     Drowsy Driving  The [address] [place] [address] Road Traffic Safety Administration cites drowsiness as a Time reflects when diagnosis was documented in both MDM as applicable and the Disposition within this note       Time User Action Codes Description Comment    7/20/2025 12:49 PM Jossue Jennings Add [J44.1] COPD exacerbation (HCC)     7/20/2025 12:49 PM Jossue Jennings Add [R06.00] Dyspnea     7/20/2025  1:27 PM Adenike Love Add [R79.89] Elevated troponin           ED Disposition       ED Disposition   Admit    Condition   Stable    Date/Time   Sun Jul 20, 2025 12:49 PM    Comment   Case was discussed with VALENTIN and the patient's admission status was agreed to be Admission Status: inpatient status to the service of Dr. Love .               Assessment & Plan       Medical Decision Making  1. Shortness of breath - Patient with similar in the past, evaluated 2 days prior, states now with diarrhea. Will order ECG and troponin to rule out acute MI, CBC for leukocytosis and anemia, metabolic panel for electrolyte abnormalities and dehydration, COVID/Flu. Will give breathing treatment.     Critical Care Time Statement: Upon my evaluation, this patient had a high probability of imminent or life-threatening deterioration due to acute respiratory failure requiring supplemental oxygn, which required my direct attention, intervention, and personal management.  I spent a total of 35 minutes directly providing critical care services, including evaluating for the presence of life-threatening injuries or illnesses, management of organ system failure(s) , and interpretation of hemodynamic data. This time is exclusive of procedures, teaching, treating other patients, family meetings, and any prior time recorded by providers other than myself.       Problems Addressed:  COPD exacerbation (HCC): acute illness or injury  Dyspnea: acute illness or injury    Amount and/or Complexity of Data Reviewed  External Data Reviewed: labs, radiology, ECG and notes.  Labs: ordered. Decision-making details documented in ED Course.  Radiology:  causing factor in more than 716,384 police reported crashes annually, resulting in 76,000 injuries and 1,500 deaths. Other surveys suggest 55% of people polled have driven while drowsy in the past year, 23% had fallen asleep but not crashed, 3% crashed, and 2% had and accident due to drowsy driving. Who is at risk? Young Drivers: One study of drowsy driving accidents states that 55% of the drivers were under 25 years. Of those, 75% were male. Shift Workers and Travelers: People who work overnight or travel across time zones frequently are at higher risk of experiencing Circadian Rhythm Disorders. They are trying to work and function when their body is programed to sleep. Sleep Deprived: Lack of sleep has a serious impact on your ability to pay attention or focus on a task. Consistently getting less than the average of 8 hours your body needs creates partial or cumulative sleep deprivation. Untreated Sleep Disorders: Sleep Apnea, Narcolepsy, R.L.S., and other sleep disorders (untreated) prevent a person from getting enough restful sleep. This leads to excessive daytime sleepiness and increases the risk for drowsy driving accidents by up to 7 times. Medications / Alcohol: Even over the counter medications can cause drowsiness. Medications that impair a drivers attention should have a warning label. Alcohol naturally makes you sleepy and on its own can cause accidents. Combined with excessive drowsiness its effects are amplified. Signs of Drowsy Driving:   * You don't remember driving the last few miles   * You may drift out of your tyrese   * You are unable to focus and your thoughts wander   * You may yawn more often than normal   * You have difficulty keeping your eyes open / nodding off   * Missing traffic signs, speeding, or tailgating  Prevention-   Good sleep hygiene, lifestyle and behavioral choices have the most impact on drowsy driving.  There is no substitute for sleep and the average person ordered.  ECG/medicine tests: ordered and independent interpretation performed. Decision-making details documented in ED Course.    Risk  Prescription drug management.  Decision regarding hospitalization.        ED Course as of 07/20/25 1539   Sun Jul 20, 2025   0824 hs TnI 0hr: 14  13 two days ago   0855 ECG evaluated by myself, interpretation included in procedure section of note.    1148 Patient continues to deny pain, offers no specific complaints at this time.       Medications   albuterol inhalation solution 5 mg (5 mg Nebulization Given 7/20/25 0751)   ipratropium-albuterol (DUO-NEB) 0.5-2.5 mg/3 mL inhalation solution 3 mL (3 mL Nebulization Given 7/20/25 0752)   methylPREDNISolone sodium succinate (Solu-MEDROL) injection 80 mg (80 mg Intravenous Given 7/20/25 0908)   ondansetron (ZOFRAN) injection 4 mg (4 mg Intravenous Given 7/20/25 1014)       ED Risk Strat Scores                    No data recorded        SBIRT 22yo+      Flowsheet Row Most Recent Value   Initial Alcohol Screen: US AUDIT-C     1. How often do you have a drink containing alcohol? 0 Filed at: 07/20/2025 0741   2. How many drinks containing alcohol do you have on a typical day you are drinking?  0 Filed at: 07/20/2025 0741   3b. FEMALE Any Age, or MALE 65+: How often do you have 4 or more drinks on one occassion? 0 Filed at: 07/20/2025 0741   Audit-C Score 0 Filed at: 07/20/2025 0741   ANA: How many times in the past year have you...    Used an illegal drug or used a prescription medication for non-medical reasons? Never Filed at: 07/20/2025 0741                            History of Present Illness       Chief Complaint   Patient presents with    Shortness of Breath     Pt reports SOB and cough began this past Wednesday, was seen here on Friday and d/c. Pt reports he is feeling worse, began with diarrhea this morning.        Past Medical History[1]   Past Surgical History[2]   Family History[3]   Social History[4]   E-Cigarette/Vaping     E-Cigarette Use Never User       E-Cigarette/Vaping Substances    Nicotine No     THC No     CBD No     Flavoring No     Other No     Unknown No       I have reviewed and agree with the history as documented.     88 YO male presents with continuing shortness of breath. States this has now been present for the last 4 days, states he has a history of COPD. Patient was evaluated in the ED 2 days prior for shortness of breath, stating overnight the previous night he had difficulty sleeping due to worsening shortness of breath while laying flat. Patient was evaluated in the ED for this shortness of breath, he had cardiac testing, troponins were stable, BNP was not elevated and CXR was determined to be normal. He additionally had a CTA due to an elevated d-dimer. Patient states he was feeling improved on leaving but symptoms again worsened last night, states laying down is worse. He additionally started to have diarrhea ~1 hour prior to calling EMS. He complains of jaw pain that was present during his last visit. Pt denies CP/F/C/N/V, no dysuria, burning on urination or blood in urine.       History provided by:  Patient   used: No        Review of Systems   Constitutional:  Negative for fever.   HENT:  Negative for dental problem.    Eyes:  Negative for visual disturbance.   Respiratory:  Negative for shortness of breath.    Cardiovascular:  Negative for chest pain.   Gastrointestinal:  Positive for diarrhea. Negative for abdominal pain, nausea and vomiting.   Genitourinary:  Negative for dysuria and frequency.   Musculoskeletal:  Negative for neck pain and neck stiffness.   Skin:  Negative for rash.   Neurological:  Negative for dizziness, weakness and light-headedness.   Psychiatric/Behavioral:  Negative for agitation, behavioral problems and confusion.    All other systems reviewed and are negative.          Objective       ED Triage Vitals [07/20/25 0737]   Temperature Pulse Blood Pressure  requires 8 hours nightly. If you find yourself driving drowsy, stop and sleep. Consider the sleep hygiene tips provided during your visit as well. Medication Refill Policy: Refills for all medications require 1 week advance notice. Please have your pharmacy fax a refill request. We are unable to fax, or call in \"controled substance\" medications and you will need to pick these prescriptions up from our office. Vistaar Activation    Thank you for requesting access to Vistaar. Please follow the instructions below to securely access and download your online medical record. Vistaar allows you to send messages to your doctor, view your test results, renew your prescriptions, schedule appointments, and more. How Do I Sign Up? In your internet browser, go to [email or web address]. Click on the First Time User? Click Here link in the Sign In box. You will see the New Member Sign Up page. Enter your Vistaar Access Code exactly as it appears below. You will not need to use this code after youve completed the sign-up process. If you do not sign up before the expiration date, you must request a new code. Vistaar Access Code: Activation code not generated  Current Vistaar Status: Active (This is the date your Vistaar access code will )    Enter the last four digits of your Social Security Number (xxxx) and Date of Birth (mm/dd/yyyy) as indicated and click Submit. You will be taken to the next sign-up page. Create a Vistaar ID. This will be your Vistaar login ID and cannot be changed, so think of one that is secure and easy to remember. Create a Vistaar password. You can change your password at any time. Enter your Password Reset Question and Answer. This can be used at a later time if you forget your password. Enter your e-mail address. You will receive e-mail notification when new information is available in [address] Click Sign Up.  You can now view and download portions of your Respirations SpO2 Patient Position - Orthostatic VS   98.3 °F (36.8 °C) 68 135/71 (!) 24 96 % Lying      Temp Source Heart Rate Source BP Location FiO2 (%) Pain Score    Oral Monitor Right arm -- No Pain      Vitals      Date and Time Temp Pulse SpO2 Resp BP Pain Score FACES Pain Rating User   07/20/25 1509 -- -- -- -- -- No Pain -- BM   07/20/25 1509 98.9 °F (37.2 °C) 60 95 % -- 141/61 -- -- DII   07/20/25 1430 -- 97 94 % 26 151/67 -- -- LB   07/20/25 1400 -- 88 95 % 24 161/79 -- -- LB   07/20/25 1330 -- 75 95 % 22 146/67 -- -- LB   07/20/25 1300 -- 70 94 % 20 144/69 -- -- JK   07/20/25 1300 98.3 °F (36.8 °C) -- -- -- -- No Pain -- LB   07/20/25 1230 -- 62 95 % 19 129/61 -- -- LB   07/20/25 1200 -- 77 92 % 15 128/61 -- -- LB   07/20/25 1130 -- 72 93 % 24 140/67 -- -- LB   07/20/25 1100 -- 70 97 % 22 141/63 -- -- LB   07/20/25 1030 -- 70 98 % 23 127/62 -- -- LB   07/20/25 1000 -- 83 94 % 24 122/59 -- -- JK   07/20/25 0945 -- 93 88 % 27 124/60 -- -- JK   07/20/25 0930 -- 84 95 % 25 134/63 -- -- LB   07/20/25 0900 -- 89 93 % 22 157/71 -- -- LB   07/20/25 0845 -- 85 95 % 24 158/74 -- -- LB   07/20/25 0800 -- 82 98 % 26 124/62 -- -- LB   07/20/25 0737 98.3 °F (36.8 °C) 68 96 % 24 135/71 No Pain -- LB            Physical Exam  Vitals and nursing note reviewed.   Constitutional:       Appearance: He is well-developed.   HENT:      Head: Normocephalic and atraumatic.     Eyes:      Extraocular Movements: Extraocular movements intact.       Cardiovascular:      Rate and Rhythm: Normal rate.   Pulmonary:      Effort: Pulmonary effort is normal.      Breath sounds: Wheezing (Mild end-expiratory wheezing) present.   Abdominal:      General: There is no distension.     Musculoskeletal:         General: Normal range of motion.      Cervical back: Normal range of motion.     Skin:     Findings: No rash.     Neurological:      Mental Status: He is alert and oriented to person, place, and time.     Psychiatric:         Behavior:  medical record. Click the TapInko link to download a portable copy of your medical information. Additional Information    If you have questions, please call [phone]. Remember, [employer] is NOT to be used for urgent needs. For medical emergencies, dial 911. Behavior normal.         Results Reviewed       Procedure Component Value Units Date/Time    COVID[facility], [facility]/B, [id number]  (Normal) Collected: 07/20/25 1306    Lab Status: Final result Specimen: Nares from Nasopharyngeal Swab Updated: 07/20/25 1428     SARS-CoV-2 Negative     INFLUENZA A PCR Negative     INFLUENZA B PCR Negative     RSV PCR Negative    Narrative:      This test has been performed using the CoV-2/Flu/RSV plus assay on the I-Pulse platform. This test has been validated by the  and verified by the performing laboratory.     This test is designed to amplify and detect the following: nucleocapsid (N), envelope (E), and RNA-dependent RNA polymerase (RdRP) genes of the SARS-CoV-2 genome; matrix (M), basic polymerase (PB2), and acidic protein (PA) segments of the influenza A genome; matrix (M) and non-structural protein (NS) segments of the influenza B genome, and the nucleocapsid genes of RSV A and RSV B.     Positive results are indicative of the presence of Flu A, Flu B, RSV, and/or SARS-CoV-2 RNA. Positive results for SARS-CoV-2 or suspected novel influenza should be reported to state, local, or federal health departments according to local reporting requirements.      All results should be assessed in conjunction with clinical presentation and other laboratory markers for clinical management.     FOR PEDIATRIC PATIENTS - copy/paste COVID Guidelines URL to browser: [email or web address]       High Sensitivity Troponin I Random [[id number]]  (Normal) Collected: 07/20/25 1339    Lab Status: Final result Specimen: Blood from Arm, Left Updated: 07/20/25 1409     HS TnI random 10 ng/L     HS Troponin I 4hr [[id number]]  (Normal) Collected: 07/20/25 1130    Lab Status: Final result Specimen: Blood from Arm, Left Updated: 07/20/25 1205     hs TnI 4hr 12 ng/L      Delta 4hr hsTnI -2 ng/L     HS Troponin I 2hr  [[id number]]  (Abnormal) Collected: 07/20/25 0935    Lab Status: Final result Specimen: Blood from Arm, Left Updated: 07/20/25 1043     hs TnI 2hr 138 ng/L      Delta 2hr hsTnI 124 ng/L     TSH, 3rd generation with Free T4 reflex [[id number]]  (Normal) Collected: 07/20/25 0748    Lab Status: Final result Specimen: Blood from Arm, Left Updated: 07/20/25 0829     TSH 3RD GENERATION 3.791 uIU/mL     HS Troponin 0hr (reflex protocol) [[id number]]  (Normal) Collected: 07/20/25 0748    Lab Status: Final result Specimen: Blood from Arm, Left Updated: 07/20/25 0821     hs TnI 0hr 14 ng/L     FLU/COVID Rapid Antigen (30 min. TAT) - Preferred screening test in ED [[id number]]  (Normal) Collected: 07/20/25 0748    Lab Status: Final result Specimen: Nares from Nose Updated: 07/20/25 0820     SARS COV Rapid Antigen Negative     Influenza A Rapid Antigen Negative     Influenza B Rapid Antigen Negative    Narrative:      This test has been performed using the Quidel Korina 2 FLU+SARS Antigen test under the Emergency Use Authorization (EUA). This test has been validated by the  and verified by the performing laboratory. The Korina uses lateral flow immunofluorescent sandwich assay to detect SARS-COV, Influenza A and Influenza B Antigen.     The Quidel Korina 2 SARS Antigen test does not differentiate between SARS-CoV and SARS-CoV-2.     Negative results are presumptive and may be confirmed with a molecular assay, if necessary, for patient management. Negative results do not rule out SARS-CoV-2 or influenza infection and should not be used as the sole basis for treatment or patient management decisions. A negative test result may occur if the level of antigen in a sample is below the limit of detection of this test.     Positive results are indicative of the presence of viral antigens, but do not rule out bacterial infection or co-infection with other viruses.     All test results should be used as an adjunct to clinical  observations and other information available to the provider.    FOR PEDIATRIC PATIENTS - copy/paste COVID Guidelines URL to browser: [email or web address]    B-Type Natriuretic Peptide(BNP) [[id number]]  (Normal) Collected: 07/20/25 0748    Lab Status: Final result Specimen: Blood from Arm, Left Updated: 07/20/25 0819     BNP 72 pg/mL     Protime-INR [[id number]]  (Normal) Collected: 07/20/25 0748    Lab Status: Final result Specimen: Blood from Arm, Left Updated: 07/20/25 0818     Protime 13.6 seconds      INR 1.02    Narrative:      INR Therapeutic Range    Indication                                             INR Range      Atrial Fibrillation                                               2.0-3.0  Hypercoagulable State                                    2.0.2.3  Left Ventricular Asist Device                            2.0-3.0  Mechanical Heart Valve                                  -    Aortic(with afib, MI, embolism, HF, LA enlargement,    and/or coagulopathy)                                     2.0-3.0 (2.5-3.5)     Mitral                                                             2.5-3.5  Prosthetic/Bioprosthetic Heart Valve               2.0-3.0  Venous thromboembolism (VTE: VT, PE        2.0-3.0    APTT [[id number]]  (Normal) Collected: 07/20/25 0748    Lab Status: Final result Specimen: Blood from Arm, Left Updated: 07/20/25 0818     PTT 25 seconds     Basic metabolic panel [[id number]]  (Abnormal) Collected: 07/20/25 0748    Lab Status: Final result Specimen: Blood from Arm, Left Updated: 07/20/25 0813     Sodium 134 mmol/L      Potassium 3.5 mmol/L      Chloride 101 mmol/L      CO2 27 mmol/L      ANION GAP 6 mmol/L      BUN 26 mg/dL      Creatinine 0.90 mg/dL      Glucose 109 mg/dL      Calcium 8.6 mg/dL      eGFR 75 ml/min/1.73sq m     Narrative:      National Kidney Disease Foundation guidelines for Chronic Kidney Disease (CKD):     Stage 1 with normal  or high GFR (GFR > 90 mL/min/1.73 square meters)    Stage 2 Mild CKD (GFR = 60-89 mL/min/1.73 square meters)    Stage 3A Moderate CKD (GFR = 45-59 mL/min/1.73 square meters)    Stage 3B Moderate CKD (GFR = 30-44 mL/min/1.73 square meters)    Stage 4 Severe CKD (GFR = 15-29 mL/min/1.73 square meters)    Stage 5 End Stage CKD (GFR <15 mL/min/1.73 square meters)  Note: GFR calculation is accurate only with a steady state creatinine    Hepatic function panel [[id number]]  (Abnormal) Collected: 07/20/25 0748    Lab Status: Final result Specimen: Blood from Arm, Left Updated: 07/20/25 0813     Total Bilirubin 0.52 mg/dL      Bilirubin, Direct 0.13 mg/dL      Alkaline Phosphatase 51 U/L      AST 27 U/L      ALT 21 U/L      Total Protein 6.2 g/dL      Albumin 3.7 g/dL     Lipase [[id number]]  (Normal) Collected: 07/20/25 0748    Lab Status: Final result Specimen: Blood from Arm, Left Updated: 07/20/25 0812     Lipase 38 u/L     CBC and differential [[id number]]  (Abnormal) Collected: 07/20/25 0748    Lab Status: Final result Specimen: Blood from Arm, Left Updated: 07/20/25 0804     WBC 8.72 Thousand/uL      RBC 4.48 Million/uL      Hemoglobin 13.2 g/dL      Hematocrit 39.8 %      MCV 89 fL      MCH 29.5 pg      MCHC 33.2 g/dL      RDW 13.7 %      MPV 10.3 fL      Platelets 129 Thousands/uL      nRBC 0 /100 WBCs      Segmented % 77 %      Immature Grans % 1 %      Lymphocytes % 11 %      Monocytes % 9 %      Eosinophils Relative 1 %      Basophils Relative 1 %      Absolute Neutrophils 6.82 Thousands/µL      Absolute Immature Grans 0.04 Thousand/uL      Absolute Lymphocytes 0.98 Thousands/µL      Absolute Monocytes 0.78 Thousand/µL      Eosinophils Absolute 0.06 Thousand/µL      Basophils Absolute 0.04 Thousands/µL             CT abdomen pelvis wo contrast   Final Interpretation by Bruno Abarca MD (07/20 1032)      1.  Right inguinal hernia containing a short segment loop of distal ileum without evidence of  obstruction.      2.  No evidence of acute abnormality in the abdomen or pelvis.      Workstation performed: [id number]         XR chest portable   Final Interpretation by Bradley Landon Kocher, MD (07/20 1145)      No focal consolidation, pleural effusion, or pneumothorax.            Resident: Melissa Corley I, the attending radiologist, have reviewed the images and agree with the final report above.      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 Taking?   Cholecalciferol (Vitamin D3) 10 MCG (400 UNIT) CAPS 7/19/2025 Morning Self Yes Yes   Sig: Take by mouth   ILEVRO 0.3 % SUSP  Self Yes No   Patient not taking: Reported on 6/13/2023   albuterol (Ventolin HFA) 90 mcg/act inhaler 7/19/2025 Morning  No Yes   Sig: Inhale 2 puffs every 6 (six) hours as needed for wheezing   allopurinol (ZYLOPRIM) 100 mg tablet 7/19/2025 Morning Self No Yes   Sig: Take 1 tablet (100 mg total) by mouth daily   amLODIPine (NORVASC) 5 mg tablet 7/19/2025 Morning Self No Yes   Sig: Take 1 tablet (5 mg total) by mouth daily   clotrimazole (LOTRIMIN) 1 % cream Past Week Self No Yes   Sig: Apply topically 2 (two) times a day   hydroCHLOROthiazide 12.5 mg capsule 7/19/2025 Morning  No Yes   Sig: Take 1 capsule (12.5 mg total) by mouth every morning    losartan (COZAAR) 50 mg tablet 7/19/2025 Morning Self Yes Yes   Sig: Take 50 mg by mouth in the morning.   meclizine (ANTIVERT) 25 mg tablet Past Week Self No Yes   Sig: Take 1 tablet (25 mg total) by mouth 3 (three) times a day as needed for dizziness   tamsulosin (FLOMAX) 0.4 mg 7/19/2025 Evening Self Yes Yes   Sig: Take 0.4 mg by mouth daily with dinner   tiotropium-olodaterol (Stiolto Respimat) 2.5-2.5 MCG/ACT inhaler 7/19/2025 Evening  No Yes   Sig: Inhale 2 puffs daily   vitamin B-12 (CYANOCOBALAMIN) 100 MCG tablet 7/19/2025 Morning Self Yes Yes   Sig: Take 100 mcg by mouth in the morning.      Facility-Administered Medications: None     Current Discharge Medication List        CONTINUE these medications which have NOT CHANGED    Details   albuterol (Ventolin HFA) 90 mcg/act inhaler Inhale 2 puffs every 6 (six) hours as needed for wheezing  Qty: 18 g, Refills: 4    Comments: Substitution to a formulary equivalent within the same pharmaceutical class is authorized.  Associated Diagnoses: Chronic obstructive pulmonary disease, unspecified COPD type (HCC)      allopurinol (ZYLOPRIM) 100 mg tablet Take 1 tablet (100 mg total) by mouth daily  Qty: 30 tablet, Refills: 5    Associated Diagnoses: Chronic gout without tophus, unspecified cause, unspecified site      amLODIPine (NORVASC) 5 mg tablet Take 1 tablet (5 mg total) by mouth daily  Qty: 100 tablet, Refills: 1    Associated Diagnoses: Benign essential hypertension      Cholecalciferol (Vitamin D3) 10 MCG (400 UNIT) CAPS Take by mouth      clotrimazole (LOTRIMIN) 1 % cream Apply topically 2 (two) times a day  Qty: 30 g, Refills: 2    Associated Diagnoses: Tinea cruris      hydroCHLOROthiazide 12.5 mg capsule Take 1 capsule (12.5 mg total) by mouth every morning  Qty: 90 capsule, Refills: 1    Associated Diagnoses: Benign essential hypertension      losartan (COZAAR) 50 mg tablet Take 50 mg by mouth in the morning.      meclizine (ANTIVERT) 25 mg tablet Take 1  tablet (25 mg total) by mouth 3 (three) times a day as needed for dizziness  Qty: 30 tablet, Refills: 0    Associated Diagnoses: Dizziness      tamsulosin (FLOMAX) 0.4 mg Take 0.4 mg by mouth daily with dinner      tiotropium-olodaterol (Stiolto Respimat) 2.5-2.5 MCG/ACT inhaler Inhale 2 puffs daily  Qty: 4 g, Refills: 11    Associated Diagnoses: Chronic obstructive pulmonary disease, unspecified COPD type (HCC)      vitamin B-12 (CYANOCOBALAMIN) 100 MCG tablet Take 100 mcg by mouth in the morning.      ILEVRO 0.3 % SUSP            No discharge procedures on file.  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    COPD with acute exacerbation (HCC)     last assessed: 2/7/2017    Diverticulosis     Eczema     last assessed: 4/30/2013    Esophagitis     Gout     last assessed: 5/19/2014    Nail avulsion of toe     Neoplasm of bladder     last assessed: 4/30/2013   [2]   Past Surgical History:  Procedure Laterality Date    HERNIA REPAIR      last assessed: 11/25/2014    KIDNEY SURGERY      description: removal of kidney stone last assessed: 11/25/2014    US GUIDED THYROID BIOPSY  5/11/2021   [3]   Family History  Problem Relation Name Age of Onset    Stroke Mother      Alzheimer's disease Father      Heart attack Sister      Cancer Family sibling     Lung cancer Brother     [4]   Social History  Tobacco Use    Smoking status: Former     Current packs/day: 0.00     Average packs/day: 2.0 packs/day for  33.0 years (66.0 ttl pk-yrs)     Types: Cigarettes     Start date:      Quit date:      Years since quittin.5    Smokeless tobacco: Never   Vaping Use    Vaping status: Never Used   Substance Use Topics    Alcohol use: No    Drug use: No        Josseu Jennings MD  [phone]

## 2025-07-20 NOTE — PLAN OF CARE
Problem: Potential for Falls  Goal: Patient will remain free of falls  Description: INTERVENTIONS:  - Educate patient/family on patient safety including physical limitations  - Instruct patient to call for assistance with activity   - Consider consulting OT/PT to assist with strengthening/mobility based on AM PAC & JH-HLM score  - Consult OT/PT to assist with strengthening/mobility   - Keep Call bell within reach  - Keep bed low and locked with side rails adjusted as appropriate  - Keep care items and personal belongings within reach  - Initiate and maintain comfort rounds  - Make Fall Risk Sign visible to staff  - Offer Toileting every 2 Hours, in advance of need  - Initiate/Maintain bed alarm  - Obtain necessary fall risk management equipment:   - Apply yellow socks and bracelet for high fall risk patients  - Consider moving patient to room near nurses station  Outcome: Progressing     Problem: PAIN - ADULT  Goal: Verbalizes/displays adequate comfort level or baseline comfort level  Description: Interventions:  - Encourage patient to monitor pain and request assistance  - Assess pain using appropriate pain scale  - Administer analgesics as ordered based on type and severity of pain and evaluate response  - Implement non-pharmacological measures as appropriate and evaluate response  - Consider cultural and social influences on pain and pain management  - Notify physician/advanced practitioner if interventions unsuccessful or patient reports new pain  - Educate patient/family on pain management process including their role and importance of  reporting pain   - Provide non-pharmacologic/complimentary pain relief interventions  Outcome: Progressing     Problem: SAFETY ADULT  Goal: Patient will remain free of falls  Description: INTERVENTIONS:  - Educate patient/family on patient safety including physical limitations  - Instruct patient to call for assistance with activity   - Consider consulting OT/PT to assist with  strengthening/mobility based on AM PAC & JH-HLM score  - Consult OT/PT to assist with strengthening/mobility   - Keep Call bell within reach  - Keep bed low and locked with side rails adjusted as appropriate  - Keep care items and personal belongings within reach  - Initiate and maintain comfort rounds  - Make Fall Risk Sign visible to staff  - Offer Toileting every 2 Hours, in advance of need  - Initiate/Maintain bed alarm  - Obtain necessary fall risk management equipment:   - Apply yellow socks and bracelet for high fall risk patients  - Consider moving patient to room near nurses station  Outcome: Progressing  Goal: Maintain or return to baseline ADL function  Description: INTERVENTIONS:  -  Assess patient's ability to carry out ADLs; assess patient's baseline for ADL function and identify physical deficits which impact ability to perform ADLs (bathing, care of mouth/teeth, toileting, grooming, dressing, etc.)  - Assess/evaluate cause of self-care deficits   - Assess range of motion  - Assess patient's mobility; develop plan if impaired  - Assess patient's need for assistive devices and provide as appropriate  - Encourage maximum independence but intervene and supervise when necessary  - Involve family in performance of ADLs  - Assess for home care needs following discharge   - Consider OT consult to assist with ADL evaluation and planning for discharge  - Provide patient education as appropriate  - Monitor functional capacity and physical performance, use of AM PAC & JH-HLM   - Monitor gait, balance and fatigue with ambulation    Outcome: Progressing  Goal: Maintains/Returns to pre admission functional level  Description: INTERVENTIONS:  - Perform AM-PAC 6 Click Basic Mobility/ Daily Activity assessment daily.  - Set and communicate daily mobility goal to care team and patient/family/caregiver.   - Collaborate with rehabilitation services on mobility goals if consulted  - Perform Range of Motion 3 times a  day.  - Reposition patient every 2 hours.  - Dangle patient 3 times a day  - Stand patient 3 times a day  - Ambulate patient 3 times a day  - Out of bed to chair 3 times a day   - Out of bed for meals 3 times a day  - Out of bed for toileting  - Record patient progress and toleration of activity level   Outcome: Progressing     Problem: DISCHARGE PLANNING  Goal: Discharge to home or other facility with appropriate resources  Description: INTERVENTIONS:  - Identify barriers to discharge w/patient and caregiver  - Arrange for needed discharge resources and transportation as appropriate  - Identify discharge learning needs (meds, wound care, etc.)  - Arrange for interpretive services to assist at discharge as needed  - Refer to Case Management Department for coordinating discharge planning if the patient needs post-hospital services based on physician/advanced practitioner order or complex needs related to functional status, cognitive ability, or social support system  Outcome: Progressing     Problem: Knowledge Deficit  Goal: Patient/family/caregiver demonstrates understanding of disease process, treatment plan, medications, and discharge instructions  Description: Complete learning assessment and assess knowledge base.  Interventions:  - Provide teaching at level of understanding  - Provide teaching via preferred learning methods  Outcome: Progressing     Problem: RESPIRATORY - ADULT  Goal: Achieves optimal ventilation and oxygenation  Description: INTERVENTIONS:  - Assess for changes in respiratory status  - Assess for changes in mentation and behavior  - Position to facilitate oxygenation and minimize respiratory effort  - Oxygen administered by appropriate delivery if ordered  - Initiate smoking cessation education as indicated  - Encourage broncho-pulmonary hygiene including cough, deep breathe, Incentive Spirometry  - Assess the need for suctioning and aspirate as needed  - Assess and instruct to report SOB or  any respiratory difficulty  - Respiratory Therapy support as indicated  Outcome: Progressing

## 2025-07-20 NOTE — ED NOTES
Patient complaining of lower back and buttock pain and shortness of breath. Patient's O2  saturation 88-93% placed patient on nasal canula at 2L. Patient repositioned with green wedges to relieve pressure to back and buttocks.     Giorgi Frey  07/20/25 1019

## 2025-07-20 NOTE — H&P
H&P - Hospitalist   Name: Ramsey Palmer 89 y.o. male I MRN: [id number]  Unit/Bed#: ED-04 I Date of Admission: 7/20/2025   Date of Service: 7/20/2025 I Hospital Day: 0     Assessment & Plan  Dyspnea  Patient is an 89-year-old male with past medical history significant for COPD who presented to the ER complaining of 4-day history of worsening shortness of breath, dyspnea on exertion, chest congestion, and cough.    Patient was initially seen in the ER 7/18 complaining of URI symptoms, and dyspnea that felt consistent with a COPD exacerbation.  When symptoms did not improve he presented to the ER.  He was given IV Solu-Medrol, DuoNeb.  Due to elevated D-dimer he underwent a CT of the chest that was negative for PE.  He improved and was discharged home  Patient re-presented to the ER today complaining of worsening dyspnea: He was given DuoNeb, IV steroids  Patient's lab work revealed a positive troponin so he was referred for admission for eval, as well as treatment for COPD exacerbation    COPD exacerbation (HCC)  Background: Patient follows with [facility] pulmonology for COPD  Was just seen in the ER 5/31/2025 for COPD exacerbation and discharged with steroid taper  At Pulm office visit 6/11/2025 was started on Stiolto    Presented with sob and concerns for COPD acute exacerbation with dyspnea x 4 days  CTA chest 7/18 negative for focal infiltrate or pulmonary embolism  Patient was given Solu-Medrol 80 mg IV, and a DuoNeb  Continued nebulizers  Currently afebrile with normal white blood cell count: No evidence of acute bacterial infection  Check procalcitonin for confirmation  Azithromycin x 3 days for COPD exacerbation  Elevated troponin  Background: Pt follows with Dr. Ibrahim, [facility]:   Most recently seen in the office 1/25: Is followed for previous refractory hypertension, nonsustained V. tach  Previous ischemic evaluation: Nuclear stress test 2021 negative for ischemia:  in 2024 he had intermittent  chest pressure with dyspnea, decreased exercise tolerance but declined additional ischemic testing and symptoms improved    Patient presented 4 days of dyspnea as well as new jaw pain this morning  Troponin 14--> 138-->12  EKG with right bundle branch block, intermittent first-degree heart block  In the setting of jaw pain and dyspnea concerning for ischemia  Unusual trop pattern-  question if 2h trop spurious:  repeat now  Monitor on tele  ASA (took a dose a home this am), b-blocker, statin  Check echo to eval ef and wma  Benign essential hypertension  Home medications include losartan 50 mg daily, hydrochlorothiazide 12.5 mg daily, and Norvasc 5 mg daily  Continue home regimen and titrate as needed  SIVA (obstructive sleep apnea)  Compliance with CPAP  Follows with [facility] pulmonology: Attempted transition from facemask to nasal pillow  Continue outpatient follow-up  NSVT (nonsustained ventricular tachycardia) (HCC)  History of  Cont b-blocker and monitor on tele  Diarrhea  Patient had 1 episode of diarrhea yesterday  CT abdomen/pelvis: No acute intra-abdominal abnormality.  Subtle finding of right inguinal hernia without strangulation   Diarrhea in the setting of recent URI symptoms, possibly secondary to viral illness  Influenza-COVID PCR pending, rapid antigen was negative   No additional diarrhea thus far: if persists will check stool studies  Thrombocytopenia (HCC)  Patient with very mild thrombocytopenia, in the setting of viral-like illness  Normal coags  Most likely related to acute illness, continue to monitor      VTE Pharmacologic Prophylaxis: VTE Score: 5 High Risk (Score >/= 5) - Pharmacological DVT Prophylaxis Ordered: heparin. Sequential Compression Devices Ordered.  Code Status: full code  Discussion with family: Updated patient and wife at the bedside.  Reviewed test results, treatment plan.  Answered all questions    Anticipated Length of Stay: Patient will be admitted on an inpatient basis  with an anticipated length of stay of greater than 2 midnights secondary to COPD exacerbation, IV steroids, dyspnea, positive troponin.    History of Present Illness   Chief Complaint: Shortness of breath    Ramsey Palmer is a 89 y.o. male with a PMH of COPD who presents with 4-day history of worsening symptoms.  History is currently taken from patient at bedside    Patient relates that he was having postnasal drip, rhinorrhea with green drainage, chest congestion, and cough starting Wednesday, 4 days prior to admission.  He notes it became progressively worse as well as he developed shortness of breath and dyspnea on exertion.  He was seen in the ER 2 days ago treated with nebulizer treatments and IV steroids and improved.  He was discharged home but was not on any steroids.    He notes his symptoms worsened so he then returned to the ER.  He notes chest congestion, and phlegm.  He notes wheezing.  Notes shortness of breath and dyspnea on exertion.  Patient was given nebulizer treatments in the ER and he does not note any significant improvement.    Patient currently complains of chest congestion and cough.    He notes this morning he had tightness in his jaw and his neck bilaterally.  He denies any chest pain, pressure, or tightness.  Denies any chest discomfort with exertion.    Patient denies any abdominal pain but notes he started diarrhea yesterday with 1 episode.  None since.  He denies any vomiting or nausea.  Notes he is hungry would like to eat.    Patient denies any pain anywhere else.    Patient states he took 2 aspirin this morning prior to coming to the ER    Review of Systems    Historical Information   Past Medical History[1]  Past Surgical History[2]  Social History[3]  E-Cigarette/Vaping    E-Cigarette Use Never User      E-Cigarette/Vaping Substances    Nicotine No     THC No     CBD No     Flavoring No     Other No     Unknown No      Family history non-contributory  Social History:  Marital  Status: /Civil Union     Meds/Allergies   Confirmed with patient at the bedside as well as cardiology and pulmonology office notes  Prior to Admission medications    Medication Sig Start Date End Date Taking? Authorizing Provider   albuterol (Ventolin HFA) 90 mcg/act inhaler Inhale 2 puffs every 6 (six) hours as needed for wheezing 6/11/25  Yes Ramsey Cyr MD   allopurinol (ZYLOPRIM) 100 mg tablet Take 1 tablet (100 mg total) by mouth daily 3/24/25  Yes Michelle Bush DO   amLODIPine (NORVASC) 5 mg tablet Take 1 tablet (5 mg total) by mouth daily 12/4/24  Yes Prudencio Claudio MD   Cholecalciferol (Vitamin D3) 10 MCG (400 UNIT) CAPS Take by mouth   Yes Historical Provider, MD   clotrimazole (LOTRIMIN) 1 % cream Apply topically 2 (two) times a day 12/14/20  Yes Michelle Bush DO   hydroCHLOROthiazide 12.5 mg capsule Take 1 capsule (12.5 mg total) by mouth every morning 7/16/25  Yes Homero Ibrahim,    losartan (COZAAR) 50 mg tablet Take 50 mg by mouth in the morning.   Yes Historical Provider, MD   meclizine (ANTIVERT) 25 mg tablet Take 1 tablet (25 mg total) by mouth 3 (three) times a day as needed for dizziness 2/27/25  Yes Michelle Bush DO   tamsulosin (FLOMAX) 0.4 mg Take 0.4 mg by mouth daily with dinner 2/8/21  Yes Historical Provider, MD   tiotropium-olodaterol (Stiolto Respimat) 2.5-2.5 MCG/ACT inhaler Inhale 2 puffs daily 6/11/25  Yes Ramsey Cyr MD   vitamin B-12 (CYANOCOBALAMIN) 100 MCG tablet Take 100 mcg by mouth in the morning.   Yes Historical Provider, MD   ILEVRO 0.3 % SUSP  1/10/20   Historical Provider, MD     Allergies   Allergen Reactions    Levetiracetam Confusion and Anxiety       Objective :  Temp:  [98.3 °F (36.8 °C)] 98.3 °F (36.8 °C)  HR:  [62-93] 70  BP: (122-158)/(59-74) 144/69  Resp:  [15-27] 20  SpO2:  [88 %-98 %] 94 %  O2 Device: Nasal cannula  Nasal Cannula O2 Flow Rate (L/min):  [2 L/min] 2 L/min    Physical Exam   General: Very pleasant male.  No  acute distress.  Nontachypneic and nondyspneic.  Able to speak in complete sentences without a stop or dyspnea  Heart: Irregularly irregular.  S1-S2 present.  No murmur, rub, gallop  Lungs: Coarse breath sounds bilaterally with bilateral rhonchi.+ End expiratory wheezing bilaterally.  No crackles.  No accessory muscle use or respiratory distress.  Prolonged and expiratory phase  Abdomen: Soft, nontender with palpation, nondistended, normal active bowel sounds present.  No guarding or rebound.  No peritoneal signs or mass  Extremities: No clubbing, cyanosis, edema.  2+ pedal pulses bilaterally  Neurologic: Awake and alert.  Oriented.  Interactive.  Moving all 4 extremity  Skin: Warm and dry, no petechia, purpura, rash    Lines/Drains:            Lab Results: I have reviewed the following results:  Results from last 7 days   Lab Units 07/20/25  0748   WBC Thousand/uL 8.72   HEMOGLOBIN g/dL 13.2   HEMATOCRIT % 39.8   PLATELETS Thousands/uL 129*   SEGS PCT % 77*   LYMPHO PCT % 11*   MONO PCT % 9   EOS PCT % 1     Results from last 7 days   Lab Units 07/20/25  0748   SODIUM mmol/L 134*   POTASSIUM mmol/L 3.5   CHLORIDE mmol/L 101   CO2 mmol/L 27   BUN mg/dL 26*   CREATININE mg/dL 0.90   ANION GAP mmol/L 6   CALCIUM mg/dL 8.6   ALBUMIN g/dL 3.7   TOTAL BILIRUBIN mg/dL 0.52   ALK PHOS U/L 51   ALT U/L 21   AST U/L 27   GLUCOSE RANDOM mg/dL 109     Results from last 7 days   Lab Units 07/20/25  0748   INR  1.02         Lab Results   Component Value Date    HGBA1C 6.1 (H) 02/09/2024    HGBA1C 6.0 (H) 07/26/2022    HGBA1C 5.9 (H) 03/12/2021           Imaging report    7/20 CT abdomen/pelvis  1.  Right inguinal hernia containing a short segment loop of distal ileum without evidence of obstruction.  2.  No evidence of acute abnormality in the abdomen or pelvis.    7/28: Chest x-ray  No focal consolidation, pleural effusion, or pneumothorax     7/18 CTA chest PE study: No pulmonary embolus or other acute findings.      Administrative Statements       ** Please Note: This note has been constructed using a voice recognition system. 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    COPD with acute exacerbation (HCC)     last assessed: 2017    Diverticulosis     Eczema     last assessed: 2013    Esophagitis     Gout     last assessed: 2014    Nail avulsion of toe     Neoplasm of bladder     last assessed: 2013   [2]   Past Surgical History:  Procedure Laterality Date    HERNIA REPAIR      last assessed: 2014    KIDNEY SURGERY      description: removal of kidney stone last assessed: 2014    US GUIDED THYROID BIOPSY  2021   [3]   Social History  Tobacco Use    Smoking status: Former     Current packs/day: 0.00     Average packs/day: 2.0 packs/day for 33.0 years (66.0 ttl pk-yrs)     Types: Cigarettes     Start date:      Quit date: 1982     Years since quittin.5    Smokeless tobacco: Never   Vaping Use    Vaping status: Never Used   Substance and Sexual Activity    Alcohol use: No    Drug use: No

## 2025-07-20 NOTE — ASSESSMENT & PLAN NOTE
Compliance with CPAP  Follows with [facility] pulmonology: Attempted transition from facemask to nasal pillow  Continue outpatient follow-up

## 2025-07-20 NOTE — ASSESSMENT & PLAN NOTE
Patient with very mild thrombocytopenia, in the setting of viral-like illness  Normal coags  Most likely related to acute illness, continue to monitor

## 2025-07-20 NOTE — ASSESSMENT & PLAN NOTE
Patient is an 89-year-old male with past medical history significant for COPD who presented to the ER complaining of 4-day history of worsening shortness of breath, dyspnea on exertion, chest congestion, and cough.    Patient was initially seen in the ER 7/18 complaining of URI symptoms, and dyspnea that felt consistent with a COPD exacerbation.  When symptoms did not improve he presented to the ER.  He was given IV Solu-Medrol, DuoNeb.  Due to elevated D-dimer he underwent a CT of the chest that was negative for PE.  He improved and was discharged home  Patient re-presented to the ER today complaining of worsening dyspnea: He was given DuoNeb, IV steroids  Patient's lab work revealed a positive troponin so he was referred for admission for eval, as well as treatment for COPD exacerbation

## 2025-07-20 NOTE — ASSESSMENT & PLAN NOTE
Patient had 1 episode of diarrhea yesterday  CT abdomen/pelvis: No acute intra-abdominal abnormality.  Subtle finding of right inguinal hernia without strangulation   Diarrhea in the setting of recent URI symptoms, possibly secondary to viral illness  Influenza-COVID PCR pending, rapid antigen was negative   No additional diarrhea thus far: if persists will check stool studies

## 2025-07-20 NOTE — ASSESSMENT & PLAN NOTE
Background: Patient follows with [facility] pulmonology for COPD  Was just seen in the ER 5/31/2025 for COPD exacerbation and discharged with steroid taper  At Pulm office visit 6/11/2025 was started on Stiolto    Presented with sob and concerns for COPD acute exacerbation with dyspnea x 4 days  CTA chest 7/18 negative for focal infiltrate or pulmonary embolism  Patient was given Solu-Medrol 80 mg IV, and a DuoNeb  Continued nebulizers  Currently afebrile with normal white blood cell count: No evidence of acute bacterial infection  Check procalcitonin for confirmation  Azithromycin x 3 days for COPD exacerbation

## 2025-07-20 NOTE — ASSESSMENT & PLAN NOTE
Background: Pt follows with Dr. Ibrahim, [facility]:   Most recently seen in the office 1/25: Is followed for previous refractory hypertension, nonsustained V. tach  Previous ischemic evaluation: Nuclear stress test 2021 negative for ischemia:  in 2024 he had intermittent chest pressure with dyspnea, decreased exercise tolerance but declined additional ischemic testing and symptoms improved    Patient presented 4 days of dyspnea as well as new jaw pain this morning  Troponin 14--> 138-->12  EKG with right bundle branch block, intermittent first-degree heart block  In the setting of jaw pain and dyspnea concerning for ischemia  Unusual trop pattern-  question if 2h trop spurious:  repeat now  Monitor on tele  ASA (took a dose a home this am), b-blocker, statin  Check echo to eval ef and wma

## 2025-07-21 ENCOUNTER — PATIENT OUTREACH (OUTPATIENT)
Dept: CASE MANAGEMENT | Facility: OTHER | Age: OVER 89
End: 2025-07-21

## 2025-07-21 ENCOUNTER — TRANSITIONAL CARE MANAGEMENT (OUTPATIENT)
Dept: FAMILY MEDICINE CLINIC | Facility: CLINIC | Age: OVER 89
End: 2025-07-21

## 2025-07-21 ENCOUNTER — APPOINTMENT (INPATIENT)
Dept: NON INVASIVE DIAGNOSTICS | Facility: HOSPITAL | Age: OVER 89
DRG: 190 | End: 2025-07-21
Payer: COMMERCIAL

## 2025-07-21 VITALS
DIASTOLIC BLOOD PRESSURE: 64 MMHG | HEART RATE: 51 BPM | BODY MASS INDEX: 30.4 KG/M2 | OXYGEN SATURATION: 95 % | TEMPERATURE: 97.8 F | WEIGHT: 217.15 LBS | RESPIRATION RATE: 14 BRPM | HEIGHT: 71 IN | SYSTOLIC BLOOD PRESSURE: 141 MMHG

## 2025-07-21 PROBLEM — J96.01 ACUTE HYPOXIC RESPIRATORY FAILURE (HCC): Status: ACTIVE | Noted: 2025-07-20

## 2025-07-21 LAB
ANION GAP SERPL CALCULATED.3IONS-SCNC: 7 MMOL/L (ref 4–13)
BUN SERPL-MCNC: 21 MG/DL (ref 5–25)
C COLI+JEJUNI TUF STL QL NAA+PROBE: NEGATIVE
C DIFF TOX GENS STL QL NAA+PROBE: NEGATIVE
CALCIUM SERPL-MCNC: 8.8 MG/DL (ref 8.4–10.2)
CARDIAC TROPONIN I PNL SERPL HS: 11 NG/L (ref 8–18)
CHLORIDE SERPL-SCNC: 102 MMOL/L (ref 96–108)
CO2 SERPL-SCNC: 28 MMOL/L (ref 21–32)
CREAT SERPL-MCNC: 0.92 MG/DL (ref 0.6–1.3)
EC STX1+STX2 GENES STL QL NAA+PROBE: NEGATIVE
ERYTHROCYTE [DISTWIDTH] IN BLOOD BY AUTOMATED COUNT: 13.5 % (ref 11.6–15.1)
GFR SERPL CREATININE-BSD FRML MDRD: 73 ML/MIN/1.73SQ M
GLUCOSE SERPL-MCNC: 142 MG/DL (ref 65–140)
GLUCOSE SERPL-MCNC: 154 MG/DL (ref 65–140)
HCT VFR BLD AUTO: 39.6 % (ref 36.5–49.3)
HGB BLD-MCNC: 13.4 G/DL (ref 12–17)
MCH RBC QN AUTO: 29.7 PG (ref 26.8–34.3)
MCHC RBC AUTO-ENTMCNC: 33.8 G/DL (ref 31.4–37.4)
MCV RBC AUTO: 88 FL (ref 82–98)
PLATELET # BLD AUTO: 132 THOUSANDS/UL (ref 149–390)
PMV BLD AUTO: 10 FL (ref 8.9–12.7)
POTASSIUM SERPL-SCNC: 4.1 MMOL/L (ref 3.5–5.3)
PROCALCITONIN SERPL-MCNC: 0.08 NG/ML
RBC # BLD AUTO: 4.51 MILLION/UL (ref 3.88–5.62)
SALMONELLA SP SPAO STL QL NAA+PROBE: NEGATIVE
SHIGELLA SP+EIEC IPAH STL QL NAA+PROBE: NEGATIVE
SODIUM SERPL-SCNC: 137 MMOL/L (ref 135–147)
WBC # BLD AUTO: 6.5 THOUSAND/UL (ref 4.31–10.16)

## 2025-07-21 PROCEDURE — 82948 REAGENT STRIP/BLOOD GLUCOSE: CPT

## 2025-07-21 PROCEDURE — 94760 N-INVAS EAR/PLS OXIMETRY 1: CPT

## 2025-07-21 PROCEDURE — 80048 BASIC METABOLIC PNL TOTAL CA: CPT | Performed by: INTERNAL MEDICINE

## 2025-07-21 PROCEDURE — 84484 ASSAY OF TROPONIN QUANT: CPT

## 2025-07-21 PROCEDURE — 94640 AIRWAY INHALATION TREATMENT: CPT

## 2025-07-21 PROCEDURE — 97162 PT EVAL MOD COMPLEX 30 MIN: CPT

## 2025-07-21 PROCEDURE — 99239 HOSP IP/OBS DSCHRG MGMT >30: CPT | Performed by: PHYSICIAN ASSISTANT

## 2025-07-21 PROCEDURE — 93005 ELECTROCARDIOGRAM TRACING: CPT

## 2025-07-21 PROCEDURE — 85027 COMPLETE CBC AUTOMATED: CPT | Performed by: INTERNAL MEDICINE

## 2025-07-21 PROCEDURE — 97166 OT EVAL MOD COMPLEX 45 MIN: CPT

## 2025-07-21 PROCEDURE — 84145 PROCALCITONIN (PCT): CPT | Performed by: INTERNAL MEDICINE

## 2025-07-21 RX ORDER — GUAIFENESIN 100 MG/5ML
400 SOLUTION ORAL EVERY 4 HOURS PRN
Status: DISCONTINUED | OUTPATIENT
Start: 2025-07-21 | End: 2025-07-21 | Stop reason: HOSPADM

## 2025-07-21 RX ORDER — GUAIFENESIN 100 MG/5ML
400 SOLUTION ORAL 4 TIMES DAILY PRN
Qty: 473 ML | Refills: 0 | Status: SHIPPED | OUTPATIENT
Start: 2025-07-21

## 2025-07-21 RX ORDER — PREDNISONE 20 MG/1
TABLET ORAL
Qty: 15 TABLET | Refills: 0 | Status: SHIPPED | OUTPATIENT
Start: 2025-07-21 | End: 2025-08-02

## 2025-07-21 RX ORDER — ATORVASTATIN CALCIUM 40 MG/1
40 TABLET, FILM COATED ORAL
Qty: 30 TABLET | Refills: 0 | Status: SHIPPED | OUTPATIENT
Start: 2025-07-21

## 2025-07-21 RX ADMIN — LEVALBUTEROL HYDROCHLORIDE 1.25 MG: 1.25 SOLUTION RESPIRATORY (INHALATION) at 13:18

## 2025-07-21 RX ADMIN — LEVALBUTEROL HYDROCHLORIDE 1.25 MG: 1.25 SOLUTION RESPIRATORY (INHALATION) at 07:30

## 2025-07-21 RX ADMIN — LOSARTAN POTASSIUM 50 MG: 50 TABLET, FILM COATED ORAL at 08:30

## 2025-07-21 RX ADMIN — ALLOPURINOL 100 MG: 100 TABLET ORAL at 08:30

## 2025-07-21 RX ADMIN — GUAIFENESIN 400 MG: 200 SOLUTION ORAL at 09:46

## 2025-07-21 RX ADMIN — HEPARIN SODIUM 5000 UNITS: 5000 INJECTION INTRAVENOUS; SUBCUTANEOUS at 04:41

## 2025-07-21 RX ADMIN — ASPIRIN 81 MG: 81 TABLET, DELAYED RELEASE ORAL at 08:30

## 2025-07-21 RX ADMIN — UMECLIDINIUM 1 PUFF: 62.5 AEROSOL, POWDER ORAL at 08:30

## 2025-07-21 RX ADMIN — OLODATEROL RESPIMAT INHALATION SPRAY 2 PUFF: 2.5 SPRAY, METERED RESPIRATORY (INHALATION) at 08:30

## 2025-07-21 RX ADMIN — AMLODIPINE BESYLATE 5 MG: 5 TABLET ORAL at 08:30

## 2025-07-21 RX ADMIN — METHYLPREDNISOLONE SODIUM SUCCINATE 40 MG: 40 INJECTION, POWDER, FOR SOLUTION INTRAMUSCULAR; INTRAVENOUS at 14:25

## 2025-07-21 RX ADMIN — METHYLPREDNISOLONE SODIUM SUCCINATE 40 MG: 40 INJECTION, POWDER, FOR SOLUTION INTRAMUSCULAR; INTRAVENOUS at 04:41

## 2025-07-21 NOTE — ASSESSMENT & PLAN NOTE
Hello this is only written for a 7 days supply so we do not have more refills on file for this pt they have all been used can we please get them resent over please and thank you      Reports inability to tolerate CPAP  Follows with  [facility]: Attempted transition from facemask to nasal pillow  Continue outpatient follow-up

## 2025-07-21 NOTE — DISCHARGE INSTR - AVS FIRST PAGE
You were (re)admitted due to a COPD flare. I suspect your initial trigger was viral, but remember that any allergen can trigger an exacerbation including igor, pollen, virus, or any other particle.    On discharge:  - Take your steroid taper  - Use albuterol as needed (can utilize it more often than every 4hours initially if needed)  - Continue pulmonary rehab  - Robitussin as needed

## 2025-07-21 NOTE — NURSING NOTE
AVS reviewed with pt and spouse; understanding of instructions expressed. PT dc'd walking to hospital exit with wife.

## 2025-07-21 NOTE — ASSESSMENT & PLAN NOTE
Background: Patient follows with [facility] pulmonology for COPD  Was just seen in the ER 5/31/2025 for COPD exacerbation and discharged with steroid taper  At Pulm office visit 6/11/2025 was started on Stiolto    Presented with sob and concerns for COPD acute exacerbation with dyspnea x 4 days  CTA chest 7/18 negative for focal infiltrate or pulmonary embolism  Patient was given Solu-Medrol 80 mg IV, and a DuoNeb  Continued nebulizers  Currently afebrile with normal white blood cell count: No evidence of acute bacterial infection  Discharged on prednisone taper, continue inhalers  Follow-up with pulmonology

## 2025-07-21 NOTE — CASE MANAGEMENT
Case Management Discharge Planning Note    Patient name Ramsey Palmer  Location [facility] 2 [facility] 2 M* MRN [id number]  : 1935 Date 2025       Current Admission Date: 2025  Current Admission Diagnosis:Dyspnea   Patient Active Problem List    Diagnosis Date Noted    Dyspnea 2025    Elevated troponin 2025    Diarrhea 2025    Thrombocytopenia (HCC) 2025    Positive D dimer 2025    Celiac artery compression syndrome (HCC) 2023    Thyroid nodule 2021    NSVT (nonsustained ventricular tachycardia) ([facility]) 2021    Nausea 2021    Dyspnea on exertion 2021    Abnormal chest x-ray 2021    Osteoarthritis of spine with radiculopathy, lumbar region 2020    Nocturia 2019    SIVA (obstructive sleep apnea)     Sacroiliitis ([facility]) 10/16/2017    COPD exacerbation ([facility]) 2017    Diastasis of rectus abdominis 2016    Neural foraminal stenosis of lumbar spine 2015    Degeneration of intervertebral disc of lumbar region 09/15/2015    Herniated lumbar intervertebral disc 09/15/2015    Lumbar radiculitis 2015    Benign essential hypertension 2013    Hyperlipidemia 2013      LOS (days): 1  Geometric Mean LOS (GMLOS) (days): 2.1  Days to GMLOS:1     OBJECTIVE:  Risk of Unplanned Readmission Score: 12.38         Current admission status: Inpatient   Preferred Pharmacy:   [id number] - [place] - [address] [place] [address]  Phone: [phone] Fax: [phone]    Primary Care Provider: Michelle Bush DO    Primary Insurance: [employer] [facility]  Secondary Insurance:     DISCHARGE DETAILS:    Requested Home Health Care         Is the patient interested in HHC at discharge?: Yes  Home Health Discipline requested:: Nursing  Home Health Agency Name:: [facility]  HHA External Referral Reason (only applicable if external HHA name selected): Patient has  established relationship with provider  Home Health Follow-Up Provider:: PCP  Home Health Services Needed:: COPD Management  Homebound Criteria Met:: Requires the Assistance of Another Person for Safe Ambulation or to Leave the Home  Supporting Clincal Findings:: Dyspnea with Exertion, Limited Endurance, Fatigues Easliy in Short Distances         Other Referral/Resources/Interventions Provided:  Interventions: HHC  Referral Comments: HHC (SN) [facility]             Additional Comments: CM reserved [facility]  for HHC (SN); CM updated patient's discharge plan and [facility] attending. CM to follow.

## 2025-07-21 NOTE — UTILIZATION REVIEW
Initial Clinical Review    Admission: Date/Time/Statement:   Admission Orders (From admission, onward)       Ordered        07/20/25 1249  INPATIENT ADMISSION  Once                          Orders Placed This Encounter   Procedures    INPATIENT ADMISSION     Standing Status:   Standing     Number of Occurrences:   1     Level of Care:   Med Surg [16]     Estimated length of stay:   More than 2 Midnights     Certification:   I certify that inpatient services are medically necessary for this patient for a duration of greater than two midnights. See H&P and MD Progress Notes for additional information about the patient's course of treatment.     ED Arrival Information       Expected   -    Arrival   7/20/2025 07:25    Acuity   Emergent              Means of arrival   Ambulance    Escorted by   [facility]    Service   Hospitalist    Admission type   Emergency              Arrival complaint   SOB             Chief Complaint   Patient presents with    Shortness of Breath     Pt reports SOB and cough began this past Wednesday, was seen here on Friday and d/c. Pt reports he is feeling worse, began with diarrhea this morning.        Initial Presentation: 89 y.o. male with a PMH of refractory hypertension, nonsustained V. Tach, SIVA, COPD, who presented by EMS to [facility] ED. Admitted as Inpatient for evaluation and treatment of Dyspnea, COPD exacerbation. Elevated Troponin.      Presented w/ postnasal drip, rhinorrhea with green drainage, chest congestion, and cough starting Wednesday, 4 days prior to admission. Reports it became progressively worse as well as he developed shortness of breath and ALMANZA. He was seen in the ER 2 days ago treated with nebulizer treatments and IV steroids and improved and was discharged home but was not on any steroids.  Today, pt c/o dyspnea and jaw pain and reports one episode of diarrhea yesterday.  On exam, coarse breath sounds bilaterally w/ bilateral rhonchi and end  expiratory wheezing bilaterally. Abnormal Labs Na 134, Bun 26, BNP 72, Troponin 14--> 138-->12,  WBC 8.72. CTA chest 7/18 negative for focal infiltrate or pulmonary embolism. CXR - neg. EKG with right bundle branch block, intermittent first-degree heart block. Please see below med list for meds given in the ED.     Plan: Azithromycin x3 days for COPD exacerbation, Check procal, Solu-medrol, Nebs tx. Repeat Troponin. ASA, B-blocker and statin, Check Echo. Monitor on Tele,  if diarrhea persist, check stool studies. Monitor labs in am.      Anticipated Length of Stay/Certification Statement:  Patient will be admitted on an inpatient basis with an anticipated length of stay of greater than 2 midnights secondary to COPD exacerbation, IV steroids, dyspnea, positive troponin.     Date: 7/21/25   Day 2:   Pt stable to discharge home w/ home health on this date. VSS. Pt given referral to pulm rehab. Pt to continue Prednisone and Robitussin.     ED Treatment-Medication Administration from 07/20/2025 0725 to 07/20/2025 1500         Date/Time Order Dose Route Action     07/20/2025 0751 albuterol inhalation solution 5 mg 5 mg Nebulization Given     07/20/2025 0752 ipratropium-albuterol (DUO-NEB) 0.5-2.5 mg/3 mL inhalation solution 3 mL 3 mL Nebulization Given     07/20/2025 0908 methylPREDNISolone sodium succinate (Solu-MEDROL) injection 80 mg 80 mg Intravenous Given     07/20/2025 1014 ondansetron (ZOFRAN) injection 4 mg 4 mg Intravenous Given            Scheduled Medications:  allopurinol, 100 mg, Oral, Daily  amLODIPine, 5 mg, Oral, Daily  aspirin, 81 mg, Oral, Daily  atorvastatin, 40 mg, Oral, Daily With Dinner  azithromycin, 500 mg, Oral, Q24H  docusate sodium, 100 mg, Oral, BID  heparin (porcine), 5,000 Units, Subcutaneous, Q8H NASREEN  levalbuterol, 1.25 mg, Nebulization, TID  losartan, 50 mg, Oral, Daily  methylPREDNISolone sodium succinate, 40 mg, Intravenous, Q8H NASREEN  umeclidinium, 1 puff, Inhalation, Daily    And  olodaterol HCl, 2 puff, Inhalation, Daily  tamsulosin, 0.4 mg, Oral, Daily With Dinner      Continuous IV Infusions: NONE     PRN Meds:  albuterol, 2 puff, Inhalation, Q6H PRN  ondansetron, 4 mg, Intravenous, Q6H PRN      ED Triage Vitals [07/20/25 0737]   Temperature Pulse Respirations Blood Pressure SpO2 Pain Score   98.3 °F (36.8 °C) 68 (!) 24 135/71 96 % No Pain     Weight (last 2 days)       Date/Time Weight    07/20/25 1300 98.5 (217.15)    07/20/25 0809 98.5 (217.15)            Vital Signs (last 3 days)       Date/Time Temp Pulse Resp BP MAP (mmHg) SpO2 Calculated FIO2 (%) - Nasal Cannula Nasal Cannula O2 Flow Rate (L/min) O2 Device Patient Position - Orthostatic VS Pain    07/21/25 07:55:53 97.7 °F (36.5 °C) 57 14 144/73 97 95 % -- -- -- -- --    07/21/25 0730 -- -- -- -- -- 94 % 28 2 L/min Nasal cannula -- No Pain    07/21/25 03:08:48 97.9 °F (36.6 °C) 64 22 149/101 117 93 % -- -- Nasal cannula Lying --    07/21/25 02:33:12 97.9 °F (36.6 °C) 48 -- -- -- 96 % -- -- -- -- --    07/20/25 23:29:05 98 °F (36.7 °C) 60 -- 134/76 95 91 % -- -- -- -- --    07/20/25 2306 -- -- 22 -- -- -- -- -- Nasal cannula Lying --    07/20/25 2000 -- -- -- -- -- -- 28 2 L/min Nasal cannula -- No Pain    07/20/25 1931 -- -- -- -- -- 91 % 28 2 L/min Nasal cannula -- --    07/20/25 19:18:42 97.7 °F (36.5 °C) 79 22 145/85 105 89 % -- -- Nasal cannula Lying --    07/20/25 15:09:17 98.9 °F (37.2 °C) 60 -- 141/61 88 95 % 28 2 L/min Nasal cannula -- No Pain    07/20/25 1430 -- 97 26 151/67 97 94 % 28 2 L/min Nasal cannula Sitting --    07/20/25 1400 -- 88 24 161/79 113 95 % 28 2 L/min Nasal cannula Sitting --    07/20/25 1330 -- 75 22 146/67 96 95 % 28 2 L/min Nasal cannula Sitting --    07/20/25 1300 98.3 °F (36.8 °C) 70 20 144/69 97 94 % 28 2 L/min Nasal cannula Sitting No Pain    07/20/25 1230 -- 62 19 129/61 88 95 % -- -- Nasal cannula Sitting --    07/20/25 1200 -- 77 15 128/61 88 92 % -- -- Nasal cannula Sitting --    07/20/25  1130 -- 72 24 140/67 96 93 % 28 2 L/min Nasal cannula Sitting --    07/20/25 1100 -- 70 22 141/63 90 97 % -- -- Nasal cannula Sitting --    07/20/25 1030 -- 70 23 127/62 89 98 % 28 2 L/min Nasal cannula Sitting --    07/20/25 1000 -- 83 24 122/59 84 94 % 28 2 L/min Nasal cannula -- --    07/20/25 0945 -- 93 27 124/60 -- 88 % -- -- None (Room air) -- --    07/20/25 0930 -- 84 25 134/63 90 95 % -- -- None (Room air) Sitting --    07/20/25 0900 -- 89 22 157/71 102 93 % -- -- None (Room air) Sitting --    07/20/25 0845 -- 85 24 158/74 106 95 % -- -- None (Room air) Sitting --    07/20/25 0800 -- 82 26 124/62 89 98 % -- -- Simple mask Lying --    07/20/25 0737 98.3 °F (36.8 °C) 68 24 135/71 100 96 % -- -- None (Room air) Lying No Pain              Pertinent Labs/Diagnostic Test Results:   Radiology:  CT abdomen pelvis wo contrast   Final Interpretation by Bruno Abarca MD (07/20 1032)      1.  Right inguinal hernia containing a short segment loop of distal ileum without evidence of obstruction.      2.  No evidence of acute abnormality in the abdomen or pelvis.      Workstation performed: [id number]         XR chest portable   Final Interpretation by Bradley Landon Kocher, MD (07/20 1145)      No focal consolidation, pleural effusion, or pneumothorax.            Resident: Melissa Corley I, the attending radiologist, have reviewed the images and agree with the final report above.      Workstation performed: [id number]           Cardiology:  ECG 12 lead   Final Result by Adán Lynn MD (07/20 1906)   Sinus rhythm with marked sinus arrhythmia   Right bundle branch block   Abnormal ECG   When compared with ECG of 20-Jul-2025 11:15,   Premature ventricular complexes are no longer Present   Premature atrial complexes are no longer Present   Confirmed by Adán Lynn ([id number]) on 7/20/2025 7:06:09 PM      ECG 12 lead   Final Result by Adán Lnyn MD (07/20 1123)   Sinus rhythm with Blocked  Premature atrial complexes with occasional    Premature ventricular complexes   Right bundle branch block   Abnormal ECG   When compared with ECG of 20-Jul-2025 09:29, (unconfirmed)   Premature ventricular complexes are now Present   Confirmed by Adán Lynn ([id number]) on 7/20/2025 11:23:38 AM      ECG 12 lead   Final Result by Adán Lynn MD (07/20 1124)   Sinus rhythm with Premature atrial complexes   Right bundle branch block   Abnormal ECG   When compared with ECG of 20-Jul-2025 09:08, (unconfirmed)   Sinus rhythm has replaced Atrial fibrillation   Confirmed by Adán Lynn ([id number]) on 7/20/2025 11:24:02 AM      ECG 12 lead   Final Result by Adán Lynn MD (07/20 1125)   Probable  Sinus tachycardia with frequent Premature atrial complexes   Baseline artifact   Right bundle branch block   Abnormal ECG   When compared with ECG of 20-Jul-2025 07:32, (unconfirmed)   Vent. rate has increased by  43 bpm   T wave inversion now evident in Anterior leads   Confirmed by Adán Lynn ([id number]) on 7/20/2025 11:25:14 AM      ECG 12 lead   Final Result by Adán Lynn MD (07/20 1125)   Sinus rhythm with 1st degree A-V block with Premature supraventricular    complexes   Right bundle branch block   Abnormal ECG   When compared with ECG of 18-Jul-2025 12:03,   Premature ventricular complexes are no longer Present   DE interval has increased   Minimal criteria for Inferior infarct are no longer Present   T wave inversion no longer evident in Inferior leads   QT has shortened   Confirmed by Adán Lynn ([id number]) on 7/20/2025 11:25:24 AM        Results from last 7 days   Lab Units 07/20/25  1306   SARS-COV-2  Negative     Results from last 7 days   Lab Units 07/21/25  0339 07/20/25  0748 07/18/25  1032   WBC Thousand/uL 6.50 8.72 5.44   HEMOGLOBIN g/dL 13.4 13.2 13.6   HEMATOCRIT % 39.6 39.8 39.7   PLATELETS Thousands/uL 132* 129* 142*   TOTAL NEUT ABS Thousands/µL  --  6.82 3.10          Results from last 7 days   Lab Units 07/21/25  0359 07/20/25  0748 07/18/25  1032   SODIUM mmol/L 137 134* 136   POTASSIUM mmol/L 4.1 3.5 4.0   CHLORIDE mmol/L 102 101 102   CO2 mmol/L 28 27 27   ANION GAP mmol/L 7 6 7   BUN mg/dL 21 26* 14   CREATININE mg/dL 0.92 0.90 0.95   EGFR ml/min/1.73sq m 73 75 70   CALCIUM mg/dL 8.8 8.6 9.0     Results from last 7 days   Lab Units 07/20/25  0748   AST U/L 27   ALT U/L 21   ALK PHOS U/L 51   TOTAL PROTEIN g/dL 6.2*   ALBUMIN g/dL 3.7   TOTAL BILIRUBIN mg/dL 0.52   BILIRUBIN DIRECT mg/dL 0.13     Results from last 7 days   Lab Units 07/21/25  0310   POC GLUCOSE mg/dl 154*     Results from last 7 days   Lab Units 07/21/25  0359 07/20/25  0748 07/18/25  1032   GLUCOSE RANDOM mg/dL 142* 109 108     Results from last 7 days   Lab Units 07/20/25  1130 07/20/25  0935 07/20/25  0748 07/18/25  1210 07/18/25  1032   HS TNI 0HR ng/L  --   --  14  --  13   HS TNI 2HR ng/L  --  138*  --  11  --    HSTNI D2 ng/L  --  124*  --  -2  --    HS TNI 4HR ng/L 12  --   --   --   --    HSTNI D4 ng/L -2  --   --   --   --      Results from last 7 days   Lab Units 07/18/25  1032   D-DIMER QUANTITATIVE ug/ml FEU 1.25*     Results from last 7 days   Lab Units 07/20/25  0748   PROTIME seconds 13.6   INR  1.02   PTT seconds 25     Results from last 7 days   Lab Units 07/20/25  0748   TSH 3RD GENERATON uIU/mL 3.791     Results from last 7 days   Lab Units 07/21/25  0401   PROCALCITONIN ng/ml 0.08     Results from last 7 days   Lab Units 07/20/25  0748 07/18/25  1032   BNP pg/mL 72 57       Results from last 7 days   Lab Units 07/20/25  0748   LIPASE u/L 38       Results from last 7 days   Lab Units 07/20/25  1306   INFLUENZA A PCR  Negative   INFLUENZA B PCR  Negative   RSV PCR  Negative       Past Medical History[1]  Present on Admission:   Benign essential hypertension   COPD exacerbation (HCC)   SIVA (obstructive sleep apnea)   NSVT (nonsustained ventricular tachycardia) ([facility])   Dyspnea on  exertion      Admitting Diagnosis: Dyspnea [R06.00]  SOB (shortness of breath) [R06.02]  Elevated troponin [R79.89]  COPD exacerbation (HCC) [J44.1]  Age/Sex: 89 y.o. male    Network Utilization Review Department  ATTENTION: Please call with any questions or concerns to [phone] and carefully listen to the prompts so that you are directed to the right person. All voicemails are confidential.   For Discharge needs, contact Care Management DC Support Team at [phone] opt. [phone]  Send all requests for admission clinical reviews, approved or denied determinations and any other requests to dedicated fax number below belonging to the campus where the patient is receiving treatment. List of dedicated fax numbers for the Facilities:  FACILITY NAME UR FAX NUMBER   ADMISSION DENIALS (Administrative/Medical Necessity) [phone]   DISCHARGE SUPPORT TEAM (NETWORK) [phone]   PARENT CHILD HEALTH (Maternity/NICU/Pediatrics) [phone]   [facility] [phone]   [facility] [phone]   [facility] [phone]   [facility] [phone]   [facility] [phone]   [facility] [phone]   [facility] [phone]   [facility] [phone]   [facility] [phone]   [facility] [phone]   [facility] [phone]   [facility] [phone]              [1]   Past Medical History:  Diagnosis Date    Abnormal EKG     last assessed: 11/25/2014    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

## 2025-07-21 NOTE — PHYSICAL THERAPY NOTE
PHYSICAL THERAPY EVALUATION          Patient Name: Ramsey Palmer  Today's Date: 7/21/2025 07/21/25 1013   PT Last Visit   PT Visit Date 07/21/25   Note Type   Note type Evaluation   Pain Assessment   Pain Assessment Tool 0-10   Pain Score No Pain   Restrictions/Precautions   Other Precautions Contact/isolation;Multiple lines;Telemetry;O2;Fall Risk  (2L>RA)   Home Living   Type of Home House   Home Layout Two level;Able to live on main level with bedroom/bathroom;Stairs to enter without rails   Bathroom Shower/Tub   (has both)   Bathroom Toilet Standard   Bathroom Equipment Shower chair;Commode   Home Equipment Walker;Cane   Additional Comments first fl living w basement. 0 v 2 ALMAS   Prior Function   Level of [place] Independent with ADLs;Independent with functional mobility;Independent with IADLS   Lives With Spouse   Comments indep without an AD   General   Additional Pertinent History pt admitted 7/20/25 for dyspnea. goes to OP cardiopulmonary rehab   Cognition   Overall Cognitive Status WFL   Arousal/Participation Cooperative   Following Commands Follows all commands and directions without difficulty   Bed Mobility   Supine to Sit 6  Modified independent   Additional items HOB elevated   Transfers   Sit to Stand 6  Modified independent   Stand to Sit 6  Modified independent   Ambulation/Elevation   Gait pattern Short stride   Gait Assistance 5  Supervision   Assistive Device None   Distance 180'   Stair Management Assistance Not tested   Ambulation/Elevation Additional Comments pt decline need to trial steps prior to dc   Balance   Static Standing Fair   Dynamic Standing Fair   Ambulatory Fair   Endurance Deficit   Endurance Deficit Yes   Endurance Deficit Description subjective complaints of SOB. vitals during session 65, 94% on 2L at rest. 66, 94% on RA at rest. 50-70 bpm, 93% on RA after one min post amb   Activity Tolerance   Activity Tolerance Patient tolerated treatment  well   Medical Staff Made Aware Wilmer OT   Nurse Made Aware Vidal RN   Assessment   Prognosis Good   Assessment Ramsey Palmer is a 89 y.o. male admitted to [facility] on 7/20/2025 for Dyspnea. PT was consulted and pt was seen on 7/21/2025 for mobility assessment and d/c planning. Pt presents w contact isolation, multiple lines. At baseline is indep. Pt is currently functioning at a mod I bed mobility and transfers, S for ambulation. Pt demonstrated ability to ambulate community distances. Endorses feeling SOB however this is somewhat baseline. Despite subjective complaints his O2 remained stable on RA. No acute deficits impacting function; will sign off. Pt will benefit from continued mobilization via nsg/restorative.   Barriers to Discharge None   Plan   PT Frequency   (d/c PT; maintain on restorative)   Discharge Recommendation   Rehab Resource Intensity Level, PT No post-acute rehabilitation needs  (cont OP CP rehab as appropriate)   AM-PAC Basic Mobility Inpatient   Turning in Flat Bed Without Bedrails 4   Lying on Back to Sitting on Edge of Flat Bed Without Bedrails 4   Moving Bed to Chair 4   Standing Up From Chair Using Arms 4   Walk in Room 3   Climb 3-5 Stairs With Railing 3   Basic Mobility Inpatient Raw Score 22   Basic Mobility Standardized Score 47.4   [facility] Highest Level Of Mobility   -HLM Goal 7: Walk 25 feet or more   -HLM Achieved 7: Walk 25 feet or more   End of Consult   Patient Position at End of Consult Bedside chair;All needs within reach  (chair alarm not on. RN aware. pt rings appropriately)     History: co - morbidities including age, current experience including fall risk, multiple lines, contact isolation  Exam: impairments in systems including multiple body structures involved; neuromuscular (balance, gait, transfers), cardiopulmonary (vitals), cognition; activity limitations  (difficulties executing an action); participation restrictions (problems associated w involvement in life  situations), am-pac  Clinical: stable/unpredictable  Complexity: moderate    Violeta Montelongo, PT

## 2025-07-21 NOTE — PLAN OF CARE
Problem: Potential for Falls  Goal: Patient will remain free of falls  Description: INTERVENTIONS:  - Educate patient/family on patient safety including physical limitations  - Instruct patient to call for assistance with activity   - Consider consulting OT/PT to assist with strengthening/mobility based on AM PAC & JH-HLM score  - Consult OT/PT to assist with strengthening/mobility   - Keep Call bell within reach  - Keep bed low and locked with side rails adjusted as appropriate  - Keep care items and personal belongings within reach  - Initiate and maintain comfort rounds  - Make Fall Risk Sign visible to staff  - Offer Toileting every 2 Hours, in advance of need  - Initiate/Maintain bed alarm  - Obtain necessary fall risk management equipment:   - Apply yellow socks and bracelet for high fall risk patients  - Consider moving patient to room near nurses station  Outcome: Progressing     Problem: RESPIRATORY - ADULT  Goal: Achieves optimal ventilation and oxygenation  Description: INTERVENTIONS:  - Assess for changes in respiratory status  - Assess for changes in mentation and behavior  - Position to facilitate oxygenation and minimize respiratory effort  - Oxygen administered by appropriate delivery if ordered  - Initiate smoking cessation education as indicated  - Encourage broncho-pulmonary hygiene including cough, deep breathe, Incentive Spirometry  - Assess the need for suctioning and aspirate as needed  - Assess and instruct to report SOB or any respiratory difficulty  - Respiratory Therapy support as indicated  Outcome: Progressing

## 2025-07-21 NOTE — ASSESSMENT & PLAN NOTE
Patient is an 89-year-old male with past medical history significant for COPD who presented to the ER complaining of 4-day history of worsening shortness of breath, dyspnea on exertion, chest congestion, and cough.    Patient was initially seen in the ER 7/18 complaining of URI symptoms, and dyspnea that felt consistent with a COPD exacerbation.  When symptoms did not improve he presented to the ER.  He was given IV Solu-Medrol, DuoNeb.  Due to elevated D-dimer he underwent a CT of the chest that was negative for PE.  He improved and was discharged home  Patient re-presented to the ER today complaining of worsening dyspnea: He was given DuoNeb, IV steroids  Patient's lab work revealed a positive troponin so he was referred for admission for eval, as well as treatment for COPD exacerbation  Improved with IV Solu-Medrol and nebulizers  Weaned to room air.  Discharged on prednisone taper.

## 2025-07-21 NOTE — ASSESSMENT & PLAN NOTE
Background: Pt follows with Dr. Ibrahim, [facility]:   Most recently seen in the office 1/25: Is followed for previous refractory hypertension, nonsustained V. tach  Previous ischemic evaluation: Nuclear stress test 2021 negative for ischemia:  in 2024 he had intermittent chest pressure with dyspnea, decreased exercise tolerance but declined additional ischemic testing and symptoms improved    Patient presented 4 days of dyspnea as well as new jaw pain this morning  Troponin 14--> 138-->12  EKG with right bundle branch block, intermittent first-degree heart block  Multiple troponins WNL  Can follow-up patient with cardiology

## 2025-07-21 NOTE — ASSESSMENT & PLAN NOTE
EMERGENCY DEPARTMENT HISTORY AND PHYSICAL EXAM 
 
 
Date: 2/22/2019 Patient Name: Daniela Benavides History of Presenting Illness Chief Complaint Patient presents with  Shortness of Breath  
  for 1 week worse past 2 days. denies chest pain. worse when bends down. History Provided By: Patient and Patient's  HPI: Daniela Benavides, 78 y.o. female with PMHx significant for DM, HTN, hypothyroidism, afib, presents ambulatory to the ED with cc of shortness of breath. Patient states over the past 2 weeks, she has had progressively worsening dyspnea on exertion and lower extremity swelling. States she has associated cough with brownish sputum production. Denies CP. States she has been noncompliant with her lasix because it makes her urinate too frequently. States the SOB is worse when she leans forward or walks. Denies fevers. Denies nausea/vomiting/diarrhea. There are no other complaints, changes, or physical findings at this time. PCP: Unknown, Provider No current facility-administered medications on file prior to encounter. Current Outpatient Medications on File Prior to Encounter Medication Sig Dispense Refill  furosemide (LASIX) 40 mg tablet Take 40 mg by mouth daily.  cloNIDine HCl (CATAPRES) 0.1 mg tablet Take 0.15 mg by mouth two (2) times a day. (patient takes 1.5 tabs BID)  amLODIPine (NORVASC) 5 mg tablet Take 5 mg by mouth daily.  metoprolol tartrate (LOPRESSOR) 50 mg tablet Take 75 mg by mouth two (2) times a day.  pravastatin (PRAVACHOL) 80 mg tablet Take 80 mg by mouth nightly.  cholecalciferol (VITAMIN D3) 2,000 unit cap capsule Take 2,000 Units by mouth daily.  aspirin (ASPIRIN) 325 mg tablet Take 325 mg by mouth daily.  calcium-cholecalciferol, D3, (CALTRATE 600+D) tablet Take 1 Tab by mouth two (2) times a day.     
 peg 400-propylene glycol (SYSTANE, PROPYLENE GLYCOL,) 0.4-0.3 % drop History of  Cont b-blocker and monitor on tele   Administer 1 Drop to both eyes two (2) times daily as needed (dry eyes).  metFORMIN (GLUCOPHAGE) 500 mg tablet Take 500 mg by mouth two (2) times daily (with meals).  levothyroxine (SYNTHROID) 125 mcg tablet Take 125 mcg by mouth Daily (before breakfast).  losartan (COZAAR) 100 mg tablet Take 100 mg by mouth daily.  glimepiride (AMARYL) 4 mg tablet Take 4-8 mg by mouth nightly as needed. BS >200 Past History Past Medical History: 
Past Medical History:  
Diagnosis Date  Diabetes ([place])  Hypercholesteremia  Hypertension  Thyroid condition Past Surgical History: 
Past Surgical History:  
Procedure Laterality Date  HX HYSTERECTOMY Family History: No family history on file. Social History: 
Social History Tobacco Use  Smoking status: Never Smoker Substance Use Topics  Alcohol use: No  
 Drug use: Not on file Allergies: Allergies Allergen Reactions  Lisinopril Swelling Review of Systems Review of Systems Constitutional: Negative for chills and fever. HENT: Negative for congestion, postnasal drip, rhinorrhea and voice change. Eyes: Negative for visual disturbance. Respiratory: Positive for cough and shortness of breath. Negative for chest tightness. Cardiovascular: Positive for leg swelling. Negative for chest pain and palpitations. Gastrointestinal: Negative for abdominal distention, abdominal pain, diarrhea, nausea and vomiting. Genitourinary: Negative for dysuria, frequency, hematuria and urgency. Musculoskeletal: Negative for arthralgias, myalgias and neck pain. Skin: Negative for color change and rash. Neurological: Negative for dizziness, syncope, numbness and headaches. Psychiatric/Behavioral: Negative for agitation. Physical Exam  
Physical Exam  
Constitutional: She is oriented to person, place, and time. She appears toxic. She has a sickly appearance. She appears distressed. HENT:  
Head: Normocephalic and atraumatic. Mouth/Throat: Oropharynx is clear and moist.  
Eyes: Conjunctivae and EOM are normal. Pupils are equal, round, and reactive to light. Neck: Normal range of motion. JVD present. Cardiovascular: Normal heart sounds. An irregularly irregular rhythm present. Tachycardia present. Exam reveals no gallop and no friction rub. No murmur heard. Pulses: 
     Radial pulses are 2+ on the right side, and 2+ on the left side. Pulmonary/Chest: Effort normal. Tachypnea noted. No respiratory distress. She has rhonchi in the right upper field, the right middle field, the right lower field, the left upper field, the left middle field and the left lower field. She exhibits no tenderness. Abdominal: Soft. Bowel sounds are normal. There is no tenderness. There is no rebound and no guarding. Musculoskeletal: Normal range of motion. She exhibits edema. She exhibits no tenderness or deformity. Neurological: She is alert and oriented to person, place, and time. Skin: Skin is warm. No rash noted. She is diaphoretic. Psychiatric: She has a normal mood and affect. Diagnostic Study Results Labs - Recent Results (from the past 12 hour(s)) GLUCOSE, POC Collection Time: 02/24/19  4:44 PM  
Result Value Ref Range Glucose (POC) 281 (H) 65 - 100 mg/dL Performed by Steffi Louie GLUCOSE, POC Collection Time: 02/24/19  9:09 PM  
Result Value Ref Range Glucose (POC) 250 (H) 65 - 100 mg/dL Performed by Delfin Carrasco (PCT) POTASSIUM Collection Time: 02/24/19  9:55 PM  
Result Value Ref Range Potassium 2.9 (L) 3.5 - 5.1 mmol/L  
GLUCOSE, POC Collection Time: 02/25/19  2:20 AM  
Result Value Ref Range Glucose (POC) 165 (H) 65 - 100 mg/dL Performed by Cari Lobo Radiologic Studies -  
XR CHEST PA LAT Final Result IMPRESSION: Resolved pulmonary edema CTA CHEST W OR W WO CONT Final Result IMPRESSION:   
 1. No pulmonary embolism to the segmental arterial level. 2. Bilateral pneumonia is greatest in the lingula of the left upper lobe. 3. Reactive mediastinal lymphadenopathy. 4. Mild associated interstitial pulmonary edema. Increased cardiomegaly. Probable right heart failure. XR CHEST PORT Final Result IMPRESSION:  
  
Mild bilateral pulmonary edema more likely than pneumonia given the clinical  
history. CT chest pending. CT Results  (Last 48 hours) None CXR Results  (Last 48 hours) 02/24/19 0928  XR CHEST PA LAT Final result Impression:  IMPRESSION: Resolved pulmonary edema Narrative:  EXAM: XR CHEST PA LAT INDICATION: pulmonary edema COMPARISON: 2/22/2019. FINDINGS: PA and lateral radiographs of the chest demonstrate cardiomegaly  
resolved pulmonary edema. Spondylitic changes are present. Heavy costochondral  
calcification is noted. Medical Decision Making I am the first provider for this patient. I reviewed the vital signs, available nursing notes, past medical history, past surgical history, family history and social history. Vital Signs-Reviewed the patient's vital signs. Patient Vitals for the past 12 hrs: 
 Temp Pulse Resp BP SpO2  
02/24/19 2148  84  (!) 111/95   
02/24/19 2145 98 °F (36.7 °C) 79 18 (!) 111/95 98 % 02/24/19 1933 98 °F (36.7 °C) 87 18 90/68 98 % 02/24/19 1912     98 % 02/24/19 1601 98.4 °F (36.9 °C) 82 20 113/68 99 % Pulse Oximetry Analysis - 95% on 1L Cardiac Monitor:  
Rate: 98 bpm 
Rhythm: Atrial Fibrillation with RVR EKG interpretation: (Preliminary) Rhythm: atrial fib; and irregular. Rate (approx.): 113; Axis: normal; GA interval: difficult to assess; QRS interval: normal ; ST/T wave: normal; Other findings: abnormal ekg. Records Reviewed: Nursing Notes and Old Medical Records Provider Notes (Medical Decision Making):  
 Patient is a 69yo female presents with shortness of breath, appears fluid overloaded. Patient is in afib with RVR - will give 5mg IV metoprolol. Patient is 88% on RA, placed on 2L oxygen. DDx includes heart failure with fluid overload, AMI, PE, pna. Will get CBC, CMP, trop, BNP, CXR, CTPE. ED Course:  
Initial assessment performed. The patients presenting problems have been discussed, and they are in agreement with the care plan formulated and outlined with them. I have encouraged them to ask questions as they arise throughout their visit. HYPERTENSION COUNSELING Education was provided to the patient today regarding their hypertension. Patient is made aware of their elevated blood pressure and is instructed to follow up this week with their Primary Care for a recheck. Patient is counseled regarding consequences of chronic, uncontrolled hypertension including kidney disease, heart disease, stroke or even death. Patient states their understanding and agrees to follow up this week. Additionally, during their visit, I discussed sodium restriction, maintaining ideal body weight and regular exercise program as physiologic means to achieve blood pressure control. The patient will strive towards this. Medications in ED: 
Medications  
metoprolol (LOPRESSOR) 5 mg/5 mL injection (  Canceled Entry 2/22/19 0128) azithromycin (ZITHROMAX) 500 mg in 0.9% sodium chloride (MBP/ADV) 250 mL (500 mg IntraVENous New Bag 2/24/19 0426) glucose chewable tablet 16 g (not administered) dextrose (D50W) injection syrg 12.5-25 g (not administered) glucagon (GLUCAGEN) injection 1 mg (not administered)  
insulin lispro (HUMALOG) injection (3 Units SubCUTAneous Given 2/24/19 2143)  
atorvastatin (LIPITOR) tablet 40 mg (40 mg Oral Given 2/23/19 2137) losartan (COZAAR) tablet 100 mg (100 mg Oral Given 2/24/19 0835) guanFACINE IR (TENEX) tablet 2 mg (2 mg Oral Given 2/24/19 2145) HYDROcodone-acetaminophen (NORCO) 5-325 mg per tablet 0.5 Tab (not administered) aspirin delayed-release tablet 81 mg (81 mg Oral Given 2/24/19 0835) pantoprazole (PROTONIX) tablet 40 mg (40 mg Oral Given 2/24/19 0835) levothyroxine (SYNTHROID) tablet 125 mcg (125 mcg Oral Given 2/24/19 0835)  
sodium chloride (NS) flush 5-40 mL (10 mL IntraVENous Given 2/24/19 2148)  
sodium chloride (NS) flush 5-40 mL (not administered)  
acetaminophen (TYLENOL) tablet 650 mg (not administered)  
ondansetron (ZOFRAN) injection 4 mg (not administered)  
bisacodyl (DULCOLAX) tablet 5 mg (not administered)  
cefTRIAXone (ROCEPHIN) 1 g in 0.9% sodium chloride (MBP/ADV) 50 mL (1 g IntraVENous New Bag 2/24/19 0426)  
dilTIAZem (CARDIZEM) 125 mg in dextrose 5% 125 mL infusion (2.5 mg/hr IntraVENous Rate Change 2/25/19 0224) guaiFENesin ER (MUCINEX) tablet 600 mg (600 mg Oral Given 2/24/19 1734) furosemide (LASIX) tablet 40 mg (40 mg Oral Given 2/24/19 0835) enoxaparin (LOVENOX) injection 90 mg (90 mg SubCUTAneous Given 2/24/19 2144)  
albuterol-ipratropium (DUO-NEB) 2.5 MG-0.5 MG/3 ML (3 mL Nebulization Given 2/24/19 1911) iopamidol (ISOVUE-370) 76 % injection (  IV Stopped 2/24/19 2300) potassium chloride SR (KLOR-CON 10) tablet 40 mEq (not administered) glucose chewable tablet 16 g (not administered)  
metoprolol (LOPRESSOR) injection 5 mg (5 mg IntraVENous Given 2/22/19 0118) furosemide (LASIX) injection 40 mg (40 mg IntraVENous Given 2/22/19 0245) iopamidol (ISOVUE-370) 76 % injection 100 mL (100 mL IntraVENous Given 2/22/19 0257)  
sodium chloride (NS) flush 10 mL (10 mL IntraVENous Given 2/22/19 0257) potassium chloride SR (KLOR-CON 10) tablet 40 mEq (40 mEq Oral Given 2/22/19 0448) enoxaparin (LOVENOX) injection 100 mg (100 mg SubCUTAneous Given 2/22/19 0641) potassium chloride SR (KLOR-CON 10) tablet 40 mEq (40 mEq Oral Given 2/25/19 0002) potassium chloride SR (KLOR-CON 10) tablet 40 mEq (40 mEq Oral Given 2/25/19 0221) insulin glargine (LANTUS) injection 12 Units (12 Units SubCUTAneous Given 2/25/19 0221) Progress Note 2:30 AM 
Patient has moderate pulmonary edema with a BNP of 1400. Will give 40mg IV lasix for fluid overload. Awaiting CT for PE but will need admission for fluid overload and new oxygen requirement. 3:35 AM 
CTPE shows bilateral pneumonias. Will give azithro and rocephin. Discussed with Dr. Eugene Newton who agreed to see patient for further care. CONSULT NOTE:  
3:35 AM 
Caryle Marines, MD spoke with Dr. Eugene Newton, Specialty: Hospitalist 
Discussed pt's hx, disposition, and available diagnostic and imaging results. Reviewed care plans. Agree with management and plan thus far. Consultant will evaluate pt for admission. Disposition: ADMIT Fluid Overload Acute Hypoxic respiratory failure Critical Care: CRITICAL CARE NOTE : 
4:10 AM 
IMPENDING DETERIORATION -Airway, Respiratory, Cardiovascular and Metabolic ASSOCIATED RISK FACTORS - Hypotension, Shock, Hypoxia and Dysrhythmia MANAGEMENT- Bedside Assessment and Supervision of Care INTERPRETATION -  Xrays, CT Scan, Blood Gases, ECG, Blood Pressure and Cardiac Output Measures INTERVENTIONS - hemodynamic mngmt and Metobolic interventions, management of acute respiratory failure with hypoxia requiring oxygen therapy, management of atrial fibrillation with rapid ventricular rate requiring IV rate control, pt requiring frequent hemodynamic monitoring and reassessments CASE REVIEW - Hospitalist, Medical Sub-Specialist, Nursing and Family TREATMENT RESPONSE -Improved PERFORMED BY - Self and Resident NOTES   : 
 
I have spent 95 minutes of critical care time involved in lab review, consultations with specialist, family decision- making, bedside attention and documentation. During this entire length of time I was immediately available to the patient . Critical Care: The reason for providing this level of medical care for this critically ill patient was due to a critical illness that impaired one or more vital organ systems, such that there was a high probability of imminent or life threatening deterioration in the patient's condition. This care involved high complexity decision making to assess, manipulate, and support vital system functions, to treat this degree of vital organ system failure, and to prevent further life threatening deterioration of the patients condition. Regina Lopez MD 
 
PLAN: 
1. Admit to hospitalist 
Patient is being admitted to the hospital.  The results of their tests and reasons for their admission have been discussed with them and/or available family. They convey agreement and understanding for the need to be admitted and for their admission diagnosis. Consultation has been made with the inpatient physician specialist for hospitalization. Diagnosis Clinical Impression: 1. Acute respiratory failure with hypoxia ([address] 2. Community acquired pneumonia, unspecified laterality 3. Hypervolemia, unspecified hypervolemia type 4. Peripheral edema 5. Chronic a-fib ([address] 6. Atrial fibrillation with rapid ventricular response ([address] Attestations: 
Attending Sarah Fernández, 78 y.o. female with PMHx significant for DM, HTN, hypothyroidism, afib, presents ambulatory to the ED with cc of shortness of breath. General: mild distress HEENT: EOMI, non-icteric sclera Chest: tachy, irregularly, irregular, no m/r/g, +JVD Lungs: crackles at bases Abd: soft, nondistended, nontender, no CVAT, +bs. No guarding or rebound. Ext: +1 peripheral edema, no cyanosis, +2 peripheral pulses Skin: no rashes or lesions Neuro: no focal deficits Impression/Plan: 
78 y.o. female presenting with acute respiratory failure with hypoxia requiring oxygen therapy, CT with PNA requiring broad spectrum IV abx and fluid resuscitation, afib with RVR requiring IV rate control,  pt will need Cards evaluation and admission. Please see critical care per above. 
  
I have personally seen and examined the patient; I have reviewed the resident's note and agree with findings and plan. I was present during the key portions of separately billed procedures and involved in all portions of critical care management. Lesa Cortes MD 
 
 
This note will not be viewable in [address]

## 2025-07-21 NOTE — DISCHARGE SUMMARY
Discharge Summary - Hospitalist   Name: Ramsey Palmer 89 y.o. male I MRN: [id number]  Unit/Bed#: [place] [address] -01 I Date of Admission: 7/20/2025   Date of Service: 7/21/2025 I Hospital Day: 1     Assessment & Plan  Acute hypoxic respiratory failure (HCC)  Patient is an 89-year-old male with past medical history significant for COPD who presented to the ER complaining of 4-day history of worsening shortness of breath, dyspnea on exertion, chest congestion, and cough.    Patient was initially seen in the ER 7/18 complaining of URI symptoms, and dyspnea that felt consistent with a COPD exacerbation.  When symptoms did not improve he presented to the ER.  He was given IV Solu-Medrol, DuoNeb.  Due to elevated D-dimer he underwent a CT of the chest that was negative for PE.  He improved and was discharged home  Patient re-presented to the ER today complaining of worsening dyspnea: He was given DuoNeb, IV steroids  Patient's lab work revealed a positive troponin so he was referred for admission for eval, as well as treatment for COPD exacerbation  Improved with IV Solu-Medrol and nebulizers  Weaned to room air.  Discharged on prednisone taper.    COPD exacerbation (HCC)  Background: Patient follows with [facility] pulmonology for COPD  Was just seen in the ER 5/31/2025 for COPD exacerbation and discharged with steroid taper  At Pulm office visit 6/11/2025 was started on Stiolto    Presented with sob and concerns for COPD acute exacerbation with dyspnea x 4 days  CTA chest 7/18 negative for focal infiltrate or pulmonary embolism  Patient was given Solu-Medrol 80 mg IV, and a DuoNeb  Continued nebulizers  Currently afebrile with normal white blood cell count: No evidence of acute bacterial infection  Discharged on prednisone taper, continue inhalers  Follow-up with pulmonology  Elevated troponin  Background: Pt follows with Dr. Ibrahim, [facility]:   Most recently seen in the office 1/25: Is followed for previous  refractory hypertension, nonsustained V. tach  Previous ischemic evaluation: Nuclear stress test 2021 negative for ischemia:  in 2024 he had intermittent chest pressure with dyspnea, decreased exercise tolerance but declined additional ischemic testing and symptoms improved    Patient presented 4 days of dyspnea as well as new jaw pain this morning  Troponin 14--> 138-->12  EKG with right bundle branch block, intermittent first-degree heart block  Multiple troponins WNL  Can follow-up patient with cardiology  Benign essential hypertension  Home medications include losartan 50 mg daily, hydrochlorothiazide 12.5 mg daily, and Norvasc 5 mg daily  Continue home regimen and titrate as needed  SIVA (obstructive sleep apnea)  Reports inability to tolerate CPAP  Follows with [facility] pulmonology: Attempted transition from facemask to nasal pillow  Continue outpatient follow-up  NSVT (nonsustained ventricular tachycardia) (HCC)  History of  Cont b-blocker and monitor on tele  Diarrhea  Patient had 1 episode of diarrhea yesterday  CT abdomen/pelvis: No acute intra-abdominal abnormality.  Subtle finding of right inguinal hernia without strangulation   Diarrhea in the setting of recent URI symptoms, possibly secondary to viral illness  Influenza-COVID PCR pending, rapid antigen was negative   No additional diarrhea thus far: if persists will check stool studies  Stool studies negative.  Resolved.  Thrombocytopenia (HCC)  Patient with very mild thrombocytopenia, in the setting of viral-like illness  Normal coags  Most likely related to acute illness, continue to monitor     Medical Problems       Resolved Problems  Date Reviewed: 7/20/2025   None       Discharging Physician / Practitioner: Wesley Davenport PA-C  PCP: Michelle Bush DO  Admission Date:   Admission Orders (From admission, onward)       Ordered        07/20/25 1249  INPATIENT ADMISSION  Once                          Discharge Date: 07/21/25    Next Steps for  Physician/AP Assuming Care:  Follow-up on COPD exacerbation    Test Results Pending at Discharge (will require follow up):  None    Medication Changes for Discharge & Rationale:   Prednisone taper  See after visit summary for reconciled discharge medications provided to patient and/or family.     Consultations During Hospital Stay:  None    Procedures Performed:   Required 2 L transiently    Significant Findings / Test Results:   XR chest portable  Result Date: 7/20/2025  Impression: No focal consolidation, pleural effusion, or pneumothorax. Resident: Melissa Corley I, the attending radiologist, have reviewed the images and agree with the final report above. Workstation performed: [id number]     CT abdomen pelvis wo contrast  Result Date: 7/20/2025  Impression: 1.  Right inguinal hernia containing a short segment loop of distal ileum without evidence of obstruction. 2.  No evidence of acute abnormality in the abdomen or pelvis. Workstation performed: [id number]         Incidental Findings:   None      Hospital Course:   Ramsey Palmer is a 89 y.o. male patient who originally presented to the hospital on 7/20/2025 due to shortness of breath, requiring 2 L oxygen via nasal cannula.  Was recently seen in the ER and received IV Solu-Medrol with improvement and was therefore discharged home, however worsened and represented.  Received further IV Solu-Medrol nebulizers as well azithromycin with improvement.  He transiently required 2 L but was quickly able to be weaned to room air.  He reports feeling better and able to ambulate without any significant dyspnea or hypoxia.  He is stable for discharge on prednisone taper with follow-up with pulmonology as well as cardiology.        Please see above list of diagnoses and related plan for additional information.     Discharge Day Visit / Exam:   Subjective: No acute events overnight.  Reports congestion, otherwise breathing is improved.  Vitals: Blood Pressure: 141/64  "(07/21/25 1136)  Pulse: (!) 51 (07/21/25 1136)  Temperature: 97.8 °F (36.6 °C) (07/21/25 1136)  Temp Source: Oral (07/21/25 0308)  Respirations: 14 (07/21/25 1136)  Height: 5' 11\" (180.3 cm) (07/20/25 1300)  Weight - Scale: 98.5 kg (217 lb 2.5 oz) (07/20/25 1300)  SpO2: 95 % (07/21/25 1319)  Physical Exam  Vitals and nursing note reviewed.   Constitutional:       Appearance: Normal appearance.   HENT:      Head: Normocephalic and atraumatic.      Mouth/Throat:      Mouth: Mucous membranes are moist.      Pharynx: Oropharynx is clear. No oropharyngeal exudate.     Eyes:      Extraocular Movements: Extraocular movements intact.       Cardiovascular:      Rate and Rhythm: Normal rate and regular rhythm.      Pulses: Normal pulses.      Heart sounds: Normal heart sounds. No murmur heard.     No friction rub. No gallop.   Pulmonary:      Effort: Pulmonary effort is normal. No respiratory distress.      Breath sounds: No stridor. Wheezing (Expiratory, improved) present. No rales.   Abdominal:      General: Abdomen is flat. Bowel sounds are normal. There is no distension.      Palpations: Abdomen is soft.      Tenderness: There is no abdominal tenderness.     Musculoskeletal:      Right lower leg: No edema.      Left lower leg: No edema.     Skin:     General: Skin is warm and dry.     Neurological:      General: No focal deficit present.      Mental Status: He is alert and oriented to person, place, and time.          Discussion with Family: Updated  (wife) at bedside.    Discharge instructions/Information to patient and family:   See after visit summary for information provided to patient and family.      Provisions for Follow-Up Care:  See after visit summary for information related to follow-up care and any pertinent home health orders.      Mobility at time of Discharge:   Basic Mobility Inpatient Raw Score: 22  JH-HLM Goal: 7: Walk 25 feet or more  JH-HLM Achieved: 7: Walk 25 feet or more  HLM Goal " achieved. Continue to encourage appropriate mobility.     Disposition:   Home with VNA Services (Reminder: Complete face to face encounter)    Planned Readmission: None    Administrative Statements   Discharge Statement:  I have spent a total time of 55 minutes in caring for this patient on the day of the visit/encounter. .    **Please Note: This note may have been constructed using a voice recognition system**

## 2025-07-21 NOTE — PROGRESS NOTES
OP RT CM to outreach patient today. Upon chart review patient is admitted to the hospital. OPRT CM will follow and outreach upon discharge. Outgoing in basket reminder message placed.   --------------------------------------------------------------------------

## 2025-07-21 NOTE — CASE MANAGEMENT
Case Management Assessment & Discharge Planning Note    Patient name Ramsey Palmer  Location [facility] 2 M* MRN [id number]  : 1935 Date 2025       Current Admission Date: 2025  Current Admission Diagnosis:Dyspnea   Patient Active Problem List    Diagnosis Date Noted    Dyspnea 2025    Elevated troponin 2025    Diarrhea 2025    Thrombocytopenia (HCC) 2025    Positive D dimer 2025    Celiac artery compression syndrome (HCC) 2023    Thyroid nodule 2021    NSVT (nonsustained ventricular tachycardia) ([facility]) 2021    Nausea 2021    Dyspnea on exertion 2021    Abnormal chest x-ray 2021    Osteoarthritis of spine with radiculopathy, lumbar region 2020    Nocturia 2019    SIVA (obstructive sleep apnea)     Sacroiliitis ([facility]) 10/16/2017    COPD exacerbation ([facility]) 2017    Diastasis of rectus abdominis 2016    Neural foraminal stenosis of lumbar spine 2015    Degeneration of intervertebral disc of lumbar region 09/15/2015    Herniated lumbar intervertebral disc 09/15/2015    Lumbar radiculitis 2015    Benign essential hypertension 2013    Hyperlipidemia 2013      LOS (days): 1  Geometric Mean LOS (GMLOS) (days): 2.1  Days to GMLOS:1.1     OBJECTIVE:    Risk of Unplanned Readmission Score: 12.38         Current admission status: Inpatient       Preferred Pharmacy:   [id number] - [place] - [address] [place] [address]  Phone: [phone] Fax: [phone]    Primary Care Provider: Michelle Bush DO    Primary Insurance: [employer] [facility]  Secondary Insurance:     ASSESSMENT:  Active Health Care Proxies    There are no active Health Care Proxies on file.       Advance Directives  Does patient have a Health Care POA?: No  Does patient have Advance Directives?: No  Primary Contact: RolfdeanaNeeta (Spouse)  [phone] (Home  Phone)    Readmission Root Cause  30 Day Readmission: No    Patient Information  Admitted from:: Home  Mental Status: Alert  During Assessment patient was accompanied by: Not accompanied during assessment  Assessment information provided by:: Patient  Primary Caregiver: Self  Support Systems: Spouse/significant other, Self  County of Residence: [place]  What city do you live in?: [place]  Home entry access options. Select all that apply.: No steps to enter home  Type of Current Residence: [facility]  Living Arrangements: Lives w/ Spouse/significant other  Is patient a ?: No    Activities of Daily Living Prior to Admission  Functional Status: Independent  Completes ADLs independently?: Yes  Ambulates independently?: Yes  Does patient use assisted devices?: Yes  Assisted Devices (DME) used: CPAP  DME Company Name (respiratory supplies): Provided by Pulmonology  Does patient currently own DME?: Yes  What DME does the patient currently own?: CPAP, Walker  Does patient have a history of Outpatient Therapy (PT/OT)?: No  Does the patient have a history of Short-Term Rehab?: No  Does patient have a history of HHC?: No  Does patient currently have HHC?: No         Patient Information Continued  Income Source: SSI/SSD (Pension)  Does patient have prescription coverage?: Yes  Can the patient afford their medications and any related supplies (such as glucometers or test strips)?: N/A  Does patient receive dialysis treatments?: No  Does patient have a history of substance abuse?: 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 CM made referral in Aidin.  CM contacted family/caregiver?: No- see  comments (Patient declined CM need to contact family members at this time.)  Were Treatment Team discharge recommendations reviewed with patient/caregiver?: Yes  Did patient/caregiver verbalize understanding of patient care needs?: Yes       Contacts  Patient Contacts: Neeta Palmer (Spouse)  [phone] (Home Phone)  Relationship to Patient:: Family  Contact Method: Phone  Phone Number: Neeta Palmer (Spouse)  [phone] (Home Phone)  Reason/Outcome: Emergency Contact      DME Referral Provided  Referral made for DME?: No    Other Referral/Resources/Interventions Provided:  Interventions: HHC, Cardiac Rehab  Referral Comments: CM made referral for HHC (SN); CM to follow.    Treatment Team Recommendation: Home with Home Health Care  Expected Discharge Disposition: Home or Self Care  Additional Discharge Dispositions: Home Health Services  Transport at Discharge : Family           Additional Comments: CM met with patient at bedside to complete asssessment; patient alert. Patient confirmed has home Cpap and access to home walker (that he does not utilize at this time). Patient reports resides with spouse and attends CardioPulm Rehab. CM reviewed HHC (SN) recommendations, patient agreeable. CM made referral in Aidin. Patient confirmed spouse will provide transportation to home. 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 Patient denies any further CM needs at this time. CM to follow for further discharge planning.

## 2025-07-21 NOTE — OCCUPATIONAL THERAPY NOTE
"    Occupational Therapy Evaluation     Patient Name: Ramsey Palmer  Today's Date: 7/21/2025  Problem List  Principal Problem:    Dyspnea  Active Problems:    Benign essential hypertension    COPD exacerbation (HCC)    SIVA (obstructive sleep apnea)    Dyspnea on exertion    NSVT (nonsustained ventricular tachycardia) (HCC)    Elevated troponin    Diarrhea    Thrombocytopenia (HCC)    Past Medical History  Past Medical History[1]  Past Surgical History  Past Surgical History[2]        07/21/25 0950   Note Type   Note type Evaluation   Pain Assessment   Pain Assessment Tool 0-10   Pain Score No Pain   Restrictions/Precautions   Weight Bearing Precautions Per Order No   Other Precautions Chair Alarm;Bed Alarm;Contact/isolation;Fall Risk;O2  (SPO2=89-99% on RA(after activity--at rest)--nsg made aware)   Home Living   Type of Home House   Home Layout One level  (0 mau, finished basement)   Bathroom Shower/Tub Walk-in shower   Bathroom Toilet Standard   Bathroom Equipment Shower chair;Commode   Home Equipment Walker;Cane   Prior Function   Level of [place] Independent with ADLs;Independent with functional mobility   Lives With Spouse   Falls in the last 6 months 0   Lifestyle   Autonomy PTA pt states independence with all aspects of his ADLs, transfers, ambulation--w/o device; +, neg falls   Reciprocal Relationships supportive family   Service to Others worked for [employer]   Intrinsic Gratification watching TV   Subjective   Subjective \"When I do too much, I feel like I can't catch my breath.\"   ADL   Where Assessed Edge of bed   Eating Assistance 6  Modified independent   Grooming Assistance 6  Modified Independent   UB Bathing Assistance 6  Modified Independent   LB Bathing Assistance 6  Modified Independent   UB Dressing Assistance 6  Modified independent   LB Dressing Assistance 6  Modified independent   Toileting Assistance  6  Modified independent   Bed Mobility   Rolling R 6  Modified " independent   Rolling L 6  Modified independent   Supine to Sit 6  Modified independent   Transfers   Sit to Stand 6  Modified independent   Stand to Sit 6  Modified independent   Functional Mobility   Functional Mobility 5  Supervision   Additional items   (w/o device)   Balance   Static Sitting Normal   Dynamic Sitting Good   Static Standing Fair   Dynamic Standing Fair   Activity Tolerance   Activity Tolerance Patient tolerated treatment well   Medical Staff Made Aware nsg, P.T.   RUE Assessment   RUE Assessment WFL   RUE Strength   RUE Overall Strength Within Functional Limits - able to perform ADL tasks with strength  (4+/5 throughout)   LUE Assessment   LUE Assessment WFL   LUE Strength   LUE Overall Strength Within Functional Limits - able to perform ADL tasks with strength  (4+/5 throughout)   Hand Function   Gross Motor Coordination Functional   Fine Motor Coordination Functional   Sensation   Light Touch No apparent deficits   Proprioception   Proprioception No apparent deficits   Vision-Basic Assessment   Current Vision   (glasses)   Vision - Complex Assessment   Acuity   (impaired)   Psychosocial   Psychosocial (WDL) WDL   Perception   Inattention/Neglect Appears intact   Cognition   Overall Cognitive Status WFL   Arousal/Participation Alert   Attention Within functional limits   Orientation Level Oriented X4   Memory Within functional limits   Following Commands Follows all commands and directions without difficulty   Assessment   Limitation Decreased endurance;Decreased high-level ADLs   Prognosis Good   Assessment Pt is a 88y/o male admitted to the hospital 2* symptoms of SOB, persistent cough. Pt noted with elevated troponins, COPD exacerbation. Pt with PMH bladder Ca, COPD, gout, kidney sx. PTA pt states independence with all aspects of his ADLs, transfers, ambulation--w/o device; +, neg falls. During initial eval, pt demonstrated slight deficits with his functional balance, functional  "mobility, and activity tolerance. Pt was able to demonstrate good ADL status and states being close to his functional baseline. Pt would benefit from a restorative program on the unit to improve his overall endurance, balance, and mobility. Acute OT tx not indicated at this time 2* pt's limited ADL deficits. The patient's raw score on the AM-PAC Daily Activity Inpatient Short Form is 24. A raw score of greater than or equal to 19 suggests the patient may benefit from discharge to home. Please refer to the recommendation of the Occupational Therapist for safe discharge planning.   Goals   Patient Goals \"to go home\"   Plan   OT Frequency Eval only   Discharge Recommendation   Rehab Resource Intensity Level, OT III (Minimum Resource Intensity)  (outpt pulmonary rehab(states he is already involved in a program))   AM-[facility] Daily Activity Inpatient   Lower Body Dressing 4   Bathing 4   Toileting 4   Upper Body Dressing 4   Grooming 4   Eating 4   Daily Activity Raw Score 24   Daily Activity Standardized Score (Calc for Raw Score >=11) 57.54   AM-PAC Applied Cognition Inpatient   Following a Speech/Presentation 4   Understanding Ordinary Conversation 4   Taking Medications 4   Remembering Where Things Are Placed or Put Away 4   Remembering List of 4-5 Errands 4   Taking Care of Complicated Tasks 4   Applied Cognition Raw Score 24   Applied Cognition Standardized Score 62.21   Wilmer Nelson            [1]   Past Medical History:  Diagnosis Date    Abnormal EKG     last assessed: 11/25/2014    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

## 2025-07-22 ENCOUNTER — HOME CARE VISIT (OUTPATIENT)
Dept: HOME HEALTH SERVICES | Facility: HOME HEALTHCARE | Age: OVER 89
End: 2025-07-22

## 2025-07-22 ENCOUNTER — PATIENT OUTREACH (OUTPATIENT)
Dept: CASE MANAGEMENT | Facility: OTHER | Age: OVER 89
End: 2025-07-22

## 2025-07-22 ENCOUNTER — APPOINTMENT (OUTPATIENT)
Dept: PULMONOLOGY | Facility: HOSPITAL | Age: OVER 89
End: 2025-07-22
Attending: INTERNAL MEDICINE
Payer: COMMERCIAL

## 2025-07-22 LAB
ATRIAL RATE: 77 BPM
P AXIS: 75 DEGREES
PR INTERVAL: 192 MS
QRS AXIS: 84 DEGREES
QRSD INTERVAL: 166 MS
QT INTERVAL: 458 MS
QTC INTERVAL: 519 MS
T WAVE AXIS: 57 DEGREES
VENTRICULAR RATE: 77 BPM

## 2025-07-22 PROCEDURE — 93010 ELECTROCARDIOGRAM REPORT: CPT | Performed by: STUDENT IN AN ORGANIZED HEALTH CARE EDUCATION/TRAINING PROGRAM

## 2025-07-22 NOTE — UTILIZATION REVIEW
NOTIFICATION OF ADMISSION DISCHARGE   This is a Notification of Discharge from [facility]. Please be advised that this patient has been discharge from our facility. Below you will find the admission and discharge date and time including the patient’s disposition.   UTILIZATION REVIEW CONTACT:  Utilization Review Assistants  Network Utilization Review Department  Phone: [phone] x carefully listen to the prompts. All voicemails are confidential.  Email: [email or web address]     ADMISSION INFORMATION  PRESENTATION DATE: 7/20/2025  7:25 AM  OBERVATION ADMISSION DATE: N/A  INPATIENT ADMISSION DATE: 7/20/25 12:49 PM   DISCHARGE DATE: 7/21/2025  2:58 PM   DISPOSITION:Home/Self Care    Network Utilization Review Department  ATTENTION: Please call with any questions or concerns to [phone] and carefully listen to the prompts so that you are directed to the right person. All voicemails are confidential.   For Discharge needs, contact Care Management DC Support Team at [phone] opt. 2  Send all requests for admission clinical reviews, approved or denied determinations and any other requests to dedicated fax number below belonging to the campus where the patient is receiving treatment. List of dedicated fax numbers for the Facilities:  FACILITY NAME UR FAX NUMBER   ADMISSION DENIALS (Administrative/Medical Necessity) [phone]   DISCHARGE SUPPORT TEAM ([facility]) [phone]   PARENT CHILD HEALTH (Maternity/NICU/Pediatrics) [phone]   [facility] [phone]   [facility] [phone]   [facility] [phone]   General acute hospital [phone]   [facility] [phone]   [facility] [phone]   [facility] [phone]   [facility] [phone]   [facility]  [facility] [phone]   [facility] [phone]   [facility] [phone]   [facility] [phone]

## 2025-07-22 NOTE — PROGRESS NOTES
OP RT CM called and spoke with patient and his wife Nesha ,regarding their breathing, medications and O2.     Woke up in middle of night sweaty and SOB. This has been happening off and on. HE does not wear  home oxygen or have POX. Patient has SIVA but is not able to tolerate his CPAP. We did discuss importance of use. Patient tries to use but can not tolerate.     Patient   attends pulmonary rehab but will miss this week due to recent hospitalization. Patient saw pulmonary mR on 5/8 Dr Cyr but does not have follow up on schedule. Wife will call to inquire. HE also does not have PCP apt until 9/24 and she will call for post hospital follow up.    Patient and wife needed reinforcement on respiratory medications.. Patient has both Albuterol and Ventolin HFA for rescue and was advised they are same functionally to take either / or not both Q6. He also takes Prednisone  ( which he is picking up today post hospitalization) , Incruse 1p QD, Stiolto 2p QAM and Striverdi 2 P QD. Patient has little to no co pays for his respiratory medications.  He verbalizes understanding of use and wrote down instructions.      Patient denies fever and coughing up secretions.Ramsey  has my contact information to call with questions. OPRT CM will outreach patient in 1 week for follow up.

## 2025-07-23 ENCOUNTER — TELEPHONE (OUTPATIENT)
Age: OVER 89
End: 2025-07-23

## 2025-07-23 ENCOUNTER — HOME CARE VISIT (OUTPATIENT)
Dept: HOME HEALTH SERVICES | Facility: HOME HEALTHCARE | Age: OVER 89
End: 2025-07-23
Attending: PHYSICIAN ASSISTANT

## 2025-07-23 NOTE — RESTORATIVE TECHNICIAN NOTE
Restorative Technician Note      Patient Name: Ramsey Palmer     Restorative Tech Visit Date: 07/23/25  Note Type: Mobility  Patient Position Upon Consult: Bedside chair  Activity Performed: Ambulated  Patient Position at End of Consult: All needs within reach; Bedside chair

## 2025-07-23 NOTE — TELEPHONE ENCOUNTER
Patient's spouse, Quin, called to schedule hospital follow up visit. Patient was at hospital from 7/20/25-7/21/25. Attempted to warm transfer but unable to connect. Spouse requested office call her  back to schedule appointment; [phone].

## 2025-07-24 ENCOUNTER — APPOINTMENT (OUTPATIENT)
Dept: PULMONOLOGY | Facility: HOSPITAL | Age: OVER 89
End: 2025-07-24
Attending: INTERNAL MEDICINE
Payer: COMMERCIAL

## 2025-07-25 ENCOUNTER — HOME CARE VISIT (OUTPATIENT)
Dept: HOME HEALTH SERVICES | Facility: HOME HEALTHCARE | Age: OVER 89
End: 2025-07-25

## 2025-07-25 ENCOUNTER — NURSE TRIAGE (OUTPATIENT)
Age: OVER 89
End: 2025-07-25

## 2025-07-25 ENCOUNTER — OFFICE VISIT (OUTPATIENT)
Dept: PULMONOLOGY | Facility: CLINIC | Age: OVER 89
End: 2025-07-25
Payer: COMMERCIAL

## 2025-07-25 VITALS
OXYGEN SATURATION: 93 % | HEART RATE: 86 BPM | TEMPERATURE: 97 F | WEIGHT: 217 LBS | HEIGHT: 71 IN | SYSTOLIC BLOOD PRESSURE: 130 MMHG | BODY MASS INDEX: 30.38 KG/M2 | DIASTOLIC BLOOD PRESSURE: 72 MMHG

## 2025-07-25 DIAGNOSIS — G47.33 OSA (OBSTRUCTIVE SLEEP APNEA): ICD-10-CM

## 2025-07-25 DIAGNOSIS — J44.1 COPD EXACERBATION (HCC): ICD-10-CM

## 2025-07-25 DIAGNOSIS — J44.9: Primary | ICD-10-CM

## 2025-07-25 PROCEDURE — 99214 OFFICE O/P EST MOD 30 MIN: CPT | Performed by: INTERNAL MEDICINE

## 2025-07-25 PROCEDURE — G2211 COMPLEX E/M VISIT ADD ON: HCPCS | Performed by: INTERNAL MEDICINE

## 2025-07-25 RX ORDER — FLUTICASONE FUROATE, UMECLIDINIUM BROMIDE AND VILANTEROL TRIFENATATE 100; 62.5; 25 UG/1; UG/1; UG/1
1 POWDER RESPIRATORY (INHALATION) DAILY
Qty: 60 BLISTER | Refills: 0 | Status: SHIPPED | OUTPATIENT
Start: 2025-07-25 | End: 2025-08-24

## 2025-07-25 RX ORDER — ALBUTEROL SULFATE 0.83 MG/ML
2.5 SOLUTION RESPIRATORY (INHALATION) EVERY 6 HOURS PRN
Qty: 60 ML | Refills: 2 | Status: SHIPPED | OUTPATIENT
Start: 2025-07-25

## 2025-07-25 NOTE — ASSESSMENT & PLAN NOTE
Patient with recent exacerbations. Also with prior peripheral eosinophilia  He presents today with a  box full of inhalers- Stiolto which I had been prescribing, Incruse and Striverdi- the hospital formulary version of LAMA and LABA which he was sent home with, and 4 boxes of albuterol  I told him I would like him to stop the Stiolto and switch to Trelegy and rinse mouth after use- if unaffordable he will continue Stiolto and let me know. They will put the Incruse and Striverdi aside to avoid confusion  He is in the midst of an exacerbation after a viral URI. Still with wheezing. Continue his prednisone taper. Prescribe a nebulizer and albuterol to go with that- will use inhaler or nebulizer q4-6 PRN until he is feeling better.  Orders:    fluticasone-umeclidinium-vilanterol (Trelegy Ellipta) 100-62.5-25 mcg/actuation inhaler; Inhale 1 puff daily Rinse mouth after use.    Nebulizer    albuterol (2.5 mg/3 mL) 0.083 % nebulizer solution; Take 3 mL (2.5 mg total) by nebulization every 6 (six) hours as needed for wheezing or shortness of breath

## 2025-07-25 NOTE — TELEPHONE ENCOUNTER
"REASON FOR CONVERSATION: Shortness of Breath    SYMPTOMS: Pt wife states pt having SOB one hour ago, cough-coughing up clear and green sputum intermittently. Pt Spo2 while on phone 92% P 67, SPO2 this AM 93% P 73. PT wife states pt not have SOB or wheezing while on phone. Pt wife states pt cannot use Stioloto inhaler & could not provider reason why. Pt wife states pt using Guaifenesin 2-4xs a day uses when he can due to all the medications he takes, and is using Albuterol inhaler more then recommended every 6 hours. Pt wife denies pt having fever, lightheadedness, dizziness, chest pain, hemoptysis. Pt wife states he has not had much relief since hospital. Scheduled appt today with provider.     OTHER HEALTH INFORMATION: COPD, Hospital Admission 7/20/25     PROTOCOL DISPOSITION: Go to [facility]/Urgent Care    CARE ADVICE PROVIDED: Fernandez back if you develop any new or worsening symptoms. Please go to nearest ER for severe breathing difficulty.     PRACTICE FOLLOW-UP: Keep appt today with Dr. Cyr.           Reason for Disposition   Longstanding difficulty breathing (e.g., CHF, COPD, emphysema) and worse than normal    Answer Assessment - Initial Assessment Questions  1. RESPIRATORY STATUS: \"Describe your breathing?\" (e.g., wheezing, shortness of breath, unable to speak, severe coughing)       Wheezing, SOB   2. ONSET: \"When did this breathing problem begin?\"       Ongoing since hospital   5. RECURRENT SYMPTOM: \"Have you had difficulty breathing before?\" If Yes, ask: \"When was the last time?\" and \"What happened that time?\"       Ongoing since hospital   6. CARDIAC HISTORY: \"Do you have any history of heart disease?\" (e.g., heart attack, angina, bypass surgery, angioplasty)       HTN   7. LUNG HISTORY: \"Do you have any history of lung disease?\"  (e.g., pulmonary embolus, asthma, emphysema)      COPD, ISVA   8. CAUSE: \"What do you think is causing the breathing problem?\"       COPD   9. OTHER SYMPTOMS: \"Do you have any " "other symptoms?\" (e.g., chest pain, cough, dizziness, fever, runny nose)      Cough-coughing up intermittent clear and green sputum   10. O2 SATURATION MONITOR:  \"Do you use an oxygen saturation monitor (pulse oximeter) at home?\" If Yes, ask: \"What is your reading (oxygen level) today?\" \"What is your usual oxygen saturation reading?\" (e.g., 95%)        92% P 67    Protocols used: Breathing Difficulty-Adult-[place]    "

## 2025-07-25 NOTE — PROGRESS NOTES
Follow-up  Visit - Pulmonary Medicine   Name: Ramsey Palmer      : 1935      MRN: [id number]  Encounter Provider: Ramsey Cyr MD  Encounter Date: 2025   Encounter department: [facility]  :  Assessment & Plan  COPD, group E, by GOLD  classification (HCC)  COPD exacerbation (HCC)  Patient with recent exacerbations. Also with prior peripheral eosinophilia  He presents today with a  box full of inhalers- Stiolto which I had been prescribing, Incruse and Striverdi- the hospital formulary version of LAMA and LABA which he was sent home with, and 4 boxes of albuterol  I told him I would like him to stop the Stiolto and switch to Trelegy and rinse mouth after use- if unaffordable he will continue Stiolto and let me know. They will put the Incruse and Striverdi aside to avoid confusion  He is in the midst of an exacerbation after a viral URI. Still with wheezing. Continue his prednisone taper. Prescribe a nebulizer and albuterol to go with that- will use inhaler or nebulizer q4-6 PRN until he is feeling better.  Orders:    fluticasone-umeclidinium-vilanterol (Trelegy Ellipta) 100-62.5-25 mcg/actuation inhaler; Inhale 1 puff daily Rinse mouth after use.    Nebulizer    albuterol (2.5 mg/3 mL) 0.083 % nebulizer solution; Take 3 mL (2.5 mg total) by nebulization every 6 (six) hours as needed for wheezing or shortness of breath    SIVA (obstructive sleep apnea)  Struggling with his CPAP- will readdress after his exacerbation resolves         Return in about 6 weeks (around 2025).    History of Present Illness   Ramsey Palmer is a 89 y.o. male who presents for follow-up for COPD.    Seen by me - had had recent exacerbation  Was put back on Stiolto  Went to ED - COPD exacerbation- sent home   Then came back  and admitted 1 day- discharged on a prednisone taper.  He called today- short of breath, coughing.  He is having trouble using Stiolto  inhaler.    In the midst of a prednisone taper.    No fevers/chills.  Does have some night sweats.        From prior note below    Exposure history:     Exposure to pets/birds/farm animals: No     Social history:     Smoking: Over 60 pack-years but quit in 1982     Alcohol, ilicit drugs (inhalational/ IV): No vaping. No drugs. No alcohol     Worked as: Worked in [employer]    Review of Systems    Aside from what is mentioned in the HPI, ROS is otherwise negative    Past Medical History   Past Medical History[1]  Past Surgical History[2]  Family History[3]   reports that he quit smoking about 43 years ago. His smoking use included cigarettes. He started smoking about 76 years ago. He has a 66 pack-year smoking history. He has never used smokeless tobacco. He reports that he does not currently use alcohol. He reports that he does not use drugs.  Current Outpatient Medications   Medication Instructions    albuterol (Ventolin HFA) 90 mcg/act inhaler 2 puffs, Inhalation, Every 6 hours PRN    albuterol 2.5 mg, Nebulization, Every 6 hours PRN    allopurinol (ZYLOPRIM) 100 mg, Oral, Daily    amLODIPine (NORVASC) 5 mg, Oral, Daily    atorvastatin (LIPITOR) 40 mg, Oral, Daily with dinner    Cholecalciferol (Vitamin D3) 10 MCG (400 UNIT) CAPS Take by mouth    clotrimazole (LOTRIMIN) 1 % cream Topical, 2 times daily    fluticasone-umeclidinium-vilanterol (Trelegy Ellipta) 100-62.5-25 mcg/actuation inhaler 1 puff, Inhalation, Daily, Rinse mouth after use.    guaiFENesin (ROBITUSSIN) 400 mg, Oral, 4 times daily PRN    hydroCHLOROthiazide 12.5 mg, Oral, Every morning    ILEVRO 0.3 % SUSP No dose, route, or frequency recorded.    losartan (COZAAR) 50 mg, Daily    meclizine (ANTIVERT) 25 mg, Oral, 3 times daily PRN    predniSONE 20 mg tablet Take 2 tablets (40 mg total) by mouth daily for 3 days, THEN 1.5 tablets (30 mg total) daily for 3 days, THEN 1 tablet (20 mg total) daily for 3 days, THEN 0.5 tablets (10 mg total)  "daily for 3 days.    tamsulosin (FLOMAX) 0.4 mg, Daily with dinner    vitamin B-12 (CYANOCOBALAMIN) 100 mcg, Daily   Allergies[4]      Medical History Reviewed by provider this encounter:     .    Objective   /72   Pulse 86   Temp (!) 97 °F (36.1 °C) (Tympanic)   Ht 5' 11\" (1.803 m)   Wt 98.4 kg (217 lb)   SpO2 93%   BMI 30.27 kg/m²     Physical Exam  Vitals and nursing note reviewed.   Constitutional:       General: He is not in acute distress.     Appearance: He is well-developed.   HENT:      Head: Normocephalic and atraumatic.     Eyes:      Conjunctiva/sclera: Conjunctivae normal.       Cardiovascular:      Rate and Rhythm: Normal rate and regular rhythm.      Heart sounds: No murmur heard.  Pulmonary:      Effort: Pulmonary effort is normal. No respiratory distress.      Breath sounds: Wheezing present.   Abdominal:      Palpations: Abdomen is soft.      Tenderness: There is no abdominal tenderness.     Musculoskeletal:         General: No swelling.      Cervical back: Neck supple.     Skin:     General: Skin is warm and dry.      Capillary Refill: Capillary refill takes less than 2 seconds.     Neurological:      Mental Status: He is alert.     Psychiatric:         Mood and Affect: Mood normal.           Diagnostic Data:  Labs: I personally reviewed the most recent laboratory data pertinent to today's visit.      Radiology results:  Radiology Results Review: I have reviewed the following images/report studies in PACS:     CXR 7/20/25  Lungs clear    CT Chest 7/18/25  No PE. Mild emphysema.  L calcified pleural plaque      PFT/spirometry results:  Home Sleep Study 7/10/24  TESTING RESULTS:  The test results are from [facility].  The total time in bed (analysis time) was 426.5 minutes.  The patient had a total of 156 respiratory events made up of 112 obstructive apneas, 0 central apneas, 0 mixed apneas and 44 hypopneas resulting in a respiratory event index (HANG) of 22.8.  The lowest SpO2 recorded is " 77%.     IMPRESSION:     This test is consistent with at least moderate obstructive sleep apnea.  2.   Significant hypoxemia was noted.      RECOMMENDATION:  A CPAP titration study is recommended due to sleep apnea and hypoxemia        Pulmonary Functions Testing Results: 4/2021     Spirometry:  FEV1/FVC Ratio is  65%. FEV1 is  1.98L/ 72% of predicted. FVC is  3.06L/ 82% of predicted.     Lung volumes:  RV  4.34L/ 156% of predicted, TLC  7.35L/ 103% of predicted, RV/TLC ratio  59%     Diffusing capacity:   66% of predicted     IMPRESSION:   mild obstructive air flow limitation on spirometry with normal vital capacity   normal total lung capacity, increased residual volume and RV/TLC ratio indicating mild air trapping   mildly impaired diffusion capacity   obstructive flow volume     Sleep Study 2018  AHI 18- moderate SIVA    Other studies:  TTE 2021    Very technically challenging study.     Left ventricle is normal in size. Wall thickness is normal. Systolic   function is hyperdynamic with an ejection fraction over 65%. Wall motion   is within normal limits. Unable to assess diastolic function.     Right ventricle cavity is normal. Systolic function is normal.     Mitral valve structure is normal. There is mild posterior annular   calcification. There is no regurgitation or stenosis.     Pulmonic Valve: The estimated pulmonary artery systolic pressure is   30.6 mmHg. Normal pulmonary artery pressure.     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    COPD with acute exacerbation (HCC)     last assessed: 2/7/2017    Diverticulosis     Eczema     last assessed: 4/30/2013    Esophagitis     Gout     last assessed: 5/19/2014    Nail avulsion of toe     Neoplasm of bladder     last assessed: 4/30/2013   [2]   Past Surgical History:  Procedure Laterality Date     HERNIA REPAIR      last assessed: 11/25/2014    KIDNEY SURGERY      description: removal of kidney stone last assessed: 11/25/2014    US GUIDED THYROID BIOPSY  5/11/2021   [3]   Family History  Problem Relation Name Age of Onset    Stroke Mother      Alzheimer's disease Father      Heart attack Sister      Cancer Family sibling     Lung cancer Brother     [4]   Allergies  Allergen Reactions    Levetiracetam Confusion and Anxiety

## 2025-07-25 NOTE — TELEPHONE ENCOUNTER
Regarding: SOB  ----- Message from Emilia WILLARD sent at 7/25/2025 11:33 AM EDT -----  Neeta called in and would like to speak to a nurse , He was was admitted in the hospital  a few days ago , Neeta stated she feels like he should of been kept not d/c patient is stilling SOB and Wheezing . Please advise

## 2025-07-29 ENCOUNTER — CLINICAL SUPPORT (OUTPATIENT)
Dept: PULMONOLOGY | Facility: HOSPITAL | Age: OVER 89
End: 2025-07-29
Attending: INTERNAL MEDICINE
Payer: COMMERCIAL

## 2025-07-29 ENCOUNTER — PATIENT OUTREACH (OUTPATIENT)
Dept: CASE MANAGEMENT | Facility: OTHER | Age: OVER 89
End: 2025-07-29

## 2025-07-29 DIAGNOSIS — J44.9 CHRONIC OBSTRUCTIVE PULMONARY DISEASE, UNSPECIFIED COPD TYPE (HCC): Primary | ICD-10-CM

## 2025-07-29 PROCEDURE — 94625 PHY/QHP OP PULM RHB W/O MNTR: CPT

## 2025-07-30 ENCOUNTER — OFFICE VISIT (OUTPATIENT)
Dept: FAMILY MEDICINE CLINIC | Facility: CLINIC | Age: OVER 89
End: 2025-07-30
Payer: COMMERCIAL

## 2025-07-30 VITALS
BODY MASS INDEX: 29.46 KG/M2 | HEART RATE: 62 BPM | OXYGEN SATURATION: 94 % | SYSTOLIC BLOOD PRESSURE: 150 MMHG | HEIGHT: 71 IN | DIASTOLIC BLOOD PRESSURE: 80 MMHG | WEIGHT: 210.4 LBS | TEMPERATURE: 97.7 F

## 2025-07-30 DIAGNOSIS — K40.90 RIGHT INGUINAL HERNIA: ICD-10-CM

## 2025-07-30 DIAGNOSIS — J44.9 CHRONIC OBSTRUCTIVE PULMONARY DISEASE, UNSPECIFIED COPD TYPE (HCC): Primary | ICD-10-CM

## 2025-07-30 DIAGNOSIS — J96.01 ACUTE HYPOXIC RESPIRATORY FAILURE (HCC): ICD-10-CM

## 2025-07-30 DIAGNOSIS — C67.9 MALIGNANT NEOPLASM OF URINARY BLADDER, UNSPECIFIED SITE (HCC): ICD-10-CM

## 2025-07-30 PROCEDURE — 99495 TRANSJ CARE MGMT MOD F2F 14D: CPT | Performed by: FAMILY MEDICINE

## 2025-07-31 ENCOUNTER — CLINICAL SUPPORT (OUTPATIENT)
Dept: PULMONOLOGY | Facility: HOSPITAL | Age: OVER 89
End: 2025-07-31
Attending: INTERNAL MEDICINE
Payer: COMMERCIAL

## 2025-07-31 DIAGNOSIS — J44.9 CHRONIC OBSTRUCTIVE PULMONARY DISEASE, UNSPECIFIED COPD TYPE (HCC): Primary | ICD-10-CM

## 2025-07-31 PROCEDURE — 94625 PHY/QHP OP PULM RHB W/O MNTR: CPT

## 2025-08-03 DIAGNOSIS — I10 BENIGN ESSENTIAL HYPERTENSION: ICD-10-CM

## 2025-08-04 RX ORDER — AMLODIPINE BESYLATE 5 MG/1
5 TABLET ORAL DAILY
Qty: 100 TABLET | Refills: 1 | Status: SHIPPED | OUTPATIENT
Start: 2025-08-04

## 2025-08-05 ENCOUNTER — CONSULT (OUTPATIENT)
Dept: SURGERY | Facility: CLINIC | Age: OVER 89
End: 2025-08-05
Attending: FAMILY MEDICINE
Payer: COMMERCIAL

## 2025-08-05 ENCOUNTER — PATIENT OUTREACH (OUTPATIENT)
Dept: CASE MANAGEMENT | Facility: OTHER | Age: OVER 89
End: 2025-08-05

## 2025-08-05 ENCOUNTER — CLINICAL SUPPORT (OUTPATIENT)
Dept: PULMONOLOGY | Facility: HOSPITAL | Age: OVER 89
End: 2025-08-05
Attending: INTERNAL MEDICINE
Payer: COMMERCIAL

## 2025-08-05 VITALS
BODY MASS INDEX: 29.4 KG/M2 | TEMPERATURE: 97.5 F | DIASTOLIC BLOOD PRESSURE: 72 MMHG | OXYGEN SATURATION: 94 % | HEART RATE: 88 BPM | HEIGHT: 71 IN | SYSTOLIC BLOOD PRESSURE: 114 MMHG | WEIGHT: 210 LBS

## 2025-08-05 DIAGNOSIS — M62.08 DIASTASIS OF RECTUS ABDOMINIS: ICD-10-CM

## 2025-08-05 DIAGNOSIS — K40.90 RIGHT INGUINAL HERNIA: ICD-10-CM

## 2025-08-05 DIAGNOSIS — J44.1 COPD EXACERBATION (HCC): Primary | ICD-10-CM

## 2025-08-05 DIAGNOSIS — J44.9 CHRONIC OBSTRUCTIVE PULMONARY DISEASE, UNSPECIFIED COPD TYPE (HCC): Primary | ICD-10-CM

## 2025-08-05 PROCEDURE — 94625 PHY/QHP OP PULM RHB W/O MNTR: CPT

## 2025-08-05 PROCEDURE — 99204 OFFICE O/P NEW MOD 45 MIN: CPT | Performed by: SURGERY

## 2025-08-07 ENCOUNTER — CLINICAL SUPPORT (OUTPATIENT)
Dept: PULMONOLOGY | Facility: HOSPITAL | Age: OVER 89
End: 2025-08-07
Attending: INTERNAL MEDICINE
Payer: COMMERCIAL

## 2025-08-07 DIAGNOSIS — J44.9 CHRONIC OBSTRUCTIVE PULMONARY DISEASE, UNSPECIFIED COPD TYPE (HCC): Primary | ICD-10-CM

## 2025-08-07 PROCEDURE — 94625 PHY/QHP OP PULM RHB W/O MNTR: CPT

## 2025-08-12 ENCOUNTER — CLINICAL SUPPORT (OUTPATIENT)
Dept: PULMONOLOGY | Facility: HOSPITAL | Age: OVER 89
End: 2025-08-12
Attending: INTERNAL MEDICINE
Payer: COMMERCIAL

## 2025-08-14 ENCOUNTER — CLINICAL SUPPORT (OUTPATIENT)
Dept: PULMONOLOGY | Facility: HOSPITAL | Age: OVER 89
End: 2025-08-14
Attending: INTERNAL MEDICINE
Payer: COMMERCIAL

## 2025-08-18 ENCOUNTER — PATIENT OUTREACH (OUTPATIENT)
Dept: CASE MANAGEMENT | Facility: OTHER | Age: OVER 89
End: 2025-08-18

## 2025-08-19 ENCOUNTER — CLINICAL SUPPORT (OUTPATIENT)
Dept: PULMONOLOGY | Facility: HOSPITAL | Age: OVER 89
End: 2025-08-19
Attending: INTERNAL MEDICINE
Payer: COMMERCIAL

## 2025-08-19 DIAGNOSIS — J44.9 CHRONIC OBSTRUCTIVE PULMONARY DISEASE, UNSPECIFIED COPD TYPE (HCC): Primary | ICD-10-CM

## 2025-08-19 PROCEDURE — 94625 PHY/QHP OP PULM RHB W/O MNTR: CPT

## 2025-08-20 ENCOUNTER — OFFICE VISIT (OUTPATIENT)
Dept: URGENT CARE | Facility: CLINIC | Age: OVER 89
End: 2025-08-20
Payer: COMMERCIAL

## 2025-08-20 VITALS
BODY MASS INDEX: 31.78 KG/M2 | HEART RATE: 70 BPM | OXYGEN SATURATION: 98 % | WEIGHT: 214.6 LBS | RESPIRATION RATE: 18 BRPM | SYSTOLIC BLOOD PRESSURE: 130 MMHG | TEMPERATURE: 97 F | HEIGHT: 69 IN | DIASTOLIC BLOOD PRESSURE: 74 MMHG

## 2025-08-20 DIAGNOSIS — L03.032 CELLULITIS OF TOE OF LEFT FOOT: Primary | ICD-10-CM

## 2025-08-20 PROCEDURE — 99213 OFFICE O/P EST LOW 20 MIN: CPT | Performed by: PHYSICIAN ASSISTANT

## 2025-08-20 RX ORDER — CEPHALEXIN 500 MG/1
500 CAPSULE ORAL EVERY 8 HOURS SCHEDULED
Qty: 21 CAPSULE | Refills: 0 | Status: SHIPPED | OUTPATIENT
Start: 2025-08-20 | End: 2025-08-27

## 2025-08-21 ENCOUNTER — CLINICAL SUPPORT (OUTPATIENT)
Dept: PULMONOLOGY | Facility: HOSPITAL | Age: OVER 89
End: 2025-08-21
Attending: INTERNAL MEDICINE
Payer: COMMERCIAL

## 2025-08-21 DIAGNOSIS — J44.9 CHRONIC OBSTRUCTIVE PULMONARY DISEASE, UNSPECIFIED COPD TYPE (HCC): Primary | ICD-10-CM

## 2025-08-21 PROCEDURE — 94625 PHY/QHP OP PULM RHB W/O MNTR: CPT
